# Patient Record
Sex: MALE | Race: WHITE | Employment: OTHER | URBAN - METROPOLITAN AREA
[De-identification: names, ages, dates, MRNs, and addresses within clinical notes are randomized per-mention and may not be internally consistent; named-entity substitution may affect disease eponyms.]

---

## 2024-02-27 ENCOUNTER — TELEPHONE (OUTPATIENT)
Dept: HEMATOLOGY | Age: 74
End: 2024-02-27

## 2024-02-27 ENCOUNTER — OFFICE VISIT (OUTPATIENT)
Dept: SURGERY | Age: 74
End: 2024-02-27
Payer: COMMERCIAL

## 2024-02-27 ENCOUNTER — HOSPITAL ENCOUNTER (INPATIENT)
Age: 74
LOS: 5 days | Discharge: HOME | DRG: 445 | End: 2024-03-03
Attending: TRANSPLANT SURGERY | Admitting: TRANSPLANT SURGERY
Payer: MEDICARE

## 2024-02-27 VITALS
HEIGHT: 73 IN | WEIGHT: 207 LBS | HEART RATE: 69 BPM | DIASTOLIC BLOOD PRESSURE: 50 MMHG | OXYGEN SATURATION: 98 % | TEMPERATURE: 98.2 F | BODY MASS INDEX: 27.43 KG/M2 | SYSTOLIC BLOOD PRESSURE: 121 MMHG | RESPIRATION RATE: 18 BRPM

## 2024-02-27 DIAGNOSIS — C22.1 CHOLANGIOCARCINOMA (HCC): Primary | ICD-10-CM

## 2024-02-27 DIAGNOSIS — C80.1 OBSTRUCTIVE JAUNDICE DUE TO MALIGNANT NEOPLASM (HCC): ICD-10-CM

## 2024-02-27 DIAGNOSIS — K83.1 OBSTRUCTIVE JAUNDICE DUE TO MALIGNANT NEOPLASM (HCC): ICD-10-CM

## 2024-02-27 LAB — GLUCOSE BLD-MCNC: 130 MG/DL (ref 74–99)

## 2024-02-27 PROCEDURE — 80053 COMPREHEN METABOLIC PANEL: CPT

## 2024-02-27 PROCEDURE — 99205 OFFICE O/P NEW HI 60 MIN: CPT | Performed by: TRANSPLANT SURGERY

## 2024-02-27 PROCEDURE — 6360000002 HC RX W HCPCS: Performed by: TRANSPLANT SURGERY

## 2024-02-27 PROCEDURE — 82962 GLUCOSE BLOOD TEST: CPT

## 2024-02-27 PROCEDURE — 86301 IMMUNOASSAY TUMOR CA 19-9: CPT

## 2024-02-27 PROCEDURE — 2580000003 HC RX 258

## 2024-02-27 PROCEDURE — 82378 CARCINOEMBRYONIC ANTIGEN: CPT

## 2024-02-27 PROCEDURE — 2580000003 HC RX 258: Performed by: TRANSPLANT SURGERY

## 2024-02-27 PROCEDURE — 36415 COLL VENOUS BLD VENIPUNCTURE: CPT

## 2024-02-27 PROCEDURE — 6370000000 HC RX 637 (ALT 250 FOR IP)

## 2024-02-27 PROCEDURE — 1200000000 HC SEMI PRIVATE

## 2024-02-27 PROCEDURE — 1123F ACP DISCUSS/DSCN MKR DOCD: CPT | Performed by: TRANSPLANT SURGERY

## 2024-02-27 RX ORDER — ONDANSETRON 2 MG/ML
4 INJECTION INTRAMUSCULAR; INTRAVENOUS EVERY 6 HOURS PRN
Status: DISCONTINUED | OUTPATIENT
Start: 2024-02-27 | End: 2024-03-03 | Stop reason: HOSPADM

## 2024-02-27 RX ORDER — ONDANSETRON 4 MG/1
4 TABLET, ORALLY DISINTEGRATING ORAL EVERY 8 HOURS PRN
Status: DISCONTINUED | OUTPATIENT
Start: 2024-02-27 | End: 2024-03-03 | Stop reason: HOSPADM

## 2024-02-27 RX ORDER — INSULIN LISPRO 100 [IU]/ML
0-8 INJECTION, SOLUTION INTRAVENOUS; SUBCUTANEOUS
Status: DISCONTINUED | OUTPATIENT
Start: 2024-02-27 | End: 2024-03-03 | Stop reason: HOSPADM

## 2024-02-27 RX ORDER — SODIUM CHLORIDE 0.9 % (FLUSH) 0.9 %
10 SYRINGE (ML) INJECTION EVERY 12 HOURS SCHEDULED
Status: DISCONTINUED | OUTPATIENT
Start: 2024-02-27 | End: 2024-03-03 | Stop reason: HOSPADM

## 2024-02-27 RX ORDER — CANDESARTAN CILEXETIL AND HYDROCHLOROTHIAZIDE 16; 12.5 MG/1; MG/1
1 TABLET ORAL DAILY
COMMUNITY

## 2024-02-27 RX ORDER — LABETALOL HYDROCHLORIDE 5 MG/ML
10 INJECTION, SOLUTION INTRAVENOUS
Status: DISCONTINUED | OUTPATIENT
Start: 2024-02-27 | End: 2024-03-03 | Stop reason: HOSPADM

## 2024-02-27 RX ORDER — HYDROXYZINE HYDROCHLORIDE 10 MG/1
10 TABLET, FILM COATED ORAL 3 TIMES DAILY PRN
Status: DISCONTINUED | OUTPATIENT
Start: 2024-02-27 | End: 2024-03-03 | Stop reason: HOSPADM

## 2024-02-27 RX ORDER — SODIUM CHLORIDE 0.9 % (FLUSH) 0.9 %
10 SYRINGE (ML) INJECTION PRN
Status: DISCONTINUED | OUTPATIENT
Start: 2024-02-27 | End: 2024-03-03 | Stop reason: HOSPADM

## 2024-02-27 RX ORDER — SODIUM CHLORIDE 9 MG/ML
INJECTION, SOLUTION INTRAVENOUS PRN
Status: DISCONTINUED | OUTPATIENT
Start: 2024-02-27 | End: 2024-03-03 | Stop reason: HOSPADM

## 2024-02-27 RX ORDER — PANTOPRAZOLE SODIUM 40 MG/1
40 TABLET, DELAYED RELEASE ORAL 2 TIMES DAILY
Qty: 60 TABLET | Refills: 5 | Status: SHIPPED | OUTPATIENT
Start: 2024-02-27

## 2024-02-27 RX ORDER — DEXTROSE MONOHYDRATE 100 MG/ML
INJECTION, SOLUTION INTRAVENOUS CONTINUOUS PRN
Status: DISCONTINUED | OUTPATIENT
Start: 2024-02-27 | End: 2024-03-03 | Stop reason: HOSPADM

## 2024-02-27 RX ORDER — INSULIN LISPRO 100 [IU]/ML
0-16 INJECTION, SOLUTION INTRAVENOUS; SUBCUTANEOUS
Status: DISCONTINUED | OUTPATIENT
Start: 2024-02-27 | End: 2024-02-27

## 2024-02-27 RX ORDER — GLUCAGON 1 MG/ML
1 KIT INJECTION PRN
Status: DISCONTINUED | OUTPATIENT
Start: 2024-02-27 | End: 2024-03-03 | Stop reason: HOSPADM

## 2024-02-27 RX ORDER — INSULIN LISPRO 100 [IU]/ML
0-4 INJECTION, SOLUTION INTRAVENOUS; SUBCUTANEOUS NIGHTLY
Status: DISCONTINUED | OUTPATIENT
Start: 2024-02-27 | End: 2024-02-27

## 2024-02-27 RX ORDER — HYDROXYZINE HYDROCHLORIDE 25 MG/1
50 TABLET, FILM COATED ORAL EVERY 8 HOURS PRN
Qty: 30 TABLET | Refills: 0 | Status: SHIPPED | OUTPATIENT
Start: 2024-02-27 | End: 2024-03-08

## 2024-02-27 RX ORDER — HYDRALAZINE HYDROCHLORIDE 20 MG/ML
10 INJECTION INTRAMUSCULAR; INTRAVENOUS
Status: DISCONTINUED | OUTPATIENT
Start: 2024-02-27 | End: 2024-03-03 | Stop reason: HOSPADM

## 2024-02-27 RX ORDER — PANTOPRAZOLE SODIUM 40 MG/1
40 TABLET, DELAYED RELEASE ORAL
Status: DISCONTINUED | OUTPATIENT
Start: 2024-02-28 | End: 2024-03-03 | Stop reason: HOSPADM

## 2024-02-27 RX ORDER — CANDESARTAN 16 MG/1
16 TABLET ORAL DAILY
COMMUNITY

## 2024-02-27 RX ORDER — PIOGLITAZONEHYDROCHLORIDE 30 MG/1
30 TABLET ORAL DAILY
COMMUNITY

## 2024-02-27 RX ORDER — ENOXAPARIN SODIUM 100 MG/ML
40 INJECTION SUBCUTANEOUS DAILY
Status: DISCONTINUED | OUTPATIENT
Start: 2024-02-28 | End: 2024-03-03 | Stop reason: HOSPADM

## 2024-02-27 RX ADMIN — SODIUM CHLORIDE, PRESERVATIVE FREE 10 ML: 5 INJECTION INTRAVENOUS at 22:59

## 2024-02-27 RX ADMIN — PIPERACILLIN AND TAZOBACTAM 4500 MG: 4; .5 INJECTION, POWDER, FOR SOLUTION INTRAVENOUS at 23:04

## 2024-02-27 RX ADMIN — ONDANSETRON 4 MG: 4 TABLET, ORALLY DISINTEGRATING ORAL at 23:00

## 2024-02-27 ASSESSMENT — ENCOUNTER SYMPTOMS
SHORTNESS OF BREATH: 0
CONSTIPATION: 0
ABDOMINAL PAIN: 0
NAUSEA: 0
BLOOD IN STOOL: 0
PHOTOPHOBIA: 0
BACK PAIN: 0
EYE DISCHARGE: 0
DIARRHEA: 0
EYE PAIN: 0
VOMITING: 0

## 2024-02-27 NOTE — PROGRESS NOTES
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  He has noted to have chronic pancreatitis with diabetes mellitus.  He has not smoked cigarettes in over 40 years and has not drank alcohol in over 30 years.  He presented to the hospital in [place] where he was noted to have obstructive jaundice.  He underwent an ERCP with brushings.  His bilirubin at that time was 14.  He did have placement of a plastic stent and was noted to have a tight common hepatic duct stricture.  His CA 19-9 at that time was 11,000.  On CT imaging he was noted to have bulky portal lymphadenopathy. His ERCP brushings were positive for adenocarcinoma.  He states that he has lost 30 pounds over the last 2 months and he also feels dizzy.  He is eating but is still very jaundiced.  He noticed that his stools are still feliz colored and his urine is still tea colored.  He is also having intermittent nausea and emesis and is mostly eating fruit.  He also states that he is sleeping more.  He has had diabetes mellitus for the last 5 to 7 years and states that his hemoglobin A1c was 8 but he was able to get it down to 5.3.  He does have a past surgical history of having a cholecystectomy for gangrenous cholecystitis in 2017 and was also noted to have a fatty liver at that time which was found under biopsy.  He also in 2016 had a [place] 6 prostate cancer and underwent brachytherapy.    Past Medical History:   Diagnosis Date    Cancer (HCC)        PMH: gallbladder and ankle surgeries    Current Outpatient Medications   Medication Sig Dispense Refill    candesartan (ATACAND) 16 MG tablet Take 1 tablet by mouth daily      metFORMIN (GLUCOPHAGE) 500 MG tablet Take 1 tablet by mouth

## 2024-02-27 NOTE — TELEPHONE ENCOUNTER
I spoke with BUNNY Velázquez from direct admitting. Dr Reynoso wanted patient to be direct admitted for today for SE[facility]. DX cholangiocarcinoma/obstructive jaundice. All info was given to BUNNY Velázquez while on the phone. I also spoke with the patients son tOf to let them know they will receive a call when a bed is ready from either our office or direct admitting, that they should not go to the hospital now. Otf confirmed all of the above and at this time all questions were answered  Electronically signed by Erika Escalante MA on 2/27/2024 at 10:18 AM

## 2024-02-28 ENCOUNTER — APPOINTMENT (OUTPATIENT)
Dept: CT IMAGING | Age: 74
DRG: 445 | End: 2024-02-28
Attending: TRANSPLANT SURGERY
Payer: MEDICARE

## 2024-02-28 ENCOUNTER — ANESTHESIA EVENT (OUTPATIENT)
Dept: ENDOSCOPY | Age: 74
End: 2024-02-28
Payer: MEDICARE

## 2024-02-28 PROBLEM — K83.1 OBSTRUCTIVE JAUNDICE DUE TO MALIGNANT NEOPLASM (HCC): Status: ACTIVE | Noted: 2024-02-28

## 2024-02-28 PROBLEM — C80.1 OBSTRUCTIVE JAUNDICE DUE TO MALIGNANT NEOPLASM (HCC): Status: ACTIVE | Noted: 2024-02-28

## 2024-02-28 PROBLEM — N17.9 AKI (ACUTE KIDNEY INJURY) (HCC): Status: ACTIVE | Noted: 2024-02-28

## 2024-02-28 LAB
ALBUMIN SERPL-MCNC: 2.8 G/DL (ref 3.5–5.2)
ALBUMIN SERPL-MCNC: 3.1 G/DL (ref 3.5–5.2)
ALP SERPL-CCNC: 298 U/L (ref 40–129)
ALP SERPL-CCNC: 338 U/L (ref 40–129)
ALT SERPL-CCNC: 197 U/L (ref 0–40)
ALT SERPL-CCNC: 219 U/L (ref 0–40)
ANION GAP SERPL CALCULATED.3IONS-SCNC: 10 MMOL/L (ref 7–16)
ANION GAP SERPL CALCULATED.3IONS-SCNC: 18 MMOL/L (ref 7–16)
ANION GAP SERPL CALCULATED.3IONS-SCNC: 20 MMOL/L (ref 7–16)
AST SERPL-CCNC: 195 U/L (ref 0–39)
AST SERPL-CCNC: 219 U/L (ref 0–39)
B-OH-BUTYR SERPL-MCNC: 0.14 MMOL/L (ref 0.02–0.27)
BASOPHILS # BLD: 0.05 K/UL (ref 0–0.2)
BASOPHILS NFR BLD: 1 % (ref 0–2)
BILIRUB SERPL-MCNC: 18.6 MG/DL (ref 0–1.2)
BILIRUB SERPL-MCNC: 21.5 MG/DL (ref 0–1.2)
BUN SERPL-MCNC: 62 MG/DL (ref 6–23)
BUN SERPL-MCNC: 74 MG/DL (ref 6–23)
BUN SERPL-MCNC: 76 MG/DL (ref 6–23)
CALCIUM SERPL-MCNC: 9 MG/DL (ref 8.6–10.2)
CALCIUM SERPL-MCNC: 9.1 MG/DL (ref 8.6–10.2)
CALCIUM SERPL-MCNC: 9.7 MG/DL (ref 8.6–10.2)
CEA SERPL-MCNC: 92.3 NG/ML (ref 0–5.2)
CHLORIDE SERPL-SCNC: 102 MMOL/L (ref 98–107)
CHLORIDE SERPL-SCNC: 104 MMOL/L (ref 98–107)
CHLORIDE SERPL-SCNC: 99 MMOL/L (ref 98–107)
CO2 SERPL-SCNC: 17 MMOL/L (ref 22–29)
CO2 SERPL-SCNC: 19 MMOL/L (ref 22–29)
CO2 SERPL-SCNC: 24 MMOL/L (ref 22–29)
CREAT SERPL-MCNC: 1.7 MG/DL (ref 0.7–1.2)
CREAT SERPL-MCNC: 1.8 MG/DL (ref 0.7–1.2)
CREAT SERPL-MCNC: 2 MG/DL (ref 0.7–1.2)
EOSINOPHIL # BLD: 0.03 K/UL (ref 0.05–0.5)
EOSINOPHILS RELATIVE PERCENT: 0 % (ref 0–6)
ERYTHROCYTE [DISTWIDTH] IN BLOOD BY AUTOMATED COUNT: 16 % (ref 11.5–15)
GFR SERPL CREATININE-BSD FRML MDRD: 35 ML/MIN/1.73M2
GFR SERPL CREATININE-BSD FRML MDRD: 39 ML/MIN/1.73M2
GFR SERPL CREATININE-BSD FRML MDRD: 42 ML/MIN/1.73M2
GLUCOSE BLD-MCNC: 107 MG/DL (ref 74–99)
GLUCOSE BLD-MCNC: 110 MG/DL (ref 74–99)
GLUCOSE BLD-MCNC: 122 MG/DL (ref 74–99)
GLUCOSE BLD-MCNC: 153 MG/DL (ref 74–99)
GLUCOSE SERPL-MCNC: 117 MG/DL (ref 74–99)
GLUCOSE SERPL-MCNC: 122 MG/DL (ref 74–99)
GLUCOSE SERPL-MCNC: 134 MG/DL (ref 74–99)
HCT VFR BLD AUTO: 29.4 % (ref 37–54)
HGB BLD-MCNC: 9.7 G/DL (ref 12.5–16.5)
IMM GRANULOCYTES # BLD AUTO: 0.22 K/UL (ref 0–0.58)
IMM GRANULOCYTES NFR BLD: 2 % (ref 0–5)
INR PPP: 1.3
LACTATE BLDV-SCNC: 1.9 MMOL/L (ref 0.5–2.2)
LYMPHOCYTES NFR BLD: 1.83 K/UL (ref 1.5–4)
LYMPHOCYTES RELATIVE PERCENT: 18 % (ref 20–42)
MCH RBC QN AUTO: 32.4 PG (ref 26–35)
MCHC RBC AUTO-ENTMCNC: 33 G/DL (ref 32–34.5)
MCV RBC AUTO: 98.3 FL (ref 80–99.9)
MONOCYTES NFR BLD: 0.84 K/UL (ref 0.1–0.95)
MONOCYTES NFR BLD: 8 % (ref 2–12)
NEUTROPHILS NFR BLD: 71 % (ref 43–80)
NEUTS SEG NFR BLD: 7.32 K/UL (ref 1.8–7.3)
PH VENOUS: 7.36 (ref 7.35–7.45)
PLATELET # BLD AUTO: 187 K/UL (ref 130–450)
PMV BLD AUTO: 11.2 FL (ref 7–12)
POTASSIUM SERPL-SCNC: 4.2 MMOL/L (ref 3.5–5)
POTASSIUM SERPL-SCNC: 4.2 MMOL/L (ref 3.5–5)
POTASSIUM SERPL-SCNC: 4.5 MMOL/L (ref 3.5–5)
PROT SERPL-MCNC: 6 G/DL (ref 6.4–8.3)
PROT SERPL-MCNC: 6.8 G/DL (ref 6.4–8.3)
PROTHROMBIN TIME: 14.1 SEC (ref 9.3–12.4)
RBC # BLD AUTO: 2.99 M/UL (ref 3.8–5.8)
SODIUM SERPL-SCNC: 136 MMOL/L (ref 132–146)
SODIUM SERPL-SCNC: 138 MMOL/L (ref 132–146)
SODIUM SERPL-SCNC: 139 MMOL/L (ref 132–146)
WBC OTHER # BLD: 10.3 K/UL (ref 4.5–11.5)

## 2024-02-28 PROCEDURE — 74175 CTA ABDOMEN W/CONTRAST: CPT

## 2024-02-28 PROCEDURE — 2580000003 HC RX 258: Performed by: STUDENT IN AN ORGANIZED HEALTH CARE EDUCATION/TRAINING PROGRAM

## 2024-02-28 PROCEDURE — 6360000004 HC RX CONTRAST MEDICATION: Performed by: RADIOLOGY

## 2024-02-28 PROCEDURE — 80053 COMPREHEN METABOLIC PANEL: CPT

## 2024-02-28 PROCEDURE — 99222 1ST HOSP IP/OBS MODERATE 55: CPT | Performed by: TRANSPLANT SURGERY

## 2024-02-28 PROCEDURE — 82800 BLOOD PH: CPT

## 2024-02-28 PROCEDURE — 82010 KETONE BODYS QUAN: CPT

## 2024-02-28 PROCEDURE — 6370000000 HC RX 637 (ALT 250 FOR IP)

## 2024-02-28 PROCEDURE — 71260 CT THORAX DX C+: CPT

## 2024-02-28 PROCEDURE — 6360000002 HC RX W HCPCS

## 2024-02-28 PROCEDURE — 99223 1ST HOSP IP/OBS HIGH 75: CPT | Performed by: NURSE PRACTITIONER

## 2024-02-28 PROCEDURE — 83605 ASSAY OF LACTIC ACID: CPT

## 2024-02-28 PROCEDURE — 1200000000 HC SEMI PRIVATE

## 2024-02-28 PROCEDURE — 2580000003 HC RX 258

## 2024-02-28 PROCEDURE — 80048 BASIC METABOLIC PNL TOTAL CA: CPT

## 2024-02-28 PROCEDURE — 2580000003 HC RX 258: Performed by: INTERNAL MEDICINE

## 2024-02-28 PROCEDURE — 85025 COMPLETE CBC W/AUTO DIFF WBC: CPT

## 2024-02-28 PROCEDURE — 36415 COLL VENOUS BLD VENIPUNCTURE: CPT

## 2024-02-28 PROCEDURE — 85610 PROTHROMBIN TIME: CPT

## 2024-02-28 PROCEDURE — 82962 GLUCOSE BLOOD TEST: CPT

## 2024-02-28 RX ORDER — SODIUM CHLORIDE, SODIUM LACTATE, POTASSIUM CHLORIDE, CALCIUM CHLORIDE 600; 310; 30; 20 MG/100ML; MG/100ML; MG/100ML; MG/100ML
INJECTION, SOLUTION INTRAVENOUS CONTINUOUS
Status: DISCONTINUED | OUTPATIENT
Start: 2024-02-28 | End: 2024-02-28

## 2024-02-28 RX ORDER — SODIUM CHLORIDE 0.9 % (FLUSH) 0.9 %
5-40 SYRINGE (ML) INJECTION EVERY 12 HOURS SCHEDULED
Status: CANCELLED | OUTPATIENT
Start: 2024-02-28

## 2024-02-28 RX ORDER — SODIUM CHLORIDE 9 MG/ML
25 INJECTION, SOLUTION INTRAVENOUS PRN
Status: CANCELLED | OUTPATIENT
Start: 2024-02-28

## 2024-02-28 RX ORDER — SODIUM CHLORIDE, SODIUM LACTATE, POTASSIUM CHLORIDE, AND CALCIUM CHLORIDE .6; .31; .03; .02 G/100ML; G/100ML; G/100ML; G/100ML
1000 INJECTION, SOLUTION INTRAVENOUS ONCE
Status: COMPLETED | OUTPATIENT
Start: 2024-02-28 | End: 2024-02-28

## 2024-02-28 RX ORDER — DEXTROSE, SODIUM CHLORIDE, SODIUM LACTATE, POTASSIUM CHLORIDE, AND CALCIUM CHLORIDE 5; .6; .31; .03; .02 G/100ML; G/100ML; G/100ML; G/100ML; G/100ML
INJECTION, SOLUTION INTRAVENOUS CONTINUOUS
Status: DISCONTINUED | OUTPATIENT
Start: 2024-02-28 | End: 2024-03-03

## 2024-02-28 RX ORDER — SODIUM CHLORIDE 0.9 % (FLUSH) 0.9 %
5-40 SYRINGE (ML) INJECTION PRN
Status: CANCELLED | OUTPATIENT
Start: 2024-02-28

## 2024-02-28 RX ADMIN — PIPERACILLIN AND TAZOBACTAM 4500 MG: 4; .5 INJECTION, POWDER, FOR SOLUTION INTRAVENOUS at 09:05

## 2024-02-28 RX ADMIN — ONDANSETRON 4 MG: 4 TABLET, ORALLY DISINTEGRATING ORAL at 09:51

## 2024-02-28 RX ADMIN — SODIUM CHLORIDE, POTASSIUM CHLORIDE, SODIUM LACTATE AND CALCIUM CHLORIDE: 600; 310; 30; 20 INJECTION, SOLUTION INTRAVENOUS at 02:00

## 2024-02-28 RX ADMIN — SODIUM CHLORIDE, PRESERVATIVE FREE 10 ML: 5 INJECTION INTRAVENOUS at 09:01

## 2024-02-28 RX ADMIN — Medication 10 ML: at 12:44

## 2024-02-28 RX ADMIN — PIPERACILLIN AND TAZOBACTAM 4500 MG: 4; .5 INJECTION, POWDER, FOR SOLUTION INTRAVENOUS at 16:44

## 2024-02-28 RX ADMIN — SODIUM CHLORIDE, POTASSIUM CHLORIDE, SODIUM LACTATE AND CALCIUM CHLORIDE 1000 ML: 600; 310; 30; 20 INJECTION, SOLUTION INTRAVENOUS at 06:51

## 2024-02-28 RX ADMIN — SODIUM CHLORIDE, PRESERVATIVE FREE 10 ML: 5 INJECTION INTRAVENOUS at 20:46

## 2024-02-28 RX ADMIN — ENOXAPARIN SODIUM 40 MG: 100 INJECTION SUBCUTANEOUS at 09:01

## 2024-02-28 RX ADMIN — IOPAMIDOL 75 ML: 755 INJECTION, SOLUTION INTRAVENOUS at 12:44

## 2024-02-28 RX ADMIN — SODIUM CHLORIDE, SODIUM LACTATE, POTASSIUM CHLORIDE, CALCIUM CHLORIDE AND DEXTROSE MONOHYDRATE: 5; 600; 310; 30; 20 INJECTION, SOLUTION INTRAVENOUS at 09:03

## 2024-02-28 RX ADMIN — PANTOPRAZOLE SODIUM 40 MG: 40 TABLET, DELAYED RELEASE ORAL at 06:08

## 2024-02-28 RX ADMIN — PANTOPRAZOLE SODIUM 40 MG: 40 TABLET, DELAYED RELEASE ORAL at 16:41

## 2024-02-28 NOTE — CONSULTS
Palliative Care Department  [phone]  Palliative Care Initial Consult  Provider Regina Crandall, ELDA - SHAWN    Raudel Herndon  [id number]  Hospital Day: 2  Date of Initial Consult: 2/27/2024  Referring Provider: Janny Bojorquez MD  Palliative Medicine was consulted for assistance with: Goals of care and overwhelming symptoms     HPI:   Raudel Herndon is a 73 y.o. with a medical history of recently diagnosed extrahepatic cholangiocarcinoma, diabetes and chronic pancreatitis who was admitted on 2/27/2024 from home with a CHIEF COMPLAINT of obstructive jaundice.  Patient was seen at hepatobiliary clinic 2/27 and was recommended admission for obstructive jaundice.  1/2024 patient was evaluated for obstructive jaundice and underwent ERCP with stent placement.  Brushings were obtained and pathology positive for adenocarcinoma.  Patient was admitted for further medical management and palliative medicine was consulted for goals of care and overwhelming symptoms.  ASSESSMENT/PLAN:     Pertinent Hospital Diagnoses     Extrahepatic cholangiocarcinoma    Palliative Care Encounter / Counseling Regarding Goals of Care  Please see detailed goals of care discussion as below  At this time, Raudel Herndon, Does have capacity for medical decision-making.  Capacity is time limited and situation/question specific  During encounter there was no surrogate medical decision-maker needed  Outcome of goals of care meeting:   Symptom control  Continue full code and all aggressive medical management   Code status Full Code  Advanced Directives: no POA or living will in epic  Surrogate/Legal NOK:  Otf Herndon (child) [phone]      Nausea:   -Continue Zofran 4 mg p.o. every 8 hours as needed or 4 mg IV every 6 hours as needed    Spiritual assessment: no spiritual distress identified  Bereavement and grief: to be determined  Referrals to: none today  SUBJECTIVE:     Current medical issues leading to Palliative Medicine involvement include   Active 
Department of Internal Medicine  Nephrology Nurse Practitioner Consult Note      Reason for Consult: NANCY  Requesting Physician:  Sourav Rodrigez MD     CHIEF COMPLAINT: Jaundice    History Obtained From:  patient, electronic medical record    HISTORY OF PRESENT ILLNESS:  Briefly Mr. Herndon is a 73-year-old  male with history of type II DM, chronic pancreatitis, recent diagnosed with obstructive jaundice due to extrahepatic cholangiocarcinoma status post stent placement in January 2024, elevated CEA 19-9, who was a direct admit on February 27 2024 after he was seen by Dr. Reynoso on his office.  On admission his creatinine level was 1.8 mg/dL, reason for this consultation.  Patient reports having persistent nausea with intermittent emesis.  He has lost significant weight since January.  His medications prior to admission included candesartan.  There is no baseline creatinine on records.    Past Medical History:        Diagnosis Date    Cancer (HCC)      Past Surgical History:    No past surgical history on file.  Current Medications:    Current Facility-Administered Medications: dextrose 5 % in lactated ringers infusion, , IntraVENous, Continuous  sodium chloride flush 0.9 % injection 10 mL, 10 mL, IntraVENous, 2 times per day  sodium chloride flush 0.9 % injection 10 mL, 10 mL, IntraVENous, PRN  0.9 % sodium chloride infusion, , IntraVENous, PRN  ondansetron (ZOFRAN-ODT) disintegrating tablet 4 mg, 4 mg, Oral, Q8H PRN **OR** ondansetron (ZOFRAN) injection 4 mg, 4 mg, IntraVENous, Q6H PRN  enoxaparin (LOVENOX) injection 40 mg, 40 mg, SubCUTAneous, Daily  pantoprazole (PROTONIX) tablet 40 mg, 40 mg, Oral, BID AC  hydrOXYzine HCl (ATARAX) tablet 10 mg, 10 mg, Oral, TID PRN  piperacillin-tazobactam (ZOSYN) 4,500 mg in sodium chloride 0.9 % 100 mL IVPB (Rzvr0Ohb), 4,500 mg, IntraVENous, Q8H  labetalol (NORMODYNE;TRANDATE) injection 10 mg, 10 mg, IntraVENous, Q2H PRN  hydrALAZINE (APRESOLINE) injection 10 
[facility]  Hematology/Oncology  Consult      Patient Name: Raudel Herndon  YOB: 1950  PCP: [facility]   Referring Provider: [address] [place] [address] / [facility] [id number]     Reason for Consultation: No chief complaint on file.       History of Present Illness: This is a 73-year-old male patient with a PMH of prostate cancer [place] score 6 s/p brachytherapy in 2016, DM, s/p cholecystectomy for gangrenous cholecystitis in 2017, fatty liver diagnosed on biopsy, and chronic pancreatitis who presented to the ED for evaluation obstructive jaundice after seen by Dr Oh in his office yesterday as he is following for newly diagnosed with extrahepatic cholangiocarcinoma with local camron metastasis. (Minimum T2 with N1/N2).  Patient initially presented to [facility] January 2024 for evaluation of obstructive jaundice and underwent an ERCP with plastic stent placement at that time with noted tight common hepatic duct stricture. ERCP brushings were obtained with pathology positive for adenocarcinoma. CA 19-9 at that time was 11,000 and bilirubin was 14. He also reported 30 lb weight loss in the past 2 months with an additional 10 to 15 in the past couple of weeks since diagnosis. HPBS consulted since admission with CTA pancreas protocol and CT chest ordered. CEA 92.3; Ca19-9 pending.  It was discussed that he does need neoadjuvant chemotherapy as long as everything remains confined to his bile duct and lymph nodes.  Due to his lymph node metastasis and with a positive tissue diagnosis of adenocarcinoma he would strongly benefit from chemotherapy prior to surgical intervention. GI consulted for ERCP. Pending his biliary decompression he may need a PTHC if that is unsuccessful. ERCP scheduled tomorrow. Palliative following. CMP with BUN 74, Cr 2.0, GFR 35. LFT's ALK Phos 298, , , bilit total 18.6 (21.5 yesterday). CBC with hgb 9.7; ANC 7.32 otherwise unremarkable. 
MD   pioglitazone (ACTOS) 30 MG tablet Take 1 tablet by mouth daily    Provider, MD Kwadwo   pantoprazole (PROTONIX) 40 MG tablet Take 1 tablet by mouth in the morning and at bedtime 2/27/24   Raza Reynoso III, MD   hydrOXYzine HCl (ATARAX) 25 MG tablet Take 2 tablets by mouth every 8 hours as needed for Itching 2/27/24 3/8/24  Raza Reynoso III, MD       ALLERGIES:  Adhesive tape    SOCIAL Hx:  Social History     Socioeconomic History    Marital status:      Spouse name: Not on file    Number of children: Not on file    Years of education: Not on file    Highest education level: Not on file   Occupational History    Not on file   Tobacco Use    Smoking status: Former     Types: Cigarettes    Smokeless tobacco: Not on file   Vaping Use    Vaping Use: Never used   Substance and Sexual Activity    Alcohol use: Not Currently    Drug use: Never    Sexual activity: Not on file   Other Topics Concern    Not on file   Social History Narrative    Not on file     Social Determinants of Health     Financial Resource Strain: Not on file   Food Insecurity: No Food Insecurity (2/27/2024)    Hunger Vital Sign     Worried About Running Out of Food in the Last Year: Never true     Ran Out of Food in the Last Year: Never true   Transportation Needs: No Transportation Needs (2/27/2024)    PRAPARE - Transportation     Lack of Transportation (Medical): No     Lack of Transportation (Non-Medical): No   Physical Activity: Not on file   Stress: Not on file   Social Connections: Not on file   Intimate Partner Violence: Not on file   Housing Stability: Low Risk  (2/27/2024)    Housing Stability Vital Sign     Unable to Pay for Housing in the Last Year: No     Number of Places Lived in the Last Year: 2     Unstable Housing in the Last Year: No       PE:  BP (!) 108/57   Pulse 58   Temp 97.4 °F (36.3 °C) (Temporal)   Resp 18   Ht 1.867 m (6' 1.5\")   Wt 93.1 kg (205 lb 3.2 oz)   SpO2 100%   BMI

## 2024-02-28 NOTE — PLAN OF CARE
Problem: Discharge Planning  Goal: Discharge to home or other facility with appropriate resources  Outcome: Progressing     Problem: Safety - Adult  Goal: Free from fall injury  Outcome: Progressing  Flowsheets (Taken 2/27/2024 2203)  Free From Fall Injury: Instruct family/caregiver on patient safety     Problem: ABCDS Injury Assessment  Goal: Absence of physical injury  Outcome: Progressing  Flowsheets (Taken 2/27/2024 2203)  Absence of Physical Injury: Implement safety measures based on patient assessment

## 2024-02-28 NOTE — PLAN OF CARE
Problem: Discharge Planning  Goal: Discharge to home or other facility with appropriate resources  2/28/2024 1024 by Leny Leblanc RN  Outcome: Progressing  2/28/2024 0247 by Lashaun Buckner RN  Outcome: Progressing     Problem: Safety - Adult  Goal: Free from fall injury  2/28/2024 1024 by Leny Leblanc RN  Outcome: Progressing  2/28/2024 0247 by Lashaun Buckner RN  Outcome: Progressing  Flowsheets (Taken 2/27/2024 2203)  Free From Fall Injury: Instruct family/caregiver on patient safety     Problem: ABCDS Injury Assessment  Goal: Absence of physical injury  2/28/2024 1024 by Leny Leblanc RN  Outcome: Progressing  2/28/2024 0247 by Lashaun Buckner RN  Outcome: Progressing  Flowsheets (Taken 2/27/2024 2203)  Absence of Physical Injury: Implement safety measures based on patient assessment

## 2024-02-28 NOTE — CARE COORDINATION
2/28/2024Social work transition of care planning  Pt is from home with his son. Pt reported that he use a cane prn.Pt pcp is [facility] and pharmacy is [facility] in [place], [place]. Explained sw role in transition of care planning. Pt plan is home,pt will call for a ride at [place].  Electronically signed by RAMYA Interiano on 2/28/2024 at 10:50 AM

## 2024-02-29 ENCOUNTER — APPOINTMENT (OUTPATIENT)
Dept: GENERAL RADIOLOGY | Age: 74
DRG: 445 | End: 2024-02-29
Attending: TRANSPLANT SURGERY
Payer: MEDICARE

## 2024-02-29 ENCOUNTER — ANESTHESIA (OUTPATIENT)
Dept: ENDOSCOPY | Age: 74
End: 2024-02-29
Payer: MEDICARE

## 2024-02-29 PROBLEM — E44.0 MODERATE PROTEIN-CALORIE MALNUTRITION (HCC): Status: ACTIVE | Noted: 2024-02-29

## 2024-02-29 LAB
ALBUMIN SERPL-MCNC: 2.5 G/DL (ref 3.5–5.2)
ALP SERPL-CCNC: 285 U/L (ref 40–129)
ALT SERPL-CCNC: 187 U/L (ref 0–40)
ANION GAP SERPL CALCULATED.3IONS-SCNC: 11 MMOL/L (ref 7–16)
AST SERPL-CCNC: 196 U/L (ref 0–39)
BASOPHILS # BLD: 0.05 K/UL (ref 0–0.2)
BASOPHILS NFR BLD: 1 % (ref 0–2)
BILIRUB SERPL-MCNC: 17.6 MG/DL (ref 0–1.2)
BUN SERPL-MCNC: 52 MG/DL (ref 6–23)
CALCIUM SERPL-MCNC: 8.7 MG/DL (ref 8.6–10.2)
CANCER AG19-9 SERPL IA-ACNC: ABNORMAL U/ML (ref 0–35)
CHLORIDE SERPL-SCNC: 108 MMOL/L (ref 98–107)
CO2 SERPL-SCNC: 22 MMOL/L (ref 22–29)
CREAT SERPL-MCNC: 1.7 MG/DL (ref 0.7–1.2)
EOSINOPHIL # BLD: 0.21 K/UL (ref 0.05–0.5)
EOSINOPHILS RELATIVE PERCENT: 3 % (ref 0–6)
ERYTHROCYTE [DISTWIDTH] IN BLOOD BY AUTOMATED COUNT: 15.9 % (ref 11.5–15)
GFR SERPL CREATININE-BSD FRML MDRD: 43 ML/MIN/1.73M2
GLUCOSE BLD-MCNC: 117 MG/DL (ref 74–99)
GLUCOSE BLD-MCNC: 169 MG/DL (ref 74–99)
GLUCOSE BLD-MCNC: 95 MG/DL (ref 74–99)
GLUCOSE SERPL-MCNC: 114 MG/DL (ref 74–99)
HCT VFR BLD AUTO: 29.3 % (ref 37–54)
HGB BLD-MCNC: 9.7 G/DL (ref 12.5–16.5)
IMM GRANULOCYTES # BLD AUTO: 0.15 K/UL (ref 0–0.58)
IMM GRANULOCYTES NFR BLD: 2 % (ref 0–5)
LYMPHOCYTES NFR BLD: 1.33 K/UL (ref 1.5–4)
LYMPHOCYTES RELATIVE PERCENT: 17 % (ref 20–42)
MAGNESIUM SERPL-MCNC: 2.2 MG/DL (ref 1.6–2.6)
MCH RBC QN AUTO: 32.6 PG (ref 26–35)
MCHC RBC AUTO-ENTMCNC: 33.1 G/DL (ref 32–34.5)
MCV RBC AUTO: 98.3 FL (ref 80–99.9)
MONOCYTES NFR BLD: 0.76 K/UL (ref 0.1–0.95)
MONOCYTES NFR BLD: 10 % (ref 2–12)
NEUTROPHILS NFR BLD: 68 % (ref 43–80)
NEUTS SEG NFR BLD: 5.37 K/UL (ref 1.8–7.3)
PHOSPHATE SERPL-MCNC: 2.5 MG/DL (ref 2.5–4.5)
PLATELET # BLD AUTO: 176 K/UL (ref 130–450)
PMV BLD AUTO: 11.1 FL (ref 7–12)
POTASSIUM SERPL-SCNC: 4 MMOL/L (ref 3.5–5)
PROT SERPL-MCNC: 5.5 G/DL (ref 6.4–8.3)
RBC # BLD AUTO: 2.98 M/UL (ref 3.8–5.8)
SODIUM SERPL-SCNC: 141 MMOL/L (ref 132–146)
WBC OTHER # BLD: 7.9 K/UL (ref 4.5–11.5)

## 2024-02-29 PROCEDURE — C2625 STENT, NON-COR, TEM W/DEL SY: HCPCS | Performed by: INTERNAL MEDICINE

## 2024-02-29 PROCEDURE — 83735 ASSAY OF MAGNESIUM: CPT

## 2024-02-29 PROCEDURE — 99232 SBSQ HOSP IP/OBS MODERATE 35: CPT | Performed by: TRANSPLANT SURGERY

## 2024-02-29 PROCEDURE — 3700000000 HC ANESTHESIA ATTENDED CARE: Performed by: INTERNAL MEDICINE

## 2024-02-29 PROCEDURE — C1876 STENT, NON-COA/NON-COV W/DEL: HCPCS | Performed by: INTERNAL MEDICINE

## 2024-02-29 PROCEDURE — 3609018800 HC ERCP DX COLLECTION SPECIMEN BRUSHING/WASHING: Performed by: INTERNAL MEDICINE

## 2024-02-29 PROCEDURE — 3609015100 HC ERCP STENT PLACEMENT BILIARY/PANCREATIC DUCT: Performed by: INTERNAL MEDICINE

## 2024-02-29 PROCEDURE — 2580000003 HC RX 258: Performed by: NURSE ANESTHETIST, CERTIFIED REGISTERED

## 2024-02-29 PROCEDURE — 1200000000 HC SEMI PRIVATE

## 2024-02-29 PROCEDURE — 82962 GLUCOSE BLOOD TEST: CPT

## 2024-02-29 PROCEDURE — 2580000003 HC RX 258

## 2024-02-29 PROCEDURE — 3700000001 HC ADD 15 MINUTES (ANESTHESIA): Performed by: INTERNAL MEDICINE

## 2024-02-29 PROCEDURE — 2709999900 HC NON-CHARGEABLE SUPPLY: Performed by: INTERNAL MEDICINE

## 2024-02-29 PROCEDURE — 6360000002 HC RX W HCPCS

## 2024-02-29 PROCEDURE — 2580000003 HC RX 258: Performed by: INTERNAL MEDICINE

## 2024-02-29 PROCEDURE — 3609014300 HC ERCP BALLOON DILATE BILIARY/PANC DUCT/AMPULLA EA: Performed by: INTERNAL MEDICINE

## 2024-02-29 PROCEDURE — 3609015000 HC ERCP REMOVE FOREIGN BODY/STENT BILIARY/PANC DUCT: Performed by: INTERNAL MEDICINE

## 2024-02-29 PROCEDURE — 6370000000 HC RX 637 (ALT 250 FOR IP): Performed by: STUDENT IN AN ORGANIZED HEALTH CARE EDUCATION/TRAINING PROGRAM

## 2024-02-29 PROCEDURE — 0FPB8DZ REMOVAL OF INTRALUMINAL DEVICE FROM HEPATOBILIARY DUCT, VIA NATURAL OR ARTIFICIAL OPENING ENDOSCOPIC: ICD-10-PCS | Performed by: INTERNAL MEDICINE

## 2024-02-29 PROCEDURE — 85025 COMPLETE CBC W/AUTO DIFF WBC: CPT

## 2024-02-29 PROCEDURE — 84100 ASSAY OF PHOSPHORUS: CPT

## 2024-02-29 PROCEDURE — 99232 SBSQ HOSP IP/OBS MODERATE 35: CPT | Performed by: NURSE PRACTITIONER

## 2024-02-29 PROCEDURE — 0F798DZ DILATION OF COMMON BILE DUCT WITH INTRALUMINAL DEVICE, VIA NATURAL OR ARTIFICIAL OPENING ENDOSCOPIC: ICD-10-PCS | Performed by: INTERNAL MEDICINE

## 2024-02-29 PROCEDURE — 7100000000 HC PACU RECOVERY - FIRST 15 MIN: Performed by: INTERNAL MEDICINE

## 2024-02-29 PROCEDURE — 6370000000 HC RX 637 (ALT 250 FOR IP)

## 2024-02-29 PROCEDURE — 74330 X-RAY BILE/PANC ENDOSCOPY: CPT

## 2024-02-29 PROCEDURE — 2580000003 HC RX 258: Performed by: NURSE PRACTITIONER

## 2024-02-29 PROCEDURE — 7100000001 HC PACU RECOVERY - ADDTL 15 MIN: Performed by: INTERNAL MEDICINE

## 2024-02-29 PROCEDURE — 6360000002 HC RX W HCPCS: Performed by: NURSE ANESTHETIST, CERTIFIED REGISTERED

## 2024-02-29 PROCEDURE — 36415 COLL VENOUS BLD VENIPUNCTURE: CPT

## 2024-02-29 PROCEDURE — 2720000010 HC SURG SUPPLY STERILE: Performed by: INTERNAL MEDICINE

## 2024-02-29 PROCEDURE — 80053 COMPREHEN METABOLIC PANEL: CPT

## 2024-02-29 DEVICE — BILIARY STENT WITH RX DELIVERY SYSTEM
Type: IMPLANTABLE DEVICE | Site: BILE DUCT | Status: FUNCTIONAL
Brand: RX BILIARY

## 2024-02-29 DEVICE — STENT SYSTEM
Type: IMPLANTABLE DEVICE | Site: BILE DUCT | Status: FUNCTIONAL
Brand: WALLFLEX™ BILIARY

## 2024-02-29 RX ORDER — MIDAZOLAM HYDROCHLORIDE 1 MG/ML
INJECTION INTRAMUSCULAR; INTRAVENOUS PRN
Status: DISCONTINUED | OUTPATIENT
Start: 2024-02-29 | End: 2024-02-29 | Stop reason: SDUPTHER

## 2024-02-29 RX ORDER — INDOMETHACIN 100 MG
100 SUPPOSITORY, RECTAL RECTAL ONCE
Status: DISCONTINUED | OUTPATIENT
Start: 2024-02-29 | End: 2024-03-03 | Stop reason: HOSPADM

## 2024-02-29 RX ORDER — POLYETHYLENE GLYCOL 3350 17 G/17G
17 POWDER, FOR SOLUTION ORAL DAILY
Status: DISCONTINUED | OUTPATIENT
Start: 2024-02-29 | End: 2024-03-03 | Stop reason: HOSPADM

## 2024-02-29 RX ORDER — SODIUM CHLORIDE, SODIUM LACTATE, POTASSIUM CHLORIDE, AND CALCIUM CHLORIDE .6; .31; .03; .02 G/100ML; G/100ML; G/100ML; G/100ML
800 INJECTION, SOLUTION INTRAVENOUS ONCE
Status: COMPLETED | OUTPATIENT
Start: 2024-02-29 | End: 2024-02-29

## 2024-02-29 RX ORDER — ONDANSETRON 2 MG/ML
INJECTION INTRAMUSCULAR; INTRAVENOUS PRN
Status: DISCONTINUED | OUTPATIENT
Start: 2024-02-29 | End: 2024-02-29 | Stop reason: SDUPTHER

## 2024-02-29 RX ORDER — PROPOFOL 10 MG/ML
INJECTION, EMULSION INTRAVENOUS CONTINUOUS PRN
Status: DISCONTINUED | OUTPATIENT
Start: 2024-02-29 | End: 2024-02-29 | Stop reason: SDUPTHER

## 2024-02-29 RX ORDER — GLYCOPYRROLATE 0.2 MG/ML
INJECTION INTRAMUSCULAR; INTRAVENOUS PRN
Status: DISCONTINUED | OUTPATIENT
Start: 2024-02-29 | End: 2024-02-29 | Stop reason: SDUPTHER

## 2024-02-29 RX ORDER — SENNA AND DOCUSATE SODIUM 50; 8.6 MG/1; MG/1
2 TABLET, FILM COATED ORAL 2 TIMES DAILY
Status: DISCONTINUED | OUTPATIENT
Start: 2024-02-29 | End: 2024-03-03 | Stop reason: HOSPADM

## 2024-02-29 RX ORDER — SODIUM CHLORIDE, SODIUM LACTATE, POTASSIUM CHLORIDE, CALCIUM CHLORIDE 600; 310; 30; 20 MG/100ML; MG/100ML; MG/100ML; MG/100ML
INJECTION, SOLUTION INTRAVENOUS CONTINUOUS PRN
Status: DISCONTINUED | OUTPATIENT
Start: 2024-02-29 | End: 2024-02-29 | Stop reason: SDUPTHER

## 2024-02-29 RX ORDER — FENTANYL CITRATE 50 UG/ML
INJECTION, SOLUTION INTRAMUSCULAR; INTRAVENOUS PRN
Status: DISCONTINUED | OUTPATIENT
Start: 2024-02-29 | End: 2024-02-29 | Stop reason: SDUPTHER

## 2024-02-29 RX ADMIN — SODIUM CHLORIDE, POTASSIUM CHLORIDE, SODIUM LACTATE AND CALCIUM CHLORIDE: 600; 310; 30; 20 INJECTION, SOLUTION INTRAVENOUS at 17:00

## 2024-02-29 RX ADMIN — PANTOPRAZOLE SODIUM 40 MG: 40 TABLET, DELAYED RELEASE ORAL at 06:27

## 2024-02-29 RX ADMIN — FENTANYL CITRATE 50 MCG: 50 INJECTION, SOLUTION INTRAMUSCULAR; INTRAVENOUS at 17:06

## 2024-02-29 RX ADMIN — ONDANSETRON 4 MG: 2 INJECTION INTRAMUSCULAR; INTRAVENOUS at 17:16

## 2024-02-29 RX ADMIN — PIPERACILLIN AND TAZOBACTAM 4500 MG: 4; .5 INJECTION, POWDER, FOR SOLUTION INTRAVENOUS at 01:38

## 2024-02-29 RX ADMIN — GLYCOPYRROLATE 0.2 MG: 1 INJECTION INTRAMUSCULAR; INTRAVENOUS at 17:15

## 2024-02-29 RX ADMIN — SODIUM CHLORIDE, SODIUM LACTATE, POTASSIUM CHLORIDE, CALCIUM CHLORIDE AND DEXTROSE MONOHYDRATE: 5; 600; 310; 30; 20 INJECTION, SOLUTION INTRAVENOUS at 01:36

## 2024-02-29 RX ADMIN — SODIUM CHLORIDE, PRESERVATIVE FREE 10 ML: 5 INJECTION INTRAVENOUS at 20:24

## 2024-02-29 RX ADMIN — ONDANSETRON 4 MG: 2 INJECTION INTRAMUSCULAR; INTRAVENOUS at 23:37

## 2024-02-29 RX ADMIN — MIDAZOLAM 1 MG: 1 INJECTION INTRAMUSCULAR; INTRAVENOUS at 17:06

## 2024-02-29 RX ADMIN — PIPERACILLIN AND TAZOBACTAM 4500 MG: 4; .5 INJECTION, POWDER, FOR SOLUTION INTRAVENOUS at 20:24

## 2024-02-29 RX ADMIN — SENNOSIDES AND DOCUSATE SODIUM 2 TABLET: 8.6; 5 TABLET ORAL at 09:20

## 2024-02-29 RX ADMIN — PIPERACILLIN AND TAZOBACTAM 4500 MG: 4; .5 INJECTION, POWDER, FOR SOLUTION INTRAVENOUS at 09:42

## 2024-02-29 RX ADMIN — ENOXAPARIN SODIUM 40 MG: 100 INJECTION SUBCUTANEOUS at 09:16

## 2024-02-29 RX ADMIN — PROPOFOL 100 MCG/KG/MIN: 10 INJECTION, EMULSION INTRAVENOUS at 17:06

## 2024-02-29 RX ADMIN — SODIUM CHLORIDE, PRESERVATIVE FREE 10 ML: 5 INJECTION INTRAVENOUS at 09:23

## 2024-02-29 RX ADMIN — SENNOSIDES AND DOCUSATE SODIUM 2 TABLET: 8.6; 5 TABLET ORAL at 20:15

## 2024-02-29 RX ADMIN — SODIUM CHLORIDE, POTASSIUM CHLORIDE, SODIUM LACTATE AND CALCIUM CHLORIDE 800 ML: 600; 310; 30; 20 INJECTION, SOLUTION INTRAVENOUS at 14:05

## 2024-02-29 ASSESSMENT — PAIN - FUNCTIONAL ASSESSMENT: PAIN_FUNCTIONAL_ASSESSMENT: NONE - DENIES PAIN

## 2024-02-29 NOTE — PLAN OF CARE
Problem: Discharge Planning  Goal: Discharge to home or other facility with appropriate resources  Outcome: Progressing     Problem: Safety - Adult  Goal: Free from fall injury  Outcome: Progressing  Flowsheets (Taken 2/28/2024 2000)  Free From Fall Injury: Instruct family/caregiver on patient safety     Problem: ABCDS Injury Assessment  Goal: Absence of physical injury  Outcome: Progressing  Flowsheets (Taken 2/28/2024 2000)  Absence of Physical Injury: Implement safety measures based on patient assessment      PAST MEDICAL HISTORY:  Diabetes     DVT (deep venous thrombosis)     Factor V Leiden     HTN (hypertension)

## 2024-02-29 NOTE — CARE COORDINATION
2/29/2024Social work transition of care planning  Pt plan is home,once medically stable. On Iv atb,Id not consulted. Sw will follow. Pt will call for a ride at [place].  Electronically signed by RAMYA Interiano on 2/29/2024 at 8:24 AM

## 2024-02-29 NOTE — PLAN OF CARE
Problem: Discharge Planning  Goal: Discharge to home or other facility with appropriate resources  2/29/2024 0959 by Leny Leblanc RN  Outcome: Progressing  2/29/2024 0054 by Lashaun Buckner RN  Outcome: Progressing     Problem: Safety - Adult  Goal: Free from fall injury  2/29/2024 0959 by Leny Leblanc RN  Outcome: Progressing  2/29/2024 0054 by Lashaun Buckner RN  Outcome: Progressing  Flowsheets (Taken 2/28/2024 2000)  Free From Fall Injury: Instruct family/caregiver on patient safety     Problem: ABCDS Injury Assessment  Goal: Absence of physical injury  2/29/2024 0959 by Leny Leblanc RN  Outcome: Progressing  2/29/2024 0054 by Lashaun Buckner RN  Outcome: Progressing  Flowsheets (Taken 2/28/2024 2000)  Absence of Physical Injury: Implement safety measures based on patient assessment

## 2024-02-29 NOTE — ANESTHESIA POSTPROCEDURE EVALUATION
Department of Anesthesiology  Postprocedure Note    Patient: Raudel Herndon  MRN: [id number]  YOB: 1950  Date of evaluation: 2/29/2024    Procedure Summary       Date: 02/29/24 Room / Location: [place] [address] / [facility]    Anesthesia Start: 1700 Anesthesia Stop:     Procedures:       ENDOSCOPIC RETROGRADE CHOLANGIOPANCREATOGRAPHY      ENDOSCOPIC RETROGRADE CHOLANGIOPANCREATOGRAPHY DILATION BALLOON      ENDOSCOPIC RETROGRADE CHOLANGIOPANCREATOGRAPHY STENT REMOVAL      ENDOSCOPIC RETROGRADE CHOLANGIOPANCREATOGRAPHY STENT INSERTION Diagnosis:       Choledocholithiasis      (Choledocholithiasis [K80.50])    Surgeons: Raza Lane DO Responsible Provider: Shobha Nguyen MD    Anesthesia Type: MAC ASA Status: 3            Anesthesia Type: No value filed.    Andrews Phase I: Andrews Score: 9    Andrews Phase II:      Anesthesia Post Evaluation    Patient location during evaluation: PACU  Patient participation: complete - patient participated  Level of consciousness: awake  Airway patency: patent  Nausea & Vomiting: no nausea and no vomiting  Cardiovascular status: hemodynamically stable  Respiratory status: acceptable  Hydration status: euvolemic  Pain management: adequate    No notable events documented.

## 2024-02-29 NOTE — BRIEF OP NOTE
Brief Postoperative Note      Patient: Raudel Herndon  YOB: 1950  MRN: [id number]    Date of Procedure: 2/29/2024    Pre-Op Diagnosis: Obstructive jaundice, cholangiocarcinoma    Post-Op Diagnosis: Biliary stricture, biliary stent removal and replacement       Procedure(s):  ENDOSCOPIC RETROGRADE CHOLANGIOPANCREATOGRAPHY  ENDOSCOPIC RETROGRADE CHOLANGIOPANCREATOGRAPHY DILATION BALLOON  ENDOSCOPIC RETROGRADE CHOLANGIOPANCREATOGRAPHY STENT REMOVAL  ENDOSCOPIC RETROGRADE CHOLANGIOPANCREATOGRAPHY STENT INSERTION    Surgeon(s):  Raza Lane DO    Assistant:  * No surgical staff found *    Anesthesia: Monitor Anesthesia Care    Estimated Blood Loss (mL): Minimal    Complications: None    Specimens:   * No specimens in log *    Implants:  Implant Name Type Inv. Item Serial No.  Lot No. LRB No. Used Action   STENT BILI L80MM [id number] CATH L194CM 8FR GWIRE 0.035IN MTL - [id number]  STENT BILI L80MM [id number] CATH L194CM 8FR GWIRE 0.035IN [facility] [id number] [employer]- [id number] N/A 1 Implanted   STENT BILI AD 7FR L10CM 0.035IN PLAS DUODENAL BEND - [id number]  STENT BILI AD 7FR L10CM 0.035IN PLAS DUODENAL BEND  [employer] [employer]- [id number] N/A 1 Implanted      Findings:   -Biliary stent removal   -High grade common hepatic duct stricture, with significant upstream dilation  -diffusely narrowed intrahepatic ducts  -Biliary stent placement uncovered 10mm x 80mm MAXINE with traversing 7F x 10cm plastic barbed duodenal bend.       - Full procedure note to follow    Electronically signed by Raza Lane DO on 2/29/2024 at 5:53 PM

## 2024-02-29 NOTE — ANESTHESIA PRE PROCEDURE
History:        Diagnosis Date    Cancer (HCC)        Past Surgical History:  No past surgical history on file.    Social History:    Social History     Tobacco Use    Smoking status: Former     Types: Cigarettes    Smokeless tobacco: Not on file   Substance Use Topics    Alcohol use: Not Currently                                Counseling given: Not Answered      Vital Signs (Current):   Vitals:    02/28/24 2000 02/29/24 0730 02/29/24 0957 02/29/24 1200   BP: (!) 109/57 (!) 100/59  (!) 96/54   Pulse: 67 62  68   Resp: 17 18  18   Temp: 36.3 °C (97.4 °F) 36.4 °C (97.5 °F)  36.2 °C (97.2 °F)   TempSrc: Temporal Temporal  Temporal   SpO2: 97% 99%  100%   Weight:   95 kg (209 lb 7 oz)    Height:   1.867 m (6' 1.5\")                                               BP Readings from Last 3 Encounters:   02/29/24 (!) 96/54   02/27/24 (!) 121/50       NPO Status: Time of last liquid consumption: 2300                        Time of last solid consumption: 2300                        Date of last liquid consumption: 02/28/24                        Date of last solid food consumption: 02/28/24    BMI:   Wt Readings from Last 3 Encounters:   02/29/24 95 kg (209 lb 7 oz)   02/27/24 93.9 kg (207 lb)     Body mass index is 27.25 kg/m².    CBC:   Lab Results   Component Value Date/Time    WBC 7.9 02/29/2024 04:56 AM    RBC 2.98 02/29/2024 04:56 AM    HGB 9.7 02/29/2024 04:56 AM    HCT 29.3 02/29/2024 04:56 AM    MCV 98.3 02/29/2024 04:56 AM    RDW 15.9 02/29/2024 04:56 AM     02/29/2024 04:56 AM       CMP:   Lab Results   Component Value Date/Time     02/29/2024 04:56 AM    K 4.0 02/29/2024 04:56 AM     02/29/2024 04:56 AM    CO2 22 02/29/2024 04:56 AM    BUN 52 02/29/2024 04:56 AM    CREATININE 1.7 02/29/2024 04:56 AM    LABGLOM 43 02/29/2024 04:56 AM    GLUCOSE 114 02/29/2024 04:56 AM    PROT 5.5 02/29/2024 04:56 AM    CALCIUM 8.7 02/29/2024 04:56 AM    BILITOT 17.6 02/29/2024 04:56 AM    ALKPHOS 285 02/29/2024  10

## 2024-03-01 LAB
ALBUMIN SERPL-MCNC: 2.6 G/DL (ref 3.5–5.2)
ALP SERPL-CCNC: 274 U/L (ref 40–129)
ALT SERPL-CCNC: 180 U/L (ref 0–40)
ANION GAP SERPL CALCULATED.3IONS-SCNC: 13 MMOL/L (ref 7–16)
AST SERPL-CCNC: 196 U/L (ref 0–39)
BASOPHILS # BLD: 0.04 K/UL (ref 0–0.2)
BASOPHILS NFR BLD: 1 % (ref 0–2)
BILIRUB SERPL-MCNC: 16.1 MG/DL (ref 0–1.2)
BUN SERPL-MCNC: 42 MG/DL (ref 6–23)
CALCIUM SERPL-MCNC: 8.5 MG/DL (ref 8.6–10.2)
CHLORIDE SERPL-SCNC: 106 MMOL/L (ref 98–107)
CO2 SERPL-SCNC: 22 MMOL/L (ref 22–29)
CREAT SERPL-MCNC: 1.6 MG/DL (ref 0.7–1.2)
EOSINOPHIL # BLD: 0.13 K/UL (ref 0.05–0.5)
EOSINOPHILS RELATIVE PERCENT: 2 % (ref 0–6)
ERYTHROCYTE [DISTWIDTH] IN BLOOD BY AUTOMATED COUNT: 16 % (ref 11.5–15)
GFR SERPL CREATININE-BSD FRML MDRD: 47 ML/MIN/1.73M2
GLUCOSE BLD-MCNC: 144 MG/DL (ref 74–99)
GLUCOSE BLD-MCNC: 147 MG/DL (ref 74–99)
GLUCOSE BLD-MCNC: 147 MG/DL (ref 74–99)
GLUCOSE BLD-MCNC: 162 MG/DL (ref 74–99)
GLUCOSE BLD-MCNC: 180 MG/DL (ref 74–99)
GLUCOSE SERPL-MCNC: 120 MG/DL (ref 74–99)
HCT VFR BLD AUTO: 27.2 % (ref 37–54)
HGB BLD-MCNC: 9 G/DL (ref 12.5–16.5)
IMM GRANULOCYTES # BLD AUTO: 0.15 K/UL (ref 0–0.58)
IMM GRANULOCYTES NFR BLD: 2 % (ref 0–5)
LYMPHOCYTES NFR BLD: 1.35 K/UL (ref 1.5–4)
LYMPHOCYTES RELATIVE PERCENT: 17 % (ref 20–42)
MAGNESIUM SERPL-MCNC: 2 MG/DL (ref 1.6–2.6)
MCH RBC QN AUTO: 32.6 PG (ref 26–35)
MCHC RBC AUTO-ENTMCNC: 33.1 G/DL (ref 32–34.5)
MCV RBC AUTO: 98.6 FL (ref 80–99.9)
MONOCYTES NFR BLD: 0.7 K/UL (ref 0.1–0.95)
MONOCYTES NFR BLD: 9 % (ref 2–12)
NEUTROPHILS NFR BLD: 70 % (ref 43–80)
NEUTS SEG NFR BLD: 5.62 K/UL (ref 1.8–7.3)
PHOSPHATE SERPL-MCNC: 1.9 MG/DL (ref 2.5–4.5)
PLATELET # BLD AUTO: 166 K/UL (ref 130–450)
PMV BLD AUTO: 11.1 FL (ref 7–12)
POTASSIUM SERPL-SCNC: 3.9 MMOL/L (ref 3.5–5)
PROT SERPL-MCNC: 5.3 G/DL (ref 6.4–8.3)
RBC # BLD AUTO: 2.76 M/UL (ref 3.8–5.8)
SODIUM SERPL-SCNC: 141 MMOL/L (ref 132–146)
WBC OTHER # BLD: 8 K/UL (ref 4.5–11.5)

## 2024-03-01 PROCEDURE — 2580000003 HC RX 258: Performed by: INTERNAL MEDICINE

## 2024-03-01 PROCEDURE — 99232 SBSQ HOSP IP/OBS MODERATE 35: CPT | Performed by: NURSE PRACTITIONER

## 2024-03-01 PROCEDURE — P9047 ALBUMIN (HUMAN), 25%, 50ML: HCPCS | Performed by: INTERNAL MEDICINE

## 2024-03-01 PROCEDURE — 82962 GLUCOSE BLOOD TEST: CPT

## 2024-03-01 PROCEDURE — 85025 COMPLETE CBC W/AUTO DIFF WBC: CPT

## 2024-03-01 PROCEDURE — 6360000002 HC RX W HCPCS: Performed by: INTERNAL MEDICINE

## 2024-03-01 PROCEDURE — 6370000000 HC RX 637 (ALT 250 FOR IP)

## 2024-03-01 PROCEDURE — 6360000002 HC RX W HCPCS

## 2024-03-01 PROCEDURE — 2500000003 HC RX 250 WO HCPCS: Performed by: STUDENT IN AN ORGANIZED HEALTH CARE EDUCATION/TRAINING PROGRAM

## 2024-03-01 PROCEDURE — 99232 SBSQ HOSP IP/OBS MODERATE 35: CPT | Performed by: TRANSPLANT SURGERY

## 2024-03-01 PROCEDURE — 84100 ASSAY OF PHOSPHORUS: CPT

## 2024-03-01 PROCEDURE — 2580000003 HC RX 258: Performed by: STUDENT IN AN ORGANIZED HEALTH CARE EDUCATION/TRAINING PROGRAM

## 2024-03-01 PROCEDURE — 80053 COMPREHEN METABOLIC PANEL: CPT

## 2024-03-01 PROCEDURE — 1200000000 HC SEMI PRIVATE

## 2024-03-01 PROCEDURE — 2580000003 HC RX 258

## 2024-03-01 PROCEDURE — 36415 COLL VENOUS BLD VENIPUNCTURE: CPT

## 2024-03-01 PROCEDURE — 83735 ASSAY OF MAGNESIUM: CPT

## 2024-03-01 PROCEDURE — 6370000000 HC RX 637 (ALT 250 FOR IP): Performed by: STUDENT IN AN ORGANIZED HEALTH CARE EDUCATION/TRAINING PROGRAM

## 2024-03-01 RX ORDER — BISACODYL 10 MG
10 SUPPOSITORY, RECTAL RECTAL DAILY
Status: DISCONTINUED | OUTPATIENT
Start: 2024-03-01 | End: 2024-03-03 | Stop reason: HOSPADM

## 2024-03-01 RX ORDER — ALBUMIN (HUMAN) 12.5 G/50ML
25 SOLUTION INTRAVENOUS EVERY 8 HOURS
Status: COMPLETED | OUTPATIENT
Start: 2024-03-01 | End: 2024-03-03

## 2024-03-01 RX ADMIN — SODIUM CHLORIDE, PRESERVATIVE FREE 10 ML: 5 INJECTION INTRAVENOUS at 09:12

## 2024-03-01 RX ADMIN — Medication 500 MG: at 12:08

## 2024-03-01 RX ADMIN — PANTOPRAZOLE SODIUM 40 MG: 40 TABLET, DELAYED RELEASE ORAL at 05:15

## 2024-03-01 RX ADMIN — SENNOSIDES AND DOCUSATE SODIUM 2 TABLET: 8.6; 5 TABLET ORAL at 09:10

## 2024-03-01 RX ADMIN — ALBUMIN (HUMAN) 25 G: 0.25 INJECTION, SOLUTION INTRAVENOUS at 23:25

## 2024-03-01 RX ADMIN — Medication 500 MG: at 21:09

## 2024-03-01 RX ADMIN — SODIUM CHLORIDE, SODIUM LACTATE, POTASSIUM CHLORIDE, CALCIUM CHLORIDE AND DEXTROSE MONOHYDRATE: 5; 600; 310; 30; 20 INJECTION, SOLUTION INTRAVENOUS at 23:30

## 2024-03-01 RX ADMIN — SODIUM CHLORIDE, PRESERVATIVE FREE 10 ML: 5 INJECTION INTRAVENOUS at 21:09

## 2024-03-01 RX ADMIN — ENOXAPARIN SODIUM 40 MG: 100 INJECTION SUBCUTANEOUS at 09:10

## 2024-03-01 RX ADMIN — POLYETHYLENE GLYCOL 3350 17 G: 17 POWDER, FOR SOLUTION ORAL at 09:10

## 2024-03-01 RX ADMIN — SODIUM CHLORIDE, SODIUM LACTATE, POTASSIUM CHLORIDE, CALCIUM CHLORIDE AND DEXTROSE MONOHYDRATE: 5; 600; 310; 30; 20 INJECTION, SOLUTION INTRAVENOUS at 05:58

## 2024-03-01 RX ADMIN — PIPERACILLIN AND TAZOBACTAM 4500 MG: 4; .5 INJECTION, POWDER, FOR SOLUTION INTRAVENOUS at 02:30

## 2024-03-01 RX ADMIN — BISACODYL 10 MG: 10 SUPPOSITORY RECTAL at 12:09

## 2024-03-01 RX ADMIN — ONDANSETRON 4 MG: 2 INJECTION INTRAMUSCULAR; INTRAVENOUS at 21:08

## 2024-03-01 RX ADMIN — PIPERACILLIN AND TAZOBACTAM 4500 MG: 4; .5 INJECTION, POWDER, FOR SOLUTION INTRAVENOUS at 09:10

## 2024-03-01 RX ADMIN — SODIUM PHOSPHATE, MONOBASIC, MONOHYDRATE AND SODIUM PHOSPHATE, DIBASIC, ANHYDROUS 30 MMOL: 142; 276 INJECTION, SOLUTION INTRAVENOUS at 13:21

## 2024-03-01 RX ADMIN — Medication 500 MG: at 15:34

## 2024-03-01 RX ADMIN — SENNOSIDES AND DOCUSATE SODIUM 2 TABLET: 8.6; 5 TABLET ORAL at 21:09

## 2024-03-01 RX ADMIN — ALBUMIN (HUMAN) 25 G: 0.25 INJECTION, SOLUTION INTRAVENOUS at 16:21

## 2024-03-01 RX ADMIN — Medication: at 15:38

## 2024-03-01 RX ADMIN — PIPERACILLIN AND TAZOBACTAM 4500 MG: 4; .5 INJECTION, POWDER, FOR SOLUTION INTRAVENOUS at 17:21

## 2024-03-01 RX ADMIN — PANTOPRAZOLE SODIUM 40 MG: 40 TABLET, DELAYED RELEASE ORAL at 15:33

## 2024-03-01 NOTE — CARE COORDINATION
3/1/2024Social work transition of care planning  Pt plan remains for home,once medically stable.  Electronically signed by RAMYA Interiano on 3/1/2024 at 8:30 AM

## 2024-03-01 NOTE — PLAN OF CARE
Problem: Discharge Planning  Goal: Discharge to home or other facility with appropriate resources  Outcome: Progressing  Flowsheets (Taken 3/1/2024 0800)  Discharge to home or other facility with appropriate resources: Identify barriers to discharge with patient and caregiver     Problem: Safety - Adult  Goal: Free from fall injury  Outcome: Progressing  Flowsheets (Taken 3/1/2024 1008)  Free From Fall Injury: Instruct family/caregiver on patient safety     Problem: ABCDS Injury Assessment  Goal: Absence of physical injury  Outcome: Progressing     Problem: Nutrition Deficit:  Goal: Optimize nutritional status  Outcome: Progressing

## 2024-03-01 NOTE — PROCEDURES
PROCEDURE PERFORMED:  ERCP with any interventions as indicated     PREOPERATIVE DIAGNOSES:  Elevated liver chemistries, dilated Common bile duct     POSTOPERATIVE DIAGNOSES:  Obstructive jaundice     ANESTHESIA:  LMAC.     DESCRIPTION OF PROCEDURE:  With the patient in supine position, the Olympus side-viewing video duodenoscope was introduced into the esophagus, advanced through the GE junction into the gastric body, advanced through the pylorus into duodenal bulb, second portion of duodenum, the ampulla was visualized with plastic biliary stent. The stent was removed with cold snare. The duct was cannulated with a 39 sphinctertome over the 025 guide wire was utilized to cannulate the common bile duct. A cholangiogram was performed to confirm placement and demonstrated what appeared to be a common hepatic duct high grade stricture ~1cm in length with significant ~20mm ballooning of the proximal hepatic duct, intrahepatic ducts did not demonstrate any dilation and were somewhat tattered and questionably demonstrating diffuse stricturing no significant upstream intrahepatic beading/dilation noted however. The CBD was non dilated ~7mm. Balloon sweeps were performed with a 9-12mm extraction balloon utilized to remove sludge and stent debris. Measurements of the stricture were made decision was made to place a 10mm x 8cm uncovered SEMS, an addition 7F x 10cm plastic stent was then placed traversing the UC-SEMS. Good flow of bile/contrast was observed. Pictures were taken. The scope was retrieved, area decompressed, and the scope was withdrawn. The patient tolerated the procedure well.    Blood loss was minimal and self limited      Assessment:  1) Common hepatic duct stricturing - s/p biliary stent removal, balloon sweeps and 10mm x 8cm UC-SEMS and 7F x 10cm plastic stent traversing UC-SEMS  2) Stent debris/biliary sludge - s/p balloon extraction      Plan:  1) NPO for 2 hours if no signs of PEP ok to advance diet to CL 
information could not be obtained

## 2024-03-02 LAB
25(OH)D3 SERPL-MCNC: 10.3 NG/ML (ref 30–100)
ALBUMIN SERPL-MCNC: 2.8 G/DL (ref 3.5–5.2)
ALP SERPL-CCNC: 237 U/L (ref 40–129)
ALT SERPL-CCNC: 139 U/L (ref 0–40)
ANION GAP SERPL CALCULATED.3IONS-SCNC: 12 MMOL/L (ref 7–16)
AST SERPL-CCNC: 152 U/L (ref 0–39)
BASOPHILS # BLD: 0.04 K/UL (ref 0–0.2)
BASOPHILS NFR BLD: 1 % (ref 0–2)
BILIRUB SERPL-MCNC: 14.4 MG/DL (ref 0–1.2)
BUN SERPL-MCNC: 31 MG/DL (ref 6–23)
CALCIUM SERPL-MCNC: 8 MG/DL (ref 8.6–10.2)
CHLORIDE SERPL-SCNC: 104 MMOL/L (ref 98–107)
CO2 SERPL-SCNC: 21 MMOL/L (ref 22–29)
CREAT SERPL-MCNC: 1.4 MG/DL (ref 0.7–1.2)
EOSINOPHIL # BLD: 0.2 K/UL (ref 0.05–0.5)
EOSINOPHILS RELATIVE PERCENT: 3 % (ref 0–6)
ERYTHROCYTE [DISTWIDTH] IN BLOOD BY AUTOMATED COUNT: 15.6 % (ref 11.5–15)
FERRITIN SERPL-MCNC: 2751 NG/ML
FOLATE SERPL-MCNC: 12.9 NG/ML (ref 4.8–24.2)
GFR SERPL CREATININE-BSD FRML MDRD: 55 ML/MIN/1.73M2
GLUCOSE BLD-MCNC: 111 MG/DL (ref 74–99)
GLUCOSE BLD-MCNC: 120 MG/DL (ref 74–99)
GLUCOSE BLD-MCNC: 126 MG/DL (ref 74–99)
GLUCOSE BLD-MCNC: 139 MG/DL (ref 74–99)
GLUCOSE SERPL-MCNC: 105 MG/DL (ref 74–99)
HCT VFR BLD AUTO: 23.8 % (ref 37–54)
HGB BLD-MCNC: 8 G/DL (ref 12.5–16.5)
IMM GRANULOCYTES # BLD AUTO: 0.17 K/UL (ref 0–0.58)
IMM GRANULOCYTES NFR BLD: 2 % (ref 0–5)
IRON SATN MFR SERPL: 82 % (ref 20–55)
IRON SERPL-MCNC: 120 UG/DL (ref 59–158)
LYMPHOCYTES NFR BLD: 1.37 K/UL (ref 1.5–4)
LYMPHOCYTES RELATIVE PERCENT: 18 % (ref 20–42)
MAGNESIUM SERPL-MCNC: 1.9 MG/DL (ref 1.6–2.6)
MCH RBC QN AUTO: 32.8 PG (ref 26–35)
MCHC RBC AUTO-ENTMCNC: 33.6 G/DL (ref 32–34.5)
MCV RBC AUTO: 97.5 FL (ref 80–99.9)
MONOCYTES NFR BLD: 0.64 K/UL (ref 0.1–0.95)
MONOCYTES NFR BLD: 8 % (ref 2–12)
NEUTROPHILS NFR BLD: 68 % (ref 43–80)
NEUTS SEG NFR BLD: 5.18 K/UL (ref 1.8–7.3)
PHOSPHATE SERPL-MCNC: 2.2 MG/DL (ref 2.5–4.5)
PHOSPHATE SERPL-MCNC: 3.7 MG/DL (ref 2.5–4.5)
PLATELET, FLUORESCENCE: 152 K/UL (ref 130–450)
PMV BLD AUTO: 10.7 FL (ref 7–12)
POTASSIUM SERPL-SCNC: 3.3 MMOL/L (ref 3.5–5)
PROT SERPL-MCNC: 5.1 G/DL (ref 6.4–8.3)
RBC # BLD AUTO: 2.44 M/UL (ref 3.8–5.8)
SODIUM SERPL-SCNC: 137 MMOL/L (ref 132–146)
TIBC SERPL-MCNC: 146 UG/DL (ref 250–450)
VIT B12 SERPL-MCNC: 1886 PG/ML (ref 211–946)
WBC OTHER # BLD: 7.6 K/UL (ref 4.5–11.5)

## 2024-03-02 PROCEDURE — 2580000003 HC RX 258

## 2024-03-02 PROCEDURE — P9047 ALBUMIN (HUMAN), 25%, 50ML: HCPCS | Performed by: INTERNAL MEDICINE

## 2024-03-02 PROCEDURE — 84100 ASSAY OF PHOSPHORUS: CPT

## 2024-03-02 PROCEDURE — 82746 ASSAY OF FOLIC ACID SERUM: CPT

## 2024-03-02 PROCEDURE — 36415 COLL VENOUS BLD VENIPUNCTURE: CPT

## 2024-03-02 PROCEDURE — 6360000002 HC RX W HCPCS: Performed by: INTERNAL MEDICINE

## 2024-03-02 PROCEDURE — 83735 ASSAY OF MAGNESIUM: CPT

## 2024-03-02 PROCEDURE — 6370000000 HC RX 637 (ALT 250 FOR IP): Performed by: STUDENT IN AN ORGANIZED HEALTH CARE EDUCATION/TRAINING PROGRAM

## 2024-03-02 PROCEDURE — 82607 VITAMIN B-12: CPT

## 2024-03-02 PROCEDURE — 82728 ASSAY OF FERRITIN: CPT

## 2024-03-02 PROCEDURE — 83540 ASSAY OF IRON: CPT

## 2024-03-02 PROCEDURE — 1200000000 HC SEMI PRIVATE

## 2024-03-02 PROCEDURE — 80053 COMPREHEN METABOLIC PANEL: CPT

## 2024-03-02 PROCEDURE — 85025 COMPLETE CBC W/AUTO DIFF WBC: CPT

## 2024-03-02 PROCEDURE — 83550 IRON BINDING TEST: CPT

## 2024-03-02 PROCEDURE — 6370000000 HC RX 637 (ALT 250 FOR IP)

## 2024-03-02 PROCEDURE — 6360000002 HC RX W HCPCS

## 2024-03-02 PROCEDURE — 99231 SBSQ HOSP IP/OBS SF/LOW 25: CPT | Performed by: TRANSPLANT SURGERY

## 2024-03-02 PROCEDURE — 82962 GLUCOSE BLOOD TEST: CPT

## 2024-03-02 PROCEDURE — 82306 VITAMIN D 25 HYDROXY: CPT

## 2024-03-02 PROCEDURE — 2580000003 HC RX 258: Performed by: STUDENT IN AN ORGANIZED HEALTH CARE EDUCATION/TRAINING PROGRAM

## 2024-03-02 PROCEDURE — 2500000003 HC RX 250 WO HCPCS: Performed by: STUDENT IN AN ORGANIZED HEALTH CARE EDUCATION/TRAINING PROGRAM

## 2024-03-02 RX ADMIN — Medication 500 MG: at 10:17

## 2024-03-02 RX ADMIN — Medication 500 MG: at 18:50

## 2024-03-02 RX ADMIN — DIBASIC SODIUM PHOSPHATE, MONOBASIC POTASSIUM PHOSPHATE AND MONOBASIC SODIUM PHOSPHATE 2 TABLET: 852; 155; 130 TABLET ORAL at 10:16

## 2024-03-02 RX ADMIN — Medication 500 MG: at 21:27

## 2024-03-02 RX ADMIN — PANTOPRAZOLE SODIUM 40 MG: 40 TABLET, DELAYED RELEASE ORAL at 14:52

## 2024-03-02 RX ADMIN — ALBUMIN (HUMAN) 25 G: 0.25 INJECTION, SOLUTION INTRAVENOUS at 06:19

## 2024-03-02 RX ADMIN — Medication 500 MG: at 13:35

## 2024-03-02 RX ADMIN — POLYETHYLENE GLYCOL 3350 17 G: 17 POWDER, FOR SOLUTION ORAL at 10:18

## 2024-03-02 RX ADMIN — DIBASIC SODIUM PHOSPHATE, MONOBASIC POTASSIUM PHOSPHATE AND MONOBASIC SODIUM PHOSPHATE 2 TABLET: 852; 155; 130 TABLET ORAL at 21:27

## 2024-03-02 RX ADMIN — PIPERACILLIN AND TAZOBACTAM 4500 MG: 4; .5 INJECTION, POWDER, FOR SOLUTION INTRAVENOUS at 10:13

## 2024-03-02 RX ADMIN — PIPERACILLIN AND TAZOBACTAM 4500 MG: 4; .5 INJECTION, POWDER, FOR SOLUTION INTRAVENOUS at 01:39

## 2024-03-02 RX ADMIN — PIPERACILLIN AND TAZOBACTAM 4500 MG: 4; .5 INJECTION, POWDER, FOR SOLUTION INTRAVENOUS at 17:54

## 2024-03-02 RX ADMIN — DIBASIC SODIUM PHOSPHATE, MONOBASIC POTASSIUM PHOSPHATE AND MONOBASIC SODIUM PHOSPHATE 2 TABLET: 852; 155; 130 TABLET ORAL at 13:35

## 2024-03-02 RX ADMIN — SODIUM CHLORIDE, PRESERVATIVE FREE 10 ML: 5 INJECTION INTRAVENOUS at 10:18

## 2024-03-02 RX ADMIN — SODIUM PHOSPHATE, MONOBASIC, MONOHYDRATE AND SODIUM PHOSPHATE, DIBASIC, ANHYDROUS 30 MMOL: 142; 276 INJECTION, SOLUTION INTRAVENOUS at 10:30

## 2024-03-02 RX ADMIN — ALBUMIN (HUMAN) 25 G: 0.25 INJECTION, SOLUTION INTRAVENOUS at 23:12

## 2024-03-02 RX ADMIN — SENNOSIDES AND DOCUSATE SODIUM 2 TABLET: 8.6; 5 TABLET ORAL at 10:17

## 2024-03-02 RX ADMIN — ALBUMIN (HUMAN) 25 G: 0.25 INJECTION, SOLUTION INTRAVENOUS at 14:53

## 2024-03-02 RX ADMIN — PANTOPRAZOLE SODIUM 40 MG: 40 TABLET, DELAYED RELEASE ORAL at 06:19

## 2024-03-02 RX ADMIN — ENOXAPARIN SODIUM 40 MG: 100 INJECTION SUBCUTANEOUS at 10:17

## 2024-03-02 RX ADMIN — SODIUM CHLORIDE, PRESERVATIVE FREE 10 ML: 5 INJECTION INTRAVENOUS at 21:28

## 2024-03-02 NOTE — PLAN OF CARE
Problem: Discharge Planning  Goal: Discharge to home or other facility with appropriate resources  Outcome: Progressing  Flowsheets (Taken 3/1/2024 1945)  Discharge to home or other facility with appropriate resources: Identify barriers to discharge with patient and caregiver     Problem: Safety - Adult  Goal: Free from fall injury  Outcome: Progressing  Flowsheets (Taken 3/1/2024 1945)  Free From Fall Injury: Based on caregiver fall risk screen, instruct family/caregiver to ask for assistance with transferring infant if caregiver noted to have fall risk factors     Problem: ABCDS Injury Assessment  Goal: Absence of physical injury  Outcome: Progressing  Flowsheets (Taken 3/1/2024 1945)  Absence of Physical Injury: Implement safety measures based on patient assessment     Problem: Nutrition Deficit:  Goal: Optimize nutritional status  Outcome: Progressing

## 2024-03-03 VITALS
RESPIRATION RATE: 18 BRPM | HEART RATE: 74 BPM | SYSTOLIC BLOOD PRESSURE: 104 MMHG | WEIGHT: 209.44 LBS | DIASTOLIC BLOOD PRESSURE: 66 MMHG | TEMPERATURE: 97.4 F | HEIGHT: 74 IN | BODY MASS INDEX: 26.88 KG/M2 | OXYGEN SATURATION: 100 %

## 2024-03-03 LAB
ALBUMIN SERPL-MCNC: 2.9 G/DL (ref 3.5–5.2)
ALP SERPL-CCNC: 198 U/L (ref 40–129)
ALT SERPL-CCNC: 116 U/L (ref 0–40)
ANION GAP SERPL CALCULATED.3IONS-SCNC: 13 MMOL/L (ref 7–16)
AST SERPL-CCNC: 126 U/L (ref 0–39)
BASOPHILS # BLD: 0.03 K/UL (ref 0–0.2)
BASOPHILS NFR BLD: 0 % (ref 0–2)
BILIRUB SERPL-MCNC: 13.5 MG/DL (ref 0–1.2)
BUN SERPL-MCNC: 22 MG/DL (ref 6–23)
CALCIUM SERPL-MCNC: 7.5 MG/DL (ref 8.6–10.2)
CHLORIDE SERPL-SCNC: 107 MMOL/L (ref 98–107)
CO2 SERPL-SCNC: 22 MMOL/L (ref 22–29)
CREAT SERPL-MCNC: 1.3 MG/DL (ref 0.7–1.2)
EOSINOPHIL # BLD: 0.2 K/UL (ref 0.05–0.5)
EOSINOPHILS RELATIVE PERCENT: 3 % (ref 0–6)
ERYTHROCYTE [DISTWIDTH] IN BLOOD BY AUTOMATED COUNT: 14.6 % (ref 11.5–15)
GFR SERPL CREATININE-BSD FRML MDRD: 59 ML/MIN/1.73M2
GLUCOSE BLD-MCNC: 110 MG/DL (ref 74–99)
GLUCOSE BLD-MCNC: 113 MG/DL (ref 74–99)
GLUCOSE SERPL-MCNC: 235 MG/DL (ref 74–99)
HCT VFR BLD AUTO: 21.5 % (ref 37–54)
HCT VFR BLD AUTO: 27.4 % (ref 37–54)
HGB BLD-MCNC: 6.7 G/DL (ref 12.5–16.5)
HGB BLD-MCNC: 8.9 G/DL (ref 12.5–16.5)
IMM GRANULOCYTES # BLD AUTO: 0.27 K/UL (ref 0–0.58)
IMM GRANULOCYTES NFR BLD: 4 % (ref 0–5)
LYMPHOCYTES NFR BLD: 1.13 K/UL (ref 1.5–4)
LYMPHOCYTES RELATIVE PERCENT: 17 % (ref 20–42)
MAGNESIUM SERPL-MCNC: 1.6 MG/DL (ref 1.6–2.6)
MCH RBC QN AUTO: 32.1 PG (ref 26–35)
MCHC RBC AUTO-ENTMCNC: 31.2 G/DL (ref 32–34.5)
MCV RBC AUTO: 102.9 FL (ref 80–99.9)
MONOCYTES NFR BLD: 0.58 K/UL (ref 0.1–0.95)
MONOCYTES NFR BLD: 9 % (ref 2–12)
NEUTROPHILS NFR BLD: 67 % (ref 43–80)
NEUTS SEG NFR BLD: 4.48 K/UL (ref 1.8–7.3)
PHOSPHATE SERPL-MCNC: 2.9 MG/DL (ref 2.5–4.5)
PLATELET # BLD AUTO: 113 K/UL (ref 130–450)
PMV BLD AUTO: 11 FL (ref 7–12)
POTASSIUM SERPL-SCNC: 3.6 MMOL/L (ref 3.5–5)
PROT SERPL-MCNC: 5 G/DL (ref 6.4–8.3)
RBC # BLD AUTO: 2.09 M/UL (ref 3.8–5.8)
SODIUM SERPL-SCNC: 142 MMOL/L (ref 132–146)
WBC OTHER # BLD: 6.7 K/UL (ref 4.5–11.5)

## 2024-03-03 PROCEDURE — 85025 COMPLETE CBC W/AUTO DIFF WBC: CPT

## 2024-03-03 PROCEDURE — 6370000000 HC RX 637 (ALT 250 FOR IP)

## 2024-03-03 PROCEDURE — 99239 HOSP IP/OBS DSCHRG MGMT >30: CPT | Performed by: TRANSPLANT SURGERY

## 2024-03-03 PROCEDURE — 85018 HEMOGLOBIN: CPT

## 2024-03-03 PROCEDURE — 83735 ASSAY OF MAGNESIUM: CPT

## 2024-03-03 PROCEDURE — 36415 COLL VENOUS BLD VENIPUNCTURE: CPT

## 2024-03-03 PROCEDURE — 80053 COMPREHEN METABOLIC PANEL: CPT

## 2024-03-03 PROCEDURE — 2500000003 HC RX 250 WO HCPCS

## 2024-03-03 PROCEDURE — 2580000003 HC RX 258

## 2024-03-03 PROCEDURE — 6370000000 HC RX 637 (ALT 250 FOR IP): Performed by: STUDENT IN AN ORGANIZED HEALTH CARE EDUCATION/TRAINING PROGRAM

## 2024-03-03 PROCEDURE — 85014 HEMATOCRIT: CPT

## 2024-03-03 PROCEDURE — 6360000002 HC RX W HCPCS

## 2024-03-03 PROCEDURE — 2580000003 HC RX 258: Performed by: INTERNAL MEDICINE

## 2024-03-03 PROCEDURE — P9047 ALBUMIN (HUMAN), 25%, 50ML: HCPCS | Performed by: INTERNAL MEDICINE

## 2024-03-03 PROCEDURE — 82962 GLUCOSE BLOOD TEST: CPT

## 2024-03-03 PROCEDURE — 84100 ASSAY OF PHOSPHORUS: CPT

## 2024-03-03 PROCEDURE — 6360000002 HC RX W HCPCS: Performed by: INTERNAL MEDICINE

## 2024-03-03 PROCEDURE — 6370000000 HC RX 637 (ALT 250 FOR IP): Performed by: INTERNAL MEDICINE

## 2024-03-03 RX ORDER — ERGOCALCIFEROL 1.25 MG/1
50000 CAPSULE ORAL WEEKLY
Status: DISCONTINUED | OUTPATIENT
Start: 2024-03-03 | End: 2024-03-03 | Stop reason: HOSPADM

## 2024-03-03 RX ADMIN — SODIUM CHLORIDE, PRESERVATIVE FREE 10 ML: 5 INJECTION INTRAVENOUS at 08:43

## 2024-03-03 RX ADMIN — Medication 500 MG: at 08:44

## 2024-03-03 RX ADMIN — SODIUM CHLORIDE, SODIUM LACTATE, POTASSIUM CHLORIDE, CALCIUM CHLORIDE AND DEXTROSE MONOHYDRATE: 5; 600; 310; 30; 20 INJECTION, SOLUTION INTRAVENOUS at 04:43

## 2024-03-03 RX ADMIN — DIBASIC SODIUM PHOSPHATE, MONOBASIC POTASSIUM PHOSPHATE AND MONOBASIC SODIUM PHOSPHATE 2 TABLET: 852; 155; 130 TABLET ORAL at 08:43

## 2024-03-03 RX ADMIN — PIPERACILLIN AND TAZOBACTAM 4500 MG: 4; .5 INJECTION, POWDER, FOR SOLUTION INTRAVENOUS at 08:50

## 2024-03-03 RX ADMIN — ALBUMIN (HUMAN) 25 G: 0.25 INJECTION, SOLUTION INTRAVENOUS at 06:49

## 2024-03-03 RX ADMIN — SODIUM PHOSPHATE, MONOBASIC, MONOHYDRATE AND SODIUM PHOSPHATE, DIBASIC, ANHYDROUS 30 MMOL: 142; 276 INJECTION, SOLUTION INTRAVENOUS at 10:04

## 2024-03-03 RX ADMIN — PIPERACILLIN AND TAZOBACTAM 4500 MG: 4; .5 INJECTION, POWDER, FOR SOLUTION INTRAVENOUS at 01:30

## 2024-03-03 RX ADMIN — PANTOPRAZOLE SODIUM 40 MG: 40 TABLET, DELAYED RELEASE ORAL at 06:48

## 2024-03-03 RX ADMIN — ENOXAPARIN SODIUM 40 MG: 100 INJECTION SUBCUTANEOUS at 08:43

## 2024-03-03 RX ADMIN — ERGOCALCIFEROL 50000 UNITS: 1.25 CAPSULE ORAL at 10:03

## 2024-03-03 NOTE — PLAN OF CARE
Problem: Discharge Planning  Goal: Discharge to home or other facility with appropriate resources  Outcome: Progressing  Flowsheets (Taken 3/2/2024 2000)  Discharge to home or other facility with appropriate resources: Identify discharge learning needs (meds, wound care, etc)     Problem: Safety - Adult  Goal: Free from fall injury  Outcome: Progressing  Flowsheets (Taken 3/2/2024 2000)  Free From Fall Injury: Based on caregiver fall risk screen, instruct family/caregiver to ask for assistance with transferring infant if caregiver noted to have fall risk factors     Problem: ABCDS Injury Assessment  Goal: Absence of physical injury  Outcome: Progressing  Flowsheets (Taken 3/2/2024 2000)  Absence of Physical Injury: Implement safety measures based on patient assessment     Problem: Nutrition Deficit:  Goal: Optimize nutritional status  Outcome: Progressing

## 2024-03-03 NOTE — CARE COORDINATION
Discharge order noted. Patient to discharge home today, no needs. Called the floor, nurse will call back if patient needs anything.    Electronically signed by MJ Collins on 3/3/2024 at 9:21 AM

## 2024-03-03 NOTE — DISCHARGE INSTRUCTIONS
SURGERY DISCHARGE INSTRUCTIONS    FOLLOW UP: Call office to schedule follow-up appointment in 1 weeks.  Will plan to place [facility] next week.     SPECIAL INSTRUCTIONS:   Call physician if they or any other problems occur:  Call the office if you have a fever over >101F, or if your incision becomes red, tender, or drains more than a small amount of clear fluid  Fever over 101°    Redness, swelling, hardness or warmth at the operative site  Unrelieved nausea    Foul smelling or cloudy drainage at the operative site   Unrelieved pain    Blood soaked dressing (Some oozing may be normal)

## 2024-03-03 NOTE — DISCHARGE SUMMARY
laboratory findings), and discussed the case with the surgical team.  I agree with the assessment and plan with the following additions, corrections, and changes. 14pt review of symptoms completed and negative except as mentioned.    Patient continues to do very well.  His bilirubin is now down to 13 from 22.  He is tolerating a diet and his creatinine has continued to improve  It is okay for him to be discharged home today  I will have him follow-up with Dr. Hernandes so chemotherapy can be initiated  Myself or my partner Dr. Barrera will place his Mediport this week    Raza Reynoso MD  03/03/24  9:51 AM

## 2024-03-03 NOTE — PROGRESS NOTES
Palliative Care Department  [phone]  Palliative Care Progress Note  Provider Regina Crandall, APRN - CNP    Raudel Herndon  [id number]  Hospital Day: 3  Date of Initial Consult: 2/27/2024  Referring Provider: Janny Bojorquez MD  Palliative Medicine was consulted for assistance with: Goals of care and overwhelming symptoms     HPI:   Raudel Herndon is a 73 y.o. with a medical history of recently diagnosed extrahepatic cholangiocarcinoma, diabetes and chronic pancreatitis who was admitted on 2/27/2024 from home with a CHIEF COMPLAINT of obstructive jaundice.  Patient was seen at hepatobiliary clinic 2/27 and was recommended admission for obstructive jaundice.  1/2024 patient was evaluated for obstructive jaundice and underwent ERCP with stent placement.  Brushings were obtained and pathology positive for adenocarcinoma.  Patient was admitted for further medical management and palliative medicine was consulted for goals of care and overwhelming symptoms.  ASSESSMENT/PLAN:     Pertinent Hospital Diagnoses     Extrahepatic cholangiocarcinoma    Palliative Care Encounter / Counseling Regarding Goals of Care  Please see detailed goals of care discussion as below  At this time, Raudel Herndon, Does have capacity for medical decision-making.  Capacity is time limited and situation/question specific  During encounter there was no surrogate medical decision-maker needed  Outcome of goals of care meeting:   Symptom control  Continue full code and all aggressive medical management   Code status Full Code  Advanced Directives: no POA or living will in [facility]  Surrogate/Legal NOK:  Otf Herndon (child) [phone]    Nausea:   -Continue Zofran 4 mg p.o. every 8 hours as needed or 4 mg IV every 6 hours as needed    Spiritual assessment: no spiritual distress identified  Bereavement and grief: to be determined  Referrals to: none today  SUBJECTIVE:     Current medical issues leading to Palliative Medicine involvement include   Active 
  Palliative Care Department  [phone]  Palliative Care Progress Note  Provider Regina Crandall, APRN - CNP    Raudel Herndon  [id number]  Hospital Day: 4  Date of Initial Consult: 2/27/2024  Referring Provider: Janny Bojorquez MD  Palliative Medicine was consulted for assistance with: Goals of care and overwhelming symptoms     HPI:   Raudel Herndon is a 73 y.o. with a medical history of recently diagnosed extrahepatic cholangiocarcinoma, diabetes and chronic pancreatitis who was admitted on 2/27/2024 from home with a CHIEF COMPLAINT of obstructive jaundice.  Patient was seen at hepatobiliary clinic 2/27 and was recommended admission for obstructive jaundice.  1/2024 patient was evaluated for obstructive jaundice and underwent ERCP with stent placement.  Brushings were obtained and pathology positive for adenocarcinoma.  Patient was admitted for further medical management and palliative medicine was consulted for goals of care and overwhelming symptoms.  ASSESSMENT/PLAN:     Pertinent Hospital Diagnoses     Extrahepatic cholangiocarcinoma    Palliative Care Encounter / Counseling Regarding Goals of Care  Please see detailed goals of care discussion as below  At this time, Raudel Herndon, Does have capacity for medical decision-making.  Capacity is time limited and situation/question specific  During encounter there was no surrogate medical decision-maker needed  Outcome of goals of care meeting:   Symptom control  Continue full code and all aggressive medical management   Code status Full Code  Advanced Directives: no POA or living will in [facility]  Surrogate/Legal NOK:  Otf Herndon (child) [phone]    Nausea:   -Continue Zofran 4 mg p.o. every 8 hours as needed or 4 mg IV every 6 hours as needed    Spiritual assessment: no spiritual distress identified  Bereavement and grief: to be determined  Referrals to: none today  SUBJECTIVE:     Current medical issues leading to Palliative Medicine involvement include   Active 
4 Eyes Skin Assessment     NAME:  Raudel Herndon  YOB: 1950  MEDICAL RECORD NUMBER:  [id number]    The patient is being assessed for  Admission    I agree that at least one RN has performed a thorough Head to Toe Skin Assessment on the patient. ALL assessment sites listed below have been assessed.      Areas assessed by both nurses:    Head, Face, Ears, Shoulders, Back, Chest, Arms, Elbows, Hands, Sacrum. Buttock, Coccyx, Ischium, Legs. Feet and Heels, and Under Medical Devices         Does the Patient have a Wound? No noted wound(s)    Scars - R Hand, R Lat Ankle, L Knee, R Hip. R Arias  Ecchymosis BUE  Vasc. Discoloration BLE  Jaundiced  Heels Dry/Red - Blanching  BL Buttocks Red/Blanching       Chris Prevention initiated by RN: No  Wound Care Orders initiated by RN: No    Pressure Injury (Stage 3,4, Unstageable, DTI, NWPT, and Complex wounds) if present, place Wound referral order by RN under : No    New Ostomies, if present place, Ostomy referral order under : No     Nurse 1 eSignature: Electronically signed by GARRISON SEGURA RN on 2/28/24 at 2:32 AM EST    **SHARE this note so that the co-signing nurse can place an eSignature**    Nurse 2 eSignature: {Esignature:[id number]}  
Advanced Endoscopy/Gastroenterology Progress Note    By FLORESITA Lopes  73 y.o.  male    SUBJECTIVE:  Patient continues to improve daily. He has a phenomenal outlook. No family present to discuss  Pt awaiting d/c home.     OBJECTIVE:  /60   Pulse 68   Temp 97.9 °F (36.6 °C) (Temporal)   Resp 18   Ht 1.867 m (6' 1.5\")   Wt 95 kg (209 lb 7 oz)   SpO2 98%   BMI 27.25 kg/m²   GEN: A&Ox3, friendly, NAD  HEENT:PEERL, +  icterus  Heart: RRR  Lungs: CTAB  Abd.: soft, mild T, ND, BS +, no G/R, no HSM  Extr.: no C/C/E, no brusing      Stool (measured) : 0 mL  Lab Results   Component Value Date/Time    WBC 6.7 03/03/2024 06:07 AM    WBC 7.6 03/02/2024 05:10 AM    WBC 8.0 03/01/2024 05:14 AM    HGB 8.9 03/03/2024 07:19 AM    HGB 6.7 03/03/2024 06:07 AM    HGB 8.0 03/02/2024 05:10 AM    HCT 27.4 03/03/2024 07:19 AM    .9 03/03/2024 06:07 AM    RDW 14.6 03/03/2024 06:07 AM     03/03/2024 06:07 AM     03/01/2024 05:14 AM     02/29/2024 04:56 AM     Lab Results   Component Value Date/Time     03/03/2024 06:07 AM    K 3.6 03/03/2024 06:07 AM     03/03/2024 06:07 AM    CO2 22 03/03/2024 06:07 AM    BUN 22 03/03/2024 06:07 AM    CREATININE 1.3 03/03/2024 06:07 AM    CALCIUM 7.5 03/03/2024 06:07 AM    PROT 5.0 03/03/2024 06:07 AM    LABALBU 2.9 03/03/2024 06:07 AM    BILITOT 13.5 03/03/2024 06:07 AM    BILITOT 14.4 03/02/2024 05:10 AM    BILITOT 16.1 03/01/2024 05:14 AM    ALKPHOS 198 03/03/2024 06:07 AM    ALKPHOS 237 03/02/2024 05:10 AM    ALKPHOS 274 03/01/2024 05:14 AM     03/03/2024 06:07 AM     03/02/2024 05:10 AM     03/01/2024 05:14 AM     03/03/2024 06:07 AM     03/02/2024 05:10 AM     03/01/2024 05:14 AM     No results found for: \"LIPASE\"  No results found for: \"AMYLASE\"      ASSESSMENT/PLAN:  1. Obstructive jaundice secondary to cholangiocarcinoma   -s/p ERCP 2/29 - common hepatic duct stricturing with 
Advanced Endoscopy/Gastroenterology Progress Note    By FLORESITA Lopes  73 y.o.  male    SUBJECTIVE:  Patient doing well this evening. Stated he felt \"uncomfortable\" and maybe a little nauseous last night, but is feeling quite at this time. Pt has quite a good outlook. No family present    OBJECTIVE:  /61   Pulse 61   Temp 98.1 °F (36.7 °C) (Temporal)   Resp 18   Ht 1.867 m (6' 1.5\")   Wt 95 kg (209 lb 7 oz)   SpO2 98%   BMI 27.25 kg/m²   GEN: A&Ox3, friendly, NAD  HEENT:PEERL, +  icterus  Heart: RRR  Lungs: CTAB  Abd.: soft, mild T, ND, BS +, no G/R, no HSM  Extr.: no C/C/E, no brusing      Stool (measured) : 0 mL  Lab Results   Component Value Date/Time    WBC 7.6 03/02/2024 05:10 AM    WBC 8.0 03/01/2024 05:14 AM    WBC 7.9 02/29/2024 04:56 AM    HGB 8.0 03/02/2024 05:10 AM    HGB 9.0 03/01/2024 05:14 AM    HGB 9.7 02/29/2024 04:56 AM    HCT 23.8 03/02/2024 05:10 AM    MCV 97.5 03/02/2024 05:10 AM    RDW 15.6 03/02/2024 05:10 AM     03/01/2024 05:14 AM     02/29/2024 04:56 AM     02/28/2024 04:48 AM     Lab Results   Component Value Date/Time     03/01/2024 05:14 AM    K 3.9 03/01/2024 05:14 AM     03/01/2024 05:14 AM    CO2 22 03/01/2024 05:14 AM    BUN 42 03/01/2024 05:14 AM    CREATININE 1.6 03/01/2024 05:14 AM    CALCIUM 8.5 03/01/2024 05:14 AM    PROT 5.3 03/01/2024 05:14 AM    LABALBU 2.6 03/01/2024 05:14 AM    BILITOT 16.1 03/01/2024 05:14 AM    BILITOT 17.6 02/29/2024 04:56 AM    BILITOT 18.6 02/28/2024 04:48 AM    ALKPHOS 274 03/01/2024 05:14 AM    ALKPHOS 285 02/29/2024 04:56 AM    ALKPHOS 298 02/28/2024 04:48 AM     03/01/2024 05:14 AM     02/29/2024 04:56 AM     02/28/2024 04:48 AM     03/01/2024 05:14 AM     02/29/2024 04:56 AM     02/28/2024 04:48 AM     No results found for: \"LIPASE\"  No results found for: \"AMYLASE\"      ASSESSMENT/PLAN:  1. Obstructive jaundice secondary to 
Chart review, for consultation to follow.    Briefly Mr. Herndon is a 73-year-old  male with history of type II DM, chronic pancreatitis, recent diagnosed with obstructive jaundice due to extrahepatic cholangiocarcinoma status post stent placement in January 2024, elevated CEA 19-9, who was a direct admit on February 27 2024 after he was seen by Dr. Reynoso on his office.  On admission his creatinine level was 1.8 mg/dL, reason for this consultation.  Patient reports having persistent nausea with intermittent emesis.  He has lost significant weight since January.  His medications prior to admission included candesartan.  There is no baseline creatinine on records    NANCY versus NANCY on CKD, most likely volume responsive NANCY 2/2 poor intake, nausea and emesis in the setting of ARB administration  High anion gap metabolic acidosis, probably normoglycemic DKA, rule out lactic acidosis  Normocytic anemia, multifactorial  ----------------------------------------------------  Cholangiocarcinoma with local camron metastasis  Obstructive jaundice 2/2 #2, on piperacillin-tazobactam  Type II DM    Plan:    Change IV fluids to D5 LR at 125 cc/hour  Hold ARB administration  Discontinue metformin  Obtain lactic acid level  Obtain beta hydroxybutyrate level  Obtain venous pH  Monitor renal function, BMP every 4  Monitor bicarbonate and anion gap    Jovanny Devlin MD    
Department of Internal Medicine  Nephrology Consult Note    Events reviewed.    SUBJECTIVE:  We are following Mr. Herndon for NANCY. Reports no complaints.      PHYSICAL EXAM:      Vitals:    VITALS:  BP (!) 105/54   Pulse 60   Temp 97.7 °F (36.5 °C) (Temporal)   Resp 18   Ht 1.867 m (6' 1.5\")   Wt 95 kg (209 lb 7 oz)   SpO2 99%   BMI 27.25 kg/m²   24HR INTAKE/OUTPUT:    Intake/Output Summary (Last 24 hours) at 3/1/2024 1115  Last data filed at 3/1/2024 0917  Gross per 24 hour   Intake 1000 ml   Output 950 ml   Net 50 ml         Constitutional:  Awake, alert, oriented, in NAD  HEENT:  PERRLA, icteric sclera, normocephalic, atraumatic  Respiratory:  CTA  Cardiovascular/Edema:  RRR, normal S1, normal S2  Gastrointestinal:  Soft, flat, non-distended, non-tender  Neurologic:  Nonfocal  Skin:  warm, dry, no rashes, no lesions, + jaundice  Other: No edema    Scheduled Meds:   polyethylene glycol  17 g Oral Daily    sennosides-docusate sodium  2 tablet Oral BID    indomethacin  100 mg Rectal Once    sodium chloride flush  10 mL IntraVENous 2 times per day    enoxaparin  40 mg SubCUTAneous Daily    pantoprazole  40 mg Oral BID AC    piperacillin-tazobactam  4,500 mg IntraVENous Q8H    insulin lispro  0-8 Units SubCUTAneous 4x Daily AC & HS     Continuous Infusions:   dextrose 5% in lactated ringers 125 mL/hr at 03/01/24 0558    sodium chloride      dextrose       PRN Meds:.sodium chloride flush, sodium chloride, ondansetron **OR** ondansetron, hydrOXYzine HCl, labetalol, hydrALAZINE, glucose, dextrose bolus **OR** dextrose bolus, glucagon (rDNA), dextrose      DATA:    CBC:   Lab Results   Component Value Date/Time    WBC 8.0 03/01/2024 05:14 AM    RBC 2.76 03/01/2024 05:14 AM    HGB 9.0 03/01/2024 05:14 AM    HCT 27.2 03/01/2024 05:14 AM    MCV 98.6 03/01/2024 05:14 AM    MCH 32.6 03/01/2024 05:14 AM    MCHC 33.1 03/01/2024 05:14 AM    RDW 16.0 03/01/2024 05:14 AM     03/01/2024 05:14 AM    MPV 11.1 03/01/2024 
Dr. Bojorquez notified that patient has arrived to floor.   
HEPATOBILIARY SURGERY  DAILY PROGRESS NOTE  3/1/2024      Subjective:  Patient reports mild nausea and abdominal discomfort. Did not sleep well last night. S/p ERCP yesterday.    I/O last 3 completed shifts:  In: 1270 [P.O.:270; I.V.:1000]  Out: 2550 [Urine:2550]  No intake/output data recorded.      Objective:  BP (!) 98/53   Pulse 59   Temp 97.7 °F (36.5 °C) (Temporal)   Resp 17   Ht 1.867 m (6' 1.5\")   Wt 95 kg (209 lb 7 oz)   SpO2 99%   BMI 27.25 kg/m²     General Appearance:  awake, alert, pleasant  Skin:  warm, jaundiced   Head/face:  NCAT  Eyes:  scleral icterus   Lungs:  Normal expansion. No respiratory distress  Heart:  Heart regular rate, warm throughout  Abdomen:  soft, minimally tender, mild distention   Extremities: Extremities warm to touch, with no edema.    Lab Results   Component Value Date    WBC 8.0 03/01/2024    HGB 9.0 (L) 03/01/2024    HCT 27.2 (L) 03/01/2024    MCV 98.6 03/01/2024     03/01/2024     Lab Results   Component Value Date     03/01/2024    K 3.9 03/01/2024     03/01/2024    CO2 22 03/01/2024    BUN 42 (H) 03/01/2024    CREATININE 1.6 (H) 03/01/2024    GLUCOSE 120 (H) 03/01/2024    CALCIUM 8.5 (L) 03/01/2024    PROT 5.3 (L) 03/01/2024    LABALBU 2.6 (L) 03/01/2024    BILITOT 16.1 (H) 03/01/2024    ALKPHOS 274 (H) 03/01/2024     (H) 03/01/2024     (H) 03/01/2024    LABGLOM 47 (L) 03/01/2024         Assessment/Plan:  73 y.o. male with obstructive jaundice secondary to extrahepatic cholangiocarcinoma with local camron metastasis. S/p ERCP with stenting 2/29    - CT chest with RLL pulm nodules X2  - CEA 92.3, CA 19-9 83078  - Atarax for itching  - protonix BID  - ambulate, OOB  - continue to trend bilirubin to ensure consistent decrease  - Zosyn for cholangitis prophylaxis      Electronically signed by Moody Yoo DO on 3/1/2024 at 7:11 AM    Attending Physician Statement:    Chief Complaint: Obstructive jaundice    I have examined the patient 
HEPATOBILIARY SURGERY  DAILY PROGRESS NOTE  3/2/2024      Subjective:  No complaints this morning from patient.  No nausea no vomiting.  No abdominal tenderness.  Resting comfortably no acute distress    I/O last 3 completed shifts:  In: 100 [P.O.:100]  Out: 2300 [Urine:2300]  No intake/output data recorded.      Objective:  /61   Pulse 61   Temp 98.1 °F (36.7 °C) (Temporal)   Resp 18   Ht 1.867 m (6' 1.5\")   Wt 95 kg (209 lb 7 oz)   SpO2 98%   BMI 27.25 kg/m²     General Appearance:  awake, alert, pleasant  Skin:  warm, jaundiced   Head/face:  NCAT  Eyes:  scleral icterus   Lungs:  Normal expansion. No respiratory distress  Heart:  Heart regular rate, warm throughout  Abdomen:  soft, minimally tender, mild distention   Extremities: Extremities warm to touch, with no edema.    Lab Results   Component Value Date    WBC 7.6 03/02/2024    HGB 8.0 (L) 03/02/2024    HCT 23.8 (L) 03/02/2024    MCV 97.5 03/02/2024     03/01/2024     Lab Results   Component Value Date     03/02/2024    K 3.3 (L) 03/02/2024     03/02/2024    CO2 21 (L) 03/02/2024    BUN 31 (H) 03/02/2024    CREATININE 1.4 (H) 03/02/2024    GLUCOSE 105 (H) 03/02/2024    CALCIUM 8.0 (L) 03/02/2024    PROT 5.1 (L) 03/02/2024    LABALBU 2.8 (L) 03/02/2024    BILITOT 14.4 (H) 03/02/2024    ALKPHOS 237 (H) 03/02/2024     (H) 03/02/2024     (H) 03/02/2024    LABGLOM 55 (L) 03/02/2024         Assessment/Plan:  73 y.o. male with obstructive jaundice secondary to extrahepatic cholangiocarcinoma with local camron metastasis. S/p ERCP with stenting 2/29  - CT chest with RLL pulm nodules X2, CEA 92.3, CA 19-9 [id number]      - Atarax for itching  - protonix BID  - ambulate, OOB  -Continue regular diet  -Bilirubin continues to decrease, can hold off of PTHC for now  - Zosyn for cholangitis prophylaxis  -Patient has hypophosphatemia secondary to refeeding, increase phosphate supplementation, will recheck phosphate at 1700 
HEPATOBILIARY SURGERY  DAILY PROGRESS NOTE  3/3/2024      Subjective:  No complaints this morning from patient.  No nausea no vomiting.  No abdominal tenderness.  Resting comfortably no acute distress    I/O last 3 completed shifts:  In: 880 [P.O.:880]  Out: 1950 [Urine:1950]  No intake/output data recorded.      Objective:  /60   Pulse 68   Temp 97.9 °F (36.6 °C) (Temporal)   Resp 18   Ht 1.867 m (6' 1.5\")   Wt 95 kg (209 lb 7 oz)   SpO2 98%   BMI 27.25 kg/m²     General Appearance:  awake, alert, pleasant  Skin:  warm, jaundiced   Head/face:  NCAT  Eyes:  scleral icterus   Lungs:  Normal expansion. No respiratory distress  Heart:  Heart regular rate, warm throughout  Abdomen:  soft, minimally tender, mild distention   Extremities: Extremities warm to touch, with no edema.    Lab Results   Component Value Date    WBC 6.7 03/03/2024    HGB 8.9 (L) 03/03/2024    HCT 27.4 (L) 03/03/2024    .9 (H) 03/03/2024     (L) 03/03/2024     Lab Results   Component Value Date     03/03/2024    K 3.6 03/03/2024     03/03/2024    CO2 22 03/03/2024    BUN 22 03/03/2024    CREATININE 1.3 (H) 03/03/2024    GLUCOSE 235 (H) 03/03/2024    CALCIUM 7.5 (L) 03/03/2024    PROT 5.0 (L) 03/03/2024    LABALBU 2.9 (L) 03/03/2024    BILITOT 13.5 (H) 03/03/2024    ALKPHOS 198 (H) 03/03/2024     (H) 03/03/2024     (H) 03/03/2024    LABGLOM 59 (L) 03/03/2024         Assessment/Plan:  73 y.o. male with obstructive jaundice secondary to extrahepatic cholangiocarcinoma with local camron metastasis. S/p ERCP with stenting 2/29      - CT chest with RLL pulm nodules X2, CEA 92.3, CA 19-9 39586  - Atarax for itching  - protonix BID  - ambulate, OOB  -Continue regular diet  - Will check a repeat CBC if hemoglobin is greater than 7 patient is okay for discharge from our point of view  - Zosyn for cholangitis prophylaxis  -Phos 2.9, replaced this morning    Electronically signed by Rudy Watts DO on 
Ivs removed for discharge. Discharge instructions reviewed with patient and son. Both acknowledge understanding.   
Name: Raudel Herndon  : 1950  MRN: [id number]    Date:  24    Time: 9:33 AM EST    Benefits of immediately proceeding with Radiology exam(s) without pre-testing outweigh the risks or are not indicated as specified below and therefore the following is/are being waived:    [] Pregnancy test   [] Patients LMP on-time and regular.   [] Patient had Tubal Ligation or has other Contraception Device.   [] Patient  is Menopausal or Premenarcheal.    [] Patient had Full or Partial Hysterectomy.    [] Protocol for Iodine allergy    [] MRI Questionnaire     [x] BUN/Creatinine   [] Patient age w/no hx of renal dysfunction.   [] Patient on Dialysis.   [] Recent Normal Labs.  Electronically signed by Sourav Rodrigez MD on 24 at 9:33 AM EST            
Nephrology consult sent to Dr. Brower.  
New orders per Dr Almeida to recheck stat Hemoglobin and Hematocrit and discontinue D5LR continuous fluid.  
Notified blood and cancer center regarding consult.   
Palliative Medicine Social Work     Patient Name: Raudel Herndon    Age: 73 y.o.    Marital Status:     [place] Status: no    Next of Kin:son Otf Herndon [phone]    Additional Support: family and friends    Minor Children: no    Advanced Directives: no, pt states he only has 1 child who is already aware of his wishes. Forms provided for future use if pt decides to complete them.    Confirm Code Status: full    Current Goals of Care:proceed with treatment for newly diagnosed cancer    Mental Health History: no    Substance Abuse:no    Indications of Abuse/Neglect: no    Financial Concerns: no    Living Situation: resides with his son. Pt was independent prior to admission. He was driving until 3 weeks ago. His son can provide transportation    Physical Care Needs Met: yes    Emotional Needs Met: time spent exploring pts thoughts and feelings, providing support through active listening and validation of feelings.    Assessment: LSW met with pt to review and provide advance directives. Pt does not think he will complete them as he only has one child who already knows his wishes.  Pt feels he has support to assist him through his recently diagnosed cancer and upcoming treatments.       
Palliative consult sent to LUCA Pinto.  Gastroenterology consult sent to Dr. Lane.  
Patient given a rectal suppository this morning with scant results (two tiny round formed balls of stool).  Ordered enema given this afternoon, with the same results.      Patient states his abdomen is not uncomfortable and he is passing gas.    
Patient given medication whole thin liquids with small sips. Patient NPO secondary IV bag hung. IV site flushed patent no signs of infection   
Pt's GFR is too low to receive contrast for CT scans.  Pt would need a waiver or dialysis within 24 hrs to proceed with scans.  Also, pt needs an iv in ac or below.   
Renal Dose Adjustment Policy (Generic)     This patient is on medication that requires renal, weight, and/or indication dose adjustment.      Date Body Weight IBW  Adjusted BW SCr  CrCl Dialysis status   2/28/2024 93.1 kg (205 lb 3.2 oz) Ideal body weight: 81 kg (178 lb 10.9 oz)  Adjusted ideal body weight: 85.9 kg (189 lb 4.6 oz) Serum creatinine: 1.8 mg/dL (H) 02/27/24 2243  Estimated creatinine clearance: 42 mL/min (A) N/a       Pharmacy has dose-adjusted the following medication(s):    Date Previous Order Adjusted Order   2/28/2024 Piperacillin-tazobactam 3,3 75 mg IV q8h Piperacillin-tazobactam 4,500 mg IV q8h       These changes were made per protocol according to the [facility]   Automatic Renal Dose Adjustment Policy.     *Please note this dose may need readjusted if patient's condition changes.    Please contact pharmacy with any questions regarding these changes.    Cary Bundy, PharmD, RP, BCPS 2/28/2024 1:08 AM      
[facility]  Hematology/Oncology  Consult      Patient Name: Raudel Herndon  YOB: 1950  PCP: Villa Lemus PA   Referring Provider: [address] [place] [address] / [facility] [id number]     Reason for Consultation: No chief complaint on file.       History of Present Illness: This is a 73-year-old male patient with a PMH of prostate cancer [place] score 6 s/p brachytherapy in 2016, DM, s/p cholecystectomy for gangrenous cholecystitis in 2017, fatty liver diagnosed on biopsy, and chronic pancreatitis who presented to the ED for evaluation obstructive jaundice after seen by Dr Oh in his office yesterday as he is following for newly diagnosed with extrahepatic cholangiocarcinoma with local camron metastasis. (Minimum T2 with N1/N2).  Patient initially presented to [facility] in January 2024 for evaluation of obstructive jaundice and underwent an ERCP with plastic stent placement at that time with noted tight common hepatic duct stricture. ERCP brushings were obtained with pathology positive for adenocarcinoma. CA 19-9 at that time was 11,000 and bilirubin was 14. He also reported 30 lb weight loss in the past 2 months with an additional 10 to 15 in the past couple of weeks since diagnosis. HPBS consulted since admission with CTA pancreas protocol and CT chest ordered. CEA 92.3; Ca19-9 pending.  It was discussed that he does need neoadjuvant chemotherapy as long as everything remains confined to his bile duct and lymph nodes.  Due to his lymph node metastasis and with a positive tissue diagnosis of adenocarcinoma he would strongly benefit from chemotherapy prior to surgical intervention. GI consulted for ERCP. Pending his biliary decompression he may need a PTHC if that is unsuccessful. ERCP scheduled tomorrow. Palliative following. CMP with BUN 74, Cr 2.0, GFR 35. LFT's ALK Phos 298, , , bilit total 18.6 (21.5 yesterday). CBC with hgb 9.7; ANC 7.32 otherwise unremarkable. 
Visited the patient, he stated that he is doing better than he was. That he is living with his son who takes him wherever he is going. He has some family members living in [place]. He indicated that he is being taking good care of here. The doctors and nurses doing their best to get him going. He is a Religious by franco and he received anointing and prayed for. He was appreciative of the visit.  Assured him of our support if need be.     IOANA Gifford.PH,B.TH, [facility] [phone]  
03/02/2024 05:10 AM    HCT 23.8 03/02/2024 05:10 AM    MCV 97.5 03/02/2024 05:10 AM    MCH 32.8 03/02/2024 05:10 AM    MCHC 33.6 03/02/2024 05:10 AM    RDW 15.6 03/02/2024 05:10 AM     03/01/2024 05:14 AM    MPV 10.7 03/02/2024 05:10 AM     CMP:    Lab Results   Component Value Date/Time     03/02/2024 05:10 AM    K 3.3 03/02/2024 05:10 AM     03/02/2024 05:10 AM    CO2 21 03/02/2024 05:10 AM    BUN 31 03/02/2024 05:10 AM    CREATININE 1.4 03/02/2024 05:10 AM    LABGLOM 55 03/02/2024 05:10 AM    GLUCOSE 105 03/02/2024 05:10 AM    PROT 5.1 03/02/2024 05:10 AM    LABALBU 2.8 03/02/2024 05:10 AM    CALCIUM 8.0 03/02/2024 05:10 AM    BILITOT 14.4 03/02/2024 05:10 AM    ALKPHOS 237 03/02/2024 05:10 AM     03/02/2024 05:10 AM     03/02/2024 05:10 AM     Magnesium:    Lab Results   Component Value Date/Time    MG 1.9 03/02/2024 05:10 AM     Phosphorus:    Lab Results   Component Value Date/Time    PHOS 2.2 03/02/2024 05:10 AM     Radiology Review:    CT CHEST W CONTRAST 2/28/24  IMPRESSION  5 and 4 mm right lower lobe pulmonary nodules may be postinflammatory but  early metastatic lesions cannot be excluded.      BRIEF SUMMARY OF INITIAL CONSULT:    Briefly Mr. Herndon is a 73-year-old  male with history of type II DM, chronic pancreatitis, recent diagnosed with obstructive jaundice due to extrahepatic cholangiocarcinoma status post stent placement in January 2024, elevated CEA 19-9, who was a direct admit on February 27 2024 after he was seen by Dr. Reynoso on his office.  On admission his creatinine level was 1.8 mg/dL, reason for this consultation.  Patient reports having persistent nausea with intermittent emesis.  He has lost significant weight since January.  His medications prior to admission included candesartan.  There is no baseline creatinine on records    Problems resolved:    High anion gap metabolic acidosis, probably starvation ketoacidosis, 
06:07 AM    MCHC 31.2 03/03/2024 06:07 AM    RDW 14.6 03/03/2024 06:07 AM     03/03/2024 06:07 AM    MPV 11.0 03/03/2024 06:07 AM     CMP:    Lab Results   Component Value Date/Time     03/03/2024 06:07 AM    K 3.6 03/03/2024 06:07 AM     03/03/2024 06:07 AM    CO2 22 03/03/2024 06:07 AM    BUN 22 03/03/2024 06:07 AM    CREATININE 1.3 03/03/2024 06:07 AM    LABGLOM 59 03/03/2024 06:07 AM    GLUCOSE 235 03/03/2024 06:07 AM    PROT 5.0 03/03/2024 06:07 AM    LABALBU 2.9 03/03/2024 06:07 AM    CALCIUM 7.5 03/03/2024 06:07 AM    BILITOT 13.5 03/03/2024 06:07 AM    ALKPHOS 198 03/03/2024 06:07 AM     03/03/2024 06:07 AM     03/03/2024 06:07 AM     Magnesium:    Lab Results   Component Value Date/Time    MG 1.6 03/03/2024 06:07 AM     Phosphorus:    Lab Results   Component Value Date/Time    PHOS 2.9 03/03/2024 06:07 AM     Radiology Review:    CT CHEST W CONTRAST 2/28/24  IMPRESSION  5 and 4 mm right lower lobe pulmonary nodules may be postinflammatory but  early metastatic lesions cannot be excluded.      BRIEF SUMMARY OF INITIAL CONSULT:    Briefly Mr. Herndon is a 73-year-old  male with history of type II DM, chronic pancreatitis, recent diagnosed with obstructive jaundice due to extrahepatic cholangiocarcinoma status post stent placement in January 2024, elevated CEA 19-9, who was a direct admit on February 27 2024 after he was seen by Dr. Reynoso on his office.  On admission his creatinine level was 1.8 mg/dL, reason for this consultation.  Patient reports having persistent nausea with intermittent emesis.  He has lost significant weight since January.  His medications prior to admission included candesartan.  There is no baseline creatinine on records    Problems resolved:    High anion gap metabolic acidosis, probably starvation ketoacidosis, resolved,    IMPRESSION/RECOMMENDATIONS:        NANCY versus NANCY on CKD, most likely volume responsive NANCY 2/2 poor intake, nausea 
Physician Statement:    Chief Complaint: Obstructive jaundice due to malignant neoplasm    I have examined the patient and performed the key aspects of physical exam, reviewed the record (including all pertinent and new radiology images and laboratory findings), and discussed the case with the surgical team.  I agree with the assessment and plan with the following additions, corrections, and changes. 14pt review of symptoms completed and negative except as mentioned.    Patient's current CA 19-9 is 11,620 and his CEA is 92  He is scheduled for an ERCP with metallic stenting today with Dr. Lane  I appreciate all consultants input  His creatinine is improving with IV hydration  Antibiotics are continued due to high risk for cholangitis post metallic stenting  Will plan to place a Mediport next week once his bilirubin is improved.  I will plan to keep him for a few more days after his metallic stenting to ensure that his bilirubin does come down.  Otherwise he will need a [facility]    Raza Reynoso MD  02/29/24  11:01 AM    
Results   Component Value Date/Time     02/29/2024 04:56 AM    K 4.0 02/29/2024 04:56 AM     02/29/2024 04:56 AM    CO2 22 02/29/2024 04:56 AM    BUN 52 02/29/2024 04:56 AM    CREATININE 1.7 02/29/2024 04:56 AM    LABGLOM 43 02/29/2024 04:56 AM    GLUCOSE 114 02/29/2024 04:56 AM    PROT 5.5 02/29/2024 04:56 AM    LABALBU 2.5 02/29/2024 04:56 AM    CALCIUM 8.7 02/29/2024 04:56 AM    BILITOT 17.6 02/29/2024 04:56 AM    ALKPHOS 285 02/29/2024 04:56 AM     02/29/2024 04:56 AM     02/29/2024 04:56 AM     Magnesium:    Lab Results   Component Value Date/Time    MG 2.2 02/29/2024 04:56 AM     Phosphorus:    Lab Results   Component Value Date/Time    PHOS 2.5 02/29/2024 04:56 AM     Radiology Review:    CT CHEST W CONTRAST 2/28/24  IMPRESSION  5 and 4 mm right lower lobe pulmonary nodules may be postinflammatory but  early metastatic lesions cannot be excluded.      BRIEF SUMMARY OF INITIAL CONSULT:    Briefly Mr. Herndon is a 73-year-old  male with history of type II DM, chronic pancreatitis, recent diagnosed with obstructive jaundice due to extrahepatic cholangiocarcinoma status post stent placement in January 2024, elevated CEA 19-9, who was a direct admit on February 27 2024 after he was seen by Dr. Reynoso on his office.  On admission his creatinine level was 1.8 mg/dL, reason for this consultation.  Patient reports having persistent nausea with intermittent emesis.  He has lost significant weight since January.  His medications prior to admission included candesartan.  There is no baseline creatinine on records    IMPRESSION/RECOMMENDATIONS:        NANCY versus NANCY on CKD, most likely volume responsive NANCY 2/2 poor intake, nausea and emesis in the setting of ARB administration, less likely bile nephropathy?.  Renal function improved with IV fluids, creatinine down to 1.7 mg/dL with excellent urine output.    High anion gap metabolic acidosis, probably starvation ketoacidosis, 
kg)    Admission Body Weight: 95 kg (209 lb 7 oz) (2/29-BS)  Current Body Weight: 95 kg (209 lb 7 oz) (2/29-BS), 112 % IBW. Weight Source: Bed Scale  Current BMI (kg/m2): 27.3  Usual Body Weight:  (UTO d/t lack of wt hx per EMR ; subjective wt loss noted)     Weight Adjustment For: No Adjustment                 BMI Categories: Overweight (BMI 25.0-29.9)    Estimated Daily Nutrient Needs:  Energy Requirements Based On: Formula  Weight Used for Energy Requirements: Current  Energy (kcal/day): 5359-8554  Weight Used for Protein Requirements: Ideal  Protein (g/day): 1.3-1.5g/kadSAL=755-553b (as tolerated ; monitor LFTs/bili)  Method Used for Fluid Requirements: 1 ml/kcal  Fluid (ml/day): 8609-6850    Nutrition Diagnosis:   Moderate malnutrition, In context of chronic illness related to catabolic illness (hx of CA) as evidenced by Criteria as identified in malnutrition assessment    Nutrition Interventions:   Food and/or Nutrient Delivery: Continue NPO (start Glucerna TID (prefers chocolate or strawberry flavors) w/ diet advancement)  Nutrition Education/Counseling: Education initiated (about ONS Options once pt is no longer NPO)  Coordination of Nutrition Care: Continue to monitor while inpatient       Goals:     Goals: Initiate PO diet, within 2 days       Nutrition Monitoring and Evaluation:   Behavioral-Environmental Outcomes: None Identified  Food/Nutrient Intake Outcomes: Diet Advancement/Tolerance  Physical Signs/Symptoms Outcomes: Nutrition Focused Physical Findings, Biochemical Data, Chewing or Swallowing, Skin, Weight, GI Status, Fluid Status or Edema    Discharge Planning:    Too soon to determine     Cary Ventura RD, LD  Contact: [phone]

## 2024-03-04 ENCOUNTER — TELEPHONE (OUTPATIENT)
Dept: HEMATOLOGY | Age: 74
End: 2024-03-04

## 2024-03-04 ENCOUNTER — PREP FOR PROCEDURE (OUTPATIENT)
Dept: HEMATOLOGY | Age: 74
End: 2024-03-04

## 2024-03-04 RX ORDER — SODIUM CHLORIDE 9 MG/ML
INJECTION, SOLUTION INTRAVENOUS PRN
Status: CANCELLED | OUTPATIENT
Start: 2024-03-04

## 2024-03-04 RX ORDER — SODIUM CHLORIDE 0.9 % (FLUSH) 0.9 %
5-40 SYRINGE (ML) INJECTION EVERY 12 HOURS SCHEDULED
Status: CANCELLED | OUTPATIENT
Start: 2024-03-04

## 2024-03-04 RX ORDER — SODIUM CHLORIDE 0.9 % (FLUSH) 0.9 %
5-40 SYRINGE (ML) INJECTION PRN
Status: CANCELLED | OUTPATIENT
Start: 2024-03-04

## 2024-03-04 NOTE — PROGRESS NOTES
[facility]   PRE-ADMISSION TESTING GENERAL INSTRUCTIONS  PAT Phone Number: [phone]      GENERAL INSTRUCTIONS:    [x] Antibacterial Soap Shower Night before and/or AM of surgery.  [] CHG Wipes instruction sheet and wipes given.  [x] Do not wear makeup, lotions, powders, deodorant the morning of surgery.  [x] Nothing to eat or drink after midnight. This includes no gum, candy, mints or water.  [x] You may brush your teeth, gargle, but do not swallow water.   [] No tobacco products, illegal drugs, or alcohol within 24 hours of your surgery.  [x] Jewelry or valuables should not be brought to the hospital. All body and/or tongue piercing's must be removed prior to arriving to hospital. No contact lens or hair pins.   [x] Arrange transportation with a responsible adult  to and from the hospital. Arrange for someone to be with you for the remainder of the day and for 24 hours after your procedure due to having had anesthesia.          -Who will be your  for transportation? friend        -Who will be staying with you for 24 hrs after your procedure? son  [x] Bring insurance card and photo ID.  [] Bring copy of living will or healthcare power of  papers to be placed in your electronic record.  [] Urine Pregnancy test will be preformed the day of surgery. A specimen sample may be brought from home.  [] Transfusion (Green) Bracelet: Please bring with you to hospital, day of surgery.     PARKING INSTRUCTIONS:     [x] ARRIVAL DATE & TIME: 3/8 @ 0530  [x] Times are subject to change. We will contact you the business day before surgery if that were to occur.  [x] Enter into the [facility] Entrance. Two people may accompany you. Masks are not required.  [x] Parking Lot \"I\" is where you will park. It is located on the corner of [address] and [address]. The entrance is on [address].   Only one vehicle - per patient, is permitted in parking lot.   Upon

## 2024-03-04 NOTE — TELEPHONE ENCOUNTER
Raudel is scheduled for Surgery on 3/8/24 at [facility] with Dr. Barrera.   Prior authorization for CPT 03596 is pending.   Spoke to patient who confirmed surgery date and location. He is aware he needs to talk to PAT for further instructions. Raudel denies taking any blood thinners, ASA, or any diabetic medications listed on the sx hold list.

## 2024-03-07 NOTE — PROGRESS NOTES
Patient notified of new arrival time for procedure tomorrow 3/8. New arrival time is now 0500. Patient verbalized understanding.

## 2024-03-08 ENCOUNTER — HOSPITAL ENCOUNTER (OUTPATIENT)
Age: 74
Setting detail: OUTPATIENT SURGERY
Discharge: HOME | End: 2024-03-08
Attending: STUDENT IN AN ORGANIZED HEALTH CARE EDUCATION/TRAINING PROGRAM | Admitting: STUDENT IN AN ORGANIZED HEALTH CARE EDUCATION/TRAINING PROGRAM
Payer: MEDICARE

## 2024-03-08 ENCOUNTER — APPOINTMENT (OUTPATIENT)
Dept: GENERAL RADIOLOGY | Age: 74
End: 2024-03-08
Attending: STUDENT IN AN ORGANIZED HEALTH CARE EDUCATION/TRAINING PROGRAM
Payer: MEDICARE

## 2024-03-08 ENCOUNTER — ANESTHESIA EVENT (OUTPATIENT)
Dept: OPERATING ROOM | Age: 74
End: 2024-03-08
Payer: MEDICARE

## 2024-03-08 ENCOUNTER — ANESTHESIA (OUTPATIENT)
Dept: OPERATING ROOM | Age: 74
End: 2024-03-08
Payer: MEDICARE

## 2024-03-08 VITALS
DIASTOLIC BLOOD PRESSURE: 55 MMHG | OXYGEN SATURATION: 99 % | BODY MASS INDEX: 27.43 KG/M2 | SYSTOLIC BLOOD PRESSURE: 105 MMHG | HEIGHT: 73 IN | HEART RATE: 58 BPM | TEMPERATURE: 97.2 F | WEIGHT: 207 LBS | RESPIRATION RATE: 15 BRPM

## 2024-03-08 DIAGNOSIS — C22.1 CHOLANGIOCARCINOMA (HCC): ICD-10-CM

## 2024-03-08 DIAGNOSIS — E44.0 MODERATE PROTEIN-CALORIE MALNUTRITION (HCC): Primary | ICD-10-CM

## 2024-03-08 DIAGNOSIS — Z45.2 ENCOUNTER FOR INSERTION OF TUNNELED CENTRAL VENOUS CATHETER (CVC) WITH PORT: ICD-10-CM

## 2024-03-08 PROCEDURE — 2580000003 HC RX 258: Performed by: CLINICAL NURSE SPECIALIST

## 2024-03-08 PROCEDURE — 7100000010 HC PHASE II RECOVERY - FIRST 15 MIN: Performed by: STUDENT IN AN ORGANIZED HEALTH CARE EDUCATION/TRAINING PROGRAM

## 2024-03-08 PROCEDURE — C1788 PORT, INDWELLING, IMP: HCPCS | Performed by: STUDENT IN AN ORGANIZED HEALTH CARE EDUCATION/TRAINING PROGRAM

## 2024-03-08 PROCEDURE — 2580000003 HC RX 258

## 2024-03-08 PROCEDURE — A4217 STERILE WATER/SALINE, 500 ML: HCPCS | Performed by: STUDENT IN AN ORGANIZED HEALTH CARE EDUCATION/TRAINING PROGRAM

## 2024-03-08 PROCEDURE — 3600000016 HC SURGERY LEVEL 6 ADDTL 15MIN: Performed by: STUDENT IN AN ORGANIZED HEALTH CARE EDUCATION/TRAINING PROGRAM

## 2024-03-08 PROCEDURE — 71045 X-RAY EXAM CHEST 1 VIEW: CPT

## 2024-03-08 PROCEDURE — 6360000002 HC RX W HCPCS: Performed by: CLINICAL NURSE SPECIALIST

## 2024-03-08 PROCEDURE — 36561 INSERT TUNNELED CV CATH: CPT | Performed by: STUDENT IN AN ORGANIZED HEALTH CARE EDUCATION/TRAINING PROGRAM

## 2024-03-08 PROCEDURE — 6360000002 HC RX W HCPCS

## 2024-03-08 PROCEDURE — 7100000011 HC PHASE II RECOVERY - ADDTL 15 MIN: Performed by: STUDENT IN AN ORGANIZED HEALTH CARE EDUCATION/TRAINING PROGRAM

## 2024-03-08 PROCEDURE — 77001 FLUOROGUIDE FOR VEIN DEVICE: CPT | Performed by: STUDENT IN AN ORGANIZED HEALTH CARE EDUCATION/TRAINING PROGRAM

## 2024-03-08 PROCEDURE — 3700000000 HC ANESTHESIA ATTENDED CARE: Performed by: STUDENT IN AN ORGANIZED HEALTH CARE EDUCATION/TRAINING PROGRAM

## 2024-03-08 PROCEDURE — 2580000003 HC RX 258: Performed by: STUDENT IN AN ORGANIZED HEALTH CARE EDUCATION/TRAINING PROGRAM

## 2024-03-08 PROCEDURE — 3700000001 HC ADD 15 MINUTES (ANESTHESIA): Performed by: STUDENT IN AN ORGANIZED HEALTH CARE EDUCATION/TRAINING PROGRAM

## 2024-03-08 PROCEDURE — 3600000006 HC SURGERY LEVEL 6 BASE: Performed by: STUDENT IN AN ORGANIZED HEALTH CARE EDUCATION/TRAINING PROGRAM

## 2024-03-08 PROCEDURE — 2709999900 HC NON-CHARGEABLE SUPPLY: Performed by: STUDENT IN AN ORGANIZED HEALTH CARE EDUCATION/TRAINING PROGRAM

## 2024-03-08 PROCEDURE — 6360000002 HC RX W HCPCS: Performed by: STUDENT IN AN ORGANIZED HEALTH CARE EDUCATION/TRAINING PROGRAM

## 2024-03-08 DEVICE — VACCESS CT POWER-INJECTABLE IMPLANTABLE PORT (WITH SUTURE PLUGS) (8F)
Type: IMPLANTABLE DEVICE | Site: CHEST | Status: FUNCTIONAL
Brand: VACCESS

## 2024-03-08 RX ORDER — OXYCODONE HYDROCHLORIDE AND ACETAMINOPHEN 5; 325 MG/1; MG/1
1 TABLET ORAL EVERY 8 HOURS PRN
Qty: 4 TABLET | Refills: 0 | Status: SHIPPED | OUTPATIENT
Start: 2024-03-08 | End: 2024-03-11

## 2024-03-08 RX ORDER — SODIUM CHLORIDE 0.9 % (FLUSH) 0.9 %
5-40 SYRINGE (ML) INJECTION PRN
Status: DISCONTINUED | OUTPATIENT
Start: 2024-03-08 | End: 2024-03-08 | Stop reason: HOSPADM

## 2024-03-08 RX ORDER — BUPIVACAINE HYDROCHLORIDE 5 MG/ML
INJECTION, SOLUTION EPIDURAL; INTRACAUDAL PRN
Status: DISCONTINUED | OUTPATIENT
Start: 2024-03-08 | End: 2024-03-08 | Stop reason: ALTCHOICE

## 2024-03-08 RX ORDER — FENTANYL CITRATE 50 UG/ML
INJECTION, SOLUTION INTRAMUSCULAR; INTRAVENOUS PRN
Status: DISCONTINUED | OUTPATIENT
Start: 2024-03-08 | End: 2024-03-08 | Stop reason: SDUPTHER

## 2024-03-08 RX ORDER — PROPOFOL 10 MG/ML
INJECTION, EMULSION INTRAVENOUS CONTINUOUS PRN
Status: DISCONTINUED | OUTPATIENT
Start: 2024-03-08 | End: 2024-03-08 | Stop reason: SDUPTHER

## 2024-03-08 RX ORDER — SODIUM CHLORIDE 0.9 % (FLUSH) 0.9 %
5-40 SYRINGE (ML) INJECTION EVERY 12 HOURS SCHEDULED
Status: DISCONTINUED | OUTPATIENT
Start: 2024-03-08 | End: 2024-03-08 | Stop reason: HOSPADM

## 2024-03-08 RX ORDER — HEPARIN 100 UNIT/ML
SYRINGE INTRAVENOUS PRN
Status: DISCONTINUED | OUTPATIENT
Start: 2024-03-08 | End: 2024-03-08 | Stop reason: ALTCHOICE

## 2024-03-08 RX ORDER — SODIUM CHLORIDE 9 MG/ML
INJECTION, SOLUTION INTRAVENOUS CONTINUOUS PRN
Status: DISCONTINUED | OUTPATIENT
Start: 2024-03-08 | End: 2024-03-08 | Stop reason: SDUPTHER

## 2024-03-08 RX ORDER — PHENYLEPHRINE HYDROCHLORIDE 10 MG/ML
INJECTION INTRAVENOUS PRN
Status: DISCONTINUED | OUTPATIENT
Start: 2024-03-08 | End: 2024-03-08 | Stop reason: SDUPTHER

## 2024-03-08 RX ORDER — SODIUM CHLORIDE 9 MG/ML
INJECTION, SOLUTION INTRAVENOUS PRN
Status: DISCONTINUED | OUTPATIENT
Start: 2024-03-08 | End: 2024-03-08 | Stop reason: HOSPADM

## 2024-03-08 RX ADMIN — SODIUM CHLORIDE: 9 INJECTION, SOLUTION INTRAVENOUS at 05:26

## 2024-03-08 RX ADMIN — CEFAZOLIN 2000 MG: 2 INJECTION, POWDER, FOR SOLUTION INTRAMUSCULAR; INTRAVENOUS at 07:07

## 2024-03-08 RX ADMIN — PROPOFOL 50 MCG/KG/MIN: 10 INJECTION, EMULSION INTRAVENOUS at 07:08

## 2024-03-08 RX ADMIN — PHENYLEPHRINE HYDROCHLORIDE 100 MCG: 10 INJECTION, SOLUTION INTRAVENOUS at 07:35

## 2024-03-08 RX ADMIN — SODIUM CHLORIDE: 9 INJECTION, SOLUTION INTRAVENOUS at 07:00

## 2024-03-08 RX ADMIN — FENTANYL CITRATE 50 MCG: 50 INJECTION, SOLUTION INTRAMUSCULAR; INTRAVENOUS at 07:06

## 2024-03-08 RX ADMIN — FENTANYL CITRATE 50 MCG: 50 INJECTION, SOLUTION INTRAMUSCULAR; INTRAVENOUS at 07:22

## 2024-03-08 ASSESSMENT — PAIN - FUNCTIONAL ASSESSMENT
PAIN_FUNCTIONAL_ASSESSMENT: NONE - DENIES PAIN
PAIN_FUNCTIONAL_ASSESSMENT: NONE - DENIES PAIN

## 2024-03-08 NOTE — PROGRESS NOTES
3/8/2024 8:36 am     COMPARISON:  None.     HISTORY:  ORDERING SYSTEM PROVIDED HISTORY: port insertion  TECHNOLOGIST PROVIDED HISTORY:  Reason for exam:->port insertion  What reading provider will be dictating this exam?->CRC     FINDINGS:  The cardiac silhouette is within normal limits..     The lungs are clear.  There is no focal infiltrate or effusion     Note is made of a right MediPort catheter.  There is no right pneumothorax.     IMPRESSION:  1. Satisfactory position of the right MediPort catheter.  There is no  pneumothorax.  2. The lungs are clear.              Specimen Collected: 03/08/24 08:39 EST

## 2024-03-08 NOTE — H&P
GENERAL SURGERY  HISTORY AND PHYSICAL  3/8/2024          HPI  Raudel Herndon is a 73 y.o. male with hx of diabetes mellitus and chronic pancreatitis and recently diagnosed extrahepatic cholangiocarcinoma. He was seen in [facility] clinic 2/27 and was recommended admission for obstructive jaundice.  Patient was started on Zosyn for cholangitis prophylaxis and GI was consulted for ERCP. Patient underwent ERCP with improvement of bilirubin and was discharged home. He is here today for mediport insertion.       Past Medical History:   Diagnosis Date    Cancer (HCC) 02/2024    cholangiocarcinoma    Diabetes mellitus (HCC)     Prostate cancer (HCC)     treated with seed implants       Past Surgical History:   Procedure Laterality Date    ANKLE FUSION Right 2022    COLONOSCOPY      ERCP N/A 02/29/2024    ENDOSCOPIC RETROGRADE CHOLANGIOPANCREATOGRAPHY performed by Raza Lane DO at [facility] ENDOSCOPY    ERCP N/A 02/29/2024    ENDOSCOPIC RETROGRADE CHOLANGIOPANCREATOGRAPHY DILATION BALLOON performed by Raza Lane DO at [facility] ENDOSCOPY    ERCP N/A 02/29/2024    ENDOSCOPIC RETROGRADE CHOLANGIOPANCREATOGRAPHY STENT REMOVAL performed by Raza Lane DO at [facility] ENDOSCOPY    ERCP N/A 02/29/2024    ENDOSCOPIC RETROGRADE CHOLANGIOPANCREATOGRAPHY STENT INSERTION performed by Raza Laen DO at [facility] ENDOSCOPY    EYE SURGERY Bilateral 2022    JOINT REPLACEMENT Right 2022    hip    TOTAL KNEE ARTHROPLASTY Left        Prior to Admission medications    Medication Sig Start Date End Date Taking? Authorizing Provider   metFORMIN (GLUCOPHAGE) 500 MG tablet Take 1 tablet by mouth daily    Kwadwo Valenzuela MD   pioglitazone (ACTOS) 30 MG tablet Take 1 tablet by mouth daily    Kwadwo Valenzuela MD   pantoprazole (PROTONIX) 40 MG tablet Take 1 tablet by mouth in the morning and at bedtime 2/27/24   Raza Reynoso III, MD   hydrOXYzine HCl (ATARAX) 25 MG tablet Take 2 tablets by mouth every 8 hours as needed

## 2024-03-08 NOTE — OP NOTE
junction. The catheter was tunneled up to the neck incision and a dilator sheath was placed over the wire into the right IJ. The dilator and guidewire were removed and the catheter was inserted into the sheath. The catheter was confirmed to be in appropriate position with fluoroscopy. The port was flushed with heperinized saline followed by full strength heparin. Subcutaneous tissues were closed with 3-0 vicryl and skin approximated with 4-0 monocryl followed by surgical glue. The neck incision was closed with 4-0 monocryl and surgical glue in similar fashion.     The patient tolerated the procedure well, was awoken from anesthesia and escorted to PACU in stable condition. All sponge and instrument counts were correct x2. A post operative CXR is pending.     Dr. Barrera was scrubbed and present throughout the entire case.     Electronically signed by Joann Arceo DO on 3/8/2024 at 8:09 AM

## 2024-03-08 NOTE — PROGRESS NOTES
CLINICAL PHARMACY NOTE: MEDS TO BEDS    Total # of Prescriptions Filled: 1   The following medications were delivered to the patient:  Oxycodone-apap 5-325    Additional Documentation:  Friend Liv picked up in pharmacy

## 2024-03-08 NOTE — DISCHARGE INSTRUCTIONS
[facility] AMBULATORY PROCEDURE DISCHARGE - Mediport INSTRUCTIONS  You may be drowsy or lightheaded after receiving sedation or anesthesia.    A responsible person should be with you for the next 24 hours.    Please follow the instructions checked below:    DIET INSTRUCTIONS:  [x]Start with light diet and progress to your normal diet as you feel like eating. If you experience nausea or repeated episodes of vomiting which persist beyond 12-24 hours, notify your doctor.  []Other     ACTIVITY INSTRUCTIONS:  [x]Rest today. Increase activity as tolerated    [x]No heavy lifting or strenuous activity for 2 weeks   [x]No driving for 1 day or while on narcotics  []Other     WOUND/DRESSING INSTRUCTIONS:  Always ensure you and your care giver clean hands before and after caring for the wound.  [x]May shower      []May bathe      [x]Derma bond dressing-Do not apply lotion, gel, or liquid to wound while the derma bond is in place.   []Other         MEDICATION INSTRUCTIONS:    [x]Prescriptions sent with you.  Use as directed.  When taking pain medications, you may experience dizziness or drowsiness.  Do not drink alcohol or drive when taking these medications.  [x]You may take a non-prescription “headache remedy”, preferably one that does not contain aspirin.    Other Instructions:      FOLLOW-UP CARE:  [x]Call the office at [phone] for follow-up appointment as needed    Call physician if they or any other problems occur:  Fever over 101°    Redness, swelling, hardness or warmth at the operative site  Unrelieved nausea    Foul smelling or cloudy drainage at the operative site   Unrelieved pain    Blood soaked dressing. (Some oozing may be normal)

## 2024-03-08 NOTE — ANESTHESIA POSTPROCEDURE EVALUATION
Department of Anesthesiology  Postprocedure Note    Patient: Raudel Herndon  MRN: [id number]  YOB: 1950  Date of evaluation: 3/8/2024    Procedure Summary       Date: 03/08/24 Room / Location: [address]    Anesthesia Start: 0700 Anesthesia Stop: 0800    Procedure: PORT INSERTION (Right: Chest) Diagnosis:       Cholangiocarcinoma (HCC)      (Cholangiocarcinoma (HCC) [C22.1])    Surgeons: Susie Barrera MD Responsible Provider: Di Cook MD    Anesthesia Type: MAC ASA Status: 3            Anesthesia Type: No value filed.    Andrews Phase I: Andrews Score: 10    Andrews Phase II: Andrews Score: 10    Anesthesia Post Evaluation    Patient location during evaluation: PACU  Patient participation: complete - patient participated  Level of consciousness: awake and alert  Airway patency: patent  Nausea & Vomiting: no nausea and no vomiting  Cardiovascular status: blood pressure returned to baseline and hemodynamically stable  Respiratory status: acceptable and spontaneous ventilation  Hydration status: euvolemic  Multimodal analgesia pain management approach  Pain management: adequate    No notable events documented.

## 2024-03-08 NOTE — ANESTHESIA PRE PROCEDURE
Department of Anesthesiology  Preprocedure Note       Name:  Raudel Herndon   Age:  73 y.o.  :  1950                                          MRN:  [id number]         Date:  3/8/2024      Surgeon: Surgeon(s):  Susie Barrera MD    Procedure: Procedure(s):  PORT INSERTION    Medications prior to admission:   Prior to Admission medications    Medication Sig Start Date End Date Taking? Authorizing Provider   metFORMIN (GLUCOPHAGE) 500 MG tablet Take 1 tablet by mouth daily    Kwadwo Valenzuela MD   pioglitazone (ACTOS) 30 MG tablet Take 1 tablet by mouth daily    Kwadwo Valenzuela MD   pantoprazole (PROTONIX) 40 MG tablet Take 1 tablet by mouth in the morning and at bedtime 24   Raza Reynoso III, MD   hydrOXYzine HCl (ATARAX) 25 MG tablet Take 2 tablets by mouth every 8 hours as needed for Itching  Patient not taking: Reported on 3/4/2024 2/27/24 3/8/24  Raza Reynoso III, MD   candesartan-hydroCHLOROthiazide (ATACAND HCT) 16-12.5 MG per tablet Take 1 tablet by mouth daily    ProviderKwadwo MD       Current medications:    Current Facility-Administered Medications   Medication Dose Route Frequency Provider Last Rate Last Admin    sodium chloride flush 0.9 % injection 5-40 mL  5-40 mL IntraVENous 2 times per day Miah Gong APRN - CNP        sodium chloride flush 0.9 % injection 5-40 mL  5-40 mL IntraVENous PRN Miah Gong APRN - CNP        0.9 % sodium chloride infusion   IntraVENous PRN Miah Gong APRN - CNP 15 mL/hr at 24 0526 New Bag at 24 0526    ceFAZolin (ANCEF) 2,000 mg in sterile water 20 mL IV syringe  2,000 mg IntraVENous 60 Min Pre-Op Miah Gong APRN - CNP   2,000 mg at 24 0707     Facility-Administered Medications Ordered in Other Encounters   Medication Dose Route Frequency Provider Last Rate Last Admin    0.9 % sodium chloride infusion   IntraVENous Continuous PRN Raul [place] APRN - CRNA   New Bag at 24 0700

## 2024-03-13 ENCOUNTER — TELEPHONE (OUTPATIENT)
Dept: HEMATOLOGY | Age: 74
End: 2024-03-13

## 2024-03-13 ENCOUNTER — HOSPITAL ENCOUNTER (OUTPATIENT)
Age: 74
Discharge: HOME | End: 2024-03-13
Payer: MEDICARE

## 2024-03-13 LAB
ALBUMIN SERPL-MCNC: 3.3 G/DL (ref 3.5–5.2)
ALP SERPL-CCNC: 379 U/L (ref 40–129)
ALT SERPL-CCNC: 101 U/L (ref 0–40)
ANION GAP SERPL CALCULATED.3IONS-SCNC: 12 MMOL/L (ref 7–16)
AST SERPL-CCNC: 145 U/L (ref 0–39)
BILIRUB SERPL-MCNC: 16.2 MG/DL (ref 0–1.2)
BUN SERPL-MCNC: 16 MG/DL (ref 6–23)
CALCIUM SERPL-MCNC: 8.2 MG/DL (ref 8.6–10.2)
CHLORIDE SERPL-SCNC: 103 MMOL/L (ref 98–107)
CO2 SERPL-SCNC: 22 MMOL/L (ref 22–29)
CREAT SERPL-MCNC: 1.1 MG/DL (ref 0.7–1.2)
ERYTHROCYTE [DISTWIDTH] IN BLOOD BY AUTOMATED COUNT: 17.2 % (ref 11.5–15)
GFR SERPL CREATININE-BSD FRML MDRD: >60 ML/MIN/1.73M2
GLUCOSE SERPL-MCNC: 108 MG/DL (ref 74–99)
HCT VFR BLD AUTO: 30.3 % (ref 37–54)
HGB BLD-MCNC: 9.6 G/DL (ref 12.5–16.5)
MCH RBC QN AUTO: 33.9 PG (ref 26–35)
MCHC RBC AUTO-ENTMCNC: 31.7 G/DL (ref 32–34.5)
MCV RBC AUTO: 107.1 FL (ref 80–99.9)
PLATELET # BLD AUTO: 180 K/UL (ref 130–450)
PMV BLD AUTO: 10.1 FL (ref 7–12)
POTASSIUM SERPL-SCNC: 3.9 MMOL/L (ref 3.5–5)
PROT SERPL-MCNC: 6.2 G/DL (ref 6.4–8.3)
RBC # BLD AUTO: 2.83 M/UL (ref 3.8–5.8)
SODIUM SERPL-SCNC: 137 MMOL/L (ref 132–146)
WBC OTHER # BLD: 6.6 K/UL (ref 4.5–11.5)

## 2024-03-13 PROCEDURE — 80053 COMPREHEN METABOLIC PANEL: CPT

## 2024-03-13 PROCEDURE — 36415 COLL VENOUS BLD VENIPUNCTURE: CPT

## 2024-03-13 PROCEDURE — 85027 COMPLETE CBC AUTOMATED: CPT

## 2024-03-13 NOTE — TELEPHONE ENCOUNTER
I called the access line per request of Dr. Barrera to have patient direct admitted for obstructive jaundice.  I spoke to Ariel HOLLIS and he is working on admission to [facility] intermediate floor.  He did let me know there are  holds in ER waiting for beds so he might not get a bed until  tomorrow.  I called patient and he was made aware that he is going to be admitted to [facility] and once a bed is available he will get a call with his bed assignment and he can head to hospital after he gets the call.  He verbalized understanding.    Electronically signed by Ina Henderson RN on 3/13/2024 at 3:31 PM

## 2024-03-14 ENCOUNTER — HOSPITAL ENCOUNTER (INPATIENT)
Age: 74
LOS: 5 days | Discharge: HOME | DRG: 435 | End: 2024-03-19
Attending: TRANSPLANT SURGERY | Admitting: STUDENT IN AN ORGANIZED HEALTH CARE EDUCATION/TRAINING PROGRAM
Payer: MEDICARE

## 2024-03-14 PROBLEM — K83.1 OBSTRUCTIVE JAUNDICE: Status: ACTIVE | Noted: 2024-03-14

## 2024-03-14 PROBLEM — K83.1 OBSTRUCTIVE JAUNDICE DUE TO CANCER (HCC): Status: ACTIVE | Noted: 2024-03-14

## 2024-03-14 PROBLEM — C80.1 OBSTRUCTIVE JAUNDICE DUE TO CANCER (HCC): Status: ACTIVE | Noted: 2024-03-14

## 2024-03-14 LAB
ALBUMIN SERPL-MCNC: 3 G/DL (ref 3.5–5.2)
ALP SERPL-CCNC: 359 U/L (ref 40–129)
ALT SERPL-CCNC: 94 U/L (ref 0–40)
ANION GAP SERPL CALCULATED.3IONS-SCNC: 15 MMOL/L (ref 7–16)
AST SERPL-CCNC: 128 U/L (ref 0–39)
BASOPHILS # BLD: 0.04 K/UL (ref 0–0.2)
BASOPHILS NFR BLD: 1 % (ref 0–2)
BILIRUB SERPL-MCNC: 15.4 MG/DL (ref 0–1.2)
BUN SERPL-MCNC: 16 MG/DL (ref 6–23)
CALCIUM SERPL-MCNC: 7.9 MG/DL (ref 8.6–10.2)
CHLORIDE SERPL-SCNC: 100 MMOL/L (ref 98–107)
CO2 SERPL-SCNC: 21 MMOL/L (ref 22–29)
CREAT SERPL-MCNC: 1.2 MG/DL (ref 0.7–1.2)
EOSINOPHIL # BLD: 0.08 K/UL (ref 0.05–0.5)
EOSINOPHILS RELATIVE PERCENT: 1 % (ref 0–6)
ERYTHROCYTE [DISTWIDTH] IN BLOOD BY AUTOMATED COUNT: 16.8 % (ref 11.5–15)
GFR SERPL CREATININE-BSD FRML MDRD: >60 ML/MIN/1.73M2
GLUCOSE BLD-MCNC: 83 MG/DL (ref 74–99)
GLUCOSE SERPL-MCNC: 93 MG/DL (ref 74–99)
HBA1C MFR BLD: 4.5 % (ref 4–5.6)
HCT VFR BLD AUTO: 27.7 % (ref 37–54)
HGB BLD-MCNC: 8.8 G/DL (ref 12.5–16.5)
IMM GRANULOCYTES # BLD AUTO: 0.15 K/UL (ref 0–0.58)
IMM GRANULOCYTES NFR BLD: 2 % (ref 0–5)
LYMPHOCYTES NFR BLD: 1.62 K/UL (ref 1.5–4)
LYMPHOCYTES RELATIVE PERCENT: 23 % (ref 20–42)
MCH RBC QN AUTO: 33 PG (ref 26–35)
MCHC RBC AUTO-ENTMCNC: 31.8 G/DL (ref 32–34.5)
MCV RBC AUTO: 103.7 FL (ref 80–99.9)
MONOCYTES NFR BLD: 0.59 K/UL (ref 0.1–0.95)
MONOCYTES NFR BLD: 8 % (ref 2–12)
NEUTROPHILS NFR BLD: 65 % (ref 43–80)
NEUTS SEG NFR BLD: 4.62 K/UL (ref 1.8–7.3)
PLATELET # BLD AUTO: 155 K/UL (ref 130–450)
PMV BLD AUTO: 10.1 FL (ref 7–12)
POTASSIUM SERPL-SCNC: 3.2 MMOL/L (ref 3.5–5)
PROT SERPL-MCNC: 5.8 G/DL (ref 6.4–8.3)
RBC # BLD AUTO: 2.67 M/UL (ref 3.8–5.8)
SODIUM SERPL-SCNC: 136 MMOL/L (ref 132–146)
WBC OTHER # BLD: 7.1 K/UL (ref 4.5–11.5)

## 2024-03-14 PROCEDURE — 2140000000 HC CCU INTERMEDIATE R&B

## 2024-03-14 PROCEDURE — 2580000003 HC RX 258

## 2024-03-14 PROCEDURE — 6360000002 HC RX W HCPCS

## 2024-03-14 PROCEDURE — 36415 COLL VENOUS BLD VENIPUNCTURE: CPT

## 2024-03-14 PROCEDURE — 85025 COMPLETE CBC W/AUTO DIFF WBC: CPT

## 2024-03-14 PROCEDURE — 82962 GLUCOSE BLOOD TEST: CPT

## 2024-03-14 PROCEDURE — 6370000000 HC RX 637 (ALT 250 FOR IP)

## 2024-03-14 PROCEDURE — 80053 COMPREHEN METABOLIC PANEL: CPT

## 2024-03-14 PROCEDURE — 83036 HEMOGLOBIN GLYCOSYLATED A1C: CPT

## 2024-03-14 RX ORDER — SODIUM CHLORIDE, SODIUM LACTATE, POTASSIUM CHLORIDE, CALCIUM CHLORIDE 600; 310; 30; 20 MG/100ML; MG/100ML; MG/100ML; MG/100ML
INJECTION, SOLUTION INTRAVENOUS CONTINUOUS
Status: ACTIVE | OUTPATIENT
Start: 2024-03-15 | End: 2024-03-15

## 2024-03-14 RX ORDER — DEXTROSE MONOHYDRATE 100 MG/ML
INJECTION, SOLUTION INTRAVENOUS CONTINUOUS PRN
Status: DISCONTINUED | OUTPATIENT
Start: 2024-03-14 | End: 2024-03-19 | Stop reason: HOSPADM

## 2024-03-14 RX ORDER — ONDANSETRON 2 MG/ML
4 INJECTION INTRAMUSCULAR; INTRAVENOUS EVERY 6 HOURS PRN
Status: DISCONTINUED | OUTPATIENT
Start: 2024-03-14 | End: 2024-03-19 | Stop reason: HOSPADM

## 2024-03-14 RX ORDER — INSULIN LISPRO 100 [IU]/ML
0-4 INJECTION, SOLUTION INTRAVENOUS; SUBCUTANEOUS NIGHTLY
Status: DISCONTINUED | OUTPATIENT
Start: 2024-03-14 | End: 2024-03-19 | Stop reason: HOSPADM

## 2024-03-14 RX ORDER — ENOXAPARIN SODIUM 100 MG/ML
40 INJECTION SUBCUTANEOUS DAILY
Status: DISCONTINUED | OUTPATIENT
Start: 2024-03-14 | End: 2024-03-14

## 2024-03-14 RX ORDER — SODIUM CHLORIDE 0.9 % (FLUSH) 0.9 %
10 SYRINGE (ML) INJECTION PRN
Status: DISCONTINUED | OUTPATIENT
Start: 2024-03-14 | End: 2024-03-19 | Stop reason: HOSPADM

## 2024-03-14 RX ORDER — SODIUM CHLORIDE 9 MG/ML
INJECTION, SOLUTION INTRAVENOUS PRN
Status: DISCONTINUED | OUTPATIENT
Start: 2024-03-14 | End: 2024-03-19 | Stop reason: HOSPADM

## 2024-03-14 RX ORDER — INSULIN LISPRO 100 [IU]/ML
0-8 INJECTION, SOLUTION INTRAVENOUS; SUBCUTANEOUS EVERY 4 HOURS
Status: DISCONTINUED | OUTPATIENT
Start: 2024-03-14 | End: 2024-03-14 | Stop reason: SDUPTHER

## 2024-03-14 RX ORDER — SODIUM CHLORIDE 0.9 % (FLUSH) 0.9 %
10 SYRINGE (ML) INJECTION EVERY 12 HOURS SCHEDULED
Status: DISCONTINUED | OUTPATIENT
Start: 2024-03-14 | End: 2024-03-19 | Stop reason: HOSPADM

## 2024-03-14 RX ORDER — PANTOPRAZOLE SODIUM 40 MG/1
40 TABLET, DELAYED RELEASE ORAL 2 TIMES DAILY
Status: DISCONTINUED | OUTPATIENT
Start: 2024-03-14 | End: 2024-03-19 | Stop reason: HOSPADM

## 2024-03-14 RX ORDER — INSULIN LISPRO 100 [IU]/ML
0-8 INJECTION, SOLUTION INTRAVENOUS; SUBCUTANEOUS
Status: DISCONTINUED | OUTPATIENT
Start: 2024-03-15 | End: 2024-03-19 | Stop reason: HOSPADM

## 2024-03-14 RX ORDER — GLUCAGON 1 MG/ML
1 KIT INJECTION PRN
Status: DISCONTINUED | OUTPATIENT
Start: 2024-03-14 | End: 2024-03-19 | Stop reason: HOSPADM

## 2024-03-14 RX ORDER — ONDANSETRON 4 MG/1
4 TABLET, ORALLY DISINTEGRATING ORAL EVERY 8 HOURS PRN
Status: DISCONTINUED | OUTPATIENT
Start: 2024-03-14 | End: 2024-03-19 | Stop reason: HOSPADM

## 2024-03-14 RX ADMIN — PANTOPRAZOLE SODIUM 40 MG: 40 TABLET, DELAYED RELEASE ORAL at 21:20

## 2024-03-14 RX ADMIN — PIPERACILLIN AND TAZOBACTAM 3375 MG: 3; .375 INJECTION, POWDER, FOR SOLUTION INTRAVENOUS at 21:21

## 2024-03-14 RX ADMIN — SODIUM CHLORIDE, PRESERVATIVE FREE 10 ML: 5 INJECTION INTRAVENOUS at 21:24

## 2024-03-14 ASSESSMENT — PAIN SCALES - GENERAL
PAINLEVEL_OUTOF10: 0
PAINLEVEL_OUTOF10: 0

## 2024-03-14 NOTE — PROGRESS NOTES
[facility] notified of pt arriving to unit and home med rec completed.    Myrtle Contreras RN     Incision & Drainage

## 2024-03-15 ENCOUNTER — APPOINTMENT (OUTPATIENT)
Dept: INTERVENTIONAL RADIOLOGY/VASCULAR | Age: 74
DRG: 435 | End: 2024-03-15
Attending: TRANSPLANT SURGERY
Payer: MEDICARE

## 2024-03-15 ENCOUNTER — APPOINTMENT (OUTPATIENT)
Dept: CT IMAGING | Age: 74
DRG: 435 | End: 2024-03-15
Attending: TRANSPLANT SURGERY
Payer: MEDICARE

## 2024-03-15 LAB
ANION GAP SERPL CALCULATED.3IONS-SCNC: 11 MMOL/L (ref 7–16)
BASOPHILS # BLD: 0.06 K/UL (ref 0–0.2)
BASOPHILS NFR BLD: 1 % (ref 0–2)
BUN SERPL-MCNC: 13 MG/DL (ref 6–23)
CALCIUM SERPL-MCNC: 7.9 MG/DL (ref 8.6–10.2)
CHLORIDE SERPL-SCNC: 103 MMOL/L (ref 98–107)
CO2 SERPL-SCNC: 23 MMOL/L (ref 22–29)
CREAT SERPL-MCNC: 1.1 MG/DL (ref 0.7–1.2)
EOSINOPHIL # BLD: 0.16 K/UL (ref 0.05–0.5)
EOSINOPHILS RELATIVE PERCENT: 3 % (ref 0–6)
ERYTHROCYTE [DISTWIDTH] IN BLOOD BY AUTOMATED COUNT: 16.8 % (ref 11.5–15)
FERRITIN SERPL-MCNC: 2098 NG/ML
GFR SERPL CREATININE-BSD FRML MDRD: >60 ML/MIN/1.73M2
GLUCOSE BLD-MCNC: 109 MG/DL (ref 74–99)
GLUCOSE BLD-MCNC: 75 MG/DL (ref 74–99)
GLUCOSE BLD-MCNC: 82 MG/DL (ref 74–99)
GLUCOSE BLD-MCNC: 94 MG/DL (ref 74–99)
GLUCOSE SERPL-MCNC: 69 MG/DL (ref 74–99)
HCT VFR BLD AUTO: 27.7 % (ref 37–54)
HGB BLD-MCNC: 8.8 G/DL (ref 12.5–16.5)
IMM GRANULOCYTES # BLD AUTO: 0.12 K/UL (ref 0–0.58)
IMM GRANULOCYTES NFR BLD: 2 % (ref 0–5)
INR PPP: 1.5
IRON SATN MFR SERPL: 60 % (ref 20–55)
IRON SERPL-MCNC: 94 UG/DL (ref 59–158)
LYMPHOCYTES NFR BLD: 1.76 K/UL (ref 1.5–4)
LYMPHOCYTES RELATIVE PERCENT: 29 % (ref 20–42)
MCH RBC QN AUTO: 33.1 PG (ref 26–35)
MCHC RBC AUTO-ENTMCNC: 31.8 G/DL (ref 32–34.5)
MCV RBC AUTO: 104.1 FL (ref 80–99.9)
MONOCYTES NFR BLD: 0.52 K/UL (ref 0.1–0.95)
MONOCYTES NFR BLD: 9 % (ref 2–12)
NEUTROPHILS NFR BLD: 57 % (ref 43–80)
NEUTS SEG NFR BLD: 3.51 K/UL (ref 1.8–7.3)
PLATELET # BLD AUTO: 165 K/UL (ref 130–450)
PMV BLD AUTO: 10.3 FL (ref 7–12)
POTASSIUM SERPL-SCNC: 3.6 MMOL/L (ref 3.5–5)
PROTHROMBIN TIME: 16.1 SEC (ref 9.3–12.4)
RBC # BLD AUTO: 2.66 M/UL (ref 3.8–5.8)
SODIUM SERPL-SCNC: 137 MMOL/L (ref 132–146)
TIBC SERPL-MCNC: 156 UG/DL (ref 250–450)
TRANSFERRIN SERPL-MCNC: 130 MG/DL (ref 200–360)
WBC OTHER # BLD: 6.1 K/UL (ref 4.5–11.5)

## 2024-03-15 PROCEDURE — 83516 IMMUNOASSAY NONANTIBODY: CPT

## 2024-03-15 PROCEDURE — 86039 ANTINUCLEAR ANTIBODIES (ANA): CPT

## 2024-03-15 PROCEDURE — 6370000000 HC RX 637 (ALT 250 FOR IP)

## 2024-03-15 PROCEDURE — 2709999900 IR INJ PERC CHOLA NEW ACCESS W IMAGING

## 2024-03-15 PROCEDURE — 6370000000 HC RX 637 (ALT 250 FOR IP): Performed by: STUDENT IN AN ORGANIZED HEALTH CARE EDUCATION/TRAINING PROGRAM

## 2024-03-15 PROCEDURE — 6360000004 HC RX CONTRAST MEDICATION: Performed by: RADIOLOGY

## 2024-03-15 PROCEDURE — 6360000002 HC RX W HCPCS

## 2024-03-15 PROCEDURE — 85610 PROTHROMBIN TIME: CPT

## 2024-03-15 PROCEDURE — 2580000003 HC RX 258

## 2024-03-15 PROCEDURE — 82962 GLUCOSE BLOOD TEST: CPT

## 2024-03-15 PROCEDURE — 6360000002 HC RX W HCPCS: Performed by: RADIOLOGY

## 2024-03-15 PROCEDURE — 83540 ASSAY OF IRON: CPT

## 2024-03-15 PROCEDURE — 85025 COMPLETE CBC W/AUTO DIFF WBC: CPT

## 2024-03-15 PROCEDURE — 36415 COLL VENOUS BLD VENIPUNCTURE: CPT

## 2024-03-15 PROCEDURE — 84466 ASSAY OF TRANSFERRIN: CPT

## 2024-03-15 PROCEDURE — 80074 ACUTE HEPATITIS PANEL: CPT

## 2024-03-15 PROCEDURE — 86038 ANTINUCLEAR ANTIBODIES: CPT

## 2024-03-15 PROCEDURE — 2140000000 HC CCU INTERMEDIATE R&B

## 2024-03-15 PROCEDURE — 82728 ASSAY OF FERRITIN: CPT

## 2024-03-15 PROCEDURE — 47532 INJECTION FOR CHOLANGIOGRAM: CPT

## 2024-03-15 PROCEDURE — 80048 BASIC METABOLIC PNL TOTAL CA: CPT

## 2024-03-15 PROCEDURE — 74160 CT ABDOMEN W/CONTRAST: CPT

## 2024-03-15 PROCEDURE — 6360000002 HC RX W HCPCS: Performed by: SURGERY

## 2024-03-15 RX ORDER — URSODIOL 300 MG/1
300 CAPSULE ORAL 3 TIMES DAILY
Status: DISCONTINUED | OUTPATIENT
Start: 2024-03-15 | End: 2024-03-19 | Stop reason: HOSPADM

## 2024-03-15 RX ORDER — DIPHENHYDRAMINE HYDROCHLORIDE 50 MG/ML
INJECTION INTRAMUSCULAR; INTRAVENOUS PRN
Status: COMPLETED | OUTPATIENT
Start: 2024-03-15 | End: 2024-03-15

## 2024-03-15 RX ORDER — FENTANYL CITRATE 50 UG/ML
INJECTION, SOLUTION INTRAMUSCULAR; INTRAVENOUS PRN
Status: COMPLETED | OUTPATIENT
Start: 2024-03-15 | End: 2024-03-15

## 2024-03-15 RX ORDER — ONDANSETRON 2 MG/ML
INJECTION INTRAMUSCULAR; INTRAVENOUS PRN
Status: COMPLETED | OUTPATIENT
Start: 2024-03-15 | End: 2024-03-15

## 2024-03-15 RX ORDER — ENOXAPARIN SODIUM 100 MG/ML
40 INJECTION SUBCUTANEOUS DAILY
Status: DISCONTINUED | OUTPATIENT
Start: 2024-03-15 | End: 2024-03-19 | Stop reason: HOSPADM

## 2024-03-15 RX ORDER — KETOROLAC TROMETHAMINE 30 MG/ML
INJECTION, SOLUTION INTRAMUSCULAR; INTRAVENOUS PRN
Status: COMPLETED | OUTPATIENT
Start: 2024-03-15 | End: 2024-03-15

## 2024-03-15 RX ORDER — MIDAZOLAM HYDROCHLORIDE 2 MG/2ML
INJECTION, SOLUTION INTRAMUSCULAR; INTRAVENOUS PRN
Status: COMPLETED | OUTPATIENT
Start: 2024-03-15 | End: 2024-03-15

## 2024-03-15 RX ADMIN — PANTOPRAZOLE SODIUM 40 MG: 40 TABLET, DELAYED RELEASE ORAL at 21:12

## 2024-03-15 RX ADMIN — ONDANSETRON HYDROCHLORIDE 4 MG: 2 INJECTION, SOLUTION INTRAMUSCULAR; INTRAVENOUS at 14:30

## 2024-03-15 RX ADMIN — MIDAZOLAM HYDROCHLORIDE 0.5 MG: 1 INJECTION, SOLUTION INTRAMUSCULAR; INTRAVENOUS at 14:10

## 2024-03-15 RX ADMIN — URSODIOL 300 MG: 300 CAPSULE ORAL at 17:25

## 2024-03-15 RX ADMIN — POTASSIUM BICARBONATE 40 MEQ: 782 TABLET, EFFERVESCENT ORAL at 17:25

## 2024-03-15 RX ADMIN — PIPERACILLIN AND TAZOBACTAM 3375 MG: 3; .375 INJECTION, POWDER, FOR SOLUTION INTRAVENOUS at 12:07

## 2024-03-15 RX ADMIN — DIPHENHYDRAMINE HYDROCHLORIDE 25 MG: 50 INJECTION, SOLUTION INTRAMUSCULAR; INTRAVENOUS at 14:09

## 2024-03-15 RX ADMIN — IOPAMIDOL 70 ML: 755 INJECTION, SOLUTION INTRAVENOUS at 15:22

## 2024-03-15 RX ADMIN — URSODIOL 300 MG: 300 CAPSULE ORAL at 21:11

## 2024-03-15 RX ADMIN — SODIUM CHLORIDE, PRESERVATIVE FREE 10 ML: 5 INJECTION INTRAVENOUS at 21:12

## 2024-03-15 RX ADMIN — ENOXAPARIN SODIUM 40 MG: 100 INJECTION SUBCUTANEOUS at 18:57

## 2024-03-15 RX ADMIN — HYDROMORPHONE HYDROCHLORIDE 0.5 MG: 1 INJECTION, SOLUTION INTRAMUSCULAR; INTRAVENOUS; SUBCUTANEOUS at 14:14

## 2024-03-15 RX ADMIN — SODIUM CHLORIDE, POTASSIUM CHLORIDE, SODIUM LACTATE AND CALCIUM CHLORIDE: 600; 310; 30; 20 INJECTION, SOLUTION INTRAVENOUS at 00:13

## 2024-03-15 RX ADMIN — KETOROLAC TROMETHAMINE 15 MG: 30 INJECTION, SOLUTION INTRAMUSCULAR at 14:15

## 2024-03-15 RX ADMIN — IOPAMIDOL 75 ML: 755 INJECTION, SOLUTION INTRAVENOUS at 09:11

## 2024-03-15 RX ADMIN — FENTANYL CITRATE 25 MCG: 50 INJECTION, SOLUTION INTRAMUSCULAR; INTRAVENOUS at 14:11

## 2024-03-15 RX ADMIN — PIPERACILLIN AND TAZOBACTAM 3375 MG: 3; .375 INJECTION, POWDER, FOR SOLUTION INTRAVENOUS at 21:18

## 2024-03-15 RX ADMIN — PIPERACILLIN AND TAZOBACTAM 3375 MG: 3; .375 INJECTION, POWDER, FOR SOLUTION INTRAVENOUS at 05:08

## 2024-03-15 RX ADMIN — POTASSIUM BICARBONATE 40 MEQ: 782 TABLET, EFFERVESCENT ORAL at 00:16

## 2024-03-15 NOTE — PROGRESS NOTES
4 Eyes Skin Assessment     NAME:  Raudel Herndon  YOB: 1950  MEDICAL RECORD NUMBER:  [id number]    The patient is being assessed for  Admission    I agree that at least one RN has performed a thorough Head to Toe Skin Assessment on the patient. ALL assessment sites listed below have been assessed.      Areas assessed by both nurses:    Head, Face, Ears, Shoulders, Back, Chest, Arms, Elbows, Hands, Sacrum. Buttock, Coccyx, Ischium, Legs. Feet and Heels, and Under Medical Devices         Does the Patient have a Wound? No noted wound(s)       Chris Prevention initiated by RN: No  Wound Care Orders initiated by RN: No    Pressure Injury (Stage 3,4, Unstageable, DTI, NWPT, and Complex wounds) if present, place Wound referral order by RN under : No    New Ostomies, if present place, Ostomy referral order under : No     Nurse 1 eSignature: Electronically signed by Amie Robles RN on 3/14/24 at 11:57 PM EDT    **SHARE this note so that the co-signing nurse can place an eSignature**    Nurse 2 eSignature: Electronically signed by Cassy Richmond RN on 3/14/24 at 11:58 PM EDT

## 2024-03-15 NOTE — PROGRESS NOTES
HEPATOBILIARY SURGERY  DAILY PROGRESS NOTE    Patient's Name/Date of Birth: Raudel Herndon / 1950    Date: March 15, 2024     No chief complaint on file.       Subjective:  Pt resting in bed. Denies abdominal pain. States he had some nausea which resolved.      Objective:  Last 24Hrs  Temp  Av.6 °F (36.4 °C)  Min: 97.2 °F (36.2 °C)  Max: 98.3 °F (36.8 °C)  Resp  Av.6  Min: 18  Max: 24  Pulse  Av.2  Min: 66  Max: 92  Systolic (24hrs), Av , Min:102 , Max:115     Diastolic (24hrs), Av, Min:57, Max:72    SpO2  Av.6 %  Min: 98 %  Max: 100 %    I/O last 3 completed shifts:  In: 0   Out: 800 [Urine:800]      General: In no acute distress  Cardiovascular: Warm throughout, no edema  Respiratory: no respiratory distress, equal chest rise  Abdomen: soft,  nontender, nondistended  Skin: jaundiced   Extremities: atraumatic, no focal motor deficits, no open wounds      CBC  Recent Labs     24  1309 24   WBC 6.6 7.1   RBC 2.83* 2.67*   HGB 9.6* 8.8*   HCT 30.3* 27.7*   .1* 103.7*   MCH 33.9 33.0   MCHC 31.7* 31.8*   RDW 17.2* 16.8*    155   MPV 10.1 10.1       CMP  Recent Labs     24  1309 24    136   K 3.9 3.2*    100   CO2 22 21*   BUN 16 16   CREATININE 1.1 1.2   GLUCOSE 108* 93   CALCIUM 8.2* 7.9*   PROT 6.2* 5.8*   LABALBU 3.3* 3.0*   BILITOT 16.2* 15.4*   ALKPHOS 379* 359*   * 128*   * 94*         Assessment/Plan:    Patient Active Problem List   Diagnosis    Cholangiocarcinoma (HCC)    Cholangiocellular carcinoma (HCC)    Obstructive jaundice due to malignant neoplasm (HCC)    NANCY (acute kidney injury) (HCC)    Moderate protein-calorie malnutrition (HCC)    Obstructive jaundice    Obstructive jaundice due to cancer (HCC)       73 y.o. male with history of cholangiocarcinoma complicated with obstructive jaundice status post ERCP with stent placement x 2, now with recurrent obstructive jaundice      - awaiting AM

## 2024-03-15 NOTE — PATIENT CARE CONFERENCE
P Quality Flow/Interdisciplinary Rounds Progress Note        Quality Flow Rounds held on March 15, 2024    Disciplines Attending:  Bedside Nurse, , , and Nursing Unit Leadership    Raudel Herndon was admitted on 3/14/2024  6:41 PM    Anticipated Discharge Date:       Disposition:    Chris Score:  Chris Scale Score: 21    Readmission Risk              Risk of Unplanned Readmission:  11           Discussed patient goal for the day, patient clinical progression, and barriers to discharge.  The following Goal(s) of the Day/Commitment(s) have been identified:  Report labs/diagnostics       Miranda Marie RN  March 15, 2024

## 2024-03-15 NOTE — CARE COORDINATION
3/15/24 Patient admitted for obstructive jaundice due to cancer. Patient is s/p ERCP from 2/29 with 2 stents placed. Attempted to see patient twice today who has been off the unit in Radiology for PTHC drain. Patient is on  IV Zosyn prophylactic.  Patient had a Mediport inserted on his last admission in order to start chemotherapy. Patient is pending a CT of the abdomen.  Per chart review patient lives at home with his son.  Patient uses a cane prn.  PCP is Dr. ML Lemus and pharmacy choice is [facility] in [facility].  SW/CM to follow up to complete CM assessment once patient returns to the unit. Patient will most likely need choiced for home care support.  SW/Cm to follow for discharge planing needs.    Electronically signed by RAMYA Hanna on 3/15/2024 at 3:43 PM

## 2024-03-15 NOTE — H&P
ERCP N/A 02/29/2024    ENDOSCOPIC RETROGRADE CHOLANGIOPANCREATOGRAPHY DILATION BALLOON performed by Raza Lane DO at [facility] ENDOSCOPY    ERCP N/A 02/29/2024    ENDOSCOPIC RETROGRADE CHOLANGIOPANCREATOGRAPHY STENT REMOVAL performed by Raza Lane DO at [facility] ENDOSCOPY    ERCP N/A 02/29/2024    ENDOSCOPIC RETROGRADE CHOLANGIOPANCREATOGRAPHY STENT INSERTION performed by Raza Lane DO at [facility] ENDOSCOPY    EYE SURGERY Bilateral 2022    JOINT REPLACEMENT Right 2022    hip    PORT SURGERY Right 3/8/2024    PORT INSERTION performed by Susie Barrera MD at [facility] OR    TOTAL KNEE ARTHROPLASTY Left        Prior to Admission medications    Medication Sig Start Date End Date Taking? Authorizing Provider   metFORMIN (GLUCOPHAGE) 500 MG tablet Take 1 tablet by mouth daily    Kwadwo Valenzuela MD   pioglitazone (ACTOS) 30 MG tablet Take 1 tablet by mouth daily    Kwadwo Valenzuela MD   pantoprazole (PROTONIX) 40 MG tablet Take 1 tablet by mouth in the morning and at bedtime 2/27/24   Raza Reynoso III, MD   candesartan-hydroCHLOROthiazide (ATACAND HCT) 16-12.5 MG per tablet Take 1 tablet by mouth daily    ProviderKwadwo MD       Allergies   Allergen Reactions    Adhesive Tape Rash       Family History   Problem Relation Age of Onset    Breast Cancer Mother     Cancer Mother          Review of Systems   Review of Systems       PHYSICAL EXAM:    Vitals:    03/14/24 1952   BP: (!) 115/59   Pulse: 90   Resp: 20   Temp: 97.8 °F (36.6 °C)   SpO2: 98%       General Appearance:  awake, alert, oriented, in no acute distress  Skin: Very jaundiced. No rashes or lesions.  Head/face:  NCAT  Eyes:  No gross abnormalities. Sclera nonicteric  Lungs/Chest:  Normal expansion. No respiratory distress. On room air. No chest wall tenderness  Heart: Warm throughout. Regular rate   Abdomen:  Soft, no tenderness, not distended.  Extremities: Extremities warm to touch, pink, with no

## 2024-03-15 NOTE — PROCEDURES
3695 Patient arrived to Radiology department for PTHC drain. Vital signs taken. IV flushed. Allergies, home medications, H&P and fasting instructions reviewed with patient.     6785 Procedural instructions given, questions answered, understanding expressed and consent signed.

## 2024-03-15 NOTE — PROCEDURES
1540 Patient returned from procedure. Dressing checked, clean, dry, and intact. Patient stable. No s/s of complications noted or reported. Vitals will be checked q 15min, see flow sheets.     1602 Site was checked with every set of vitals. Site clean dry and intact.  Patient in stable condition, no s/s of complications noted or reported. Patient returned to inpatient room via Transport.

## 2024-03-16 LAB
ALBUMIN SERPL-MCNC: 2.7 G/DL (ref 3.5–5.2)
ALP SERPL-CCNC: 323 U/L (ref 40–129)
ALT SERPL-CCNC: 90 U/L (ref 0–40)
ANION GAP SERPL CALCULATED.3IONS-SCNC: 13 MMOL/L (ref 7–16)
AST SERPL-CCNC: 162 U/L (ref 0–39)
BASOPHILS # BLD: 0.03 K/UL (ref 0–0.2)
BASOPHILS NFR BLD: 1 % (ref 0–2)
BILIRUB DIRECT SERPL-MCNC: 10.3 MG/DL (ref 0–0.3)
BILIRUB INDIRECT SERPL-MCNC: 4 MG/DL (ref 0–1)
BILIRUB SERPL-MCNC: 14.3 MG/DL (ref 0–1.2)
BUN SERPL-MCNC: 13 MG/DL (ref 6–23)
CALCIUM SERPL-MCNC: 7.6 MG/DL (ref 8.6–10.2)
CHLORIDE SERPL-SCNC: 102 MMOL/L (ref 98–107)
CO2 SERPL-SCNC: 23 MMOL/L (ref 22–29)
CREAT SERPL-MCNC: 1.1 MG/DL (ref 0.7–1.2)
EOSINOPHIL # BLD: 0.12 K/UL (ref 0.05–0.5)
EOSINOPHILS RELATIVE PERCENT: 2 % (ref 0–6)
ERYTHROCYTE [DISTWIDTH] IN BLOOD BY AUTOMATED COUNT: 16.5 % (ref 11.5–15)
GFR SERPL CREATININE-BSD FRML MDRD: >60 ML/MIN/1.73M2
GLUCOSE BLD-MCNC: 111 MG/DL (ref 74–99)
GLUCOSE BLD-MCNC: 120 MG/DL (ref 74–99)
GLUCOSE BLD-MCNC: 142 MG/DL (ref 74–99)
GLUCOSE BLD-MCNC: 88 MG/DL (ref 74–99)
GLUCOSE SERPL-MCNC: 79 MG/DL (ref 74–99)
HCT VFR BLD AUTO: 26.3 % (ref 37–54)
HGB BLD-MCNC: 8.4 G/DL (ref 12.5–16.5)
IMM GRANULOCYTES # BLD AUTO: 0.07 K/UL (ref 0–0.58)
IMM GRANULOCYTES NFR BLD: 1 % (ref 0–5)
LYMPHOCYTES NFR BLD: 1.05 K/UL (ref 1.5–4)
LYMPHOCYTES RELATIVE PERCENT: 21 % (ref 20–42)
MAGNESIUM SERPL-MCNC: 1.6 MG/DL (ref 1.6–2.6)
MCH RBC QN AUTO: 33.5 PG (ref 26–35)
MCHC RBC AUTO-ENTMCNC: 31.9 G/DL (ref 32–34.5)
MCV RBC AUTO: 104.8 FL (ref 80–99.9)
MONOCYTES NFR BLD: 0.45 K/UL (ref 0.1–0.95)
MONOCYTES NFR BLD: 9 % (ref 2–12)
NEUTROPHILS NFR BLD: 66 % (ref 43–80)
NEUTS SEG NFR BLD: 3.33 K/UL (ref 1.8–7.3)
PHOSPHATE SERPL-MCNC: 1.9 MG/DL (ref 2.5–4.5)
PHOSPHATE SERPL-MCNC: 2.1 MG/DL (ref 2.5–4.5)
PLATELET # BLD AUTO: 150 K/UL (ref 130–450)
PMV BLD AUTO: 10.4 FL (ref 7–12)
POTASSIUM SERPL-SCNC: 3.9 MMOL/L (ref 3.5–5)
PROT SERPL-MCNC: 5.3 G/DL (ref 6.4–8.3)
RBC # BLD AUTO: 2.51 M/UL (ref 3.8–5.8)
SODIUM SERPL-SCNC: 138 MMOL/L (ref 132–146)
WBC OTHER # BLD: 5.1 K/UL (ref 4.5–11.5)

## 2024-03-16 PROCEDURE — 2140000000 HC CCU INTERMEDIATE R&B

## 2024-03-16 PROCEDURE — 80053 COMPREHEN METABOLIC PANEL: CPT

## 2024-03-16 PROCEDURE — 2580000003 HC RX 258: Performed by: STUDENT IN AN ORGANIZED HEALTH CARE EDUCATION/TRAINING PROGRAM

## 2024-03-16 PROCEDURE — 6370000000 HC RX 637 (ALT 250 FOR IP)

## 2024-03-16 PROCEDURE — 85025 COMPLETE CBC W/AUTO DIFF WBC: CPT

## 2024-03-16 PROCEDURE — 83735 ASSAY OF MAGNESIUM: CPT

## 2024-03-16 PROCEDURE — 86376 MICROSOMAL ANTIBODY EACH: CPT

## 2024-03-16 PROCEDURE — 36415 COLL VENOUS BLD VENIPUNCTURE: CPT

## 2024-03-16 PROCEDURE — 2500000003 HC RX 250 WO HCPCS: Performed by: STUDENT IN AN ORGANIZED HEALTH CARE EDUCATION/TRAINING PROGRAM

## 2024-03-16 PROCEDURE — 6370000000 HC RX 637 (ALT 250 FOR IP): Performed by: STUDENT IN AN ORGANIZED HEALTH CARE EDUCATION/TRAINING PROGRAM

## 2024-03-16 PROCEDURE — 6360000002 HC RX W HCPCS

## 2024-03-16 PROCEDURE — 86255 FLUORESCENT ANTIBODY SCREEN: CPT

## 2024-03-16 PROCEDURE — 84100 ASSAY OF PHOSPHORUS: CPT

## 2024-03-16 PROCEDURE — 6360000002 HC RX W HCPCS: Performed by: STUDENT IN AN ORGANIZED HEALTH CARE EDUCATION/TRAINING PROGRAM

## 2024-03-16 PROCEDURE — 2580000003 HC RX 258

## 2024-03-16 PROCEDURE — 82248 BILIRUBIN DIRECT: CPT

## 2024-03-16 PROCEDURE — 6360000002 HC RX W HCPCS: Performed by: SURGERY

## 2024-03-16 PROCEDURE — 82962 GLUCOSE BLOOD TEST: CPT

## 2024-03-16 RX ORDER — MAGNESIUM SULFATE IN WATER 40 MG/ML
4000 INJECTION, SOLUTION INTRAVENOUS ONCE
Status: COMPLETED | OUTPATIENT
Start: 2024-03-16 | End: 2024-03-16

## 2024-03-16 RX ORDER — DOCUSATE SODIUM 100 MG/1
100 CAPSULE, LIQUID FILLED ORAL DAILY
Status: DISCONTINUED | OUTPATIENT
Start: 2024-03-16 | End: 2024-03-19 | Stop reason: HOSPADM

## 2024-03-16 RX ORDER — POLYETHYLENE GLYCOL 3350 17 G/17G
17 POWDER, FOR SOLUTION ORAL DAILY
Status: DISCONTINUED | OUTPATIENT
Start: 2024-03-16 | End: 2024-03-19 | Stop reason: HOSPADM

## 2024-03-16 RX ORDER — BISACODYL 10 MG
10 SUPPOSITORY, RECTAL RECTAL DAILY PRN
Status: DISCONTINUED | OUTPATIENT
Start: 2024-03-16 | End: 2024-03-19 | Stop reason: HOSPADM

## 2024-03-16 RX ADMIN — URSODIOL 300 MG: 300 CAPSULE ORAL at 20:10

## 2024-03-16 RX ADMIN — URSODIOL 300 MG: 300 CAPSULE ORAL at 09:07

## 2024-03-16 RX ADMIN — Medication 250 MG: at 17:56

## 2024-03-16 RX ADMIN — PANTOPRAZOLE SODIUM 40 MG: 40 TABLET, DELAYED RELEASE ORAL at 20:10

## 2024-03-16 RX ADMIN — Medication 30 MMOL: at 12:17

## 2024-03-16 RX ADMIN — SODIUM CHLORIDE, PRESERVATIVE FREE 10 ML: 5 INJECTION INTRAVENOUS at 09:04

## 2024-03-16 RX ADMIN — Medication 250 MG: at 15:29

## 2024-03-16 RX ADMIN — Medication 250 MG: at 20:10

## 2024-03-16 RX ADMIN — POLYETHYLENE GLYCOL 3350 17 G: 17 POWDER, FOR SOLUTION ORAL at 20:10

## 2024-03-16 RX ADMIN — DOCUSATE SODIUM 100 MG: 100 CAPSULE, LIQUID FILLED ORAL at 15:24

## 2024-03-16 RX ADMIN — Medication 250 MG: at 11:00

## 2024-03-16 RX ADMIN — URSODIOL 300 MG: 300 CAPSULE ORAL at 15:31

## 2024-03-16 RX ADMIN — SODIUM CHLORIDE, PRESERVATIVE FREE 10 ML: 5 INJECTION INTRAVENOUS at 20:11

## 2024-03-16 RX ADMIN — PANTOPRAZOLE SODIUM 40 MG: 40 TABLET, DELAYED RELEASE ORAL at 09:06

## 2024-03-16 RX ADMIN — MAGNESIUM SULFATE HEPTAHYDRATE 4000 MG: 40 INJECTION, SOLUTION INTRAVENOUS at 12:24

## 2024-03-16 RX ADMIN — PIPERACILLIN AND TAZOBACTAM 3375 MG: 3; .375 INJECTION, POWDER, FOR SOLUTION INTRAVENOUS at 05:05

## 2024-03-16 RX ADMIN — ENOXAPARIN SODIUM 40 MG: 100 INJECTION SUBCUTANEOUS at 09:06

## 2024-03-16 NOTE — PROGRESS NOTES
HEPATOBILIARY SURGERY  DAILY PROGRESS NOTE    Patient's Name/Date of Birth: Raudel Herndon / 1950    Date: 2024     No chief complaint on file.       Subjective:  Pt resting in bed. Says he has some tendereness from procedure yesterday, but otherwse feeling great. No more nausea. Tolerating diet.       Objective:  Last 24Hrs  Temp  Av.7 °F (36.5 °C)  Min: 97.1 °F (36.2 °C)  Max: 98 °F (36.7 °C)  Resp  Av.5  Min: 10  Max: 18  Pulse  Av.6  Min: 54  Max: 79  Systolic (24hrs), Av , Min:99 , Max:149     Diastolic (24hrs), Av, Min:55, Max:86    SpO2  Av.2 %  Min: 96 %  Max: 100 %    I/O last 3 completed shifts:  In: 0   Out: 1150 [Urine:1150]      General: In no acute distress  Cardiovascular: Warm throughout, no edema  Respiratory: no respiratory distress, equal chest rise  Abdomen: soft,  nontender, nondistended  Skin: jaundiced   Extremities: atraumatic, no focal motor deficits, no open wounds      CBC  Recent Labs     03/13/24  1309 03/14/24  1955 03/15/24  0636   WBC 6.6 7.1 6.1   RBC 2.83* 2.67* 2.66*   HGB 9.6* 8.8* 8.8*   HCT 30.3* 27.7* 27.7*   .1* 103.7* 104.1*   MCH 33.9 33.0 33.1   MCHC 31.7* 31.8* 31.8*   RDW 17.2* 16.8* 16.8*    155 165   MPV 10.1 10.1 10.3         CMP  Recent Labs     24  1309 03/14/24  1955 03/15/24  0636    136 137   K 3.9 3.2* 3.6    100 103   CO2 22 21* 23   BUN 16 16 13   CREATININE 1.1 1.2 1.1   GLUCOSE 108* 93 69*   CALCIUM 8.2* 7.9* 7.9*   PROT 6.2* 5.8*  --    LABALBU 3.3* 3.0*  --    BILITOT 16.2* 15.4*  --    ALKPHOS 379* 359*  --    * 128*  --    * 94*  --            Assessment/Plan:    Patient Active Problem List   Diagnosis    Cholangiocarcinoma (HCC)    Cholangiocellular carcinoma (HCC)    Obstructive jaundice due to malignant neoplasm (HCC)    NANCY (acute kidney injury) (HCC)    Moderate protein-calorie malnutrition (HCC)    Obstructive jaundice    Obstructive jaundice due to cancer (HCC)

## 2024-03-16 NOTE — PRE SEDATION
Sedation Pre-Procedure Note    Patient Name: Raudel Herndon   YOB: 1950  Room/Bed: 6509/6509-A  Medical Record Number: [id number]  Date: 3/15/2024   Time: 8:06 PM       Indication:  Elevated Bilirubin levels    Consent: Consent was able to be obtained     Vital Signs:   Vitals:    03/15/24 1612   BP: 120/61   Pulse: 61   Resp: 18   Temp: 98 °F (36.7 °C)   SpO2: 99%       Past Medical History:   has a past medical history of Cancer (HCC), Diabetes mellitus (HCC), and Prostate cancer (HCC).    Past Surgical History:   has a past surgical history that includes ERCP (N/A, 02/29/2024); ERCP (N/A, 02/29/2024); ERCP (N/A, 02/29/2024); ERCP (N/A, 02/29/2024); joint replacement (Right, 2022); Total knee arthroplasty (Left); Ankle Fusion (Right, 2022); Colonoscopy; eye surgery (Bilateral, 2022); and Port Surgery (Right, 3/8/2024).    Medications:   Scheduled Meds:    ursodiol  300 mg Oral TID    enoxaparin  40 mg SubCUTAneous Daily    sodium chloride flush  10 mL IntraVENous 2 times per day    piperacillin-tazobactam  3,375 mg IntraVENous Q8H    pantoprazole  40 mg Oral BID    insulin lispro  0-8 Units SubCUTAneous TID WC    insulin lispro  0-4 Units SubCUTAneous Nightly     Continuous Infusions:    sodium chloride      dextrose       PRN Meds: sodium chloride flush, sodium chloride, ondansetron **OR** ondansetron, glucose, dextrose bolus **OR** dextrose bolus, glucagon (rDNA), dextrose  Home Meds:   Prior to Admission medications    Medication Sig Start Date End Date Taking? Authorizing Provider   metFORMIN (GLUCOPHAGE) 500 MG tablet Take 1 tablet by mouth daily    Kwadwo Valenzuela MD   pioglitazone (ACTOS) 30 MG tablet Take 1 tablet by mouth daily    Kwadwo Valenzuela MD   pantoprazole (PROTONIX) 40 MG tablet Take 1 tablet by mouth in the morning and at bedtime 2/27/24   Raza Reynoso III, MD   candesartan-hydroCHLOROthiazide (ATACAND HCT) 16-12.5 MG per tablet Take 1 tablet by mouth daily

## 2024-03-16 NOTE — PROGRESS NOTES
P Quality Flow/Interdisciplinary Rounds Progress Note        Quality Flow Rounds held on March 16, 2024    Disciplines Attending:  Bedside Nurse, , , and Nursing Unit Leadership    Raudel Herndon was admitted on 3/14/2024  6:41 PM    Anticipated Discharge Date:       Disposition:    Chris Score:  Chris Scale Score: 21    Readmission Risk              Risk of Unplanned Readmission:  13           Discussed patient goal for the day, patient clinical progression, and barriers to discharge.  The following Goal(s) of the Day/Commitment(s) have been identified:  Labs - Report Results      Malathi Valenzuela RN  March 16, 2024

## 2024-03-17 LAB
ALBUMIN SERPL-MCNC: 2.8 G/DL (ref 3.5–5.2)
ALP SERPL-CCNC: 326 U/L (ref 40–129)
ALT SERPL-CCNC: 90 U/L (ref 0–40)
ANION GAP SERPL CALCULATED.3IONS-SCNC: 15 MMOL/L (ref 7–16)
AST SERPL-CCNC: 138 U/L (ref 0–39)
BASOPHILS # BLD: 0.04 K/UL (ref 0–0.2)
BASOPHILS NFR BLD: 1 % (ref 0–2)
BILIRUB SERPL-MCNC: 15.4 MG/DL (ref 0–1.2)
BUN SERPL-MCNC: 13 MG/DL (ref 6–23)
CALCIUM SERPL-MCNC: 7.5 MG/DL (ref 8.6–10.2)
CHLORIDE SERPL-SCNC: 102 MMOL/L (ref 98–107)
CO2 SERPL-SCNC: 22 MMOL/L (ref 22–29)
CREAT SERPL-MCNC: 1.2 MG/DL (ref 0.7–1.2)
EOSINOPHIL # BLD: 0.1 K/UL (ref 0.05–0.5)
EOSINOPHILS RELATIVE PERCENT: 2 % (ref 0–6)
ERYTHROCYTE [DISTWIDTH] IN BLOOD BY AUTOMATED COUNT: 15.9 % (ref 11.5–15)
GFR SERPL CREATININE-BSD FRML MDRD: >60 ML/MIN/1.73M2
GLUCOSE BLD-MCNC: 129 MG/DL (ref 74–99)
GLUCOSE BLD-MCNC: 137 MG/DL (ref 74–99)
GLUCOSE BLD-MCNC: 149 MG/DL (ref 74–99)
GLUCOSE BLD-MCNC: 175 MG/DL (ref 74–99)
GLUCOSE SERPL-MCNC: 124 MG/DL (ref 74–99)
HCT VFR BLD AUTO: 26.4 % (ref 37–54)
HGB BLD-MCNC: 8.5 G/DL (ref 12.5–16.5)
IMM GRANULOCYTES # BLD AUTO: 0.08 K/UL (ref 0–0.58)
IMM GRANULOCYTES NFR BLD: 1 % (ref 0–5)
LYMPHOCYTES NFR BLD: 0.92 K/UL (ref 1.5–4)
LYMPHOCYTES RELATIVE PERCENT: 15 % (ref 20–42)
MAGNESIUM SERPL-MCNC: 1.9 MG/DL (ref 1.6–2.6)
MCH RBC QN AUTO: 33.5 PG (ref 26–35)
MCHC RBC AUTO-ENTMCNC: 32.2 G/DL (ref 32–34.5)
MCV RBC AUTO: 103.9 FL (ref 80–99.9)
MONOCYTES NFR BLD: 0.49 K/UL (ref 0.1–0.95)
MONOCYTES NFR BLD: 8 % (ref 2–12)
NEUTROPHILS NFR BLD: 73 % (ref 43–80)
NEUTS SEG NFR BLD: 4.36 K/UL (ref 1.8–7.3)
PHOSPHATE SERPL-MCNC: 2.7 MG/DL (ref 2.5–4.5)
PLATELET # BLD AUTO: 146 K/UL (ref 130–450)
PMV BLD AUTO: 10.4 FL (ref 7–12)
POTASSIUM SERPL-SCNC: 3.8 MMOL/L (ref 3.5–5)
PROT SERPL-MCNC: 5.3 G/DL (ref 6.4–8.3)
RBC # BLD AUTO: 2.54 M/UL (ref 3.8–5.8)
SODIUM SERPL-SCNC: 139 MMOL/L (ref 132–146)
WBC OTHER # BLD: 6 K/UL (ref 4.5–11.5)

## 2024-03-17 PROCEDURE — 83735 ASSAY OF MAGNESIUM: CPT

## 2024-03-17 PROCEDURE — 85025 COMPLETE CBC W/AUTO DIFF WBC: CPT

## 2024-03-17 PROCEDURE — 6370000000 HC RX 637 (ALT 250 FOR IP)

## 2024-03-17 PROCEDURE — 84100 ASSAY OF PHOSPHORUS: CPT

## 2024-03-17 PROCEDURE — 82962 GLUCOSE BLOOD TEST: CPT

## 2024-03-17 PROCEDURE — 2580000003 HC RX 258

## 2024-03-17 PROCEDURE — 2140000000 HC CCU INTERMEDIATE R&B

## 2024-03-17 PROCEDURE — 6360000002 HC RX W HCPCS: Performed by: SURGERY

## 2024-03-17 PROCEDURE — 36415 COLL VENOUS BLD VENIPUNCTURE: CPT

## 2024-03-17 PROCEDURE — 80053 COMPREHEN METABOLIC PANEL: CPT

## 2024-03-17 PROCEDURE — 6370000000 HC RX 637 (ALT 250 FOR IP): Performed by: STUDENT IN AN ORGANIZED HEALTH CARE EDUCATION/TRAINING PROGRAM

## 2024-03-17 RX ORDER — SODIUM CHLORIDE, SODIUM LACTATE, POTASSIUM CHLORIDE, CALCIUM CHLORIDE 600; 310; 30; 20 MG/100ML; MG/100ML; MG/100ML; MG/100ML
INJECTION, SOLUTION INTRAVENOUS CONTINUOUS
Status: DISCONTINUED | OUTPATIENT
Start: 2024-03-18 | End: 2024-03-18

## 2024-03-17 RX ADMIN — URSODIOL 300 MG: 300 CAPSULE ORAL at 14:03

## 2024-03-17 RX ADMIN — BISACODYL 10 MG: 10 SUPPOSITORY RECTAL at 03:56

## 2024-03-17 RX ADMIN — ENOXAPARIN SODIUM 40 MG: 100 INJECTION SUBCUTANEOUS at 08:45

## 2024-03-17 RX ADMIN — PANTOPRAZOLE SODIUM 40 MG: 40 TABLET, DELAYED RELEASE ORAL at 19:59

## 2024-03-17 RX ADMIN — PANTOPRAZOLE SODIUM 40 MG: 40 TABLET, DELAYED RELEASE ORAL at 08:45

## 2024-03-17 RX ADMIN — POLYETHYLENE GLYCOL 3350 17 G: 17 POWDER, FOR SOLUTION ORAL at 08:45

## 2024-03-17 RX ADMIN — DOCUSATE SODIUM 100 MG: 100 CAPSULE, LIQUID FILLED ORAL at 08:45

## 2024-03-17 RX ADMIN — URSODIOL 300 MG: 300 CAPSULE ORAL at 19:59

## 2024-03-17 RX ADMIN — SODIUM CHLORIDE, PRESERVATIVE FREE 10 ML: 5 INJECTION INTRAVENOUS at 20:00

## 2024-03-17 RX ADMIN — Medication 500 MG: at 11:14

## 2024-03-17 RX ADMIN — URSODIOL 300 MG: 300 CAPSULE ORAL at 08:45

## 2024-03-17 RX ADMIN — SODIUM CHLORIDE, PRESERVATIVE FREE 10 ML: 5 INJECTION INTRAVENOUS at 08:46

## 2024-03-17 NOTE — PROGRESS NOTES
P Quality Flow/Interdisciplinary Rounds Progress Note        Quality Flow Rounds held on March 17, 2024    Disciplines Attending:  Bedside Nurse, , , and Nursing Unit Leadership    Raudel Herndon was admitted on 3/14/2024  6:41 PM    Anticipated Discharge Date:       Disposition:    Chris Score:  Chris Scale Score: 22    Readmission Risk              Risk of Unplanned Readmission:  15           Discussed patient goal for the day, patient clinical progression, and barriers to discharge.  The following Goal(s) of the Day/Commitment(s) have been identified:  Labs - Report Results      Malathi Valenzuela RN  March 17, 2024

## 2024-03-17 NOTE — PROGRESS NOTES
HEPATOBILIARY SURGERY  DAILY PROGRESS NOTE  3/17/2024    CHIEF COMPLAINT:  Obstructive jaundice    SUBJECTIVE:  Comfortable appearing, no acute distress, no acute events overnight.    OBJECTIVE:  /64   Pulse 72   Temp 98.4 °F (36.9 °C) (Temporal)   Resp 16   Ht 1.854 m (6' 1\")   Wt 100.7 kg (222 lb)   SpO2 97%   BMI 29.29 kg/m²     GENERAL: Comfortable appearing, mildly obese, severely jaundiced  LUNGS:  No increased work of breathing, symmetric chest rise  CARDIOVASCULAR: RR  ABDOMEN:  Soft, non-distended, non-tender. No guarding, rigidity, rebound.    ASSESSMENT/PLAN:  73 y.o. male with cholangiocarcinoma, history of ERCP with stent placements with recurrent obstructive jaundice    -PTHC was unable to be performed as ductal system was too small for catheter placement  -Okay to continue regular diet today  -Continue to monitor daily CMP, bilirubin 15.4 from 14.3  -Will plan for IR liver biopsy 3/18  -N.p.o. at midnight, maintenance fluids to start then  -Last INR was 1.5 on 3/15  -Follow-up intrinsic liver workup  -Janay Wilkins DO  Surgery Resident PGY-2  3/17/2024  9:25 AM     Attending Physician Statement:    Chief Complaint: Cholangiocarcinoma, jaundice    I have examined the patient and performed the key aspects of physical exam, reviewed the record (including all pertinent and new radiology images and laboratory findings), and discussed the case with the surgical team.  I agree with the assessment and plan with the following additions, corrections, and changes. 14pt review of symptoms completed and negative except as mentioned.    Bilirubin still 15.  Liver serologies pending.  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  Plan for IR biopsy tomorrow.  N.p.o. at midnight    Susie Barrera MD  03/17/24  10:46 AM

## 2024-03-18 ENCOUNTER — APPOINTMENT (OUTPATIENT)
Dept: INTERVENTIONAL RADIOLOGY/VASCULAR | Age: 74
DRG: 435 | End: 2024-03-18
Attending: TRANSPLANT SURGERY
Payer: MEDICARE

## 2024-03-18 PROBLEM — K83.1: Status: ACTIVE | Noted: 2024-03-18

## 2024-03-18 LAB
ALBUMIN SERPL-MCNC: 2.8 G/DL (ref 3.5–5.2)
ALP SERPL-CCNC: 322 U/L (ref 40–129)
ALT SERPL-CCNC: 82 U/L (ref 0–40)
ANA SER QL IA: NEGATIVE
ANION GAP SERPL CALCULATED.3IONS-SCNC: 16 MMOL/L (ref 7–16)
AST SERPL-CCNC: 116 U/L (ref 0–39)
BASOPHILS # BLD: 0.05 K/UL (ref 0–0.2)
BASOPHILS NFR BLD: 1 % (ref 0–2)
BILIRUB SERPL-MCNC: 15.2 MG/DL (ref 0–1.2)
BUN SERPL-MCNC: 14 MG/DL (ref 6–23)
CALCIUM SERPL-MCNC: 7.8 MG/DL (ref 8.6–10.2)
CHLORIDE SERPL-SCNC: 104 MMOL/L (ref 98–107)
CO2 SERPL-SCNC: 21 MMOL/L (ref 22–29)
CREAT SERPL-MCNC: 1.1 MG/DL (ref 0.7–1.2)
EOSINOPHIL # BLD: 0.13 K/UL (ref 0.05–0.5)
EOSINOPHILS RELATIVE PERCENT: 2 % (ref 0–6)
ERYTHROCYTE [DISTWIDTH] IN BLOOD BY AUTOMATED COUNT: 15.7 % (ref 11.5–15)
GFR SERPL CREATININE-BSD FRML MDRD: >60 ML/MIN/1.73M2
GLUCOSE BLD-MCNC: 111 MG/DL (ref 74–99)
GLUCOSE BLD-MCNC: 114 MG/DL (ref 74–99)
GLUCOSE BLD-MCNC: 119 MG/DL (ref 74–99)
GLUCOSE BLD-MCNC: 95 MG/DL (ref 74–99)
GLUCOSE SERPL-MCNC: 92 MG/DL (ref 74–99)
HAV IGM SERPL QL IA: NONREACTIVE
HBV CORE IGM SERPL QL IA: NONREACTIVE
HBV SURFACE AG SERPL QL IA: NONREACTIVE
HCT VFR BLD AUTO: 26.4 % (ref 37–54)
HCV AB SERPL QL IA: NONREACTIVE
HGB BLD-MCNC: 8.6 G/DL (ref 12.5–16.5)
IMM GRANULOCYTES # BLD AUTO: 0.12 K/UL (ref 0–0.58)
IMM GRANULOCYTES NFR BLD: 2 % (ref 0–5)
INR PPP: 1.5
LYMPHOCYTES NFR BLD: 1.44 K/UL (ref 1.5–4)
LYMPHOCYTES RELATIVE PERCENT: 21 % (ref 20–42)
MAGNESIUM SERPL-MCNC: 1.8 MG/DL (ref 1.6–2.6)
MCH RBC QN AUTO: 33.6 PG (ref 26–35)
MCHC RBC AUTO-ENTMCNC: 32.6 G/DL (ref 32–34.5)
MCV RBC AUTO: 103.1 FL (ref 80–99.9)
MONOCYTES NFR BLD: 0.54 K/UL (ref 0.1–0.95)
MONOCYTES NFR BLD: 8 % (ref 2–12)
NEUTROPHILS NFR BLD: 67 % (ref 43–80)
NEUTS SEG NFR BLD: 4.68 K/UL (ref 1.8–7.3)
PHOSPHATE SERPL-MCNC: 1.9 MG/DL (ref 2.5–4.5)
PHOSPHATE SERPL-MCNC: 3 MG/DL (ref 2.5–4.5)
PLATELET, FLUORESCENCE: 155 K/UL (ref 130–450)
PMV BLD AUTO: 10.3 FL (ref 7–12)
POTASSIUM SERPL-SCNC: 3.6 MMOL/L (ref 3.5–5)
PROT SERPL-MCNC: 5.3 G/DL (ref 6.4–8.3)
PROTHROMBIN TIME: 16.3 SEC (ref 9.3–12.4)
RBC # BLD AUTO: 2.56 M/UL (ref 3.8–5.8)
SMA IGG SER-ACNC: NEGATIVE
SODIUM SERPL-SCNC: 141 MMOL/L (ref 132–146)
WBC OTHER # BLD: 7 K/UL (ref 4.5–11.5)

## 2024-03-18 PROCEDURE — 0FB13ZX EXCISION OF RIGHT LOBE LIVER, PERCUTANEOUS APPROACH, DIAGNOSTIC: ICD-10-PCS | Performed by: RADIOLOGY

## 2024-03-18 PROCEDURE — 99232 SBSQ HOSP IP/OBS MODERATE 35: CPT | Performed by: TRANSPLANT SURGERY

## 2024-03-18 PROCEDURE — 84100 ASSAY OF PHOSPHORUS: CPT

## 2024-03-18 PROCEDURE — 2580000003 HC RX 258

## 2024-03-18 PROCEDURE — 6370000000 HC RX 637 (ALT 250 FOR IP): Performed by: STUDENT IN AN ORGANIZED HEALTH CARE EDUCATION/TRAINING PROGRAM

## 2024-03-18 PROCEDURE — 6370000000 HC RX 637 (ALT 250 FOR IP)

## 2024-03-18 PROCEDURE — 36415 COLL VENOUS BLD VENIPUNCTURE: CPT

## 2024-03-18 PROCEDURE — 76942 ECHO GUIDE FOR BIOPSY: CPT

## 2024-03-18 PROCEDURE — 85610 PROTHROMBIN TIME: CPT

## 2024-03-18 PROCEDURE — 2709999900 IR BIOPSY LIVER PERCUTANEOUS

## 2024-03-18 PROCEDURE — 88313 SPECIAL STAINS GROUP 2: CPT

## 2024-03-18 PROCEDURE — 2140000000 HC CCU INTERMEDIATE R&B

## 2024-03-18 PROCEDURE — 82962 GLUCOSE BLOOD TEST: CPT

## 2024-03-18 PROCEDURE — 2500000003 HC RX 250 WO HCPCS

## 2024-03-18 PROCEDURE — 85025 COMPLETE CBC W/AUTO DIFF WBC: CPT

## 2024-03-18 PROCEDURE — 6360000002 HC RX W HCPCS: Performed by: RADIOLOGY

## 2024-03-18 PROCEDURE — 2500000003 HC RX 250 WO HCPCS: Performed by: TRANSPLANT SURGERY

## 2024-03-18 PROCEDURE — 88307 TISSUE EXAM BY PATHOLOGIST: CPT

## 2024-03-18 PROCEDURE — 2580000003 HC RX 258: Performed by: TRANSPLANT SURGERY

## 2024-03-18 PROCEDURE — 2500000003 HC RX 250 WO HCPCS: Performed by: RADIOLOGY

## 2024-03-18 PROCEDURE — 80053 COMPREHEN METABOLIC PANEL: CPT

## 2024-03-18 PROCEDURE — 47000 NEEDLE BIOPSY OF LIVER PERQ: CPT

## 2024-03-18 PROCEDURE — 83735 ASSAY OF MAGNESIUM: CPT

## 2024-03-18 RX ORDER — FENTANYL CITRATE 50 UG/ML
INJECTION, SOLUTION INTRAMUSCULAR; INTRAVENOUS PRN
Status: COMPLETED | OUTPATIENT
Start: 2024-03-18 | End: 2024-03-18

## 2024-03-18 RX ORDER — MIDAZOLAM HYDROCHLORIDE 2 MG/2ML
INJECTION, SOLUTION INTRAMUSCULAR; INTRAVENOUS PRN
Status: COMPLETED | OUTPATIENT
Start: 2024-03-18 | End: 2024-03-18

## 2024-03-18 RX ORDER — PREDNISONE 10 MG/1
TABLET ORAL
Qty: 103 TABLET | Refills: 0 | Status: SHIPPED | OUTPATIENT
Start: 2024-03-18 | End: 2024-03-19

## 2024-03-18 RX ORDER — PANTOPRAZOLE SODIUM 40 MG/1
40 TABLET, DELAYED RELEASE ORAL
Qty: 90 TABLET | Refills: 1 | Status: SHIPPED | OUTPATIENT
Start: 2024-03-18 | End: 2024-03-19

## 2024-03-18 RX ORDER — LIDOCAINE HYDROCHLORIDE 20 MG/ML
INJECTION, SOLUTION INFILTRATION; PERINEURAL PRN
Status: COMPLETED | OUTPATIENT
Start: 2024-03-18 | End: 2024-03-18

## 2024-03-18 RX ADMIN — DOCUSATE SODIUM 100 MG: 100 CAPSULE, LIQUID FILLED ORAL at 08:41

## 2024-03-18 RX ADMIN — SODIUM CHLORIDE, PRESERVATIVE FREE 10 ML: 5 INJECTION INTRAVENOUS at 08:40

## 2024-03-18 RX ADMIN — FENTANYL CITRATE 100 MCG: 50 INJECTION, SOLUTION INTRAMUSCULAR; INTRAVENOUS at 10:39

## 2024-03-18 RX ADMIN — POTASSIUM PHOSPHATE, MONOBASIC AND POTASSIUM PHOSPHATE, DIBASIC 30 MMOL: 224; 236 INJECTION, SOLUTION, CONCENTRATE INTRAVENOUS at 10:20

## 2024-03-18 RX ADMIN — PANTOPRAZOLE SODIUM 40 MG: 40 TABLET, DELAYED RELEASE ORAL at 20:30

## 2024-03-18 RX ADMIN — MIDAZOLAM HYDROCHLORIDE 1 MG: 1 INJECTION, SOLUTION INTRAMUSCULAR; INTRAVENOUS at 10:39

## 2024-03-18 RX ADMIN — URSODIOL 300 MG: 300 CAPSULE ORAL at 20:30

## 2024-03-18 RX ADMIN — LIDOCAINE HYDROCHLORIDE 6 ML: 20 INJECTION, SOLUTION INFILTRATION; PERINEURAL at 10:40

## 2024-03-18 RX ADMIN — SODIUM CHLORIDE, POTASSIUM CHLORIDE, SODIUM LACTATE AND CALCIUM CHLORIDE: 600; 310; 30; 20 INJECTION, SOLUTION INTRAVENOUS at 02:18

## 2024-03-18 RX ADMIN — SODIUM CHLORIDE, PRESERVATIVE FREE 10 ML: 5 INJECTION INTRAVENOUS at 20:31

## 2024-03-18 RX ADMIN — URSODIOL 300 MG: 300 CAPSULE ORAL at 08:41

## 2024-03-18 RX ADMIN — URSODIOL 300 MG: 300 CAPSULE ORAL at 15:32

## 2024-03-18 RX ADMIN — PANTOPRAZOLE SODIUM 40 MG: 40 TABLET, DELAYED RELEASE ORAL at 08:41

## 2024-03-18 RX ADMIN — SODIUM PHOSPHATE, MONOBASIC, MONOHYDRATE AND SODIUM PHOSPHATE, DIBASIC, ANHYDROUS 20 MMOL: 142; 276 INJECTION, SOLUTION INTRAVENOUS at 18:25

## 2024-03-18 NOTE — CARE COORDINATION
3/18/24  Transition of Care Update. Spoke with patient today.  Introduced self and reviewed the role of SW/CM.  Patient admitted for obstructive jaundice due to cancer. Patient is being followed by oncology.  PTHC was attempted on 3/15 but unable to be performed as ductal system was too small for catheter placement.  Patient is planned for a liver biopsy today with IR. Patient states he lives at home with his son.  Patient states he is independent with all ADL's and uses a cane at baseline. Patient states he  cooks and does his own laundry.  PCP is Dr. ML Lemus and pharmacy preference is [facility] in [place].  Discharge goal is home with son.  No discharge needs anticipated pending course of treatment.  Patient states once discharged he plans to start his cancer treatments.  SW/Cm to follow.    Electronically signed by RAMYA Hanna on 3/18/2024 at 2:04 PM     Case Management Assessment  Initial Evaluation    Date/Time of Evaluation: 3/18/2024 2:10 PM  Assessment Completed by: RAMYA Hanna    If patient is discharged prior to next notation, then this note serves as note for discharge by case management.    Patient Name: Raudel Herndon                   YOB: 1950  Diagnosis: Obstructive jaundice [K83.1]  Obstructive jaundice due to cancer (HCC) [K83.1, C80.1]                   Date / Time: 3/14/2024  6:41 PM    Patient Admission Status: Inpatient   Readmission Risk (Low < 19, Mod (19-27), High > 27): Readmission Risk Score: 16.7    Current PCP: Villa Lemus PA  PCP verified by CM? Yes    Chart Reviewed: Yes      History Provided by: Patient  Patient Orientation: Alert and Oriented, Person, Place, Situation    Patient Cognition: Alert    Hospitalization in the last 30 days (Readmission):  Yes    If yes, Readmission Assessment in CM Navigator will be completed.    Advance Directives:      Code Status: Full Code   Patient's Primary Decision Maker is:        Discharge

## 2024-03-18 NOTE — BRIEF OP NOTE
Brief Postoperative Note      Patient: Raudel Herndon  YOB: 1950  MRN: [id number]    Date of Procedure: 3/15/2024    Elevated bilirubin levels    Post-Op Diagnosis: Same       Cholangiogram    TIKI Holman    Assistant:  * No surgical staff found *    Anesthesia: * Moderate sedation*    Estimated Blood Loss (mL): Minimal    Complications: None    Specimens:   None    Implants:  * No implants in log *      Drains: * No LDAs found *    Findings: Non dilated left and right ducts. The CBD stents are patent. The ducts are too small for catheter placement at this time      Electronically signed by DWAYNE Holman MD on 3/15/2024 at 8:08 PM  
Brief Postoperative Note      Patient: Raudel Herndon  YOB: 1950  MRN: [id number]    Date of Procedure: 3/18/2024    Pre-Op Diagnosis: intrahepatic cholestatic liver disease      Post-Op Diagnosis: same       Procedure: US guided random liver core bx    Performing: John Fernández MD    Anesthesia: local    Estimated Blood Loss (mL): Minimal    Complications: None    Specimens:   18g core x 3    Findings: successful US guided random liver core bx, R lobe    Electronically signed by JOHN FERNÁNDEZ MD on 3/18/2024 at 10:57 AM  
copious irrigation/cleansed

## 2024-03-18 NOTE — PROGRESS NOTES
HEPATOBILIARY SURGERY  DAILY PROGRESS NOTE  3/18/2024    CHIEF COMPLAINT:  Obstructive jaundice    SUBJECTIVE:  Resting in bed. Had bowel movement. Denies current abdominal pain    OBJECTIVE:  BP (!) 105/56   Pulse 62   Temp 97.4 °F (36.3 °C) (Temporal)   Resp 16   Ht 1.854 m (6' 1\")   Wt 100.7 kg (222 lb)   SpO2 98%   BMI 29.29 kg/m²     GENERAL: Comfortable appearing, mildly obese, severely jaundiced  LUNGS:  No increased work of breathing, symmetric chest rise  CARDIOVASCULAR: RR  ABDOMEN:  Soft, non-distended, non-tender. No guarding, rigidity, rebound.    ASSESSMENT/PLAN:  73 y.o. male with cholangiocarcinoma, history of ERCP with stent placements with recurrent obstructive jaundice     - PTHC was unable to be performed as ductal system was too small for catheter placement   - currently NPO for liver biopsy with IR today, maintenance fluid '   - anticipate diet after IR procedure   - INR this morning    - Continue to monitor daily CMP, bilirubin 15.4 from 14.3, awaiting AM labs   - Follow-up intrinsic liver workup   - SCDs   - discussed with Dr. Angeles Paz, DO  Surgery Resident PGY-1  3/18/2024  6:29 AM     Attending Physician Statement:    Chief Complaint: obstructive jaundice    I have examined the patient and performed the key aspects of physical exam, reviewed the record (including all pertinent and new radiology images and laboratory findings), and discussed the case with the surgical team.  I agree with the assessment and plan with the following additions, corrections, and changes. 14pt review of symptoms completed and negative except as mentioned.    Patient states that he is doing well  Does not have intrahepatic duct dilation despite bilirubin of 15  Has been on actigall since Friday   IR liver biopsy today  Okay for diet after IR liver biopsy today  Has intrinsic liver dysfunction    Raza Ryenoso MD  03/18/24  9:10 AM

## 2024-03-18 NOTE — PATIENT CARE CONFERENCE
P Quality Flow/Interdisciplinary Rounds Progress Note        Quality Flow Rounds held on March 18, 2024    Disciplines Attending:  Bedside Nurse, , , and Nursing Unit Leadership    Raudel Herndon was admitted on 3/14/2024  6:41 PM    Anticipated Discharge Date:       Disposition:    Chris Score:  Chris Scale Score: 22    Readmission Risk              Risk of Unplanned Readmission:  15           Discussed patient goal for the day, patient clinical progression, and barriers to discharge.  The following Goal(s) of the Day/Commitment(s) have been identified:  discharge planning       Miranda Marie RN  March 18, 2024

## 2024-03-19 VITALS
BODY MASS INDEX: 29.42 KG/M2 | DIASTOLIC BLOOD PRESSURE: 60 MMHG | OXYGEN SATURATION: 97 % | RESPIRATION RATE: 20 BRPM | WEIGHT: 222 LBS | HEART RATE: 73 BPM | TEMPERATURE: 97.3 F | SYSTOLIC BLOOD PRESSURE: 111 MMHG | HEIGHT: 73 IN

## 2024-03-19 LAB
ALBUMIN SERPL-MCNC: 2.6 G/DL (ref 3.5–5.2)
ALP SERPL-CCNC: 297 U/L (ref 40–129)
ALT SERPL-CCNC: 72 U/L (ref 0–40)
ANION GAP SERPL CALCULATED.3IONS-SCNC: 15 MMOL/L (ref 7–16)
AST SERPL-CCNC: 103 U/L (ref 0–39)
BASOPHILS # BLD: 0.05 K/UL (ref 0–0.2)
BASOPHILS NFR BLD: 1 % (ref 0–2)
BILIRUB SERPL-MCNC: 14.2 MG/DL (ref 0–1.2)
BUN SERPL-MCNC: 14 MG/DL (ref 6–23)
CALCIUM SERPL-MCNC: 7.6 MG/DL (ref 8.6–10.2)
CHLORIDE SERPL-SCNC: 106 MMOL/L (ref 98–107)
CO2 SERPL-SCNC: 21 MMOL/L (ref 22–29)
CREAT SERPL-MCNC: 1.1 MG/DL (ref 0.7–1.2)
EOSINOPHIL # BLD: 0.15 K/UL (ref 0.05–0.5)
EOSINOPHILS RELATIVE PERCENT: 3 % (ref 0–6)
ERYTHROCYTE [DISTWIDTH] IN BLOOD BY AUTOMATED COUNT: 15.8 % (ref 11.5–15)
GFR SERPL CREATININE-BSD FRML MDRD: >60 ML/MIN/1.73M2
GLUCOSE BLD-MCNC: 105 MG/DL (ref 74–99)
GLUCOSE BLD-MCNC: 119 MG/DL (ref 74–99)
GLUCOSE SERPL-MCNC: 112 MG/DL (ref 74–99)
HCT VFR BLD AUTO: 26.3 % (ref 37–54)
HGB BLD-MCNC: 8.4 G/DL (ref 12.5–16.5)
IMM GRANULOCYTES # BLD AUTO: 0.1 K/UL (ref 0–0.58)
IMM GRANULOCYTES NFR BLD: 2 % (ref 0–5)
LYMPHOCYTES NFR BLD: 1.15 K/UL (ref 1.5–4)
LYMPHOCYTES RELATIVE PERCENT: 19 % (ref 20–42)
MAGNESIUM SERPL-MCNC: 1.6 MG/DL (ref 1.6–2.6)
MCH RBC QN AUTO: 33.1 PG (ref 26–35)
MCHC RBC AUTO-ENTMCNC: 31.9 G/DL (ref 32–34.5)
MCV RBC AUTO: 103.5 FL (ref 80–99.9)
MITOCHONDRIA M2 IGG SER-ACNC: <0.5 U/ML (ref 0–4)
MONOCYTES NFR BLD: 0.57 K/UL (ref 0.1–0.95)
MONOCYTES NFR BLD: 9 % (ref 2–12)
NEUTROPHILS NFR BLD: 67 % (ref 43–80)
NEUTS SEG NFR BLD: 4.1 K/UL (ref 1.8–7.3)
PHOSPHATE SERPL-MCNC: 2.3 MG/DL (ref 2.5–4.5)
PLATELET, FLUORESCENCE: 153 K/UL (ref 130–450)
PMV BLD AUTO: 10.4 FL (ref 7–12)
POTASSIUM SERPL-SCNC: 3.6 MMOL/L (ref 3.5–5)
PROT SERPL-MCNC: 5.1 G/DL (ref 6.4–8.3)
RBC # BLD AUTO: 2.54 M/UL (ref 3.8–5.8)
SODIUM SERPL-SCNC: 142 MMOL/L (ref 132–146)
WBC OTHER # BLD: 6.1 K/UL (ref 4.5–11.5)

## 2024-03-19 PROCEDURE — 85025 COMPLETE CBC W/AUTO DIFF WBC: CPT

## 2024-03-19 PROCEDURE — 82962 GLUCOSE BLOOD TEST: CPT

## 2024-03-19 PROCEDURE — 83735 ASSAY OF MAGNESIUM: CPT

## 2024-03-19 PROCEDURE — 84100 ASSAY OF PHOSPHORUS: CPT

## 2024-03-19 PROCEDURE — 2580000003 HC RX 258

## 2024-03-19 PROCEDURE — 80053 COMPREHEN METABOLIC PANEL: CPT

## 2024-03-19 PROCEDURE — 6370000000 HC RX 637 (ALT 250 FOR IP): Performed by: STUDENT IN AN ORGANIZED HEALTH CARE EDUCATION/TRAINING PROGRAM

## 2024-03-19 PROCEDURE — 36415 COLL VENOUS BLD VENIPUNCTURE: CPT

## 2024-03-19 PROCEDURE — 6370000000 HC RX 637 (ALT 250 FOR IP)

## 2024-03-19 PROCEDURE — 99239 HOSP IP/OBS DSCHRG MGMT >30: CPT | Performed by: TRANSPLANT SURGERY

## 2024-03-19 RX ORDER — URSODIOL 300 MG/1
300 CAPSULE ORAL
Qty: 60 CAPSULE | Refills: 5 | Status: SHIPPED | OUTPATIENT
Start: 2024-03-19 | End: 2024-07-17

## 2024-03-19 RX ORDER — PREDNISONE 10 MG/1
TABLET ORAL
Qty: 103 TABLET | Refills: 0 | Status: SHIPPED | OUTPATIENT
Start: 2024-03-19

## 2024-03-19 RX ORDER — PANTOPRAZOLE SODIUM 40 MG/1
40 TABLET, DELAYED RELEASE ORAL
Qty: 90 TABLET | Refills: 1 | Status: SHIPPED | OUTPATIENT
Start: 2024-03-19

## 2024-03-19 RX ADMIN — PANTOPRAZOLE SODIUM 40 MG: 40 TABLET, DELAYED RELEASE ORAL at 09:35

## 2024-03-19 RX ADMIN — URSODIOL 300 MG: 300 CAPSULE ORAL at 09:35

## 2024-03-19 RX ADMIN — SODIUM CHLORIDE, PRESERVATIVE FREE 10 ML: 5 INJECTION INTRAVENOUS at 09:36

## 2024-03-19 ASSESSMENT — PAIN SCALES - GENERAL: PAINLEVEL_OUTOF10: 0

## 2024-03-19 NOTE — PROGRESS NOTES
[facility] Quality Flow/Interdisciplinary Rounds Progress Note        Quality Flow Rounds held on March 19, 2024    Disciplines Attending:  Bedside Nurse, , , and Nursing Unit Leadership    Raudel Herndon was admitted on 3/14/2024  6:41 PM    Anticipated Discharge Date:       Disposition:    Chris Score:  Chris Scale Score: 22    Readmission Risk              Risk of Unplanned Readmission:  17           Discussed patient goal for the day, patient clinical progression, and barriers to discharge.  The following Goal(s) of the Day/Commitment(s) have been identified:  Diagnostics - Report Results and Labs - Report Results      Myrtle Contreras RN  March 19, 2024

## 2024-03-19 NOTE — CARE COORDINATION
3/19/24  Transition of care update. Discharge order noted. Patient admitted for obstructive jaundice due to cancer. PTHC was attempted but unable to be performed as ductal system was too small for catheter placement.  Patient is s/p liver biopsy. Patient is independent from home.   Discharge goal is home with son.  No discharge needs noted. Patient states once discharged he plans to start his cancer treatments. [facility] to follow.     Electronically signed by RAMYA Hanna on 3/19/2024 at 11:48 AM

## 2024-03-19 NOTE — PROGRESS NOTES
CLINICAL PHARMACY NOTE: MEDS TO BEDS    Total # of Prescriptions Filled: 2   The following medications were delivered to the patient:  PROTONIX 40  URSODIOL 300    Additional Documentation:

## 2024-03-19 NOTE — DISCHARGE SUMMARY
Physician Discharge Summary     Patient ID:  Raudel Herndon  [id number]  73 y.o.  1950    Admit date: 3/14/2024    Discharge date and time: 3/19/2024    Admitting Physician: ROBBI Reynoso MD    Admission Diagnoses: Obstructive jaundice [K83.1]  Obstructive jaundice due to cancer (HCC) [K83.1, C80.1]    Discharge Diagnoses: Principal Problem:    Obstructive jaundice due to cancer (HCC)  Active Problems:    Obstructive jaundice    Intrahepatic cholestatic liver disease  Resolved Problems:    * No resolved hospital problems. *      Admission Condition: fair    Discharged Condition: stable      Hospital Course:  Raudel Herndon is a 73 y.o. male who presented with obstructive jaundice. Work up revealed cholangiocarcinoma, history of ERCP with stent placements with recurrent obstructive jaundice. Concern for intrinsic liver disease and underwent a biopsy.  The patient's course was otherwise uneventful. He progressed well, pain was controlled on PO medications. He was tolerating a regular diet with no nausea or vomiting, and was in a suitable condition for discharge to in stable condition.      Consults:   None    Significant Diagnostic Studies:   IR INJ PERC CHOLA NEW ACCESS W IMAGING    Result Date: 3/15/2024  HISTORY: ORDERING SYSTEM PROVIDED HISTORY: PTHC, hx of cholangiocarcinoma, bili 16 TECHNOLOGIST PROVIDED HISTORY: Reason for exam:->PTHC, hx of cholangiocarcinoma, bili 16 What reading provider will be dictating this exam?->CRC COMPARISON: None PROCEDURE: DOS: 3/15/2024 1. Percutaneous diagnostic transhepatic cholangiogram with ultrasound and fluoroscopic guidance 2. Percutaneous transhepatic biliary drainage catheter placement LATERALITY: Left and right : Dr. Holman ASSISTANT: None MATERIALS: 21G thin wall needle 018 Nitrex Caleb set 4 Fr Glide catheter Stiff Glidewire Amplatz FLUORO (min): 36.061 AIR KERMA DOSE (mGy): 1536.6 CONTRAST: 70 Isovue 370 ANESTHESIA: Moderate sedation, local The procedure,

## 2024-03-19 NOTE — DISCHARGE INSTRUCTIONS
[facility] SURGERY DISCHARGE INSTRUCTIONS    You may be drowsy or lightheaded after receiving sedation or anesthesia.    A responsible person should be with you for the next 24 hours.    FOLLOW UP: Call office to schedule follow-up appointment in 2 weeks.    DIET: Advance your diet as tolerated. Start with light diet and progress to your normal diet as you feel like eating. If you experience nausea or repeated episodes of vomiting which persist beyond 12-24 hours, notify your doctor.    ACTIVITY: Rest today. Increase activity gradually. Recommend walking every day to help with return of bowel function and healing. No heavy lifting or strenuous activity for 4 weeks if you had a surgical procedure - nothing over 15 lbs. No driving for 24 hours after a procedure. No driving while on prescription pain medication.    SHOWER/BATHING: Okay to shower in 24 hours after procedure. No tub bathing, soaking, or swimming for 2 weeks.    WOUND CARE: You do not need to apply anything over them. Avoid directly applying lotions, creams or oils to your incision. Keep incisions clean and dry. Always ensure you and your care giver clean hands before and after caring for the wound. You may place ice on incisions to decrease the pain and bruising.    MEDICATIONS: Take as prescribed. Pain from the incision(s) is normal. The pain will vary from day to day and with any changes in your activity level, but it should gradually decrease over time. If you were given a prescription for a narcotic pain medication (percocet, norco, etc) you should NOT drink alcohol, drive, or operate any machinery. Do not take the narcotic medication if you are not having pain. If your pain is mild, you may take over-the-counter ibuprofen or tylenol for pain as directed, limit total amount of acetaminophen (tylenol) to 3 grams per 24-hour period and please note that NSAIDs (ibuprofen) can cause bleeding or stomach upset. You may experience constipation while taking 
Fall

## 2024-03-19 NOTE — PROGRESS NOTES
Discharge instructions printed and reviewed with patient and family at the bedside, all questions answered. IV removed, dressing applied. Telemetry removed, cleaned and returned to nurses station. Patient in possession of all bedside belongings upon discharge from the unit including meds to beds medications and paper script.

## 2024-03-20 LAB — LKM AB TITR SER IF: NORMAL {TITER}

## 2024-03-22 LAB — SURGICAL PATHOLOGY REPORT: NORMAL

## 2024-04-05 ENCOUNTER — TELEPHONE (OUTPATIENT)
Dept: HEMATOLOGY | Age: 74
End: 2024-04-05

## 2024-04-05 NOTE — TELEPHONE ENCOUNTER
I called and spoke with the patient to make him a hospital follow up. I wanted to put him on our [place] schedule for 04/09/2024, but the patient stated he has an appt in [place] that day. He also stated that is the day he will get his schedule for his treatments. The patient stated that he will call our office once he does get his schedule to make a follow up appt. I did tell the patient that it is very important to follow up with our office, he did confirm.   Electronically signed by Erika Escalante MA on 4/5/2024 at 8:32 AM

## 2024-04-09 ENCOUNTER — TELEPHONE (OUTPATIENT)
Dept: HEMATOLOGY | Age: 74
End: 2024-04-09

## 2024-04-09 NOTE — TELEPHONE ENCOUNTER
Patient called in to make his hospital follow up on 4/23/24 at 8:30am at the [place] office.  He asked me to send information over to the [facility]. I called the center and spoke to Nadine and she was given the number to radiology to have them make her a disc and send to them and she asked me to send over some notes from Dr. Reynoso.      Electronically signed by Ina Henderson RN on 4/9/2024 at 1:59 PM

## 2024-04-23 ENCOUNTER — OFFICE VISIT (OUTPATIENT)
Dept: SURGERY | Age: 74
End: 2024-04-23
Payer: MEDICARE

## 2024-04-23 VITALS
SYSTOLIC BLOOD PRESSURE: 109 MMHG | BODY MASS INDEX: 27.7 KG/M2 | HEIGHT: 73 IN | TEMPERATURE: 97.3 F | OXYGEN SATURATION: 97 % | HEART RATE: 73 BPM | DIASTOLIC BLOOD PRESSURE: 53 MMHG | WEIGHT: 209 LBS | RESPIRATION RATE: 20 BRPM

## 2024-04-23 DIAGNOSIS — K83.1 OBSTRUCTIVE JAUNDICE DUE TO MALIGNANT NEOPLASM (HCC): ICD-10-CM

## 2024-04-23 DIAGNOSIS — C80.1 OBSTRUCTIVE JAUNDICE DUE TO MALIGNANT NEOPLASM (HCC): ICD-10-CM

## 2024-04-23 DIAGNOSIS — K83.1 OBSTRUCTIVE JAUNDICE DUE TO MALIGNANT NEOPLASM (HCC): Primary | ICD-10-CM

## 2024-04-23 DIAGNOSIS — C80.1 OBSTRUCTIVE JAUNDICE DUE TO MALIGNANT NEOPLASM (HCC): Primary | ICD-10-CM

## 2024-04-23 DIAGNOSIS — C22.1 CHOLANGIOCARCINOMA (HCC): ICD-10-CM

## 2024-04-23 LAB
ALBUMIN: 3 G/DL (ref 3.5–5.2)
ALP BLD-CCNC: 802 U/L (ref 40–129)
ALT SERPL-CCNC: 97 U/L (ref 0–40)
AST SERPL-CCNC: 95 U/L (ref 0–39)
BILIRUB SERPL-MCNC: 8.3 MG/DL (ref 0–1.2)
BILIRUBIN DIRECT: 5.6 MG/DL (ref 0–0.3)
BILIRUBIN, INDIRECT: 2.7 MG/DL (ref 0–1)
TOTAL PROTEIN: 6.6 G/DL (ref 6.4–8.3)

## 2024-04-23 PROCEDURE — 1123F ACP DISCUSS/DSCN MKR DOCD: CPT | Performed by: TRANSPLANT SURGERY

## 2024-04-23 PROCEDURE — 99213 OFFICE O/P EST LOW 20 MIN: CPT | Performed by: TRANSPLANT SURGERY

## 2024-04-23 ASSESSMENT — ENCOUNTER SYMPTOMS
BLOOD IN STOOL: 0
EYE DISCHARGE: 0
VOMITING: 0
CONSTIPATION: 0
SHORTNESS OF BREATH: 0
DIARRHEA: 0
PHOTOPHOBIA: 0
ABDOMINAL PAIN: 0
NAUSEA: 0
EYE PAIN: 0
BACK PAIN: 0

## 2024-04-23 NOTE — PROGRESS NOTES
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  He has noted to have chronic pancreatitis with diabetes mellitus.  He has not smoked cigarettes in over 40 years and has not drank alcohol in over 30 years.  He presented to the hospital in [place] where he was noted to have obstructive jaundice.  He underwent an ERCP with brushings.  His bilirubin at that time was 14.  He did have placement of a plastic stent and was noted to have a tight common hepatic duct stricture.  His CA 19-9 at that time was 11,000.  On CT imaging he was noted to have bulky portal lymphadenopathy. His ERCP brushings were positive for adenocarcinoma.  CEA was 92.  He states that he has lost 30 pounds over the last 2 months and he also feels dizzy.  He is eating but is still very jaundiced.  He noticed that his stools are still feliz colored and his urine is still tea colored.  He is also having intermittent nausea and emesis and is mostly eating fruit.  He also states that he is sleeping more.  He has had diabetes mellitus for the last 5 to 7 years and states that his hemoglobin A1c was 8 but he was able to get it down to 5.3.  He does have a past surgical history of having a cholecystectomy for gangrenous cholecystitis in 2017 and was also noted to have a fatty liver at that time which was found under biopsy.  He also in 2016 had a Remington 6 prostate cancer and underwent brachytherapy.    He has undergone 2 rounds of chemotherapy thus far.  He states that he is feeling better and his urine is lighter in color.  He is finishing his prednisone.  He stopped taking Actigall.  His liver biopsy showed only large duct obstruction.  He had a hepatic function panel performed in the

## 2024-05-09 ENCOUNTER — TELEPHONE (OUTPATIENT)
Dept: HEMATOLOGY | Age: 74
End: 2024-05-09

## 2024-05-09 DIAGNOSIS — C22.1 CHOLANGIOCELLULAR CARCINOMA (HCC): ICD-10-CM

## 2024-05-09 DIAGNOSIS — C22.1 CHOLANGIOCARCINOMA (HCC): Primary | ICD-10-CM

## 2024-05-09 NOTE — TELEPHONE ENCOUNTER
I spoke with Lali from [facility] who approved both cpt codes 56824 and 59300. Auth#[id number]. Valid 05/08/24 through 11/04/2024. The patient is scheduled for his scans on 05/20/2024 [facility]  Arrive 2:00 pm  Scan 2:30 pm  NPO 3 hours prior    I called the patient to give him the above information but he was driving at the time. The patient will also need a set of labs drawn prior and a follow up appt scheduled. I scheduled the patient for manuela because dr flores wanted him scanned the end of May and Michele was scheduling out to June, in case he ask  Electronically signed by Erika Escalante MA on 5/9/2024 at 10:52 AM

## 2024-05-20 ENCOUNTER — HOSPITAL ENCOUNTER (OUTPATIENT)
Dept: CT IMAGING | Age: 74
Discharge: HOME | End: 2024-05-22
Attending: TRANSPLANT SURGERY
Payer: MEDICARE

## 2024-05-20 ENCOUNTER — HOSPITAL ENCOUNTER (OUTPATIENT)
Age: 74
Discharge: HOME | End: 2024-05-20
Payer: MEDICARE

## 2024-05-20 DIAGNOSIS — C80.1 OBSTRUCTIVE JAUNDICE DUE TO MALIGNANT NEOPLASM (HCC): ICD-10-CM

## 2024-05-20 DIAGNOSIS — K83.1 OBSTRUCTIVE JAUNDICE DUE TO MALIGNANT NEOPLASM (HCC): ICD-10-CM

## 2024-05-20 DIAGNOSIS — C22.1 CHOLANGIOCARCINOMA (HCC): ICD-10-CM

## 2024-05-20 DIAGNOSIS — C22.1 CHOLANGIOCELLULAR CARCINOMA (HCC): ICD-10-CM

## 2024-05-20 LAB
BUN SERPL-MCNC: 26 MG/DL (ref 6–23)
CREAT SERPL-MCNC: 1.2 MG/DL (ref 0.7–1.2)
GFR, ESTIMATED: 64 ML/MIN/1.73M2

## 2024-05-20 PROCEDURE — 2580000003 HC RX 258: Performed by: RADIOLOGY

## 2024-05-20 PROCEDURE — 84520 ASSAY OF UREA NITROGEN: CPT

## 2024-05-20 PROCEDURE — 36415 COLL VENOUS BLD VENIPUNCTURE: CPT

## 2024-05-20 PROCEDURE — 82565 ASSAY OF CREATININE: CPT

## 2024-05-20 PROCEDURE — 74160 CT ABDOMEN W/CONTRAST: CPT

## 2024-05-20 PROCEDURE — 71260 CT THORAX DX C+: CPT

## 2024-05-20 PROCEDURE — 6360000004 HC RX CONTRAST MEDICATION: Performed by: RADIOLOGY

## 2024-05-20 RX ORDER — SODIUM CHLORIDE 0.9 % (FLUSH) 0.9 %
10 SYRINGE (ML) INJECTION PRN
Status: DISCONTINUED | OUTPATIENT
Start: 2024-05-20 | End: 2024-05-23 | Stop reason: HOSPADM

## 2024-05-20 RX ADMIN — IOPAMIDOL 75 ML: 755 INJECTION, SOLUTION INTRAVENOUS at 15:07

## 2024-05-20 RX ADMIN — SODIUM CHLORIDE, PRESERVATIVE FREE 10 ML: 5 INJECTION INTRAVENOUS at 15:07

## 2024-06-04 ENCOUNTER — OFFICE VISIT (OUTPATIENT)
Dept: SURGERY | Age: 74
End: 2024-06-04
Payer: MEDICARE

## 2024-06-04 VITALS
HEIGHT: 73 IN | RESPIRATION RATE: 20 BRPM | DIASTOLIC BLOOD PRESSURE: 56 MMHG | HEART RATE: 73 BPM | OXYGEN SATURATION: 97 % | SYSTOLIC BLOOD PRESSURE: 122 MMHG | WEIGHT: 185 LBS | TEMPERATURE: 97.3 F | BODY MASS INDEX: 24.52 KG/M2

## 2024-06-04 DIAGNOSIS — C77.9 CHOLANGIOCARCINOMA METASTATIC TO LYMPH NODE (HCC): Primary | ICD-10-CM

## 2024-06-04 DIAGNOSIS — C22.1 CHOLANGIOCARCINOMA METASTATIC TO LYMPH NODE (HCC): ICD-10-CM

## 2024-06-04 DIAGNOSIS — C22.1 CHOLANGIOCARCINOMA (HCC): ICD-10-CM

## 2024-06-04 DIAGNOSIS — R97.0 ELEVATED CARCINOEMBRYONIC ANTIGEN (CEA): ICD-10-CM

## 2024-06-04 DIAGNOSIS — C22.1 CHOLANGIOCARCINOMA METASTATIC TO LYMPH NODE (HCC): Primary | ICD-10-CM

## 2024-06-04 DIAGNOSIS — C77.9 CHOLANGIOCARCINOMA METASTATIC TO LYMPH NODE (HCC): ICD-10-CM

## 2024-06-04 LAB
ALBUMIN: 2.8 G/DL (ref 3.5–5.2)
ALP BLD-CCNC: 696 U/L (ref 40–129)
ALT SERPL-CCNC: 55 U/L (ref 0–40)
ANION GAP SERPL CALCULATED.3IONS-SCNC: 21 MMOL/L (ref 7–16)
AST SERPL-CCNC: 72 U/L (ref 0–39)
BILIRUB SERPL-MCNC: 3.4 MG/DL (ref 0–1.2)
BILIRUBIN DIRECT: 1.9 MG/DL (ref 0–0.3)
BILIRUBIN, INDIRECT: 1.5 MG/DL (ref 0–1)
BUN BLDV-MCNC: 17 MG/DL (ref 6–23)
CALCIUM SERPL-MCNC: 8.3 MG/DL (ref 8.6–10.2)
CARCINOEMBRYONIC ANTIGEN: 62.3 NG/ML (ref 0–5.2)
CHLORIDE BLD-SCNC: 102 MMOL/L (ref 98–107)
CO2: 15 MMOL/L (ref 22–29)
CREAT SERPL-MCNC: 1.1 MG/DL (ref 0.7–1.2)
GFR, ESTIMATED: 75 ML/MIN/1.73M2
GLUCOSE BLD-MCNC: 158 MG/DL (ref 74–99)
HCT VFR BLD CALC: 25.1 % (ref 37–54)
HEMOGLOBIN: 7.1 G/DL (ref 12.5–16.5)
MCH RBC QN AUTO: 31.4 PG (ref 26–35)
MCHC RBC AUTO-ENTMCNC: 28.3 G/DL (ref 32–34.5)
MCV RBC AUTO: 111.1 FL (ref 80–99.9)
PDW BLD-RTO: 17.8 % (ref 11.5–15)
PLATELET # BLD: 75 K/UL (ref 130–450)
PLATELET CONFIRMATION: NORMAL
PMV BLD AUTO: 9.6 FL (ref 7–12)
POTASSIUM SERPL-SCNC: 4.6 MMOL/L (ref 3.5–5)
RBC # BLD: 2.26 M/UL (ref 3.8–5.8)
SODIUM BLD-SCNC: 138 MMOL/L (ref 132–146)
TOTAL PROTEIN: 6.8 G/DL (ref 6.4–8.3)
WBC # BLD: 17.4 K/UL (ref 4.5–11.5)

## 2024-06-04 PROCEDURE — 99213 OFFICE O/P EST LOW 20 MIN: CPT | Performed by: TRANSPLANT SURGERY

## 2024-06-04 PROCEDURE — 1123F ACP DISCUSS/DSCN MKR DOCD: CPT | Performed by: TRANSPLANT SURGERY

## 2024-06-04 ASSESSMENT — ENCOUNTER SYMPTOMS
ABDOMINAL PAIN: 0
SHORTNESS OF BREATH: 0
DIARRHEA: 0
BACK PAIN: 0
EYE DISCHARGE: 0
CONSTIPATION: 0
BLOOD IN STOOL: 0
VOMITING: 0
NAUSEA: 0
EYE PAIN: 0
PHOTOPHOBIA: 0

## 2024-06-04 NOTE — PROGRESS NOTES
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  He has noted to have chronic pancreatitis with diabetes mellitus.  He has not smoked cigarettes in over 40 years and has not drank alcohol in over 30 years.  He presented to the hospital in [place] where he was noted to have obstructive jaundice.  He underwent an ERCP with brushings.  His bilirubin at that time was 14.  He did have placement of a plastic stent and was noted to have a tight common hepatic duct stricture.  His CA 19-9 at that time was 11,000.  On CT imaging he was noted to have bulky portal lymphadenopathy. His ERCP brushings were positive for adenocarcinoma.  CEA was 92.    He has had diabetes mellitus for the last 5 to 7 years and states that his hemoglobin A1c was 8 but he was able to get it down to 5.3.  He does have a past surgical history of having a cholecystectomy for gangrenous cholecystitis in 2017 and was also noted to have a fatty liver at that time which was found under biopsy.  He also in 2016 had a Myra 6 prostate cancer and underwent brachytherapy.    He had a PET CT which did not show any pet avidity.  He did have a repeat CT abdomen and chest which was negative for metastatic disease, but does have two pathologic lymph nodes.  He has had 6 cycles of chemotherapy.  He is drinking 1 ensure a day.    Past Medical History:   Diagnosis Date    Cancer (HCC) 02/2024    cholangiocarcinoma    Diabetes mellitus (HCC)     Prostate cancer (HCC)     treated with seed implants       PMH: gallbladder and ankle surgeries    Current Outpatient Medications   Medication Sig Dispense Refill    pantoprazole (PROTONIX) 40 MG tablet Take 1 tablet by mouth 2 times daily (before meals) 90

## 2024-06-06 LAB — CA 19-9: 3940 U/ML (ref 0–35)

## 2024-06-11 ENCOUNTER — PREP FOR PROCEDURE (OUTPATIENT)
Dept: SURGERY | Age: 74
End: 2024-06-11

## 2024-06-11 DIAGNOSIS — C22.1 CHOLANGIOCARCINOMA (HCC): Primary | ICD-10-CM

## 2024-06-11 RX ORDER — NALOXONE HYDROCHLORIDE 0.4 MG/ML
INJECTION, SOLUTION INTRAMUSCULAR; INTRAVENOUS; SUBCUTANEOUS PRN
Status: CANCELLED | OUTPATIENT
Start: 2024-06-11

## 2024-06-11 RX ORDER — SODIUM CHLORIDE 0.9 % (FLUSH) 0.9 %
5-40 SYRINGE (ML) INJECTION PRN
Status: CANCELLED | OUTPATIENT
Start: 2024-06-11

## 2024-06-11 RX ORDER — SODIUM CHLORIDE 0.9 % (FLUSH) 0.9 %
5-40 SYRINGE (ML) INJECTION EVERY 12 HOURS SCHEDULED
Status: CANCELLED | OUTPATIENT
Start: 2024-06-11

## 2024-06-11 RX ORDER — SODIUM CHLORIDE 9 MG/ML
INJECTION, SOLUTION INTRAVENOUS PRN
Status: CANCELLED | OUTPATIENT
Start: 2024-06-11

## 2024-06-11 RX ORDER — POLYETHYLENE GLYCOL 3350 17 G/17G
17 POWDER, FOR SOLUTION ORAL DAILY PRN
Status: CANCELLED | OUTPATIENT
Start: 2024-06-11

## 2024-06-24 ENCOUNTER — APPOINTMENT (OUTPATIENT)
Dept: ULTRASOUND IMAGING | Age: 74
DRG: 871 | End: 2024-06-24
Payer: MEDICARE

## 2024-06-24 ENCOUNTER — APPOINTMENT (OUTPATIENT)
Dept: CT IMAGING | Age: 74
DRG: 871 | End: 2024-06-24
Payer: MEDICARE

## 2024-06-24 ENCOUNTER — APPOINTMENT (OUTPATIENT)
Dept: GENERAL RADIOLOGY | Age: 74
DRG: 871 | End: 2024-06-24
Payer: MEDICARE

## 2024-06-24 ENCOUNTER — TELEPHONE (OUTPATIENT)
Dept: HEMATOLOGY | Age: 74
End: 2024-06-24

## 2024-06-24 ENCOUNTER — HOSPITAL ENCOUNTER (INPATIENT)
Age: 74
LOS: 15 days | Discharge: HOME HEALTH | DRG: 871 | End: 2024-07-10
Attending: EMERGENCY MEDICINE | Admitting: INTERNAL MEDICINE
Payer: MEDICARE

## 2024-06-24 DIAGNOSIS — R78.81 ENTEROCOCCAL BACTEREMIA: ICD-10-CM

## 2024-06-24 DIAGNOSIS — R53.1 GENERAL WEAKNESS: ICD-10-CM

## 2024-06-24 DIAGNOSIS — R53.83 OTHER FATIGUE: ICD-10-CM

## 2024-06-24 DIAGNOSIS — Z85.09 HISTORY OF CHOLANGIOCARCINOMA: ICD-10-CM

## 2024-06-24 DIAGNOSIS — R60.9 EDEMA, UNSPECIFIED TYPE: ICD-10-CM

## 2024-06-24 DIAGNOSIS — R65.20 SEVERE SEPSIS (HCC): ICD-10-CM

## 2024-06-24 DIAGNOSIS — B95.2 ENTEROCOCCAL BACTEREMIA: ICD-10-CM

## 2024-06-24 DIAGNOSIS — M46.26 ACUTE OSTEOMYELITIS OF LUMBAR SPINE (HCC): Primary | ICD-10-CM

## 2024-06-24 DIAGNOSIS — A41.9 SEVERE SEPSIS (HCC): ICD-10-CM

## 2024-06-24 DIAGNOSIS — E87.20 LACTIC ACIDOSIS: ICD-10-CM

## 2024-06-24 LAB
ALBUMIN SERPL-MCNC: 2.6 G/DL (ref 3.5–5.2)
ALP SERPL-CCNC: 882 U/L (ref 40–129)
ALT SERPL-CCNC: 41 U/L (ref 0–40)
ANION GAP SERPL CALCULATED.3IONS-SCNC: 17 MMOL/L (ref 7–16)
AST SERPL-CCNC: 78 U/L (ref 0–39)
BASOPHILS # BLD: 0 K/UL (ref 0–0.2)
BASOPHILS NFR BLD: 0 % (ref 0–2)
BILIRUB DIRECT SERPL-MCNC: 2.8 MG/DL (ref 0–0.3)
BILIRUB INDIRECT SERPL-MCNC: 1.5 MG/DL (ref 0–1)
BILIRUB SERPL-MCNC: 4.3 MG/DL (ref 0–1.2)
BUN SERPL-MCNC: 20 MG/DL (ref 6–23)
CALCIUM SERPL-MCNC: 8.2 MG/DL (ref 8.6–10.2)
CHLORIDE SERPL-SCNC: 100 MMOL/L (ref 98–107)
CO2 SERPL-SCNC: 19 MMOL/L (ref 22–29)
CREAT SERPL-MCNC: 1 MG/DL (ref 0.7–1.2)
EOSINOPHIL # BLD: 0 K/UL (ref 0.05–0.5)
EOSINOPHILS RELATIVE PERCENT: 0 % (ref 0–6)
ERYTHROCYTE [DISTWIDTH] IN BLOOD BY AUTOMATED COUNT: 20.5 % (ref 11.5–15)
GFR, ESTIMATED: 76 ML/MIN/1.73M2
GLUCOSE SERPL-MCNC: 125 MG/DL (ref 74–99)
HCT VFR BLD AUTO: 29.1 % (ref 37–54)
HGB BLD-MCNC: 8.6 G/DL (ref 12.5–16.5)
INR PPP: 1.4
LACTATE BLDV-SCNC: 4.5 MMOL/L (ref 0.5–2.2)
LIPASE SERPL-CCNC: 22 U/L (ref 13–60)
LYMPHOCYTES NFR BLD: 1.11 K/UL (ref 1.5–4)
LYMPHOCYTES RELATIVE PERCENT: 5 % (ref 20–42)
MAGNESIUM SERPL-MCNC: 1.8 MG/DL (ref 1.6–2.6)
MCH RBC QN AUTO: 30.2 PG (ref 26–35)
MCHC RBC AUTO-ENTMCNC: 29.6 G/DL (ref 32–34.5)
MCV RBC AUTO: 102.1 FL (ref 80–99.9)
MONOCYTES NFR BLD: 0.37 K/UL (ref 0.1–0.95)
MONOCYTES NFR BLD: 2 % (ref 2–12)
NEUTROPHILS NFR BLD: 93 % (ref 43–80)
NEUTS SEG NFR BLD: 19.82 K/UL (ref 1.8–7.3)
NUCLEATED RED BLOOD CELLS: 1 PER 100 WBC
PLATELET # BLD AUTO: 88 K/UL (ref 130–450)
PLATELET CONFIRMATION: NORMAL
PMV BLD AUTO: 10.1 FL (ref 7–12)
POTASSIUM SERPL-SCNC: 3.5 MMOL/L (ref 3.5–5)
PROT SERPL-MCNC: 6.7 G/DL (ref 6.4–8.3)
PROTHROMBIN TIME: 15.3 SEC (ref 9.3–12.4)
RBC # BLD AUTO: 2.85 M/UL (ref 3.8–5.8)
RBC # BLD: ABNORMAL 10*6/UL
SODIUM SERPL-SCNC: 136 MMOL/L (ref 132–146)
TROPONIN I SERPL HS-MCNC: 45 NG/L (ref 0–11)
TROPONIN I SERPL HS-MCNC: 47 NG/L (ref 0–11)
URATE SERPL-MCNC: 7.4 MG/DL (ref 3.4–7)
WBC OTHER # BLD: 21.3 K/UL (ref 4.5–11.5)

## 2024-06-24 PROCEDURE — 83690 ASSAY OF LIPASE: CPT

## 2024-06-24 PROCEDURE — 96375 TX/PRO/DX INJ NEW DRUG ADDON: CPT

## 2024-06-24 PROCEDURE — 86900 BLOOD TYPING SEROLOGIC ABO: CPT

## 2024-06-24 PROCEDURE — 74177 CT ABD & PELVIS W/CONTRAST: CPT

## 2024-06-24 PROCEDURE — 84550 ASSAY OF BLOOD/URIC ACID: CPT

## 2024-06-24 PROCEDURE — 6360000004 HC RX CONTRAST MEDICATION: Performed by: RADIOLOGY

## 2024-06-24 PROCEDURE — 86901 BLOOD TYPING SEROLOGIC RH(D): CPT

## 2024-06-24 PROCEDURE — 84484 ASSAY OF TROPONIN QUANT: CPT

## 2024-06-24 PROCEDURE — 99285 EMERGENCY DEPT VISIT HI MDM: CPT

## 2024-06-24 PROCEDURE — 87040 BLOOD CULTURE FOR BACTERIA: CPT

## 2024-06-24 PROCEDURE — 83605 ASSAY OF LACTIC ACID: CPT

## 2024-06-24 PROCEDURE — 6370000000 HC RX 637 (ALT 250 FOR IP): Performed by: EMERGENCY MEDICINE

## 2024-06-24 PROCEDURE — 82248 BILIRUBIN DIRECT: CPT

## 2024-06-24 PROCEDURE — 85025 COMPLETE CBC W/AUTO DIFF WBC: CPT

## 2024-06-24 PROCEDURE — 2580000003 HC RX 258: Performed by: RADIOLOGY

## 2024-06-24 PROCEDURE — 93005 ELECTROCARDIOGRAM TRACING: CPT

## 2024-06-24 PROCEDURE — 2580000003 HC RX 258

## 2024-06-24 PROCEDURE — 87154 CUL TYP ID BLD PTHGN 6+ TRGT: CPT

## 2024-06-24 PROCEDURE — 86923 COMPATIBILITY TEST ELECTRIC: CPT

## 2024-06-24 PROCEDURE — 86850 RBC ANTIBODY SCREEN: CPT

## 2024-06-24 PROCEDURE — 93970 EXTREMITY STUDY: CPT

## 2024-06-24 PROCEDURE — 85610 PROTHROMBIN TIME: CPT

## 2024-06-24 PROCEDURE — 83735 ASSAY OF MAGNESIUM: CPT

## 2024-06-24 PROCEDURE — 2580000003 HC RX 258: Performed by: EMERGENCY MEDICINE

## 2024-06-24 PROCEDURE — 99222 1ST HOSP IP/OBS MODERATE 55: CPT | Performed by: INTERNAL MEDICINE

## 2024-06-24 PROCEDURE — 71046 X-RAY EXAM CHEST 2 VIEWS: CPT

## 2024-06-24 PROCEDURE — 80053 COMPREHEN METABOLIC PANEL: CPT

## 2024-06-24 PROCEDURE — 96374 THER/PROPH/DIAG INJ IV PUSH: CPT

## 2024-06-24 RX ORDER — OXYCODONE HYDROCHLORIDE AND ACETAMINOPHEN 5; 325 MG/1; MG/1
1 TABLET ORAL ONCE
Status: COMPLETED | OUTPATIENT
Start: 2024-06-24 | End: 2024-06-24

## 2024-06-24 RX ORDER — SODIUM CHLORIDE 0.9 % (FLUSH) 0.9 %
10 SYRINGE (ML) INJECTION PRN
Status: DISCONTINUED | OUTPATIENT
Start: 2024-06-24 | End: 2024-07-10 | Stop reason: HOSPADM

## 2024-06-24 RX ORDER — 0.9 % SODIUM CHLORIDE 0.9 %
1000 INTRAVENOUS SOLUTION INTRAVENOUS ONCE
Status: COMPLETED | OUTPATIENT
Start: 2024-06-24 | End: 2024-06-25

## 2024-06-24 RX ORDER — 0.9 % SODIUM CHLORIDE 0.9 %
1000 INTRAVENOUS SOLUTION INTRAVENOUS ONCE
Status: COMPLETED | OUTPATIENT
Start: 2024-06-24 | End: 2024-06-24

## 2024-06-24 RX ADMIN — Medication 10 ML: at 23:29

## 2024-06-24 RX ADMIN — SODIUM CHLORIDE 1000 ML: 9 INJECTION, SOLUTION INTRAVENOUS at 18:28

## 2024-06-24 RX ADMIN — SODIUM CHLORIDE 1000 ML: 9 INJECTION, SOLUTION INTRAVENOUS at 21:28

## 2024-06-24 RX ADMIN — IOPAMIDOL 75 ML: 755 INJECTION, SOLUTION INTRAVENOUS at 23:29

## 2024-06-24 RX ADMIN — OXYCODONE HYDROCHLORIDE AND ACETAMINOPHEN 1 TABLET: 5; 325 TABLET ORAL at 18:27

## 2024-06-24 ASSESSMENT — LIFESTYLE VARIABLES: HOW OFTEN DO YOU HAVE A DRINK CONTAINING ALCOHOL: NEVER

## 2024-06-24 NOTE — TELEPHONE ENCOUNTER
The patients son Otf called the office. He stated that he is very concerned about the patient. The patient is not eating much. He is also having a hard time walking and they just had to order a wheelchair for the patient. The patients son also stated that he feels the patient is going down mentally and cognitively. The patient son said he is worried because he works 12 hours shifts and is scared to leave his dad alone for that amount of time. The patient was to have an appt tomorrow at [facility] and then on then with Dr Peterson on 06/26/2024. I told the patients son if his dad is that weak and he is not eating that he should bring him the ER to get evaluated. I told the patients son to bring him to [facility] er. The patients son Otf vocalized understanding and stated he will get his father together and bring him directly to the er  Electronically signed by Erika Escalante MA on 6/24/2024 at 2:16 PM

## 2024-06-24 NOTE — ED PROVIDER NOTES
Department of Emergency Medicine     Written by: Socrates Mcmullen MD  Patient Name: Raudel Herndon  Admit Date: 2024  5:19 PM  MRN: [id number]                   : 1950    HPI     Chief Complaint   Patient presents with    Fatigue     Pt states he has been too weak to get up out of the chair. Unable to eat. Generalized body aches.     Generalized Body Aches       Raudel Herndon is a 73 y.o. male that presents to the ED with concerns for fatigue and tiredness.  Diagnosed with biliary cancer about 6 months ago.  Following Dr. Peterson and Dr. Reynoso.  On chemotherapy.  Last chemotherapy was last week.  Has always been jaundiced, son is concerned that he is too weak now to get up and do basic daily activities.  He is concerned that he goes to work and is unable to take care of his father.  The patient denies any chest pain shortness of breath abdominal pain nausea vomiting thromboembolic events.  No history of anticoagulation antiplatelet use.  He is seeking placement temporarily.      Review of systems   Pertinent positives and negatives mentioned in the HPI/MDM.      Physical   Physical Exam  Constitutional:       General: He is not in acute distress.     Appearance: Normal appearance.   Eyes:      Extraocular Movements: Extraocular movements intact.      Pupils: Pupils are equal, round, and reactive to light.   Cardiovascular:      Rate and Rhythm: Normal rate and regular rhythm.   Pulmonary:      Effort: Pulmonary effort is normal. No respiratory distress.   Abdominal:      Palpations: Abdomen is soft.   Musculoskeletal:         General: No swelling or tenderness. Normal range of motion.   Skin:     General: Skin is warm and dry.      Capillary Refill: Capillary refill takes less than 2 seconds.   Neurological:      Mental Status: He is alert and oriented to person, place, and time. Mental status is at baseline.            Chart review: The patient followed with hepatobiliary on 2024 for

## 2024-06-24 NOTE — ED NOTES
Department of Emergency Medicine  FIRST PROVIDER TRIAGE NOTE             Independent MLP           6/24/24  5:16 PM EDT    Date of Encounter: 6/24/24   MRN: [id number]      HPI: Raudel Herndon is a 73 y.o. male who presents to the ED for No chief complaint on file.  Weakness for several weeks. On chemo-cholangiocarcinoma. No appetite. Denies pains.     ROS: Negative for cp or sob.    PE: Gen Appearance/Constitutional: alert  Musculoskeletal: moves all extremities x 4     Initial Plan of Care: All treatment areas with department are currently occupied. Plan to order/Initiate the following while awaiting opening in ED: EKG.  Initiate Treatment-Testing, Proceed toTreatment Area When Bed Available for ED Attending/MLP to Continue Care    Electronically signed by ELDA Lenz NP   DD: 6/24/24       John Hess APRN - NP  06/24/24 3816

## 2024-06-25 PROBLEM — N39.0 UTI (URINARY TRACT INFECTION): Status: ACTIVE | Noted: 2024-06-25

## 2024-06-25 PROBLEM — R62.7 FAILURE TO THRIVE IN ADULT: Status: ACTIVE | Noted: 2024-06-25

## 2024-06-25 LAB
ALBUMIN SERPL-MCNC: 2.1 G/DL (ref 3.5–5.2)
ALP SERPL-CCNC: 691 U/L (ref 40–129)
ALT SERPL-CCNC: 32 U/L (ref 0–40)
ANION GAP SERPL CALCULATED.3IONS-SCNC: 9 MMOL/L (ref 7–16)
AST SERPL-CCNC: 56 U/L (ref 0–39)
BACTERIA URNS QL MICRO: ABNORMAL
BILIRUB SERPL-MCNC: 3.2 MG/DL (ref 0–1.2)
BILIRUB UR QL STRIP: ABNORMAL
BUN SERPL-MCNC: 19 MG/DL (ref 6–23)
CALCIUM SERPL-MCNC: 7.8 MG/DL (ref 8.6–10.2)
CASTS #/AREA URNS LPF: ABNORMAL /LPF
CHLORIDE SERPL-SCNC: 104 MMOL/L (ref 98–107)
CHOLEST SERPL-MCNC: 108 MG/DL
CLARITY UR: CLEAR
CO2 SERPL-SCNC: 24 MMOL/L (ref 22–29)
COLOR UR: ABNORMAL
CREAT SERPL-MCNC: 1 MG/DL (ref 0.7–1.2)
EKG ATRIAL RATE: 106 BPM
EKG P AXIS: 41 DEGREES
EKG P-R INTERVAL: 120 MS
EKG Q-T INTERVAL: 334 MS
EKG QRS DURATION: 76 MS
EKG QTC CALCULATION (BAZETT): 443 MS
EKG R AXIS: 36 DEGREES
EKG T AXIS: -68 DEGREES
EKG VENTRICULAR RATE: 106 BPM
EPI CELLS #/AREA URNS HPF: ABNORMAL /HPF
ERYTHROCYTE [DISTWIDTH] IN BLOOD BY AUTOMATED COUNT: 20.4 % (ref 11.5–15)
GFR, ESTIMATED: 79 ML/MIN/1.73M2
GLUCOSE SERPL-MCNC: 96 MG/DL (ref 74–99)
GLUCOSE UR STRIP-MCNC: NEGATIVE MG/DL
HBA1C MFR BLD: 4.3 % (ref 4–5.6)
HCT VFR BLD AUTO: 23.3 % (ref 37–54)
HCT VFR BLD AUTO: 24.5 % (ref 37–54)
HDLC SERPL-MCNC: 19 MG/DL
HGB BLD-MCNC: 6.9 G/DL (ref 12.5–16.5)
HGB BLD-MCNC: 7.7 G/DL (ref 12.5–16.5)
HGB UR QL STRIP.AUTO: NEGATIVE
KETONES UR STRIP-MCNC: ABNORMAL MG/DL
LACTATE BLDV-SCNC: 1.1 MMOL/L (ref 0.5–2.2)
LACTATE BLDV-SCNC: 2 MMOL/L (ref 0.5–2.2)
LDLC SERPL CALC-MCNC: 65 MG/DL
LEUKOCYTE ESTERASE UR QL STRIP: ABNORMAL
MAGNESIUM SERPL-MCNC: 1.8 MG/DL (ref 1.6–2.6)
MCH RBC QN AUTO: 30.4 PG (ref 26–35)
MCHC RBC AUTO-ENTMCNC: 29.6 G/DL (ref 32–34.5)
MCV RBC AUTO: 102.6 FL (ref 80–99.9)
NITRITE UR QL STRIP: POSITIVE
PH UR STRIP: 6 [PH] (ref 5–9)
PHOSPHATE SERPL-MCNC: 2.8 MG/DL (ref 2.5–4.5)
PLATELET # BLD AUTO: 74 K/UL (ref 130–450)
PLATELET CONFIRMATION: NORMAL
PMV BLD AUTO: 10.4 FL (ref 7–12)
POTASSIUM SERPL-SCNC: 3.5 MMOL/L (ref 3.5–5)
PROT SERPL-MCNC: 5.5 G/DL (ref 6.4–8.3)
PROT UR STRIP-MCNC: 30 MG/DL
RBC # BLD AUTO: 2.27 M/UL (ref 3.8–5.8)
RBC #/AREA URNS HPF: ABNORMAL /HPF
SODIUM SERPL-SCNC: 137 MMOL/L (ref 132–146)
SP GR UR STRIP: 1.01 (ref 1–1.03)
TRIGL SERPL-MCNC: 120 MG/DL
TSH SERPL DL<=0.05 MIU/L-ACNC: 0.78 UIU/ML (ref 0.27–4.2)
UROBILINOGEN UR STRIP-ACNC: 2 EU/DL (ref 0–1)
VLDLC SERPL CALC-MCNC: 24 MG/DL
WBC #/AREA URNS HPF: ABNORMAL /HPF
WBC OTHER # BLD: 12.4 K/UL (ref 4.5–11.5)

## 2024-06-25 PROCEDURE — 87077 CULTURE AEROBIC IDENTIFY: CPT

## 2024-06-25 PROCEDURE — 6360000002 HC RX W HCPCS: Performed by: INTERNAL MEDICINE

## 2024-06-25 PROCEDURE — 2580000003 HC RX 258: Performed by: INTERNAL MEDICINE

## 2024-06-25 PROCEDURE — 84100 ASSAY OF PHOSPHORUS: CPT

## 2024-06-25 PROCEDURE — 83605 ASSAY OF LACTIC ACID: CPT

## 2024-06-25 PROCEDURE — 85014 HEMATOCRIT: CPT

## 2024-06-25 PROCEDURE — 2140000000 HC CCU INTERMEDIATE R&B

## 2024-06-25 PROCEDURE — 81001 URINALYSIS AUTO W/SCOPE: CPT

## 2024-06-25 PROCEDURE — 30233N1 TRANSFUSION OF NONAUTOLOGOUS RED BLOOD CELLS INTO PERIPHERAL VEIN, PERCUTANEOUS APPROACH: ICD-10-PCS | Performed by: INTERNAL MEDICINE

## 2024-06-25 PROCEDURE — 83735 ASSAY OF MAGNESIUM: CPT

## 2024-06-25 PROCEDURE — 6360000002 HC RX W HCPCS

## 2024-06-25 PROCEDURE — 99232 SBSQ HOSP IP/OBS MODERATE 35: CPT | Performed by: INTERNAL MEDICINE

## 2024-06-25 PROCEDURE — 6370000000 HC RX 637 (ALT 250 FOR IP): Performed by: INTERNAL MEDICINE

## 2024-06-25 PROCEDURE — 2580000003 HC RX 258

## 2024-06-25 PROCEDURE — 80061 LIPID PANEL: CPT

## 2024-06-25 PROCEDURE — P9016 RBC LEUKOCYTES REDUCED: HCPCS

## 2024-06-25 PROCEDURE — 84443 ASSAY THYROID STIM HORMONE: CPT

## 2024-06-25 PROCEDURE — 85018 HEMOGLOBIN: CPT

## 2024-06-25 PROCEDURE — 36415 COLL VENOUS BLD VENIPUNCTURE: CPT

## 2024-06-25 PROCEDURE — 80053 COMPREHEN METABOLIC PANEL: CPT

## 2024-06-25 PROCEDURE — 93010 ELECTROCARDIOGRAM REPORT: CPT | Performed by: INTERNAL MEDICINE

## 2024-06-25 PROCEDURE — 87086 URINE CULTURE/COLONY COUNT: CPT

## 2024-06-25 PROCEDURE — 83036 HEMOGLOBIN GLYCOSYLATED A1C: CPT

## 2024-06-25 PROCEDURE — 85027 COMPLETE CBC AUTOMATED: CPT

## 2024-06-25 PROCEDURE — 87040 BLOOD CULTURE FOR BACTERIA: CPT

## 2024-06-25 RX ORDER — HYDRALAZINE HYDROCHLORIDE 20 MG/ML
10 INJECTION INTRAMUSCULAR; INTRAVENOUS EVERY 6 HOURS PRN
Status: DISCONTINUED | OUTPATIENT
Start: 2024-06-25 | End: 2024-07-10 | Stop reason: HOSPADM

## 2024-06-25 RX ORDER — LANOLIN ALCOHOL/MO/W.PET/CERES
3 CREAM (GRAM) TOPICAL NIGHTLY PRN
Status: DISCONTINUED | OUTPATIENT
Start: 2024-06-25 | End: 2024-07-10 | Stop reason: HOSPADM

## 2024-06-25 RX ORDER — ONDANSETRON 2 MG/ML
4 INJECTION INTRAMUSCULAR; INTRAVENOUS EVERY 6 HOURS PRN
Status: DISCONTINUED | OUTPATIENT
Start: 2024-06-25 | End: 2024-07-10 | Stop reason: HOSPADM

## 2024-06-25 RX ORDER — ONDANSETRON 4 MG/1
4 TABLET, ORALLY DISINTEGRATING ORAL EVERY 8 HOURS PRN
Status: DISCONTINUED | OUTPATIENT
Start: 2024-06-25 | End: 2024-07-10 | Stop reason: HOSPADM

## 2024-06-25 RX ORDER — METRONIDAZOLE 500 MG/100ML
500 INJECTION, SOLUTION INTRAVENOUS EVERY 8 HOURS
Status: DISCONTINUED | OUTPATIENT
Start: 2024-06-25 | End: 2024-06-27

## 2024-06-25 RX ORDER — POTASSIUM CHLORIDE 20 MEQ/1
40 TABLET, EXTENDED RELEASE ORAL PRN
Status: DISCONTINUED | OUTPATIENT
Start: 2024-06-25 | End: 2024-06-28

## 2024-06-25 RX ORDER — PIOGLITAZONEHYDROCHLORIDE 30 MG/1
30 TABLET ORAL DAILY
Status: ON HOLD | COMMUNITY
End: 2024-07-05 | Stop reason: HOSPADM

## 2024-06-25 RX ORDER — POTASSIUM CHLORIDE 7.45 MG/ML
10 INJECTION INTRAVENOUS PRN
Status: DISCONTINUED | OUTPATIENT
Start: 2024-06-25 | End: 2024-06-28

## 2024-06-25 RX ORDER — PREDNISONE 10 MG/1
10 TABLET ORAL DAILY
Status: ON HOLD | COMMUNITY
End: 2024-07-05

## 2024-06-25 RX ORDER — OXYCODONE HYDROCHLORIDE AND ACETAMINOPHEN 5; 325 MG/1; MG/1
1 TABLET ORAL EVERY 6 HOURS PRN
Status: ON HOLD | COMMUNITY
End: 2024-07-05 | Stop reason: HOSPADM

## 2024-06-25 RX ORDER — CANDESARTAN CILEXETIL AND HYDROCHLOROTHIAZIDE 16; 12.5 MG/1; MG/1
1 TABLET ORAL DAILY
Status: DISCONTINUED | OUTPATIENT
Start: 2024-06-25 | End: 2024-06-25 | Stop reason: SDUPTHER

## 2024-06-25 RX ORDER — SODIUM CHLORIDE 0.9 % (FLUSH) 0.9 %
5-40 SYRINGE (ML) INJECTION PRN
Status: DISCONTINUED | OUTPATIENT
Start: 2024-06-25 | End: 2024-07-10 | Stop reason: HOSPADM

## 2024-06-25 RX ORDER — SODIUM CHLORIDE 0.9 % (FLUSH) 0.9 %
5-40 SYRINGE (ML) INJECTION EVERY 12 HOURS SCHEDULED
Status: DISCONTINUED | OUTPATIENT
Start: 2024-06-25 | End: 2024-07-10 | Stop reason: HOSPADM

## 2024-06-25 RX ORDER — ACETAMINOPHEN 650 MG/1
650 SUPPOSITORY RECTAL EVERY 6 HOURS PRN
Status: DISCONTINUED | OUTPATIENT
Start: 2024-06-25 | End: 2024-07-03

## 2024-06-25 RX ORDER — HYDROCHLOROTHIAZIDE 25 MG/1
12.5 TABLET ORAL DAILY
Status: DISCONTINUED | OUTPATIENT
Start: 2024-06-25 | End: 2024-07-10 | Stop reason: HOSPADM

## 2024-06-25 RX ORDER — ACETAMINOPHEN 325 MG/1
650 TABLET ORAL EVERY 6 HOURS PRN
Status: DISCONTINUED | OUTPATIENT
Start: 2024-06-25 | End: 2024-07-03

## 2024-06-25 RX ORDER — BENZONATATE 100 MG/1
100 CAPSULE ORAL 3 TIMES DAILY PRN
Status: DISCONTINUED | OUTPATIENT
Start: 2024-06-25 | End: 2024-07-10 | Stop reason: HOSPADM

## 2024-06-25 RX ORDER — METRONIDAZOLE 500 MG/100ML
500 INJECTION, SOLUTION INTRAVENOUS ONCE
Status: COMPLETED | OUTPATIENT
Start: 2024-06-25 | End: 2024-06-25

## 2024-06-25 RX ORDER — VALSARTAN 80 MG/1
80 TABLET ORAL DAILY
Status: DISCONTINUED | OUTPATIENT
Start: 2024-06-25 | End: 2024-07-10 | Stop reason: HOSPADM

## 2024-06-25 RX ORDER — PANTOPRAZOLE SODIUM 40 MG/1
40 TABLET, DELAYED RELEASE ORAL
Status: DISCONTINUED | OUTPATIENT
Start: 2024-06-25 | End: 2024-07-10 | Stop reason: HOSPADM

## 2024-06-25 RX ORDER — ENOXAPARIN SODIUM 100 MG/ML
40 INJECTION SUBCUTANEOUS DAILY
Status: DISCONTINUED | OUTPATIENT
Start: 2024-06-25 | End: 2024-07-10 | Stop reason: HOSPADM

## 2024-06-25 RX ORDER — SODIUM CHLORIDE 9 MG/ML
INJECTION, SOLUTION INTRAVENOUS CONTINUOUS
Status: DISCONTINUED | OUTPATIENT
Start: 2024-06-25 | End: 2024-06-28

## 2024-06-25 RX ORDER — POLYETHYLENE GLYCOL 3350 17 G/17G
17 POWDER, FOR SOLUTION ORAL DAILY PRN
Status: DISCONTINUED | OUTPATIENT
Start: 2024-06-25 | End: 2024-06-26

## 2024-06-25 RX ORDER — SODIUM CHLORIDE 9 MG/ML
INJECTION, SOLUTION INTRAVENOUS PRN
Status: DISCONTINUED | OUTPATIENT
Start: 2024-06-25 | End: 2024-07-10 | Stop reason: HOSPADM

## 2024-06-25 RX ORDER — MAGNESIUM SULFATE IN WATER 40 MG/ML
2000 INJECTION, SOLUTION INTRAVENOUS PRN
Status: DISCONTINUED | OUTPATIENT
Start: 2024-06-25 | End: 2024-06-28

## 2024-06-25 RX ORDER — CALCIUM CARBONATE 500 MG/1
500 TABLET, CHEWABLE ORAL 3 TIMES DAILY PRN
Status: DISCONTINUED | OUTPATIENT
Start: 2024-06-25 | End: 2024-07-10 | Stop reason: HOSPADM

## 2024-06-25 RX ADMIN — PANTOPRAZOLE SODIUM 40 MG: 40 TABLET, DELAYED RELEASE ORAL at 18:53

## 2024-06-25 RX ADMIN — CEFTRIAXONE SODIUM 2000 MG: 2 INJECTION, POWDER, FOR SOLUTION INTRAMUSCULAR; INTRAVENOUS at 00:20

## 2024-06-25 RX ADMIN — PANTOPRAZOLE SODIUM 40 MG: 40 TABLET, DELAYED RELEASE ORAL at 08:55

## 2024-06-25 RX ADMIN — METRONIDAZOLE 500 MG: 500 INJECTION, SOLUTION INTRAVENOUS at 19:01

## 2024-06-25 RX ADMIN — AMPICILLIN SODIUM 2000 MG: 2 INJECTION, POWDER, FOR SOLUTION INTRAVENOUS at 18:57

## 2024-06-25 RX ADMIN — AMPICILLIN SODIUM 2000 MG: 2 INJECTION, POWDER, FOR SOLUTION INTRAVENOUS at 22:34

## 2024-06-25 RX ADMIN — WATER 2000 MG: 1 INJECTION INTRAMUSCULAR; INTRAVENOUS; SUBCUTANEOUS at 18:53

## 2024-06-25 RX ADMIN — SODIUM CHLORIDE, PRESERVATIVE FREE 10 ML: 5 INJECTION INTRAVENOUS at 22:12

## 2024-06-25 RX ADMIN — METRONIDAZOLE 500 MG: 500 INJECTION, SOLUTION INTRAVENOUS at 00:23

## 2024-06-25 RX ADMIN — ACETAMINOPHEN 650 MG: 325 TABLET ORAL at 18:53

## 2024-06-25 RX ADMIN — SODIUM CHLORIDE: 9 INJECTION, SOLUTION INTRAVENOUS at 04:09

## 2024-06-25 RX ADMIN — SODIUM CHLORIDE, PRESERVATIVE FREE 5 ML: 5 INJECTION INTRAVENOUS at 10:46

## 2024-06-25 RX ADMIN — SODIUM CHLORIDE: 9 INJECTION, SOLUTION INTRAVENOUS at 22:11

## 2024-06-25 ASSESSMENT — LIFESTYLE VARIABLES
HOW MANY STANDARD DRINKS CONTAINING ALCOHOL DO YOU HAVE ON A TYPICAL DAY: PATIENT DOES NOT DRINK
HOW OFTEN DO YOU HAVE A DRINK CONTAINING ALCOHOL: NEVER

## 2024-06-25 ASSESSMENT — PAIN DESCRIPTION - LOCATION: LOCATION: BACK

## 2024-06-25 ASSESSMENT — PAIN DESCRIPTION - ORIENTATION: ORIENTATION: LOWER

## 2024-06-25 ASSESSMENT — PAIN SCALES - GENERAL: PAINLEVEL_OUTOF10: 6

## 2024-06-25 ASSESSMENT — PAIN - FUNCTIONAL ASSESSMENT: PAIN_FUNCTIONAL_ASSESSMENT: NONE - DENIES PAIN

## 2024-06-25 NOTE — H&P
FINDINGS: The visualized veins of the bilateral lower extremities are patent and free of echogenic thrombus. The veins demonstrate good compressibility with normal color flow study and spectral analysis.     No evidence of DVT in either lower extremity.           Assessment and Plan  Patient is a 73 y.o. male who presented with FTT.   The active problem list is as follows:    Principal Problem:    Failure to thrive in adult  Active Problems:    Cholangiocarcinoma (HCC)    Moderate protein-calorie malnutrition (HCC)    Obstructive jaundice due to cancer (HCC)    Intrahepatic cholestatic liver disease    UTI (urinary tract infection)  Resolved Problems:    * No resolved hospital problems. *      Failure to thrive/decrease po intake- PTOT  Cholangiocarcinoma- normally follows with Dr. Reynoso  UTI- rocephin, follow cultures.   Lactic acidosis- now normalized  Leukocytosis- follow   Chronic Anemia- transfuse for hemoglobin <7  Thrombocytopenia- monitor CBC        Routine labs in the morning.  DVT prophylaxis.  Please see orders for further management and care.  If there are any questions after 7am, please contact [facility] Hospitalist that is assuming care.      Additional work up or/and treatment plan may be added today or thereafter based on clinical progression. I am managing admission portion of patient care. Some medical issues are handled by other specialists. Additional work up and treatment should be done by my colleague hospitalist who assumes care.       Sindhu Knight,     3:16 AM  6/25/2024

## 2024-06-25 NOTE — CONSENT
Informed Consent for Blood Component Transfusion Note    I have discussed with the patient the rationale for blood component transfusion; its benefits in treating or preventing fatigue, organ damage, or death; and its risk which includes mild transfusion reactions, rare risk of blood borne infection, or more serious but rare reactions. I have discussed the alternatives to transfusion, including the risk and consequences of not receiving transfusion. The patient had an opportunity to ask questions and had agreed to proceed with transfusion of blood components.    Electronically signed by Toño Shah MD on 6/25/24 at 2:41 PM EDT

## 2024-06-26 ENCOUNTER — APPOINTMENT (OUTPATIENT)
Age: 74
DRG: 871 | End: 2024-06-26
Attending: INTERNAL MEDICINE
Payer: MEDICARE

## 2024-06-26 PROBLEM — E43 SEVERE PROTEIN-CALORIE MALNUTRITION (HCC): Status: ACTIVE | Noted: 2024-02-29

## 2024-06-26 PROBLEM — R78.81 ENTEROCOCCAL BACTEREMIA: Status: ACTIVE | Noted: 2024-06-26

## 2024-06-26 PROBLEM — B95.2 ENTEROCOCCAL BACTEREMIA: Status: ACTIVE | Noted: 2024-06-26

## 2024-06-26 PROBLEM — K83.09 CHOLANGITIS: Status: ACTIVE | Noted: 2024-06-26

## 2024-06-26 PROBLEM — E43 SEVERE PROTEIN-CALORIE MALNUTRITION (HCC): Status: ACTIVE | Noted: 2024-06-26

## 2024-06-26 LAB
ABO/RH: NORMAL
ALBUMIN SERPL-MCNC: 1.9 G/DL (ref 3.5–5.2)
ALP SERPL-CCNC: 626 U/L (ref 40–129)
ALT SERPL-CCNC: 28 U/L (ref 0–40)
ANION GAP SERPL CALCULATED.3IONS-SCNC: 12 MMOL/L (ref 7–16)
ANTIBODY SCREEN: NEGATIVE
ARM BAND NUMBER: NORMAL
AST SERPL-CCNC: 59 U/L (ref 0–39)
BASOPHILS # BLD: 0.03 K/UL (ref 0–0.2)
BASOPHILS NFR BLD: 0 % (ref 0–2)
BILIRUB SERPL-MCNC: 3 MG/DL (ref 0–1.2)
BLOOD BANK BLOOD PRODUCT EXPIRATION DATE: NORMAL
BLOOD BANK DISPENSE STATUS: NORMAL
BLOOD BANK ISBT PRODUCT BLOOD TYPE: 2800
BLOOD BANK PRODUCT CODE: NORMAL
BLOOD BANK SAMPLE EXPIRATION: NORMAL
BLOOD BANK UNIT TYPE AND RH: NORMAL
BPU ID: NORMAL
BUN SERPL-MCNC: 13 MG/DL (ref 6–23)
CALCIUM SERPL-MCNC: 7.2 MG/DL (ref 8.6–10.2)
CHLORIDE SERPL-SCNC: 106 MMOL/L (ref 98–107)
CO2 SERPL-SCNC: 21 MMOL/L (ref 22–29)
COMPONENT: NORMAL
CREAT SERPL-MCNC: 0.8 MG/DL (ref 0.7–1.2)
CROSSMATCH RESULT: NORMAL
EOSINOPHIL # BLD: 0 K/UL (ref 0.05–0.5)
EOSINOPHILS RELATIVE PERCENT: 0 % (ref 0–6)
ERYTHROCYTE [DISTWIDTH] IN BLOOD BY AUTOMATED COUNT: 19.4 % (ref 11.5–15)
GFR, ESTIMATED: >90 ML/MIN/1.73M2
GLUCOSE SERPL-MCNC: 72 MG/DL (ref 74–99)
HCT VFR BLD AUTO: 26.8 % (ref 37–54)
HGB BLD-MCNC: 8 G/DL (ref 12.5–16.5)
IMM GRANULOCYTES # BLD AUTO: 0.72 K/UL (ref 0–0.58)
IMM GRANULOCYTES NFR BLD: 5 % (ref 0–5)
LYMPHOCYTES NFR BLD: 1.29 K/UL (ref 1.5–4)
LYMPHOCYTES RELATIVE PERCENT: 10 % (ref 20–42)
MAGNESIUM SERPL-MCNC: 1.6 MG/DL (ref 1.6–2.6)
MCH RBC QN AUTO: 30.1 PG (ref 26–35)
MCHC RBC AUTO-ENTMCNC: 29.9 G/DL (ref 32–34.5)
MCV RBC AUTO: 100.8 FL (ref 80–99.9)
MONOCYTES NFR BLD: 0.61 K/UL (ref 0.1–0.95)
MONOCYTES NFR BLD: 5 % (ref 2–12)
NEUTROPHILS NFR BLD: 80 % (ref 43–80)
NEUTS SEG NFR BLD: 10.8 K/UL (ref 1.8–7.3)
PLATELET # BLD AUTO: 89 K/UL (ref 130–450)
PLATELET CONFIRMATION: NORMAL
PMV BLD AUTO: 10.4 FL (ref 7–12)
POTASSIUM SERPL-SCNC: 2.8 MMOL/L (ref 3.5–5)
POTASSIUM SERPL-SCNC: 3.5 MMOL/L (ref 3.5–5)
PROT SERPL-MCNC: 5.1 G/DL (ref 6.4–8.3)
RBC # BLD AUTO: 2.66 M/UL (ref 3.8–5.8)
RBC # BLD: ABNORMAL 10*6/UL
SODIUM SERPL-SCNC: 139 MMOL/L (ref 132–146)
TRANSFUSION STATUS: NORMAL
UNIT DIVISION: 0
UNIT ISSUE DATE/TIME: NORMAL
WBC # BLD: ABNORMAL 10*3/UL
WBC OTHER # BLD: 13.5 K/UL (ref 4.5–11.5)

## 2024-06-26 PROCEDURE — 85025 COMPLETE CBC W/AUTO DIFF WBC: CPT

## 2024-06-26 PROCEDURE — 2580000003 HC RX 258: Performed by: INTERNAL MEDICINE

## 2024-06-26 PROCEDURE — 97161 PT EVAL LOW COMPLEX 20 MIN: CPT

## 2024-06-26 PROCEDURE — 99232 SBSQ HOSP IP/OBS MODERATE 35: CPT | Performed by: INTERNAL MEDICINE

## 2024-06-26 PROCEDURE — 6360000002 HC RX W HCPCS: Performed by: INTERNAL MEDICINE

## 2024-06-26 PROCEDURE — 6370000000 HC RX 637 (ALT 250 FOR IP): Performed by: INTERNAL MEDICINE

## 2024-06-26 PROCEDURE — 36415 COLL VENOUS BLD VENIPUNCTURE: CPT

## 2024-06-26 PROCEDURE — 93306 TTE W/DOPPLER COMPLETE: CPT | Performed by: INTERNAL MEDICINE

## 2024-06-26 PROCEDURE — 97165 OT EVAL LOW COMPLEX 30 MIN: CPT

## 2024-06-26 PROCEDURE — 99222 1ST HOSP IP/OBS MODERATE 55: CPT | Performed by: TRANSPLANT SURGERY

## 2024-06-26 PROCEDURE — 97530 THERAPEUTIC ACTIVITIES: CPT

## 2024-06-26 PROCEDURE — 93306 TTE W/DOPPLER COMPLETE: CPT

## 2024-06-26 PROCEDURE — 83735 ASSAY OF MAGNESIUM: CPT

## 2024-06-26 PROCEDURE — 2140000000 HC CCU INTERMEDIATE R&B

## 2024-06-26 PROCEDURE — 84132 ASSAY OF SERUM POTASSIUM: CPT

## 2024-06-26 PROCEDURE — 2580000003 HC RX 258

## 2024-06-26 PROCEDURE — 93356 MYOCRD STRAIN IMG SPCKL TRCK: CPT | Performed by: INTERNAL MEDICINE

## 2024-06-26 PROCEDURE — 80053 COMPREHEN METABOLIC PANEL: CPT

## 2024-06-26 RX ORDER — OXYCODONE HYDROCHLORIDE AND ACETAMINOPHEN 5; 325 MG/1; MG/1
1 TABLET ORAL EVERY 6 HOURS PRN
Status: DISCONTINUED | OUTPATIENT
Start: 2024-06-26 | End: 2024-07-02

## 2024-06-26 RX ORDER — POLYETHYLENE GLYCOL 3350 17 G/17G
17 POWDER, FOR SOLUTION ORAL DAILY
Status: DISCONTINUED | OUTPATIENT
Start: 2024-06-26 | End: 2024-07-10 | Stop reason: HOSPADM

## 2024-06-26 RX ORDER — INDOMETHACIN 100 MG
100 SUPPOSITORY, RECTAL RECTAL ONCE
Status: COMPLETED | OUTPATIENT
Start: 2024-06-28 | End: 2024-06-28

## 2024-06-26 RX ORDER — SODIUM CHLORIDE, SODIUM LACTATE, POTASSIUM CHLORIDE, AND CALCIUM CHLORIDE .6; .31; .03; .02 G/100ML; G/100ML; G/100ML; G/100ML
800 INJECTION, SOLUTION INTRAVENOUS ONCE
Status: COMPLETED | OUTPATIENT
Start: 2024-06-28 | End: 2024-06-28

## 2024-06-26 RX ADMIN — METRONIDAZOLE 500 MG: 500 INJECTION, SOLUTION INTRAVENOUS at 16:39

## 2024-06-26 RX ADMIN — PANTOPRAZOLE SODIUM 40 MG: 40 TABLET, DELAYED RELEASE ORAL at 15:37

## 2024-06-26 RX ADMIN — OXYCODONE HYDROCHLORIDE AND ACETAMINOPHEN 1 TABLET: 5; 325 TABLET ORAL at 23:28

## 2024-06-26 RX ADMIN — AMPICILLIN SODIUM 2000 MG: 2 INJECTION, POWDER, FOR SOLUTION INTRAVENOUS at 06:51

## 2024-06-26 RX ADMIN — POTASSIUM CHLORIDE 10 MEQ: 7.46 INJECTION, SOLUTION INTRAVENOUS at 14:47

## 2024-06-26 RX ADMIN — AMPICILLIN SODIUM 2000 MG: 2 INJECTION, POWDER, FOR SOLUTION INTRAVENOUS at 02:52

## 2024-06-26 RX ADMIN — Medication 3 MG: at 23:28

## 2024-06-26 RX ADMIN — AMPICILLIN SODIUM 2000 MG: 2 INJECTION, POWDER, FOR SOLUTION INTRAVENOUS at 18:25

## 2024-06-26 RX ADMIN — POTASSIUM CHLORIDE 10 MEQ: 7.46 INJECTION, SOLUTION INTRAVENOUS at 15:39

## 2024-06-26 RX ADMIN — AMPICILLIN SODIUM 2000 MG: 2 INJECTION, POWDER, FOR SOLUTION INTRAVENOUS at 10:04

## 2024-06-26 RX ADMIN — POTASSIUM CHLORIDE 10 MEQ: 7.46 INJECTION, SOLUTION INTRAVENOUS at 16:40

## 2024-06-26 RX ADMIN — AMPICILLIN SODIUM 2000 MG: 2 INJECTION, POWDER, FOR SOLUTION INTRAVENOUS at 13:53

## 2024-06-26 RX ADMIN — SODIUM CHLORIDE: 9 INJECTION, SOLUTION INTRAVENOUS at 15:33

## 2024-06-26 RX ADMIN — MAGNESIUM SULFATE HEPTAHYDRATE 2000 MG: 40 INJECTION, SOLUTION INTRAVENOUS at 14:50

## 2024-06-26 RX ADMIN — POTASSIUM CHLORIDE 10 MEQ: 7.46 INJECTION, SOLUTION INTRAVENOUS at 13:43

## 2024-06-26 RX ADMIN — METRONIDAZOLE 500 MG: 500 INJECTION, SOLUTION INTRAVENOUS at 01:22

## 2024-06-26 RX ADMIN — METRONIDAZOLE 500 MG: 500 INJECTION, SOLUTION INTRAVENOUS at 08:12

## 2024-06-26 RX ADMIN — POTASSIUM CHLORIDE 10 MEQ: 7.46 INJECTION, SOLUTION INTRAVENOUS at 17:49

## 2024-06-26 RX ADMIN — AMPICILLIN SODIUM 2000 MG: 2 INJECTION, POWDER, FOR SOLUTION INTRAVENOUS at 23:35

## 2024-06-26 RX ADMIN — WATER 2000 MG: 1 INJECTION INTRAMUSCULAR; INTRAVENOUS; SUBCUTANEOUS at 16:31

## 2024-06-26 RX ADMIN — POTASSIUM CHLORIDE 10 MEQ: 7.46 INJECTION, SOLUTION INTRAVENOUS at 19:01

## 2024-06-26 ASSESSMENT — PAIN SCALES - GENERAL
PAINLEVEL_OUTOF10: 6
PAINLEVEL_OUTOF10: 0

## 2024-06-26 ASSESSMENT — PAIN DESCRIPTION - DESCRIPTORS: DESCRIPTORS: ACHING;DISCOMFORT;SORE

## 2024-06-26 ASSESSMENT — PAIN DESCRIPTION - LOCATION: LOCATION: BUTTOCKS;BACK

## 2024-06-26 ASSESSMENT — PAIN SCALES - WONG BAKER: WONGBAKER_NUMERICALRESPONSE: NO HURT

## 2024-06-26 NOTE — FLOWSHEET NOTE
Pt arrived to the unit with the following belongings:   06/25/24 6309   Belongings   Dental Appliances None   Vision - Corrective Lenses Eyeglasses;At bedside   Hearing Aid None   Clothing Socks;Hat;Undergarments;Footwear;Shirt;Pants;At bedside   Jewelry None   Electronic Devices Cell Phone;;At bedside   Weapons (Notify Protective Services/Security) None   Other Valuables Money;Wallet;Credit/Debit Card;At bedside  ($32 in cash at bedside)   Home Medications None   Valuables Given To Patient   Provide Name(s) of Who Valuable(s) Were Given To patient     + cane

## 2024-06-26 NOTE — CARE COORDINATION
Transition of care: Enterococcus bacteremia and complicated UTI, lactic acidosis. ID, GI and oncology consulted. IV rocephin 2000mg q 24 hrs, iv flagyl 500mg q 8 hrs, iv ampicillin 2000mg 6 times per day, IVFs at 125ml/hr.  CMP and CBC w/diff ordered for today. Echo ordered. NPO. Needs ERCP with stent exchange. Cholangiocarcinoma. On chemo every 2 weeks. Met with pt in room. Pt said he lives with his son, Otf, in a 2 story home. Pt resides on 1st level. Has 3 stairs with 1 rail to enter home. Pt said he was independent with ADLs. Family provides transportation. Not a . DME-FWW, cane and 3:1 commode. Pt said he uses the FWW or the cane as needed. His plan is to return home when medically ready. Pt was agreeable to have [facility] setup and had no preference for [facility] agency. Referral was made to [facility] and they do not provide services to the area where pt's lives. Pt is from [place] which is in [facility]. Referral was made to [facility] and they are unable to accept due to where pt's lives. Left voicemail referral to [facility]. #[phone]. Await their return call. PCP is [facility] and pharmacy is [facility] in [place]. Cm/sw will follow.     Case Management Assessment  Initial Evaluation    Date/Time of Evaluation: 6/26/2024 2:29 PM  Assessment Completed by: Delma Mcdonald RN    If patient is discharged prior to next notation, then this note serves as note for discharge by case management.    Patient Name: Raudel Herndon                   YOB: 1950  Diagnosis: Lactic acidosis [E87.20]  General weakness [R53.1]  Failure to thrive in adult [R62.7]  Enterococcal bacteremia [R78.81, B95.2]  Severe sepsis (HCC) [A41.9, R65.20]  Other fatigue [R53.83]  History of cholangiocarcinoma [Z85.09]                   Date / Time: 6/24/2024  5:19 PM    Patient Admission Status: Inpatient   Readmission Risk (Low < 19, Mod (19-27), High > 27): Readmission Risk Score: 20.3    Current PCP:

## 2024-06-26 NOTE — ACP (ADVANCE CARE PLANNING)
Advance Care Planning   The patient has the following advanced directives on file:  Advance Directives       Power of  Living Will ACP-Advance Directive ACP-Power of     Not on File Not on File Not on File Not on File            CONTACT:     Primary Decision Maker: Otf Herndon - Child - [phone]    The Patient has the following current code status:    Code Status: Full Code

## 2024-06-27 ENCOUNTER — ANESTHESIA EVENT (OUTPATIENT)
Dept: ENDOSCOPY | Age: 74
End: 2024-06-27
Payer: MEDICARE

## 2024-06-27 LAB
ACB COMPLEX DNA BLD POS QL NAA+NON-PROBE: NOT DETECTED
ALBUMIN SERPL-MCNC: 1.8 G/DL (ref 3.5–5.2)
ALP SERPL-CCNC: 701 U/L (ref 40–129)
ALT SERPL-CCNC: 26 U/L (ref 0–40)
ANION GAP SERPL CALCULATED.3IONS-SCNC: 11 MMOL/L (ref 7–16)
AST SERPL-CCNC: 58 U/L (ref 0–39)
B FRAGILIS DNA BLD POS QL NAA+NON-PROBE: NOT DETECTED
BILIRUB SERPL-MCNC: 2.4 MG/DL (ref 0–1.2)
BIOFIRE TEST COMMENT: ABNORMAL
BUN SERPL-MCNC: 10 MG/DL (ref 6–23)
C ALBICANS DNA BLD POS QL NAA+NON-PROBE: NOT DETECTED
C AURIS DNA BLD POS QL NAA+NON-PROBE: NOT DETECTED
C GATTII+NEOFOR DNA BLD POS QL NAA+N-PRB: NOT DETECTED
C GLABRATA DNA BLD POS QL NAA+NON-PROBE: NOT DETECTED
C KRUSEI DNA BLD POS QL NAA+NON-PROBE: NOT DETECTED
C PARAP DNA BLD POS QL NAA+NON-PROBE: NOT DETECTED
C TROPICLS DNA BLD POS QL NAA+NON-PROBE: NOT DETECTED
CALCIUM SERPL-MCNC: 7.2 MG/DL (ref 8.6–10.2)
CHLORIDE SERPL-SCNC: 112 MMOL/L (ref 98–107)
CO2 SERPL-SCNC: 19 MMOL/L (ref 22–29)
CREAT SERPL-MCNC: 0.8 MG/DL (ref 0.7–1.2)
E CLOAC COMP DNA BLD POS NAA+NON-PROBE: NOT DETECTED
E COLI DNA BLD POS QL NAA+NON-PROBE: NOT DETECTED
E FAECALIS DNA BLD POS QL NAA+NON-PROBE: DETECTED
E FAECIUM DNA BLD POS QL NAA+NON-PROBE: NOT DETECTED
ECHO AR MAX VEL PISA: 3.5 M/S
ECHO AV AREA PEAK VELOCITY: 3 CM2
ECHO AV AREA VTI: 3.5 CM2
ECHO AV AREA/BSA PEAK VELOCITY: 1.5 CM2/M2
ECHO AV AREA/BSA VTI: 1.7 CM2/M2
ECHO AV CUSP MM: 2.2 CM
ECHO AV MEAN GRADIENT: 5 MMHG
ECHO AV MEAN VELOCITY: 1.1 M/S
ECHO AV PEAK GRADIENT: 10 MMHG
ECHO AV PEAK VELOCITY: 1.6 M/S
ECHO AV REGURGITANT PHT: 900.8 MILLISECOND
ECHO AV VELOCITY RATIO: 0.75
ECHO AV VTI: 29.5 CM
ECHO BSA: 2.05 M2
ECHO EST RA PRESSURE: 3 MMHG
ECHO LA DIAMETER INDEX: 2.18 CM/M2
ECHO LA DIAMETER: 4.5 CM
ECHO LA VOL A-L A2C: 53 ML (ref 18–58)
ECHO LA VOL A-L A4C: 57 ML (ref 18–58)
ECHO LA VOL MOD A2C: 51 ML (ref 18–58)
ECHO LA VOL MOD A4C: 55 ML (ref 18–58)
ECHO LA VOLUME AREA LENGTH: 61 ML
ECHO LA VOLUME INDEX A-L A2C: 26 ML/M2 (ref 16–34)
ECHO LA VOLUME INDEX A-L A4C: 28 ML/M2 (ref 16–34)
ECHO LA VOLUME INDEX AREA LENGTH: 30 ML/M2 (ref 16–34)
ECHO LA VOLUME INDEX MOD A2C: 25 ML/M2 (ref 16–34)
ECHO LA VOLUME INDEX MOD A4C: 27 ML/M2 (ref 16–34)
ECHO LV FRACTIONAL SHORTENING: 34 % (ref 28–44)
ECHO LV GLOBAL LONGITUDINAL STRAIN (GLS): -18.5 %
ECHO LV INTERNAL DIMENSION DIASTOLE INDEX: 2.43 CM/M2
ECHO LV INTERNAL DIMENSION DIASTOLIC: 5 CM (ref 4.2–5.9)
ECHO LV INTERNAL DIMENSION SYSTOLIC INDEX: 1.6 CM/M2
ECHO LV INTERNAL DIMENSION SYSTOLIC: 3.3 CM
ECHO LV ISOVOLUMETRIC RELAXATION TIME (IVRT): 110.7 MS
ECHO LV IVSD: 1.1 CM (ref 0.6–1)
ECHO LV IVSS: 1.5 CM
ECHO LV MASS 2D: 182 G (ref 88–224)
ECHO LV MASS INDEX 2D: 88.3 G/M2 (ref 49–115)
ECHO LV POSTERIOR WALL DIASTOLIC: 0.9 CM (ref 0.6–1)
ECHO LV POSTERIOR WALL SYSTOLIC: 1.6 CM
ECHO LV RELATIVE WALL THICKNESS RATIO: 0.36
ECHO LVOT AREA: 3.8 CM2
ECHO LVOT AV VTI INDEX: 0.89
ECHO LVOT DIAM: 2.2 CM
ECHO LVOT MEAN GRADIENT: 3 MMHG
ECHO LVOT PEAK GRADIENT: 6 MMHG
ECHO LVOT PEAK VELOCITY: 1.2 M/S
ECHO LVOT STROKE VOLUME INDEX: 48.7 ML/M2
ECHO LVOT SV: 100.3 ML
ECHO LVOT VTI: 26.4 CM
ECHO MV "A" WAVE DURATION: 133.8 MSEC
ECHO MV A VELOCITY: 0.54 M/S
ECHO MV AREA PHT: 2.4 CM2
ECHO MV AREA VTI: 3.6 CM2
ECHO MV E DECELERATION TIME (DT): 205.1 MS
ECHO MV E VELOCITY: 0.67 M/S
ECHO MV E/A RATIO: 1.24
ECHO MV LVOT VTI INDEX: 1.05
ECHO MV MAX VELOCITY: 1.1 M/S
ECHO MV MEAN GRADIENT: 2 MMHG
ECHO MV MEAN VELOCITY: 0.6 M/S
ECHO MV PEAK GRADIENT: 5 MMHG
ECHO MV PRESSURE HALF TIME (PHT): 91.1 MS
ECHO MV VTI: 27.8 CM
ECHO PV MAX VELOCITY: 0.9 M/S
ECHO PV MEAN GRADIENT: 2 MMHG
ECHO PV MEAN VELOCITY: 0.6 M/S
ECHO PV PEAK GRADIENT: 3 MMHG
ECHO PV VTI: 17.6 CM
ECHO PVEIN A DURATION: 170.7 MS
ECHO PVEIN A VELOCITY: 0.3 M/S
ECHO PVEIN PEAK D VELOCITY: 0.5 M/S
ECHO PVEIN PEAK S VELOCITY: 0.7 M/S
ECHO PVEIN S/D RATIO: 1.4
ECHO RIGHT VENTRICULAR SYSTOLIC PRESSURE (RVSP): 27 MMHG
ECHO RV INTERNAL DIMENSION: 3.5 CM
ECHO RV TAPSE: 2.8 CM (ref 1.7–?)
ECHO TV REGURGITANT MAX VELOCITY: 2.47 M/S
ECHO TV REGURGITANT PEAK GRADIENT: 24 MMHG
ENTEROBACTERALES DNA BLD POS NAA+N-PRB: NOT DETECTED
ERYTHROCYTE [DISTWIDTH] IN BLOOD BY AUTOMATED COUNT: 19.9 % (ref 11.5–15)
GFR, ESTIMATED: >90 ML/MIN/1.73M2
GLUCOSE SERPL-MCNC: 72 MG/DL (ref 74–99)
GP B STREP DNA BLD POS QL NAA+NON-PROBE: NOT DETECTED
HAEM INFLU DNA BLD POS QL NAA+NON-PROBE: NOT DETECTED
HCT VFR BLD AUTO: 28.8 % (ref 37–54)
HGB BLD-MCNC: 8.5 G/DL (ref 12.5–16.5)
K OXYTOCA DNA BLD POS QL NAA+NON-PROBE: NOT DETECTED
KLEBSIELLA SP DNA BLD POS QL NAA+NON-PRB: NOT DETECTED
KLEBSIELLA SP DNA BLD POS QL NAA+NON-PRB: NOT DETECTED
L MONOCYTOG DNA BLD POS QL NAA+NON-PROBE: NOT DETECTED
LIPASE SERPL-CCNC: 24 U/L (ref 13–60)
MAGNESIUM SERPL-MCNC: 1.9 MG/DL (ref 1.6–2.6)
MCH RBC QN AUTO: 31.8 PG (ref 26–35)
MCHC RBC AUTO-ENTMCNC: 29.5 G/DL (ref 32–34.5)
MCV RBC AUTO: 107.9 FL (ref 80–99.9)
MICROORGANISM SPEC CULT: ABNORMAL
MICROORGANISM/AGENT SPEC: ABNORMAL
N MEN DNA BLD POS QL NAA+NON-PROBE: NOT DETECTED
P AERUGINOSA DNA BLD POS NAA+NON-PROBE: NOT DETECTED
PHOSPHATE SERPL-MCNC: 1.9 MG/DL (ref 2.5–4.5)
PHOSPHATE SERPL-MCNC: 2.3 MG/DL (ref 2.5–4.5)
PLATELET # BLD AUTO: 85 K/UL (ref 130–450)
PLATELET CONFIRMATION: NORMAL
PMV BLD AUTO: 9.8 FL (ref 7–12)
POTASSIUM SERPL-SCNC: 3.6 MMOL/L (ref 3.5–5)
PROT SERPL-MCNC: 4.9 G/DL (ref 6.4–8.3)
PROTEUS SP DNA BLD POS QL NAA+NON-PROBE: NOT DETECTED
RBC # BLD AUTO: 2.67 M/UL (ref 3.8–5.8)
S AUREUS DNA BLD POS QL NAA+NON-PROBE: NOT DETECTED
S AUREUS+CONS DNA BLD POS NAA+NON-PROBE: NOT DETECTED
S EPIDERMIDIS DNA BLD POS QL NAA+NON-PRB: NOT DETECTED
S LUGDUNENSIS DNA BLD POS QL NAA+NON-PRB: NOT DETECTED
S MALTOPHILIA DNA BLD POS QL NAA+NON-PRB: NOT DETECTED
S MARCESCENS DNA BLD POS NAA+NON-PROBE: NOT DETECTED
S PNEUM DNA BLD POS QL NAA+NON-PROBE: NOT DETECTED
S PYO DNA BLD POS QL NAA+NON-PROBE: NOT DETECTED
SALMONELLA DNA BLD POS QL NAA+NON-PROBE: NOT DETECTED
SERVICE CMNT-IMP: ABNORMAL
SODIUM SERPL-SCNC: 142 MMOL/L (ref 132–146)
SPECIMEN DESCRIPTION: ABNORMAL
STREPTOCOCCUS DNA BLD POS NAA+NON-PROBE: NOT DETECTED
VANA+VANB ISLT/SPM QL: NOT DETECTED
WBC OTHER # BLD: 8.6 K/UL (ref 4.5–11.5)

## 2024-06-27 PROCEDURE — 6370000000 HC RX 637 (ALT 250 FOR IP)

## 2024-06-27 PROCEDURE — 83735 ASSAY OF MAGNESIUM: CPT

## 2024-06-27 PROCEDURE — 99232 SBSQ HOSP IP/OBS MODERATE 35: CPT | Performed by: TRANSPLANT SURGERY

## 2024-06-27 PROCEDURE — 6370000000 HC RX 637 (ALT 250 FOR IP): Performed by: INTERNAL MEDICINE

## 2024-06-27 PROCEDURE — 2580000003 HC RX 258: Performed by: INTERNAL MEDICINE

## 2024-06-27 PROCEDURE — 36415 COLL VENOUS BLD VENIPUNCTURE: CPT

## 2024-06-27 PROCEDURE — 80053 COMPREHEN METABOLIC PANEL: CPT

## 2024-06-27 PROCEDURE — 85027 COMPLETE CBC AUTOMATED: CPT

## 2024-06-27 PROCEDURE — 1200000000 HC SEMI PRIVATE

## 2024-06-27 PROCEDURE — 2580000003 HC RX 258

## 2024-06-27 PROCEDURE — 87040 BLOOD CULTURE FOR BACTERIA: CPT

## 2024-06-27 PROCEDURE — 6360000002 HC RX W HCPCS: Performed by: INTERNAL MEDICINE

## 2024-06-27 PROCEDURE — 83690 ASSAY OF LIPASE: CPT

## 2024-06-27 PROCEDURE — 84100 ASSAY OF PHOSPHORUS: CPT

## 2024-06-27 PROCEDURE — 2500000003 HC RX 250 WO HCPCS

## 2024-06-27 PROCEDURE — 99232 SBSQ HOSP IP/OBS MODERATE 35: CPT | Performed by: INTERNAL MEDICINE

## 2024-06-27 RX ADMIN — POTASSIUM CHLORIDE 40 MEQ: 1500 TABLET, EXTENDED RELEASE ORAL at 01:32

## 2024-06-27 RX ADMIN — AMPICILLIN SODIUM 2000 MG: 2 INJECTION, POWDER, FOR SOLUTION INTRAVENOUS at 21:42

## 2024-06-27 RX ADMIN — ANORECTAL OINTMENT: 15.7; .44; 24; 20.6 OINTMENT TOPICAL at 18:42

## 2024-06-27 RX ADMIN — AMPICILLIN SODIUM 2000 MG: 2 INJECTION, POWDER, FOR SOLUTION INTRAVENOUS at 02:44

## 2024-06-27 RX ADMIN — SODIUM CHLORIDE: 9 INJECTION, SOLUTION INTRAVENOUS at 08:51

## 2024-06-27 RX ADMIN — ACETAMINOPHEN 650 MG: 325 TABLET ORAL at 21:32

## 2024-06-27 RX ADMIN — SODIUM CHLORIDE, PRESERVATIVE FREE 10 ML: 5 INJECTION INTRAVENOUS at 08:45

## 2024-06-27 RX ADMIN — SODIUM CHLORIDE, PRESERVATIVE FREE 10 ML: 5 INJECTION INTRAVENOUS at 21:33

## 2024-06-27 RX ADMIN — AMPICILLIN SODIUM 2000 MG: 2 INJECTION, POWDER, FOR SOLUTION INTRAVENOUS at 06:12

## 2024-06-27 RX ADMIN — AMPICILLIN SODIUM 2000 MG: 2 INJECTION, POWDER, FOR SOLUTION INTRAVENOUS at 14:11

## 2024-06-27 RX ADMIN — Medication 250 MG: at 14:06

## 2024-06-27 RX ADMIN — Medication 250 MG: at 18:33

## 2024-06-27 RX ADMIN — AMPICILLIN SODIUM 2000 MG: 2 INJECTION, POWDER, FOR SOLUTION INTRAVENOUS at 18:35

## 2024-06-27 RX ADMIN — SODIUM PHOSPHATE, MONOBASIC, MONOHYDRATE AND SODIUM PHOSPHATE, DIBASIC, ANHYDROUS 30 MMOL: 142; 276 INJECTION, SOLUTION INTRAVENOUS at 10:48

## 2024-06-27 RX ADMIN — AMPICILLIN SODIUM 2000 MG: 2 INJECTION, POWDER, FOR SOLUTION INTRAVENOUS at 10:50

## 2024-06-27 RX ADMIN — Medication 250 MG: at 21:32

## 2024-06-27 RX ADMIN — PANTOPRAZOLE SODIUM 40 MG: 40 TABLET, DELAYED RELEASE ORAL at 06:11

## 2024-06-27 RX ADMIN — METRONIDAZOLE 500 MG: 500 INJECTION, SOLUTION INTRAVENOUS at 08:50

## 2024-06-27 RX ADMIN — POTASSIUM BICARBONATE 40 MEQ: 782 TABLET, EFFERVESCENT ORAL at 08:45

## 2024-06-27 RX ADMIN — POLYETHYLENE GLYCOL 3350 17 G: 17 POWDER, FOR SOLUTION ORAL at 08:45

## 2024-06-27 RX ADMIN — PANTOPRAZOLE SODIUM 40 MG: 40 TABLET, DELAYED RELEASE ORAL at 18:33

## 2024-06-27 RX ADMIN — METRONIDAZOLE 500 MG: 500 INJECTION, SOLUTION INTRAVENOUS at 01:32

## 2024-06-27 ASSESSMENT — PAIN DESCRIPTION - DESCRIPTORS: DESCRIPTORS: ACHING;DISCOMFORT;SORE

## 2024-06-27 ASSESSMENT — PAIN SCALES - GENERAL
PAINLEVEL_OUTOF10: 6
PAINLEVEL_OUTOF10: 6

## 2024-06-27 ASSESSMENT — PAIN DESCRIPTION - PAIN TYPE: TYPE: CHRONIC PAIN

## 2024-06-27 ASSESSMENT — PAIN DESCRIPTION - FREQUENCY: FREQUENCY: CONTINUOUS

## 2024-06-27 ASSESSMENT — PAIN DESCRIPTION - LOCATION
LOCATION: BACK
LOCATION: BACK

## 2024-06-27 ASSESSMENT — PAIN DESCRIPTION - ORIENTATION
ORIENTATION: MID;LOWER
ORIENTATION: MID

## 2024-06-27 ASSESSMENT — PAIN - FUNCTIONAL ASSESSMENT: PAIN_FUNCTIONAL_ASSESSMENT: PREVENTS OR INTERFERES SOME ACTIVE ACTIVITIES AND ADLS

## 2024-06-27 ASSESSMENT — PAIN DESCRIPTION - ONSET: ONSET: ON-GOING

## 2024-06-27 NOTE — PATIENT CARE CONFERENCE
P Quality Flow/Interdisciplinary Rounds Progress Note        Quality Flow Rounds held on June 27, 2024    Disciplines Attending:  Bedside Nurse, , , and Nursing Unit Leadership    Raudel Herndon was admitted on 6/24/2024  5:19 PM    Anticipated Discharge Date:       Disposition:    Chris Score:  Chris Scale Score: 18    Readmission Risk              Risk of Unplanned Readmission:  19           Discussed patient goal for the day, patient clinical progression, and barriers to discharge.  The following Goal(s) of the Day/Commitment(s) have been identified:  downgrade/discharge planning       Miranda Marie RN  June 27, 2024

## 2024-06-27 NOTE — CARE COORDINATION
Transition of care update: ERCP with possible stent exchange/removal tomorrow am. IV rocephin 2000mg q 24 hrs, iv flagyl 500mg q 8 hrs, iv ampicillin 2000mg 6 times per day, IVFs at 125ml/hr. Labs noted and chart reviewed. [facility] referral was given to Ginette #[phone] option #1 with [facility]. Clinicals were faxed to her at fax#[phone].    1445  Met with pt in room. Informed him referral was made to [facility]. We discussed ed. Provided pt with ed list. Pt's goal is to return home when when medically ready with [facility]. Cm/sw will follow.

## 2024-06-27 NOTE — FLOWSHEET NOTE
Inpatient Wound Care(initial evaluation)     Admit Date: 6/24/2024  5:19 PM    Reason for consult:  buttocks    Significant history:  per H & P  Patient presents with    Fatigue       Pt states he has been too weak to get up out of the chair. Unable to eat. Generalized body aches.     Generalized Body Aches   Past medical history that includes cholangiocarcinoma, chronic pancreatitis, diabetes  presents to the ER with failure to thrive and fatigue.  He was diagnosed with  cholangiocarcinoma around 6 months ago and follows with Dr. Reynoso.  He is on chemotherapy.  Last chemotherapy was last week.  Son is concerned he is too weak to take care of himself.  Son goes to work and is unable to take care of his father. He has had decreased po intake over the past few weeks, states he has had no appetite. Also has UTI. Will be admitted for possible placement.    Wound history:  admitted with wounds from home    Findings:     06/27/24 1450   Skin Integumentary    Skin Condition/Temp Poor turgor   Skin Integrity Ecchymosis;Redness   Location BUE   Skin Integrity Site 2   Skin Integrity Location 2 Abrasion   Location 2 left posterior lower leg   Skin Integrity Site 3   Skin Integrity Location 3   (old scars, scar tissue)    Location 3 RLE   Skin Integrity Site 4   Skin Integrity Location 4   (soft, blanchable)   Location 4 heels   Wound 06/25/24 Buttocks Right;Inner   Date First Assessed/Time First Assessed: (c) 06/25/24 2300   Present on Original Admission: Yes  Location: Buttocks  Wound Location Orientation: Right;Inner   Wound Image    Dressing/Treatment Calmoseptine/zinc oxide with menthol   Wound Length (cm) 2.2 cm   Wound Width (cm) 2 cm   Wound Depth (cm) 0.4 cm   Wound Surface Area (cm^2) 4.4 cm^2   Change in Wound Size % (l*w) -29.41   Wound Volume (cm^3) 1.76 cm^3   Wound Healing % -4   Wound Assessment Pink/red   Drainage Amount Scant (moist but unmeasurable)   Drainage Description Serosanguinous   Odor None

## 2024-06-28 ENCOUNTER — ANESTHESIA (OUTPATIENT)
Dept: ENDOSCOPY | Age: 74
End: 2024-06-28
Payer: MEDICARE

## 2024-06-28 ENCOUNTER — APPOINTMENT (OUTPATIENT)
Dept: GENERAL RADIOLOGY | Age: 74
DRG: 871 | End: 2024-06-28
Payer: MEDICARE

## 2024-06-28 LAB
ALBUMIN SERPL-MCNC: 1.7 G/DL (ref 3.5–5.2)
ALP SERPL-CCNC: 709 U/L (ref 40–129)
ALT SERPL-CCNC: 24 U/L (ref 0–40)
ANION GAP SERPL CALCULATED.3IONS-SCNC: 10 MMOL/L (ref 7–16)
AST SERPL-CCNC: 54 U/L (ref 0–39)
BILIRUB SERPL-MCNC: 2.3 MG/DL (ref 0–1.2)
BUN SERPL-MCNC: 10 MG/DL (ref 6–23)
CALCIUM SERPL-MCNC: 7 MG/DL (ref 8.6–10.2)
CHLORIDE SERPL-SCNC: 113 MMOL/L (ref 98–107)
CO2 SERPL-SCNC: 21 MMOL/L (ref 22–29)
CREAT SERPL-MCNC: 0.8 MG/DL (ref 0.7–1.2)
ERYTHROCYTE [DISTWIDTH] IN BLOOD BY AUTOMATED COUNT: 19.9 % (ref 11.5–15)
GFR, ESTIMATED: >90 ML/MIN/1.73M2
GLUCOSE SERPL-MCNC: 70 MG/DL (ref 74–99)
HCT VFR BLD AUTO: 27.8 % (ref 37–54)
HGB BLD-MCNC: 8.1 G/DL (ref 12.5–16.5)
LIPASE SERPL-CCNC: 32 U/L (ref 13–60)
MAGNESIUM SERPL-MCNC: 1.8 MG/DL (ref 1.6–2.6)
MCH RBC QN AUTO: 31.5 PG (ref 26–35)
MCHC RBC AUTO-ENTMCNC: 29.1 G/DL (ref 32–34.5)
MCV RBC AUTO: 108.2 FL (ref 80–99.9)
MICROORGANISM SPEC CULT: ABNORMAL
MICROORGANISM SPEC CULT: ABNORMAL
PHOSPHATE SERPL-MCNC: 3.4 MG/DL (ref 2.5–4.5)
PLATELET # BLD AUTO: 94 K/UL (ref 130–450)
PLATELET CONFIRMATION: NORMAL
PMV BLD AUTO: 9.9 FL (ref 7–12)
POTASSIUM SERPL-SCNC: 3.3 MMOL/L (ref 3.5–5)
PROT SERPL-MCNC: 4.7 G/DL (ref 6.4–8.3)
RBC # BLD AUTO: 2.57 M/UL (ref 3.8–5.8)
SERVICE CMNT-IMP: ABNORMAL
SODIUM SERPL-SCNC: 144 MMOL/L (ref 132–146)
SPECIMEN DESCRIPTION: ABNORMAL
WBC OTHER # BLD: 7.8 K/UL (ref 4.5–11.5)

## 2024-06-28 PROCEDURE — 7100000000 HC PACU RECOVERY - FIRST 15 MIN: Performed by: INTERNAL MEDICINE

## 2024-06-28 PROCEDURE — 2580000003 HC RX 258: Performed by: INTERNAL MEDICINE

## 2024-06-28 PROCEDURE — 6360000002 HC RX W HCPCS: Performed by: INTERNAL MEDICINE

## 2024-06-28 PROCEDURE — 3700000001 HC ADD 15 MINUTES (ANESTHESIA): Performed by: INTERNAL MEDICINE

## 2024-06-28 PROCEDURE — 2580000003 HC RX 258

## 2024-06-28 PROCEDURE — 2500000003 HC RX 250 WO HCPCS

## 2024-06-28 PROCEDURE — 6360000004 HC RX CONTRAST MEDICATION: Performed by: INTERNAL MEDICINE

## 2024-06-28 PROCEDURE — 0FC98ZZ EXTIRPATION OF MATTER FROM COMMON BILE DUCT, VIA NATURAL OR ARTIFICIAL OPENING ENDOSCOPIC: ICD-10-PCS | Performed by: INTERNAL MEDICINE

## 2024-06-28 PROCEDURE — 6370000000 HC RX 637 (ALT 250 FOR IP)

## 2024-06-28 PROCEDURE — BF131ZZ FLUOROSCOPY OF GALLBLADDER AND BILE DUCTS USING LOW OSMOLAR CONTRAST: ICD-10-PCS | Performed by: INTERNAL MEDICINE

## 2024-06-28 PROCEDURE — 2709999900 HC NON-CHARGEABLE SUPPLY: Performed by: INTERNAL MEDICINE

## 2024-06-28 PROCEDURE — 99232 SBSQ HOSP IP/OBS MODERATE 35: CPT | Performed by: INTERNAL MEDICINE

## 2024-06-28 PROCEDURE — 3E033XZ INTRODUCTION OF VASOPRESSOR INTO PERIPHERAL VEIN, PERCUTANEOUS APPROACH: ICD-10-PCS | Performed by: INTERNAL MEDICINE

## 2024-06-28 PROCEDURE — 1200000000 HC SEMI PRIVATE

## 2024-06-28 PROCEDURE — 7100000001 HC PACU RECOVERY - ADDTL 15 MIN: Performed by: INTERNAL MEDICINE

## 2024-06-28 PROCEDURE — 36415 COLL VENOUS BLD VENIPUNCTURE: CPT

## 2024-06-28 PROCEDURE — 83690 ASSAY OF LIPASE: CPT

## 2024-06-28 PROCEDURE — 2720000010 HC SURG SUPPLY STERILE: Performed by: INTERNAL MEDICINE

## 2024-06-28 PROCEDURE — 6360000002 HC RX W HCPCS: Performed by: NURSE ANESTHETIST, CERTIFIED REGISTERED

## 2024-06-28 PROCEDURE — 74330 X-RAY BILE/PANC ENDOSCOPY: CPT

## 2024-06-28 PROCEDURE — 84100 ASSAY OF PHOSPHORUS: CPT

## 2024-06-28 PROCEDURE — 80053 COMPREHEN METABOLIC PANEL: CPT

## 2024-06-28 PROCEDURE — 85027 COMPLETE CBC AUTOMATED: CPT

## 2024-06-28 PROCEDURE — 6370000000 HC RX 637 (ALT 250 FOR IP): Performed by: INTERNAL MEDICINE

## 2024-06-28 PROCEDURE — 2580000003 HC RX 258: Performed by: NURSE PRACTITIONER

## 2024-06-28 PROCEDURE — 3700000000 HC ANESTHESIA ATTENDED CARE: Performed by: INTERNAL MEDICINE

## 2024-06-28 PROCEDURE — 2580000003 HC RX 258: Performed by: NURSE ANESTHETIST, CERTIFIED REGISTERED

## 2024-06-28 PROCEDURE — 6360000002 HC RX W HCPCS

## 2024-06-28 PROCEDURE — 6370000000 HC RX 637 (ALT 250 FOR IP): Performed by: NURSE PRACTITIONER

## 2024-06-28 PROCEDURE — 0F798ZZ DILATION OF COMMON BILE DUCT, VIA NATURAL OR ARTIFICIAL OPENING ENDOSCOPIC: ICD-10-PCS | Performed by: INTERNAL MEDICINE

## 2024-06-28 PROCEDURE — 83735 ASSAY OF MAGNESIUM: CPT

## 2024-06-28 PROCEDURE — 3609018800 HC ERCP DX COLLECTION SPECIMEN BRUSHING/WASHING: Performed by: INTERNAL MEDICINE

## 2024-06-28 RX ORDER — SODIUM CHLORIDE 0.9 % (FLUSH) 0.9 %
5-40 SYRINGE (ML) INJECTION PRN
Status: DISCONTINUED | OUTPATIENT
Start: 2024-06-28 | End: 2024-06-28 | Stop reason: HOSPADM

## 2024-06-28 RX ORDER — SODIUM CHLORIDE 0.9 % (FLUSH) 0.9 %
5-40 SYRINGE (ML) INJECTION EVERY 12 HOURS SCHEDULED
Status: DISCONTINUED | OUTPATIENT
Start: 2024-06-28 | End: 2024-06-28 | Stop reason: HOSPADM

## 2024-06-28 RX ORDER — NALOXONE HYDROCHLORIDE 0.4 MG/ML
INJECTION, SOLUTION INTRAMUSCULAR; INTRAVENOUS; SUBCUTANEOUS PRN
Status: DISCONTINUED | OUTPATIENT
Start: 2024-06-28 | End: 2024-06-28 | Stop reason: HOSPADM

## 2024-06-28 RX ORDER — MAGNESIUM SULFATE IN WATER 40 MG/ML
4000 INJECTION, SOLUTION INTRAVENOUS ONCE
Status: DISCONTINUED | OUTPATIENT
Start: 2024-06-28 | End: 2024-06-28 | Stop reason: SDUPTHER

## 2024-06-28 RX ORDER — PROPOFOL 10 MG/ML
INJECTION, EMULSION INTRAVENOUS PRN
Status: DISCONTINUED | OUTPATIENT
Start: 2024-06-28 | End: 2024-06-28 | Stop reason: SDUPTHER

## 2024-06-28 RX ORDER — MAGNESIUM SULFATE HEPTAHYDRATE 40 MG/ML
4000 INJECTION, SOLUTION INTRAVENOUS ONCE
Status: COMPLETED | OUTPATIENT
Start: 2024-06-28 | End: 2024-06-28

## 2024-06-28 RX ORDER — SODIUM CHLORIDE 9 MG/ML
INJECTION, SOLUTION INTRAVENOUS PRN
Status: DISCONTINUED | OUTPATIENT
Start: 2024-06-28 | End: 2024-06-28 | Stop reason: HOSPADM

## 2024-06-28 RX ORDER — POTASSIUM CHLORIDE 20 MEQ/1
40 TABLET, EXTENDED RELEASE ORAL ONCE
Status: COMPLETED | OUTPATIENT
Start: 2024-06-28 | End: 2024-06-28

## 2024-06-28 RX ORDER — PHENYLEPHRINE HCL IN 0.9% NACL 1 MG/10 ML
SYRINGE (ML) INTRAVENOUS PRN
Status: DISCONTINUED | OUTPATIENT
Start: 2024-06-28 | End: 2024-06-28 | Stop reason: SDUPTHER

## 2024-06-28 RX ORDER — DEXTROSE, SODIUM CHLORIDE, SODIUM LACTATE, POTASSIUM CHLORIDE, AND CALCIUM CHLORIDE 5; .6; .31; .03; .02 G/100ML; G/100ML; G/100ML; G/100ML; G/100ML
INJECTION, SOLUTION INTRAVENOUS CONTINUOUS PRN
Status: DISCONTINUED | OUTPATIENT
Start: 2024-06-28 | End: 2024-06-28 | Stop reason: SDUPTHER

## 2024-06-28 RX ADMIN — PROPOFOL 75 MCG/KG/MIN: 10 INJECTION, EMULSION INTRAVENOUS at 07:29

## 2024-06-28 RX ADMIN — SODIUM CHLORIDE, POTASSIUM CHLORIDE, SODIUM LACTATE AND CALCIUM CHLORIDE 800 ML: 600; 310; 30; 20 INJECTION, SOLUTION INTRAVENOUS at 05:26

## 2024-06-28 RX ADMIN — AMPICILLIN SODIUM 2000 MG: 2 INJECTION, POWDER, FOR SOLUTION INTRAVENOUS at 23:02

## 2024-06-28 RX ADMIN — PANTOPRAZOLE SODIUM 40 MG: 40 TABLET, DELAYED RELEASE ORAL at 18:16

## 2024-06-28 RX ADMIN — SODIUM PHOSPHATE, MONOBASIC, MONOHYDRATE AND SODIUM PHOSPHATE, DIBASIC, ANHYDROUS 30 MMOL: 142; 276 INJECTION, SOLUTION INTRAVENOUS at 02:21

## 2024-06-28 RX ADMIN — MAGNESIUM SULFATE HEPTAHYDRATE 4000 MG: 40 INJECTION, SOLUTION INTRAVENOUS at 10:14

## 2024-06-28 RX ADMIN — SODIUM CHLORIDE, PRESERVATIVE FREE 10 ML: 5 INJECTION INTRAVENOUS at 10:15

## 2024-06-28 RX ADMIN — PROPOFOL 100 MG: 10 INJECTION, EMULSION INTRAVENOUS at 07:25

## 2024-06-28 RX ADMIN — OXYCODONE HYDROCHLORIDE AND ACETAMINOPHEN 1 TABLET: 5; 325 TABLET ORAL at 20:03

## 2024-06-28 RX ADMIN — ANORECTAL OINTMENT: 15.7; .44; 24; 20.6 OINTMENT TOPICAL at 10:14

## 2024-06-28 RX ADMIN — POTASSIUM CHLORIDE 40 MEQ: 20 TABLET, EXTENDED RELEASE ORAL at 10:43

## 2024-06-28 RX ADMIN — SODIUM CHLORIDE, SODIUM LACTATE, POTASSIUM CHLORIDE, CALCIUM CHLORIDE AND DEXTROSE MONOHYDRATE: 5; 600; 310; 30; 20 INJECTION, SOLUTION INTRAVENOUS at 07:20

## 2024-06-28 RX ADMIN — SODIUM CHLORIDE, PRESERVATIVE FREE 10 ML: 5 INJECTION INTRAVENOUS at 20:00

## 2024-06-28 RX ADMIN — AMPICILLIN SODIUM 2000 MG: 2 INJECTION, POWDER, FOR SOLUTION INTRAVENOUS at 05:43

## 2024-06-28 RX ADMIN — Medication 250 MG: at 10:09

## 2024-06-28 RX ADMIN — ANORECTAL OINTMENT: 15.7; .44; 24; 20.6 OINTMENT TOPICAL at 18:21

## 2024-06-28 RX ADMIN — Medication 100 MCG: at 07:37

## 2024-06-28 RX ADMIN — SODIUM PHOSPHATE, MONOBASIC, MONOHYDRATE 30 MMOL: 276; 142 INJECTION, SOLUTION INTRAVENOUS at 11:10

## 2024-06-28 RX ADMIN — AMPICILLIN SODIUM 2000 MG: 2 INJECTION, POWDER, FOR SOLUTION INTRAVENOUS at 18:21

## 2024-06-28 RX ADMIN — Medication 100 MG: at 05:59

## 2024-06-28 RX ADMIN — AMPICILLIN SODIUM 2000 MG: 2 INJECTION, POWDER, FOR SOLUTION INTRAVENOUS at 10:16

## 2024-06-28 RX ADMIN — AMPICILLIN SODIUM 2000 MG: 2 INJECTION, POWDER, FOR SOLUTION INTRAVENOUS at 01:55

## 2024-06-28 RX ADMIN — Medication 3 MG: at 21:55

## 2024-06-28 ASSESSMENT — PAIN DESCRIPTION - ORIENTATION: ORIENTATION: LOWER

## 2024-06-28 ASSESSMENT — PAIN SCALES - GENERAL: PAINLEVEL_OUTOF10: 7

## 2024-06-28 ASSESSMENT — PAIN DESCRIPTION - LOCATION: LOCATION: BACK

## 2024-06-28 NOTE — CARE COORDINATION
CM update note.  Patient was a transfer from [facility].  ERCP today showing diffuse intrahepatic duct stricturing.  ID is following.  Pt is on IV ampicillin 6 times per day.  Await ID input and plan.  Referral was made to [facility].  Spoke with Bianca ([phone] option 1).  If patient would require antibiotics, they use [facility].  Next start of care is Monday.  Referral called to Otf with [facility].  He will not be able to obtain IV antibiotics over the weekend.  Met with patient.  Discussed MAG as well.  He reports at home he was having a hard time getting out of his chair and getting around his home.  He agrees MAG is the best option for him.  He as has chosen 1. [facility] 2. [facility].  Referral information faxed to Allison at [facility] ([phone]).   They out of network with patient insurance.  No beds are available at [facility].  Obtained additional choices.  Referral called to Rosibel at [facility].  Await acceptance.  Call [phone] if patient would happen to go home with [facility].  CM/SW to follow.  Dima Mehta RN  [phone].      The Plan for Transition of Care is related to the following treatment goals: PLOF    The Patient and/or patient representative  was provided with a choice of provider and agrees   with the discharge plan. [x] Yes [] No    Freedom of choice list was provided with basic dialogue that supports the patient's individualized plan of care/goals, treatment preferences and shares the quality data associated with the providers. [x] Yes [] No

## 2024-06-28 NOTE — PROCEDURES
PROCEDURE PERFORMED:  ERCP with any interventions as indicated     PREOPERATIVE DIAGNOSES:  Ascending cholangitis concerns     POSTOPERATIVE DIAGNOSES:  Obstructive jaundice, bacteremia     ANESTHESIA:  LMAC.     DESCRIPTION OF PROCEDURE:  With the patient in supine position, the Olympus side-viewing video duodenoscope was introduced into the esophagus, advanced through the GE junction into the gastric body, advanced through the pylorus into duodenal bulb, second portion of duodenum, the ampulla was visualized with metal stent the previously plastic biliary stent appears to have been evacuated. The duct was cannulated via the stent with a 39 sphinctertome over the 025 guide wire. A cholangiogram was performed to confirm placement. Biliary stent appeared to be patent, disease process appears to have progressed from prior studies as there was more diffuse intrahepatic duct stricturing with no main stricture. No significant dilation/beading. The CBD stent was sweeped with 12mm balloon and some debris was noted no appreciable stones or pustulous material. Good flow of bile/contrast was observed. Decision made not to place plastic biliary stent.  Pictures were taken. The scope was retrieved, area decompressed, and the scope was withdrawn. The patient tolerated the procedure well.    Blood loss was minimal and self limited      Assessment:  1) Diffuse intrahepatic duct stricturing - balloon sweeps of UC-SEMS with removal of scant sludge/debris  2) Stent debris/biliary sludge - s/p balloon extraction  No signs of cholangitis.     Plan:  1) NPO for 2 hours if no signs of PEP ok to advance diet to CL and then as tolerated  2) OK to resume medications, including any anticoagulation  3) GI will continue to follow whilst in the hospital, Please call/PS the service if any questions or concerns.   4) Assessment and plan was discussed with his wife post procedure

## 2024-06-28 NOTE — ANESTHESIA PRE PROCEDURE
102/56   06/04/24 (!) 122/56   04/23/24 (!) 109/53       NPO Status: Time of last liquid consumption: 2300                        Time of last solid consumption: 2000                        Date of last liquid consumption: 06/27/24                        Date of last solid food consumption: 06/27/24    BMI:   Wt Readings from Last 3 Encounters:   06/26/24 81.6 kg (180 lb)   06/04/24 83.9 kg (185 lb)   04/23/24 94.8 kg (209 lb)     Body mass index is 23.75 kg/m².    CBC:   Lab Results   Component Value Date/Time    WBC 7.8 06/28/2024 04:22 AM    RBC 2.57 06/28/2024 04:22 AM    HGB 8.1 06/28/2024 04:22 AM    HCT 27.8 06/28/2024 04:22 AM    .2 06/28/2024 04:22 AM    RDW 19.9 06/28/2024 04:22 AM    PLT 94 06/28/2024 04:22 AM       CMP:   Lab Results   Component Value Date/Time     06/28/2024 04:22 AM    K 3.3 06/28/2024 04:22 AM     06/28/2024 04:22 AM    CO2 21 06/28/2024 04:22 AM    BUN 10 06/28/2024 04:22 AM    CREATININE 0.8 06/28/2024 04:22 AM    LABGLOM >90 06/28/2024 04:22 AM    LABGLOM >60 03/19/2024 06:01 AM    GLUCOSE 70 06/28/2024 04:22 AM    CALCIUM 7.0 06/28/2024 04:22 AM    BILITOT 2.3 06/28/2024 04:22 AM    ALKPHOS 709 06/28/2024 04:22 AM    AST 54 06/28/2024 04:22 AM    ALT 24 06/28/2024 04:22 AM       POC Tests:   No results for input(s): \"POCGLU\", \"POCNA\", \"POCK\", \"POCCL\", \"POCBUN\", \"POCHEMO\", \"POCHCT\" in the last 72 hours.      Coags:   Lab Results   Component Value Date/Time    PROTIME 15.3 06/24/2024 06:12 PM    INR 1.4 06/24/2024 06:12 PM       HCG (If Applicable): No results found for: \"PREGTESTUR\", \"PREGSERUM\", \"HCG\", \"HCGQUANT\"     ABGs: No results found for: \"PHART\", \"PO2ART\", \"YKY0SCR\", \"QDF9BLX\", \"BEART\", \"F0ULGEEC\"     Type & Screen (If Applicable):  No results found for: \"LABABO\"    Drug/Infectious Status (If Applicable):  Lab Results   Component Value Date/Time    HEPCAB NONREACTIVE 03/15/2024 05:20 PM       COVID-19 Screening (If Applicable): No results found for:

## 2024-06-29 LAB
ANION GAP SERPL CALCULATED.3IONS-SCNC: 10 MMOL/L (ref 7–16)
BASOPHILS # BLD: 0 K/UL (ref 0–0.2)
BASOPHILS NFR BLD: 0 % (ref 0–2)
BUN SERPL-MCNC: 13 MG/DL (ref 6–23)
CALCIUM SERPL-MCNC: 7.2 MG/DL (ref 8.6–10.2)
CHLORIDE SERPL-SCNC: 108 MMOL/L (ref 98–107)
CO2 SERPL-SCNC: 20 MMOL/L (ref 22–29)
CREAT SERPL-MCNC: 0.9 MG/DL (ref 0.7–1.2)
EOSINOPHIL # BLD: 0 K/UL (ref 0.05–0.5)
EOSINOPHILS RELATIVE PERCENT: 0 % (ref 0–6)
ERYTHROCYTE [DISTWIDTH] IN BLOOD BY AUTOMATED COUNT: 19.9 % (ref 11.5–15)
GFR, ESTIMATED: >90 ML/MIN/1.73M2
GLUCOSE SERPL-MCNC: 133 MG/DL (ref 74–99)
HCT VFR BLD AUTO: 30.8 % (ref 37–54)
HGB BLD-MCNC: 8.9 G/DL (ref 12.5–16.5)
LIPASE SERPL-CCNC: 33 U/L (ref 13–60)
LYMPHOCYTES NFR BLD: 0.13 K/UL (ref 1.5–4)
LYMPHOCYTES RELATIVE PERCENT: 2 % (ref 20–42)
MCH RBC QN AUTO: 30.7 PG (ref 26–35)
MCHC RBC AUTO-ENTMCNC: 28.9 G/DL (ref 32–34.5)
MCV RBC AUTO: 106.2 FL (ref 80–99.9)
METAMYELOCYTES ABSOLUTE COUNT: 0.06 K/UL (ref 0–0.12)
METAMYELOCYTES: 1 % (ref 0–1)
MONOCYTES NFR BLD: 0.5 K/UL (ref 0.1–0.95)
MONOCYTES NFR BLD: 7 % (ref 2–12)
MYELOCYTES ABSOLUTE COUNT: 0.06 K/UL
MYELOCYTES: 1 %
NEUTROPHILS NFR BLD: 89 % (ref 43–80)
NEUTS SEG NFR BLD: 6.35 K/UL (ref 1.8–7.3)
NUCLEATED RED BLOOD CELLS: 1 PER 100 WBC
PHOSPHATE SERPL-MCNC: 3.4 MG/DL (ref 2.5–4.5)
PLATELET # BLD AUTO: 97 K/UL (ref 130–450)
PLATELET CONFIRMATION: NORMAL
PMV BLD AUTO: 9.3 FL (ref 7–12)
POTASSIUM SERPL-SCNC: 3.8 MMOL/L (ref 3.5–5)
RBC # BLD AUTO: 2.9 M/UL (ref 3.8–5.8)
RBC # BLD: ABNORMAL 10*6/UL
SODIUM SERPL-SCNC: 138 MMOL/L (ref 132–146)
WBC OTHER # BLD: 7.1 K/UL (ref 4.5–11.5)

## 2024-06-29 PROCEDURE — 6360000002 HC RX W HCPCS: Performed by: INTERNAL MEDICINE

## 2024-06-29 PROCEDURE — 80053 COMPREHEN METABOLIC PANEL: CPT

## 2024-06-29 PROCEDURE — 36415 COLL VENOUS BLD VENIPUNCTURE: CPT

## 2024-06-29 PROCEDURE — 6360000002 HC RX W HCPCS

## 2024-06-29 PROCEDURE — 6370000000 HC RX 637 (ALT 250 FOR IP): Performed by: INTERNAL MEDICINE

## 2024-06-29 PROCEDURE — 2580000003 HC RX 258: Performed by: INTERNAL MEDICINE

## 2024-06-29 PROCEDURE — 80048 BASIC METABOLIC PNL TOTAL CA: CPT

## 2024-06-29 PROCEDURE — 6370000000 HC RX 637 (ALT 250 FOR IP)

## 2024-06-29 PROCEDURE — 99232 SBSQ HOSP IP/OBS MODERATE 35: CPT | Performed by: TRANSPLANT SURGERY

## 2024-06-29 PROCEDURE — 85025 COMPLETE CBC W/AUTO DIFF WBC: CPT

## 2024-06-29 PROCEDURE — 1200000000 HC SEMI PRIVATE

## 2024-06-29 PROCEDURE — 84100 ASSAY OF PHOSPHORUS: CPT

## 2024-06-29 PROCEDURE — 99232 SBSQ HOSP IP/OBS MODERATE 35: CPT | Performed by: INTERNAL MEDICINE

## 2024-06-29 PROCEDURE — 83690 ASSAY OF LIPASE: CPT

## 2024-06-29 RX ORDER — FUROSEMIDE 10 MG/ML
40 INJECTION INTRAMUSCULAR; INTRAVENOUS ONCE
Status: COMPLETED | OUTPATIENT
Start: 2024-06-29 | End: 2024-06-29

## 2024-06-29 RX ADMIN — PANTOPRAZOLE SODIUM 40 MG: 40 TABLET, DELAYED RELEASE ORAL at 17:13

## 2024-06-29 RX ADMIN — SODIUM CHLORIDE, PRESERVATIVE FREE 10 ML: 5 INJECTION INTRAVENOUS at 20:24

## 2024-06-29 RX ADMIN — PANTOPRAZOLE SODIUM 40 MG: 40 TABLET, DELAYED RELEASE ORAL at 06:29

## 2024-06-29 RX ADMIN — Medication 250 MG: at 13:14

## 2024-06-29 RX ADMIN — AMPICILLIN SODIUM 2000 MG: 2 INJECTION, POWDER, FOR SOLUTION INTRAVENOUS at 02:32

## 2024-06-29 RX ADMIN — FUROSEMIDE 40 MG: 10 INJECTION, SOLUTION INTRAMUSCULAR; INTRAVENOUS at 08:21

## 2024-06-29 RX ADMIN — ENOXAPARIN SODIUM 40 MG: 100 INJECTION SUBCUTANEOUS at 08:20

## 2024-06-29 RX ADMIN — OXYCODONE HYDROCHLORIDE AND ACETAMINOPHEN 1 TABLET: 5; 325 TABLET ORAL at 13:18

## 2024-06-29 RX ADMIN — AMPICILLIN SODIUM 2000 MG: 2 INJECTION, POWDER, FOR SOLUTION INTRAVENOUS at 10:02

## 2024-06-29 RX ADMIN — AMPICILLIN SODIUM 2000 MG: 2 INJECTION, POWDER, FOR SOLUTION INTRAVENOUS at 07:12

## 2024-06-29 RX ADMIN — OXYCODONE HYDROCHLORIDE AND ACETAMINOPHEN 1 TABLET: 5; 325 TABLET ORAL at 06:31

## 2024-06-29 RX ADMIN — SODIUM CHLORIDE, PRESERVATIVE FREE 10 ML: 5 INJECTION INTRAVENOUS at 08:20

## 2024-06-29 RX ADMIN — Medication 250 MG: at 17:14

## 2024-06-29 RX ADMIN — AMPICILLIN SODIUM 2000 MG: 2 INJECTION, POWDER, FOR SOLUTION INTRAVENOUS at 14:39

## 2024-06-29 RX ADMIN — ANORECTAL OINTMENT: 15.7; .44; 24; 20.6 OINTMENT TOPICAL at 18:48

## 2024-06-29 RX ADMIN — OXYCODONE HYDROCHLORIDE AND ACETAMINOPHEN 1 TABLET: 5; 325 TABLET ORAL at 20:27

## 2024-06-29 RX ADMIN — AMPICILLIN SODIUM 2000 MG: 2 INJECTION, POWDER, FOR SOLUTION INTRAVENOUS at 22:10

## 2024-06-29 RX ADMIN — POLYETHYLENE GLYCOL 3350 17 G: 17 POWDER, FOR SOLUTION ORAL at 08:21

## 2024-06-29 RX ADMIN — AMPICILLIN SODIUM 2000 MG: 2 INJECTION, POWDER, FOR SOLUTION INTRAVENOUS at 18:48

## 2024-06-29 RX ADMIN — Medication 250 MG: at 20:24

## 2024-06-29 ASSESSMENT — PAIN SCALES - GENERAL
PAINLEVEL_OUTOF10: 9
PAINLEVEL_OUTOF10: 9
PAINLEVEL_OUTOF10: 7

## 2024-06-29 ASSESSMENT — PAIN DESCRIPTION - LOCATION
LOCATION: BACK
LOCATION: BACK
LOCATION: BACK;BUTTOCKS

## 2024-06-29 ASSESSMENT — PAIN DESCRIPTION - DESCRIPTORS
DESCRIPTORS: ACHING;BURNING;SORE
DESCRIPTORS: ACHING;SORE

## 2024-06-29 ASSESSMENT — PAIN DESCRIPTION - ORIENTATION: ORIENTATION: LOWER

## 2024-06-29 NOTE — ANESTHESIA POSTPROCEDURE EVALUATION
Department of Anesthesiology  Postprocedure Note    Patient: Raudel Herndon  MRN: [id number]  YOB: 1950  Date of evaluation: 6/28/2024    Procedure Summary       Date: 06/28/24 Room / Location: [place] [address] / [facility]    Anesthesia Start: 0720 Anesthesia Stop: 0759    Procedure: ENDOSCOPIC RETROGRADE CHOLANGIOPANCREATOGRAPHY/ POSSIBLE STENT Diagnosis:       Obstructive jaundice      (Obstructive jaundice [K83.1])    Surgeons: Raza Lane DO Responsible Provider: Clinton Gillette DO    Anesthesia Type: MAC, general ASA Status: 4            Anesthesia Type: No value filed.    Andrews Phase I: Andrews Score: 9    Andrews Phase II:      Anesthesia Post Evaluation    Patient location during evaluation: PACU  Patient participation: complete - patient participated  Level of consciousness: awake and alert  Pain score: 1  Airway patency: patent  Nausea & Vomiting: no nausea and no vomiting  Cardiovascular status: hemodynamically stable  Respiratory status: acceptable  Hydration status: euvolemic  Pain management: adequate    No notable events documented.

## 2024-06-30 LAB
ALBUMIN SERPL-MCNC: 1.7 G/DL (ref 3.5–5.2)
ALP SERPL-CCNC: 867 U/L (ref 40–129)
ALT SERPL-CCNC: 27 U/L (ref 0–40)
ANION GAP SERPL CALCULATED.3IONS-SCNC: 11 MMOL/L (ref 7–16)
AST SERPL-CCNC: 62 U/L (ref 0–39)
BILIRUB SERPL-MCNC: 2.1 MG/DL (ref 0–1.2)
BUN SERPL-MCNC: 11 MG/DL (ref 6–23)
CALCIUM SERPL-MCNC: 7 MG/DL (ref 8.6–10.2)
CHLORIDE SERPL-SCNC: 111 MMOL/L (ref 98–107)
CO2 SERPL-SCNC: 19 MMOL/L (ref 22–29)
CREAT SERPL-MCNC: 0.8 MG/DL (ref 0.7–1.2)
GFR, ESTIMATED: >90 ML/MIN/1.73M2
GLUCOSE SERPL-MCNC: 86 MG/DL (ref 74–99)
PHOSPHATE SERPL-MCNC: 3.3 MG/DL (ref 2.5–4.5)
POTASSIUM SERPL-SCNC: 3.7 MMOL/L (ref 3.5–5)
PROT SERPL-MCNC: 4.9 G/DL (ref 6.4–8.3)
SODIUM SERPL-SCNC: 141 MMOL/L (ref 132–146)

## 2024-06-30 PROCEDURE — 6360000002 HC RX W HCPCS: Performed by: INTERNAL MEDICINE

## 2024-06-30 PROCEDURE — 99232 SBSQ HOSP IP/OBS MODERATE 35: CPT | Performed by: INTERNAL MEDICINE

## 2024-06-30 PROCEDURE — 6370000000 HC RX 637 (ALT 250 FOR IP): Performed by: INTERNAL MEDICINE

## 2024-06-30 PROCEDURE — 2580000003 HC RX 258: Performed by: INTERNAL MEDICINE

## 2024-06-30 PROCEDURE — 6360000002 HC RX W HCPCS

## 2024-06-30 PROCEDURE — 84100 ASSAY OF PHOSPHORUS: CPT

## 2024-06-30 PROCEDURE — 97530 THERAPEUTIC ACTIVITIES: CPT

## 2024-06-30 PROCEDURE — 36415 COLL VENOUS BLD VENIPUNCTURE: CPT

## 2024-06-30 PROCEDURE — 97535 SELF CARE MNGMENT TRAINING: CPT

## 2024-06-30 PROCEDURE — 1200000000 HC SEMI PRIVATE

## 2024-06-30 PROCEDURE — P9047 ALBUMIN (HUMAN), 25%, 50ML: HCPCS

## 2024-06-30 PROCEDURE — 99232 SBSQ HOSP IP/OBS MODERATE 35: CPT | Performed by: TRANSPLANT SURGERY

## 2024-06-30 PROCEDURE — 6370000000 HC RX 637 (ALT 250 FOR IP)

## 2024-06-30 RX ORDER — FUROSEMIDE 10 MG/ML
40 INJECTION INTRAMUSCULAR; INTRAVENOUS ONCE
Status: COMPLETED | OUTPATIENT
Start: 2024-06-30 | End: 2024-06-30

## 2024-06-30 RX ORDER — CYCLOBENZAPRINE HCL 10 MG
10 TABLET ORAL 3 TIMES DAILY PRN
Status: DISCONTINUED | OUTPATIENT
Start: 2024-06-30 | End: 2024-07-10 | Stop reason: HOSPADM

## 2024-06-30 RX ORDER — ALBUMIN (HUMAN) 12.5 G/50ML
25 SOLUTION INTRAVENOUS ONCE
Status: COMPLETED | OUTPATIENT
Start: 2024-06-30 | End: 2024-06-30

## 2024-06-30 RX ADMIN — ANORECTAL OINTMENT: 15.7; .44; 24; 20.6 OINTMENT TOPICAL at 19:09

## 2024-06-30 RX ADMIN — AMPICILLIN SODIUM 2000 MG: 2 INJECTION, POWDER, FOR SOLUTION INTRAVENOUS at 02:11

## 2024-06-30 RX ADMIN — CYCLOBENZAPRINE 10 MG: 10 TABLET, FILM COATED ORAL at 19:14

## 2024-06-30 RX ADMIN — OXYCODONE HYDROCHLORIDE AND ACETAMINOPHEN 1 TABLET: 5; 325 TABLET ORAL at 06:32

## 2024-06-30 RX ADMIN — PANTOPRAZOLE SODIUM 40 MG: 40 TABLET, DELAYED RELEASE ORAL at 15:51

## 2024-06-30 RX ADMIN — POLYETHYLENE GLYCOL 3350 17 G: 17 POWDER, FOR SOLUTION ORAL at 08:56

## 2024-06-30 RX ADMIN — ALBUMIN (HUMAN) 25 G: 0.25 INJECTION, SOLUTION INTRAVENOUS at 08:54

## 2024-06-30 RX ADMIN — AMPICILLIN SODIUM 2000 MG: 2 INJECTION, POWDER, FOR SOLUTION INTRAVENOUS at 22:22

## 2024-06-30 RX ADMIN — AMPICILLIN SODIUM 2000 MG: 2 INJECTION, POWDER, FOR SOLUTION INTRAVENOUS at 10:17

## 2024-06-30 RX ADMIN — AMPICILLIN SODIUM 2000 MG: 2 INJECTION, POWDER, FOR SOLUTION INTRAVENOUS at 13:57

## 2024-06-30 RX ADMIN — SODIUM CHLORIDE, PRESERVATIVE FREE 10 ML: 5 INJECTION INTRAVENOUS at 08:57

## 2024-06-30 RX ADMIN — ANORECTAL OINTMENT: 15.7; .44; 24; 20.6 OINTMENT TOPICAL at 08:55

## 2024-06-30 RX ADMIN — FUROSEMIDE 40 MG: 10 INJECTION, SOLUTION INTRAMUSCULAR; INTRAVENOUS at 08:56

## 2024-06-30 RX ADMIN — AMPICILLIN SODIUM 2000 MG: 2 INJECTION, POWDER, FOR SOLUTION INTRAVENOUS at 19:03

## 2024-06-30 RX ADMIN — Medication 250 MG: at 08:56

## 2024-06-30 RX ADMIN — OXYCODONE HYDROCHLORIDE AND ACETAMINOPHEN 1 TABLET: 5; 325 TABLET ORAL at 15:57

## 2024-06-30 RX ADMIN — PANTOPRAZOLE SODIUM 40 MG: 40 TABLET, DELAYED RELEASE ORAL at 06:29

## 2024-06-30 RX ADMIN — OXYCODONE HYDROCHLORIDE AND ACETAMINOPHEN 1 TABLET: 5; 325 TABLET ORAL at 22:21

## 2024-06-30 RX ADMIN — CYCLOBENZAPRINE 10 MG: 10 TABLET, FILM COATED ORAL at 10:22

## 2024-06-30 RX ADMIN — ENOXAPARIN SODIUM 40 MG: 100 INJECTION SUBCUTANEOUS at 08:55

## 2024-06-30 RX ADMIN — AMPICILLIN SODIUM 2000 MG: 2 INJECTION, POWDER, FOR SOLUTION INTRAVENOUS at 06:26

## 2024-06-30 ASSESSMENT — PAIN SCALES - GENERAL
PAINLEVEL_OUTOF10: 7
PAINLEVEL_OUTOF10: 8
PAINLEVEL_OUTOF10: 8
PAINLEVEL_OUTOF10: 7
PAINLEVEL_OUTOF10: 9

## 2024-06-30 ASSESSMENT — PAIN DESCRIPTION - LOCATION
LOCATION: BACK;CHEST
LOCATION: BACK

## 2024-06-30 ASSESSMENT — PAIN DESCRIPTION - DESCRIPTORS
DESCRIPTORS: SPASM
DESCRIPTORS: SPASM
DESCRIPTORS: ACHING;SORE;SHOOTING
DESCRIPTORS: STABBING

## 2024-06-30 ASSESSMENT — PAIN DESCRIPTION - ORIENTATION
ORIENTATION: LOWER

## 2024-07-01 ENCOUNTER — HOSPITAL ENCOUNTER (INPATIENT)
Age: 74
Discharge: HOME | DRG: 871 | End: 2024-07-03
Attending: INTERNAL MEDICINE
Payer: MEDICARE

## 2024-07-01 ENCOUNTER — ANESTHESIA EVENT (OUTPATIENT)
Age: 74
End: 2024-07-01
Payer: MEDICARE

## 2024-07-01 ENCOUNTER — ANESTHESIA (OUTPATIENT)
Age: 74
End: 2024-07-01
Payer: MEDICARE

## 2024-07-01 VITALS
TEMPERATURE: 97.9 F | HEART RATE: 83 BPM | DIASTOLIC BLOOD PRESSURE: 61 MMHG | SYSTOLIC BLOOD PRESSURE: 125 MMHG | RESPIRATION RATE: 20 BRPM | OXYGEN SATURATION: 95 %

## 2024-07-01 LAB
ECHO BSA: 2.05 M2
PHOSPHATE SERPL-MCNC: 3.3 MG/DL (ref 2.5–4.5)

## 2024-07-01 PROCEDURE — 2580000003 HC RX 258: Performed by: INTERNAL MEDICINE

## 2024-07-01 PROCEDURE — 99232 SBSQ HOSP IP/OBS MODERATE 35: CPT | Performed by: INTERNAL MEDICINE

## 2024-07-01 PROCEDURE — 1200000000 HC SEMI PRIVATE

## 2024-07-01 PROCEDURE — B24BZZ4 ULTRASONOGRAPHY OF HEART WITH AORTA, TRANSESOPHAGEAL: ICD-10-PCS | Performed by: INTERNAL MEDICINE

## 2024-07-01 PROCEDURE — 93320 DOPPLER ECHO COMPLETE: CPT | Performed by: INTERNAL MEDICINE

## 2024-07-01 PROCEDURE — 6370000000 HC RX 637 (ALT 250 FOR IP): Performed by: INTERNAL MEDICINE

## 2024-07-01 PROCEDURE — 7100000010 HC PHASE II RECOVERY - FIRST 15 MIN: Performed by: INTERNAL MEDICINE

## 2024-07-01 PROCEDURE — 2580000003 HC RX 258: Performed by: NURSE ANESTHETIST, CERTIFIED REGISTERED

## 2024-07-01 PROCEDURE — 93325 DOPPLER ECHO COLOR FLOW MAPG: CPT | Performed by: INTERNAL MEDICINE

## 2024-07-01 PROCEDURE — 3700000000 HC ANESTHESIA ATTENDED CARE: Performed by: INTERNAL MEDICINE

## 2024-07-01 PROCEDURE — 93312 ECHO TRANSESOPHAGEAL: CPT | Performed by: INTERNAL MEDICINE

## 2024-07-01 PROCEDURE — 36415 COLL VENOUS BLD VENIPUNCTURE: CPT

## 2024-07-01 PROCEDURE — 99232 SBSQ HOSP IP/OBS MODERATE 35: CPT | Performed by: TRANSPLANT SURGERY

## 2024-07-01 PROCEDURE — 93320 DOPPLER ECHO COMPLETE: CPT

## 2024-07-01 PROCEDURE — 6360000002 HC RX W HCPCS: Performed by: NURSE ANESTHETIST, CERTIFIED REGISTERED

## 2024-07-01 PROCEDURE — 84100 ASSAY OF PHOSPHORUS: CPT

## 2024-07-01 PROCEDURE — 6360000002 HC RX W HCPCS: Performed by: INTERNAL MEDICINE

## 2024-07-01 PROCEDURE — 3700000001 HC ADD 15 MINUTES (ANESTHESIA): Performed by: INTERNAL MEDICINE

## 2024-07-01 RX ORDER — SODIUM CHLORIDE, SODIUM LACTATE, POTASSIUM CHLORIDE, CALCIUM CHLORIDE 600; 310; 30; 20 MG/100ML; MG/100ML; MG/100ML; MG/100ML
INJECTION, SOLUTION INTRAVENOUS CONTINUOUS PRN
Status: DISCONTINUED | OUTPATIENT
Start: 2024-07-01 | End: 2024-07-01 | Stop reason: SDUPTHER

## 2024-07-01 RX ORDER — PROPOFOL 10 MG/ML
INJECTION, EMULSION INTRAVENOUS PRN
Status: DISCONTINUED | OUTPATIENT
Start: 2024-07-01 | End: 2024-07-01 | Stop reason: SDUPTHER

## 2024-07-01 RX ADMIN — SODIUM CHLORIDE, POTASSIUM CHLORIDE, SODIUM LACTATE AND CALCIUM CHLORIDE: 600; 310; 30; 20 INJECTION, SOLUTION INTRAVENOUS at 14:52

## 2024-07-01 RX ADMIN — PANTOPRAZOLE SODIUM 40 MG: 40 TABLET, DELAYED RELEASE ORAL at 17:25

## 2024-07-01 RX ADMIN — AMPICILLIN SODIUM 2000 MG: 2 INJECTION, POWDER, FOR SOLUTION INTRAVENOUS at 02:33

## 2024-07-01 RX ADMIN — PANTOPRAZOLE SODIUM 40 MG: 40 TABLET, DELAYED RELEASE ORAL at 06:04

## 2024-07-01 RX ADMIN — AMPICILLIN SODIUM 2000 MG: 2 INJECTION, POWDER, FOR SOLUTION INTRAVENOUS at 17:25

## 2024-07-01 RX ADMIN — ANORECTAL OINTMENT: 15.7; .44; 24; 20.6 OINTMENT TOPICAL at 17:25

## 2024-07-01 RX ADMIN — PROPOFOL 180 MG: 10 INJECTION, EMULSION INTRAVENOUS at 14:57

## 2024-07-01 RX ADMIN — CYCLOBENZAPRINE 10 MG: 10 TABLET, FILM COATED ORAL at 21:06

## 2024-07-01 RX ADMIN — AMPICILLIN SODIUM 2000 MG: 2 INJECTION, POWDER, FOR SOLUTION INTRAVENOUS at 22:24

## 2024-07-01 RX ADMIN — SODIUM CHLORIDE, PRESERVATIVE FREE 10 ML: 5 INJECTION INTRAVENOUS at 09:21

## 2024-07-01 RX ADMIN — CYCLOBENZAPRINE 10 MG: 10 TABLET, FILM COATED ORAL at 06:09

## 2024-07-01 RX ADMIN — ANORECTAL OINTMENT: 15.7; .44; 24; 20.6 OINTMENT TOPICAL at 09:19

## 2024-07-01 RX ADMIN — OXYCODONE HYDROCHLORIDE AND ACETAMINOPHEN 1 TABLET: 5; 325 TABLET ORAL at 06:10

## 2024-07-01 RX ADMIN — AMPICILLIN SODIUM 2000 MG: 2 INJECTION, POWDER, FOR SOLUTION INTRAVENOUS at 06:06

## 2024-07-01 RX ADMIN — AMPICILLIN SODIUM 2000 MG: 2 INJECTION, POWDER, FOR SOLUTION INTRAVENOUS at 09:23

## 2024-07-01 ASSESSMENT — PAIN SCALES - GENERAL
PAINLEVEL_OUTOF10: 9
PAINLEVEL_OUTOF10: 9
PAINLEVEL_OUTOF10: 4
PAINLEVEL_OUTOF10: 10
PAINLEVEL_OUTOF10: 10

## 2024-07-01 ASSESSMENT — PAIN SCALES - WONG BAKER
WONGBAKER_NUMERICALRESPONSE: NO HURT
WONGBAKER_NUMERICALRESPONSE: NO HURT

## 2024-07-01 ASSESSMENT — PAIN DESCRIPTION - DESCRIPTORS
DESCRIPTORS: ACHING;DISCOMFORT;TENDER
DESCRIPTORS: SPASM;DISCOMFORT

## 2024-07-01 ASSESSMENT — PAIN DESCRIPTION - LOCATION
LOCATION: BACK

## 2024-07-01 ASSESSMENT — PAIN DESCRIPTION - PAIN TYPE: TYPE: CHRONIC PAIN

## 2024-07-01 ASSESSMENT — PAIN DESCRIPTION - ORIENTATION
ORIENTATION: LOWER;UPPER
ORIENTATION: RIGHT;LEFT

## 2024-07-01 ASSESSMENT — PAIN - FUNCTIONAL ASSESSMENT: PAIN_FUNCTIONAL_ASSESSMENT: PREVENTS OR INTERFERES SOME ACTIVE ACTIVITIES AND ADLS

## 2024-07-01 NOTE — PROCEDURES
PROCEDURE NOTE  Date: 7/1/2024   Name: Raudel Herndon  YOB: 1950    ProceduresPRELIMINARY TRANSESOPHAGEAL ECHOCARDIOGRAPHY REPORT    Indications for study: Bacteremia    Study performed using (Sedation): Per anesthesia    Complications: None    Preliminary findings: Normal LV function, normal RV function, no hemodynamically significant valve disease, no evidence of vegetations, no left atrial or left atrial appendage thrombus (no evidence of spontaneous echo contrast), no intraatrial shunting on bubble study, no significant atherosclerosis of the aorta.    For patient status during procedure, refer to intra-procedure sedation flowsheet.    The complete JULIO report will follow - see \"CARDIOLOGY\" Section in Epic.      Clinton Dinero MD  [facility]

## 2024-07-01 NOTE — CARE COORDINATION
CM update note.  [facility] is unable to accept.  Met with patient and his son Otf at the Bedside.  Both agree with MAG at discharge.  They have chosen 1. [facility] in [place], [place] [phone]  2. [facility] in [facility] [phone].  VM message left with [facility].  Referral information faxed to Raisa at [facility] at [phone].  Pt is s/p ERCP on 6/28.  Plan is for JULIO today.  ID following.  Patient remains on IV ampicillin 6 times per day.  CM/SW to follow.  Dima Mehta RN [phone].      1345:  Pt insurance changed to [employer] on 7/1.  [facility] is not in network with patient insurance.  Await return call from [facility].  Dima Mehta RN [phone].

## 2024-07-01 NOTE — ANESTHESIA POSTPROCEDURE EVALUATION
Department of Anesthesiology  Postprocedure Note    Patient: Raudel Herndon  MRN: [id number]  YOB: 1950  Date of evaluation: 7/1/2024    Procedure Summary       Date: 07/01/24 Room / Location: [facility] Cardiac Cath Lab; [facility] Noninvasive Cardiology    Anesthesia Start: 1452 Anesthesia Stop: 1514    Procedure: JULIO (PRN CONTRAST/BUBBLE/3D) Diagnosis: Enterococcal bacteremia    Scheduled Providers: Clinton Dinero MD Responsible Provider: Sadiq Ward MD    Anesthesia Type: MAC ASA Status: 3            Anesthesia Type: No value filed.    Andrews Phase I:      Andrews Phase II:      Anesthesia Post Evaluation    Patient location during evaluation: PACU  Patient participation: complete - patient participated  Level of consciousness: awake  Pain score: 3  Airway patency: patent  Nausea & Vomiting: no nausea and no vomiting  Cardiovascular status: blood pressure returned to baseline  Respiratory status: acceptable  Hydration status: euvolemic    No notable events documented.

## 2024-07-01 NOTE — ANESTHESIA PRE PROCEDURE
Dental:    (+) edentulous      Pulmonary:Negative Pulmonary ROS                              Cardiovascular:             Beta Blocker:  Not on Beta Blocker         Neuro/Psych:   Negative Neuro/Psych ROS              GI/Hepatic/Renal:   (+) liver disease (Cholangitis. Obstructive jaundice):, renal disease (NANCY):          Endo/Other:    (+) Diabetes, malignancy/cancer (Cholangiocellular carcinoma).                  ROS comment: Past history of prostate cancer 7 years ago Abdominal:             Vascular: negative vascular ROS.         Other Findings:             Anesthesia Plan      MAC     ASA 3       Induction: intravenous.      Anesthetic plan and risks discussed with patient.      Plan discussed with CRNA and attending.                    TULIO RODRIGUES RN   7/1/2024

## 2024-07-02 LAB
MICROORGANISM SPEC CULT: NORMAL
MICROORGANISM SPEC CULT: NORMAL
PHOSPHATE SERPL-MCNC: 3.5 MG/DL (ref 2.5–4.5)
SERVICE CMNT-IMP: NORMAL
SERVICE CMNT-IMP: NORMAL
SPECIMEN DESCRIPTION: NORMAL
SPECIMEN DESCRIPTION: NORMAL

## 2024-07-02 PROCEDURE — 6360000002 HC RX W HCPCS: Performed by: INTERNAL MEDICINE

## 2024-07-02 PROCEDURE — 99232 SBSQ HOSP IP/OBS MODERATE 35: CPT | Performed by: TRANSPLANT SURGERY

## 2024-07-02 PROCEDURE — 6370000000 HC RX 637 (ALT 250 FOR IP): Performed by: INTERNAL MEDICINE

## 2024-07-02 PROCEDURE — 2580000003 HC RX 258: Performed by: INTERNAL MEDICINE

## 2024-07-02 PROCEDURE — 6370000000 HC RX 637 (ALT 250 FOR IP): Performed by: FAMILY MEDICINE

## 2024-07-02 PROCEDURE — 36415 COLL VENOUS BLD VENIPUNCTURE: CPT

## 2024-07-02 PROCEDURE — 1200000000 HC SEMI PRIVATE

## 2024-07-02 PROCEDURE — 99232 SBSQ HOSP IP/OBS MODERATE 35: CPT | Performed by: FAMILY MEDICINE

## 2024-07-02 PROCEDURE — 84100 ASSAY OF PHOSPHORUS: CPT

## 2024-07-02 RX ORDER — OXYCODONE HYDROCHLORIDE AND ACETAMINOPHEN 5; 325 MG/1; MG/1
1 TABLET ORAL EVERY 4 HOURS PRN
Status: DISCONTINUED | OUTPATIENT
Start: 2024-07-02 | End: 2024-07-03

## 2024-07-02 RX ADMIN — SODIUM CHLORIDE, PRESERVATIVE FREE 10 ML: 5 INJECTION INTRAVENOUS at 08:50

## 2024-07-02 RX ADMIN — AMPICILLIN SODIUM 2000 MG: 2 INJECTION, POWDER, FOR SOLUTION INTRAVENOUS at 02:22

## 2024-07-02 RX ADMIN — ANORECTAL OINTMENT: 15.7; .44; 24; 20.6 OINTMENT TOPICAL at 08:49

## 2024-07-02 RX ADMIN — ENOXAPARIN SODIUM 40 MG: 100 INJECTION SUBCUTANEOUS at 08:49

## 2024-07-02 RX ADMIN — OXYCODONE HYDROCHLORIDE AND ACETAMINOPHEN 1 TABLET: 5; 325 TABLET ORAL at 02:23

## 2024-07-02 RX ADMIN — POLYETHYLENE GLYCOL 3350 17 G: 17 POWDER, FOR SOLUTION ORAL at 08:49

## 2024-07-02 RX ADMIN — OXYCODONE HYDROCHLORIDE AND ACETAMINOPHEN 1 TABLET: 5; 325 TABLET ORAL at 11:02

## 2024-07-02 RX ADMIN — AMPICILLIN SODIUM 2000 MG: 2 INJECTION, POWDER, FOR SOLUTION INTRAVENOUS at 14:10

## 2024-07-02 RX ADMIN — PANTOPRAZOLE SODIUM 40 MG: 40 TABLET, DELAYED RELEASE ORAL at 16:45

## 2024-07-02 RX ADMIN — AMPICILLIN SODIUM 2000 MG: 2 INJECTION, POWDER, FOR SOLUTION INTRAVENOUS at 11:06

## 2024-07-02 RX ADMIN — OXYCODONE HYDROCHLORIDE AND ACETAMINOPHEN 1 TABLET: 5; 325 TABLET ORAL at 18:52

## 2024-07-02 RX ADMIN — OXYCODONE HYDROCHLORIDE AND ACETAMINOPHEN 1 TABLET: 5; 325 TABLET ORAL at 22:55

## 2024-07-02 RX ADMIN — AMPICILLIN SODIUM 2000 MG: 2 INJECTION, POWDER, FOR SOLUTION INTRAVENOUS at 22:21

## 2024-07-02 RX ADMIN — CYCLOBENZAPRINE 10 MG: 10 TABLET, FILM COATED ORAL at 05:55

## 2024-07-02 RX ADMIN — PANTOPRAZOLE SODIUM 40 MG: 40 TABLET, DELAYED RELEASE ORAL at 05:53

## 2024-07-02 RX ADMIN — AMPICILLIN SODIUM 2000 MG: 2 INJECTION, POWDER, FOR SOLUTION INTRAVENOUS at 06:02

## 2024-07-02 RX ADMIN — AMPICILLIN SODIUM 2000 MG: 2 INJECTION, POWDER, FOR SOLUTION INTRAVENOUS at 18:17

## 2024-07-02 RX ADMIN — CYCLOBENZAPRINE 10 MG: 10 TABLET, FILM COATED ORAL at 18:52

## 2024-07-02 ASSESSMENT — PAIN SCALES - GENERAL
PAINLEVEL_OUTOF10: 7
PAINLEVEL_OUTOF10: 7
PAINLEVEL_OUTOF10: 8
PAINLEVEL_OUTOF10: 8
PAINLEVEL_OUTOF10: 9
PAINLEVEL_OUTOF10: 9
PAINLEVEL_OUTOF10: 8

## 2024-07-02 ASSESSMENT — PAIN DESCRIPTION - ORIENTATION
ORIENTATION: RIGHT;LEFT

## 2024-07-02 ASSESSMENT — PAIN DESCRIPTION - PAIN TYPE: TYPE: CHRONIC PAIN

## 2024-07-02 ASSESSMENT — PAIN DESCRIPTION - LOCATION
LOCATION: BACK

## 2024-07-02 ASSESSMENT — PAIN DESCRIPTION - DESCRIPTORS
DESCRIPTORS: ACHING;DISCOMFORT
DESCRIPTORS: ACHING;DISCOMFORT;TENDER
DESCRIPTORS: ACHING;CRUSHING;DISCOMFORT

## 2024-07-02 ASSESSMENT — PAIN - FUNCTIONAL ASSESSMENT: PAIN_FUNCTIONAL_ASSESSMENT: PREVENTS OR INTERFERES SOME ACTIVE ACTIVITIES AND ADLS

## 2024-07-02 NOTE — CARE COORDINATION
I called and left a voicemail message for Ina in admissions at [facility] in [place], [place] phone# [phone] to see if they can accept patient. Await call back. Per ID note today, Repeat blood cultures no growth so far. Continue Ampicillin. TTE negative for vegetations, JULIO no vegetations. CRP, SED, Procalcitionin. MRI thoracic and lumbar spine. Per internal med note today, patient complains of back pain requesting adjustment in his pain regimen. Will need a 7000 closer to discharge for the accepting facility. Will determine mode of transportation closer to discharge.  Yakelin Rico RN   [phone]

## 2024-07-03 LAB
ALBUMIN SERPL-MCNC: 1.7 G/DL (ref 3.5–5.2)
ALP SERPL-CCNC: 924 U/L (ref 40–129)
ALT SERPL-CCNC: 24 U/L (ref 0–40)
ANION GAP SERPL CALCULATED.3IONS-SCNC: 10 MMOL/L (ref 7–16)
AST SERPL-CCNC: 59 U/L (ref 0–39)
BASOPHILS # BLD: 0.04 K/UL (ref 0–0.2)
BASOPHILS NFR BLD: 1 % (ref 0–2)
BILIRUB SERPL-MCNC: 1.9 MG/DL (ref 0–1.2)
BUN SERPL-MCNC: 34 MG/DL (ref 6–23)
CALCIUM SERPL-MCNC: 7.5 MG/DL (ref 8.6–10.2)
CHLORIDE SERPL-SCNC: 107 MMOL/L (ref 98–107)
CO2 SERPL-SCNC: 23 MMOL/L (ref 22–29)
CREAT SERPL-MCNC: 1.1 MG/DL (ref 0.7–1.2)
CRP SERPL HS-MCNC: 69 MG/L (ref 0–5)
EOSINOPHIL # BLD: 0.06 K/UL (ref 0.05–0.5)
EOSINOPHILS RELATIVE PERCENT: 2 % (ref 0–6)
ERYTHROCYTE [DISTWIDTH] IN BLOOD BY AUTOMATED COUNT: 18.8 % (ref 11.5–15)
ERYTHROCYTE [SEDIMENTATION RATE] IN BLOOD BY WESTERGREN METHOD: 60 MM/HR (ref 0–15)
GFR, ESTIMATED: 68 ML/MIN/1.73M2
GLUCOSE SERPL-MCNC: 70 MG/DL (ref 74–99)
HCT VFR BLD AUTO: 26.1 % (ref 37–54)
HGB BLD-MCNC: 7.9 G/DL (ref 12.5–16.5)
IMM GRANULOCYTES # BLD AUTO: 0.06 K/UL (ref 0–0.58)
IMM GRANULOCYTES NFR BLD: 2 % (ref 0–5)
LYMPHOCYTES NFR BLD: 0.91 K/UL (ref 1.5–4)
LYMPHOCYTES RELATIVE PERCENT: 23 % (ref 20–42)
MAGNESIUM SERPL-MCNC: 2 MG/DL (ref 1.6–2.6)
MCH RBC QN AUTO: 31.9 PG (ref 26–35)
MCHC RBC AUTO-ENTMCNC: 30.3 G/DL (ref 32–34.5)
MCV RBC AUTO: 105.2 FL (ref 80–99.9)
MONOCYTES NFR BLD: 0.38 K/UL (ref 0.1–0.95)
MONOCYTES NFR BLD: 9 % (ref 2–12)
NEUTROPHILS NFR BLD: 64 % (ref 43–80)
NEUTS SEG NFR BLD: 2.58 K/UL (ref 1.8–7.3)
PHOSPHATE SERPL-MCNC: 3.2 MG/DL (ref 2.5–4.5)
PLATELET # BLD AUTO: 68 K/UL (ref 130–450)
PLATELET CONFIRMATION: NORMAL
PMV BLD AUTO: 10 FL (ref 7–12)
POTASSIUM SERPL-SCNC: 4 MMOL/L (ref 3.5–5)
PROCALCITONIN SERPL-MCNC: 0.58 NG/ML (ref 0–0.08)
PROT SERPL-MCNC: 5.1 G/DL (ref 6.4–8.3)
RBC # BLD AUTO: 2.48 M/UL (ref 3.8–5.8)
RBC # BLD: ABNORMAL 10*6/UL
SODIUM SERPL-SCNC: 140 MMOL/L (ref 132–146)
WBC OTHER # BLD: 4 K/UL (ref 4.5–11.5)

## 2024-07-03 PROCEDURE — 6370000000 HC RX 637 (ALT 250 FOR IP): Performed by: FAMILY MEDICINE

## 2024-07-03 PROCEDURE — 85025 COMPLETE CBC W/AUTO DIFF WBC: CPT

## 2024-07-03 PROCEDURE — 80053 COMPREHEN METABOLIC PANEL: CPT

## 2024-07-03 PROCEDURE — 83735 ASSAY OF MAGNESIUM: CPT

## 2024-07-03 PROCEDURE — 99232 SBSQ HOSP IP/OBS MODERATE 35: CPT | Performed by: TRANSPLANT SURGERY

## 2024-07-03 PROCEDURE — 2580000003 HC RX 258: Performed by: INTERNAL MEDICINE

## 2024-07-03 PROCEDURE — 6360000002 HC RX W HCPCS: Performed by: INTERNAL MEDICINE

## 2024-07-03 PROCEDURE — 84145 PROCALCITONIN (PCT): CPT

## 2024-07-03 PROCEDURE — 6370000000 HC RX 637 (ALT 250 FOR IP): Performed by: INTERNAL MEDICINE

## 2024-07-03 PROCEDURE — 99232 SBSQ HOSP IP/OBS MODERATE 35: CPT | Performed by: FAMILY MEDICINE

## 2024-07-03 PROCEDURE — 85652 RBC SED RATE AUTOMATED: CPT

## 2024-07-03 PROCEDURE — 84100 ASSAY OF PHOSPHORUS: CPT

## 2024-07-03 PROCEDURE — 1200000000 HC SEMI PRIVATE

## 2024-07-03 PROCEDURE — 86140 C-REACTIVE PROTEIN: CPT

## 2024-07-03 PROCEDURE — 36415 COLL VENOUS BLD VENIPUNCTURE: CPT

## 2024-07-03 RX ORDER — ACETAMINOPHEN 500 MG
1000 TABLET ORAL EVERY 8 HOURS SCHEDULED
Status: DISCONTINUED | OUTPATIENT
Start: 2024-07-03 | End: 2024-07-10 | Stop reason: HOSPADM

## 2024-07-03 RX ORDER — OXYCODONE HYDROCHLORIDE 10 MG/1
10 TABLET ORAL EVERY 4 HOURS PRN
Status: DISCONTINUED | OUTPATIENT
Start: 2024-07-03 | End: 2024-07-10 | Stop reason: HOSPADM

## 2024-07-03 RX ADMIN — OXYCODONE HYDROCHLORIDE 10 MG: 10 TABLET ORAL at 21:05

## 2024-07-03 RX ADMIN — OXYCODONE HYDROCHLORIDE 10 MG: 10 TABLET ORAL at 09:42

## 2024-07-03 RX ADMIN — SODIUM CHLORIDE, PRESERVATIVE FREE 10 ML: 5 INJECTION INTRAVENOUS at 08:57

## 2024-07-03 RX ADMIN — OXYCODONE HYDROCHLORIDE 10 MG: 10 TABLET ORAL at 16:06

## 2024-07-03 RX ADMIN — PANTOPRAZOLE SODIUM 40 MG: 40 TABLET, DELAYED RELEASE ORAL at 05:41

## 2024-07-03 RX ADMIN — CYCLOBENZAPRINE 10 MG: 10 TABLET, FILM COATED ORAL at 18:10

## 2024-07-03 RX ADMIN — ANORECTAL OINTMENT: 15.7; .44; 24; 20.6 OINTMENT TOPICAL at 08:53

## 2024-07-03 RX ADMIN — ACETAMINOPHEN 1000 MG: 500 TABLET ORAL at 15:29

## 2024-07-03 RX ADMIN — ANORECTAL OINTMENT: 15.7; .44; 24; 20.6 OINTMENT TOPICAL at 18:12

## 2024-07-03 RX ADMIN — CYCLOBENZAPRINE 10 MG: 10 TABLET, FILM COATED ORAL at 08:52

## 2024-07-03 RX ADMIN — AMPICILLIN SODIUM 2000 MG: 2 INJECTION, POWDER, FOR SOLUTION INTRAVENOUS at 02:39

## 2024-07-03 RX ADMIN — ENOXAPARIN SODIUM 40 MG: 100 INJECTION SUBCUTANEOUS at 08:52

## 2024-07-03 RX ADMIN — AMPICILLIN SODIUM 2000 MG: 2 INJECTION, POWDER, FOR SOLUTION INTRAVENOUS at 05:45

## 2024-07-03 RX ADMIN — AMPICILLIN SODIUM 2000 MG: 2 INJECTION, POWDER, FOR SOLUTION INTRAVENOUS at 15:29

## 2024-07-03 RX ADMIN — ACETAMINOPHEN 1000 MG: 500 TABLET ORAL at 22:14

## 2024-07-03 RX ADMIN — AMPICILLIN SODIUM 2000 MG: 2 INJECTION, POWDER, FOR SOLUTION INTRAVENOUS at 22:14

## 2024-07-03 RX ADMIN — OXYCODONE HYDROCHLORIDE AND ACETAMINOPHEN 1 TABLET: 5; 325 TABLET ORAL at 05:41

## 2024-07-03 RX ADMIN — AMPICILLIN SODIUM 2000 MG: 2 INJECTION, POWDER, FOR SOLUTION INTRAVENOUS at 18:10

## 2024-07-03 RX ADMIN — PANTOPRAZOLE SODIUM 40 MG: 40 TABLET, DELAYED RELEASE ORAL at 16:05

## 2024-07-03 RX ADMIN — AMPICILLIN SODIUM 2000 MG: 2 INJECTION, POWDER, FOR SOLUTION INTRAVENOUS at 10:01

## 2024-07-03 RX ADMIN — SODIUM CHLORIDE, PRESERVATIVE FREE 10 ML: 5 INJECTION INTRAVENOUS at 21:06

## 2024-07-03 ASSESSMENT — PAIN SCALES - GENERAL
PAINLEVEL_OUTOF10: 10
PAINLEVEL_OUTOF10: 7
PAINLEVEL_OUTOF10: 6
PAINLEVEL_OUTOF10: 10

## 2024-07-03 ASSESSMENT — PAIN DESCRIPTION - DESCRIPTORS
DESCRIPTORS: ACHING;DISCOMFORT;TENDER
DESCRIPTORS: ACHING;DISCOMFORT
DESCRIPTORS: ACHING;CRUSHING;DISCOMFORT

## 2024-07-03 ASSESSMENT — PAIN DESCRIPTION - PAIN TYPE: TYPE: CHRONIC PAIN;ACUTE PAIN

## 2024-07-03 ASSESSMENT — PAIN - FUNCTIONAL ASSESSMENT: PAIN_FUNCTIONAL_ASSESSMENT: PREVENTS OR INTERFERES SOME ACTIVE ACTIVITIES AND ADLS

## 2024-07-03 ASSESSMENT — PAIN DESCRIPTION - ORIENTATION
ORIENTATION: RIGHT;LEFT

## 2024-07-03 ASSESSMENT — PAIN DESCRIPTION - LOCATION
LOCATION: BACK

## 2024-07-03 NOTE — CARE COORDINATION
Social Work/Case Management Transition of Care Planning (Katya Guevara [facility] [phone]):  Per report and chart review, MRI of thoracic and lumbar spine are pending.  Patient is on IV Ampicillin q4.  ID is following.  Referral was made to [facility] in [place], [place] (P: [phone]).  No return call despite multiple attempts.  Patient has no preference for facilities as long as it is close to his home.  Spoke with Lynn shah [facility].  She indicated [facility] is a facility in [place], [place] and they are in network with his insurance.  Met with patient at bedside.  He is agreeable to this.  Pre-cert to be started today.  ALFA completed.  7000 completed.  Discharge envelope with ambulette form is in the soft chart.  Attempted to call patient's son x2  to update him but the line was busy.  CM/SW will follow.  RAMYA Rayo  7/3/2024    Update:  Spoke with patient's son, Otf, to update on accepting facility.  He is in agreement as well.  CM/SW will follow.  RAMYA Rayo  7/3/2024

## 2024-07-03 NOTE — DISCHARGE INSTR - COC
Elimination:  Continence:   Bowel: No  Bladder: No  Urinary Catheter: None   Colostomy/Ileostomy/Ileal Conduit: No       Date of Last BM: 7/8/24    Intake/Output Summary (Last 24 hours) at 7/3/2024 1157  Last data filed at 7/3/2024 0533  Gross per 24 hour   Intake 240 ml   Output 520 ml   Net -280 ml     I/O last 3 completed shifts:  In: 720 [P.O.:720]  Out: 770 [Urine:770]    Safety Concerns:     At Risk for Falls    Impairments/Disabilities:      Vision-glasses    Nutrition Therapy:  Current Nutrition Therapy:   - Oral Diet:  General    Routes of Feeding: Oral  Liquids: Thin Liquids  Daily Fluid Restriction: no  Last Modified Barium Swallow with Video (Video Swallowing Test): not done    Treatments at the Time of Hospital Discharge:   Respiratory Treatments: none  Oxygen Therapy:  is not on home oxygen therapy.  Ventilator:    - No ventilator support    Rehab Therapies: Physical Therapy and Occupational Therapy  Weight Bearing Status/Restrictions: No weight bearing restrictions  Other Medical Equipment (for information only, NOT a DME order):  walker, bedside commode, and hospital bed  Other Treatments: TLSO brace when OOB    Patient's personal belongings (please select all that are sent with patient):  Glasses    RN SIGNATURE:  Electronically signed by Christa Mondragon RN on 7/10/24 at 2:59 PM EDT    CASE MANAGEMENT/SOCIAL WORK SECTION    Inpatient Status Date: 6/25/24    Readmission Risk Assessment Score:  Readmission Risk              Risk of Unplanned Readmission:  28           Discharging to Facility/ Agency   Name: [facility]  Address:  [address] [place] [address]  Phone:  [phone]  Fax:    Dialysis Facility (if applicable)   Name:  Address:  Dialysis Schedule:  Phone:  Fax:    / signature: Electronically signed by RAMYA Rayo on 7/3/2024 at 11:58 AM      PHYSICIAN SECTION    Prognosis: Good    Condition at Discharge: Stable    Rehab Potential

## 2024-07-04 ENCOUNTER — APPOINTMENT (OUTPATIENT)
Dept: MRI IMAGING | Age: 74
DRG: 871 | End: 2024-07-04
Payer: MEDICARE

## 2024-07-04 PROBLEM — R60.9 EDEMA: Status: ACTIVE | Noted: 2024-07-04

## 2024-07-04 PROBLEM — M46.26 ACUTE OSTEOMYELITIS OF LUMBAR SPINE (HCC): Status: ACTIVE | Noted: 2024-07-04

## 2024-07-04 LAB
ALBUMIN SERPL-MCNC: 1.8 G/DL (ref 3.5–5.2)
ALP SERPL-CCNC: 982 U/L (ref 40–129)
ALT SERPL-CCNC: 27 U/L (ref 0–40)
ANION GAP SERPL CALCULATED.3IONS-SCNC: 8 MMOL/L (ref 7–16)
AST SERPL-CCNC: 66 U/L (ref 0–39)
BASOPHILS # BLD: 0.04 K/UL (ref 0–0.2)
BASOPHILS NFR BLD: 1 % (ref 0–2)
BILIRUB SERPL-MCNC: 2 MG/DL (ref 0–1.2)
BUN SERPL-MCNC: 30 MG/DL (ref 6–23)
CALCIUM SERPL-MCNC: 7.7 MG/DL (ref 8.6–10.2)
CHLORIDE SERPL-SCNC: 106 MMOL/L (ref 98–107)
CO2 SERPL-SCNC: 24 MMOL/L (ref 22–29)
CREAT SERPL-MCNC: 1.2 MG/DL (ref 0.7–1.2)
EOSINOPHIL # BLD: 0.05 K/UL (ref 0.05–0.5)
EOSINOPHILS RELATIVE PERCENT: 1 % (ref 0–6)
ERYTHROCYTE [DISTWIDTH] IN BLOOD BY AUTOMATED COUNT: 18.5 % (ref 11.5–15)
GFR, ESTIMATED: 67 ML/MIN/1.73M2
GLUCOSE SERPL-MCNC: 79 MG/DL (ref 74–99)
HCT VFR BLD AUTO: 28.8 % (ref 37–54)
HGB BLD-MCNC: 8.4 G/DL (ref 12.5–16.5)
IMM GRANULOCYTES # BLD AUTO: 0.04 K/UL (ref 0–0.58)
IMM GRANULOCYTES NFR BLD: 1 % (ref 0–5)
LYMPHOCYTES NFR BLD: 1.05 K/UL (ref 1.5–4)
LYMPHOCYTES RELATIVE PERCENT: 26 % (ref 20–42)
MAGNESIUM SERPL-MCNC: 2 MG/DL (ref 1.6–2.6)
MCH RBC QN AUTO: 30.9 PG (ref 26–35)
MCHC RBC AUTO-ENTMCNC: 29.2 G/DL (ref 32–34.5)
MCV RBC AUTO: 105.9 FL (ref 80–99.9)
MONOCYTES NFR BLD: 0.47 K/UL (ref 0.1–0.95)
MONOCYTES NFR BLD: 12 % (ref 2–12)
NEUTROPHILS NFR BLD: 60 % (ref 43–80)
NEUTS SEG NFR BLD: 2.42 K/UL (ref 1.8–7.3)
PHOSPHATE SERPL-MCNC: 3.4 MG/DL (ref 2.5–4.5)
PLATELET # BLD AUTO: 61 K/UL (ref 130–450)
PLATELET CONFIRMATION: NORMAL
PMV BLD AUTO: 10.2 FL (ref 7–12)
POTASSIUM SERPL-SCNC: 4.3 MMOL/L (ref 3.5–5)
PROT SERPL-MCNC: 5.6 G/DL (ref 6.4–8.3)
RBC # BLD AUTO: 2.72 M/UL (ref 3.8–5.8)
SODIUM SERPL-SCNC: 138 MMOL/L (ref 132–146)
WBC OTHER # BLD: 4.1 K/UL (ref 4.5–11.5)

## 2024-07-04 PROCEDURE — 83735 ASSAY OF MAGNESIUM: CPT

## 2024-07-04 PROCEDURE — 6360000004 HC RX CONTRAST MEDICATION: Performed by: RADIOLOGY

## 2024-07-04 PROCEDURE — 2580000003 HC RX 258: Performed by: INTERNAL MEDICINE

## 2024-07-04 PROCEDURE — A9579 GAD-BASE MR CONTRAST NOS,1ML: HCPCS | Performed by: RADIOLOGY

## 2024-07-04 PROCEDURE — 85025 COMPLETE CBC W/AUTO DIFF WBC: CPT

## 2024-07-04 PROCEDURE — 99232 SBSQ HOSP IP/OBS MODERATE 35: CPT | Performed by: FAMILY MEDICINE

## 2024-07-04 PROCEDURE — 36415 COLL VENOUS BLD VENIPUNCTURE: CPT

## 2024-07-04 PROCEDURE — 6360000002 HC RX W HCPCS: Performed by: INTERNAL MEDICINE

## 2024-07-04 PROCEDURE — 6370000000 HC RX 637 (ALT 250 FOR IP): Performed by: FAMILY MEDICINE

## 2024-07-04 PROCEDURE — 84100 ASSAY OF PHOSPHORUS: CPT

## 2024-07-04 PROCEDURE — 72157 MRI CHEST SPINE W/O & W/DYE: CPT

## 2024-07-04 PROCEDURE — 80053 COMPREHEN METABOLIC PANEL: CPT

## 2024-07-04 PROCEDURE — 72158 MRI LUMBAR SPINE W/O & W/DYE: CPT

## 2024-07-04 PROCEDURE — 99222 1ST HOSP IP/OBS MODERATE 55: CPT | Performed by: NEUROLOGICAL SURGERY

## 2024-07-04 PROCEDURE — 6370000000 HC RX 637 (ALT 250 FOR IP): Performed by: INTERNAL MEDICINE

## 2024-07-04 PROCEDURE — 1200000000 HC SEMI PRIVATE

## 2024-07-04 RX ORDER — SODIUM CHLORIDE 9 MG/ML
INJECTION, SOLUTION INTRAVENOUS PRN
Status: DISCONTINUED | OUTPATIENT
Start: 2024-07-04 | End: 2024-07-10 | Stop reason: HOSPADM

## 2024-07-04 RX ORDER — SODIUM CHLORIDE 0.9 % (FLUSH) 0.9 %
5-40 SYRINGE (ML) INJECTION EVERY 12 HOURS SCHEDULED
Status: DISCONTINUED | OUTPATIENT
Start: 2024-07-04 | End: 2024-07-10 | Stop reason: HOSPADM

## 2024-07-04 RX ORDER — LIDOCAINE HYDROCHLORIDE 10 MG/ML
50 INJECTION, SOLUTION EPIDURAL; INFILTRATION; INTRACAUDAL; PERINEURAL ONCE
Status: DISCONTINUED | OUTPATIENT
Start: 2024-07-04 | End: 2024-07-10 | Stop reason: HOSPADM

## 2024-07-04 RX ORDER — SODIUM CHLORIDE 0.9 % (FLUSH) 0.9 %
5-40 SYRINGE (ML) INJECTION PRN
Status: DISCONTINUED | OUTPATIENT
Start: 2024-07-04 | End: 2024-07-10 | Stop reason: HOSPADM

## 2024-07-04 RX ADMIN — AMPICILLIN SODIUM 2000 MG: 2 INJECTION, POWDER, FOR SOLUTION INTRAVENOUS at 17:32

## 2024-07-04 RX ADMIN — ACETAMINOPHEN 1000 MG: 500 TABLET ORAL at 15:06

## 2024-07-04 RX ADMIN — AMPICILLIN SODIUM 2000 MG: 2 INJECTION, POWDER, FOR SOLUTION INTRAVENOUS at 10:06

## 2024-07-04 RX ADMIN — AMPICILLIN SODIUM 2000 MG: 2 INJECTION, POWDER, FOR SOLUTION INTRAVENOUS at 06:17

## 2024-07-04 RX ADMIN — OXYCODONE HYDROCHLORIDE 10 MG: 10 TABLET ORAL at 06:06

## 2024-07-04 RX ADMIN — AMPICILLIN SODIUM 2000 MG: 2 INJECTION, POWDER, FOR SOLUTION INTRAVENOUS at 22:45

## 2024-07-04 RX ADMIN — AMPICILLIN SODIUM 2000 MG: 2 INJECTION, POWDER, FOR SOLUTION INTRAVENOUS at 15:06

## 2024-07-04 RX ADMIN — ANORECTAL OINTMENT: 15.7; .44; 24; 20.6 OINTMENT TOPICAL at 17:34

## 2024-07-04 RX ADMIN — ACETAMINOPHEN 1000 MG: 500 TABLET ORAL at 22:45

## 2024-07-04 RX ADMIN — CYCLOBENZAPRINE 10 MG: 10 TABLET, FILM COATED ORAL at 17:32

## 2024-07-04 RX ADMIN — SODIUM CHLORIDE, PRESERVATIVE FREE 10 ML: 5 INJECTION INTRAVENOUS at 10:04

## 2024-07-04 RX ADMIN — ANORECTAL OINTMENT: 15.7; .44; 24; 20.6 OINTMENT TOPICAL at 10:03

## 2024-07-04 RX ADMIN — SODIUM CHLORIDE, PRESERVATIVE FREE 10 ML: 5 INJECTION INTRAVENOUS at 22:46

## 2024-07-04 RX ADMIN — ENOXAPARIN SODIUM 40 MG: 100 INJECTION SUBCUTANEOUS at 10:02

## 2024-07-04 RX ADMIN — GADOTERIDOL 18 ML: 279.3 INJECTION, SOLUTION INTRAVENOUS at 09:12

## 2024-07-04 RX ADMIN — OXYCODONE HYDROCHLORIDE 10 MG: 10 TABLET ORAL at 17:32

## 2024-07-04 RX ADMIN — ACETAMINOPHEN 1000 MG: 500 TABLET ORAL at 06:03

## 2024-07-04 RX ADMIN — CYCLOBENZAPRINE 10 MG: 10 TABLET, FILM COATED ORAL at 06:06

## 2024-07-04 RX ADMIN — AMPICILLIN SODIUM 2000 MG: 2 INJECTION, POWDER, FOR SOLUTION INTRAVENOUS at 02:38

## 2024-07-04 RX ADMIN — PANTOPRAZOLE SODIUM 40 MG: 40 TABLET, DELAYED RELEASE ORAL at 15:06

## 2024-07-04 RX ADMIN — PANTOPRAZOLE SODIUM 40 MG: 40 TABLET, DELAYED RELEASE ORAL at 06:05

## 2024-07-04 ASSESSMENT — PAIN DESCRIPTION - ONSET: ONSET: ON-GOING

## 2024-07-04 ASSESSMENT — PAIN SCALES - GENERAL
PAINLEVEL_OUTOF10: 6
PAINLEVEL_OUTOF10: 10
PAINLEVEL_OUTOF10: 10
PAINLEVEL_OUTOF10: 2

## 2024-07-04 ASSESSMENT — PAIN DESCRIPTION - DESCRIPTORS
DESCRIPTORS: ACHING;DISCOMFORT;DULL
DESCRIPTORS: ACHING;DISCOMFORT;DULL

## 2024-07-04 ASSESSMENT — PAIN DESCRIPTION - ORIENTATION
ORIENTATION: RIGHT;LEFT;LOWER
ORIENTATION: RIGHT;LEFT;LOWER

## 2024-07-04 ASSESSMENT — PAIN DESCRIPTION - FREQUENCY: FREQUENCY: CONTINUOUS

## 2024-07-04 ASSESSMENT — PAIN DESCRIPTION - LOCATION
LOCATION: BACK
LOCATION: BACK

## 2024-07-04 ASSESSMENT — PAIN - FUNCTIONAL ASSESSMENT: PAIN_FUNCTIONAL_ASSESSMENT: PREVENTS OR INTERFERES SOME ACTIVE ACTIVITIES AND ADLS

## 2024-07-04 ASSESSMENT — PAIN DESCRIPTION - PAIN TYPE: TYPE: ACUTE PAIN;CHRONIC PAIN

## 2024-07-04 NOTE — DISCHARGE INSTRUCTIONS
sensitivity. If the patient has a fever or increasing drainage or foul odor from a wound. Notify the treating physician in a timely manner  Stool specimen. If diarrhea occurs while on antibiotics, send stools for C. difficile and WBCs.  When a drug is discontinued due to a low white blood cell count (WBCs) draw two consecutive CBC with differential and CMP.         ALLERGIC OR ADVERSE REACTIONS TO MEDICATIONS  Mild reaction: (itching, with or without rash):  Administer Benadryl 50mg po x 1, then 25mg po q6h prn.   Notify office or physician in a timely matter.  Moderate reaction (itching with or without rash and/or wheezing, dyspnea, itchy throat):  Administer Benadryl 50 mg IV push x 1.   Notify office or physician in a timely manner.  Severe reaction i.e. Anaphylaxis (wheezing or stidor, sudden rash, lightheadedness, hypotension):  Administer epinephrine subcutaneous 0.3mg (1:1000) x 1 dose.   May repeat twice every 5 minutes if needed.  Call EMS and notify office or physician immediately.  For all above reactions: administer Solu-Cortef 100mg slow IV push x 1.    VASCULAR ACCESS DRESSING CHANGE PROTOCOL  Cleanse insertion site with Shur-Clens® or equivalent three times.  Secure catheter with Steri-Strips®, Bone®, or equivalent securing device.  Apply Opsite® 3000 or equivalent transparent dressing.  Change dressing twice weekly, maintaining sterile technique. If there is a BioPatch®, SilverSite® or equivalent, change once weekly only or as needed.    FOLLOW-UP VISITS  Nursing staff should call the office during business hours to schedule a follow-up appointment once the patient is admitted to the service or facility. Every effort should be made to have patient follow-up within 2 weeks of discharge.  Continue IV antibiotic therapy until patient is seen in the office or unless specific stop date is noted on the original order or unless otherwise ordered by physician.  Call office to ensure stop date is correct

## 2024-07-05 ENCOUNTER — APPOINTMENT (OUTPATIENT)
Dept: ULTRASOUND IMAGING | Age: 74
DRG: 871 | End: 2024-07-05
Payer: MEDICARE

## 2024-07-05 LAB
ALBUMIN SERPL-MCNC: 1.8 G/DL (ref 3.5–5.2)
ALP SERPL-CCNC: 1041 U/L (ref 40–129)
ALT SERPL-CCNC: 31 U/L (ref 0–40)
ANION GAP SERPL CALCULATED.3IONS-SCNC: 12 MMOL/L (ref 7–16)
AST SERPL-CCNC: 70 U/L (ref 0–39)
BASOPHILS # BLD: 0.03 K/UL (ref 0–0.2)
BASOPHILS NFR BLD: 0 % (ref 0–2)
BILIRUB SERPL-MCNC: 2.9 MG/DL (ref 0–1.2)
BUN SERPL-MCNC: 22 MG/DL (ref 6–23)
CALCIUM SERPL-MCNC: 7.7 MG/DL (ref 8.6–10.2)
CHLORIDE SERPL-SCNC: 106 MMOL/L (ref 98–107)
CO2 SERPL-SCNC: 21 MMOL/L (ref 22–29)
CREAT SERPL-MCNC: 1 MG/DL (ref 0.7–1.2)
D-DIMER QUANTITATIVE: 1437 NG/ML DDU (ref 0–230)
EOSINOPHIL # BLD: 0.01 K/UL (ref 0.05–0.5)
EOSINOPHILS RELATIVE PERCENT: 0 % (ref 0–6)
ERYTHROCYTE [DISTWIDTH] IN BLOOD BY AUTOMATED COUNT: 18.3 % (ref 11.5–15)
GFR, ESTIMATED: 81 ML/MIN/1.73M2
GLUCOSE SERPL-MCNC: 89 MG/DL (ref 74–99)
HCT VFR BLD AUTO: 29 % (ref 37–54)
HGB BLD-MCNC: 8.5 G/DL (ref 12.5–16.5)
IMM GRANULOCYTES # BLD AUTO: 0.07 K/UL (ref 0–0.58)
IMM GRANULOCYTES NFR BLD: 1 % (ref 0–5)
LACTATE BLDV-SCNC: 4.1 MMOL/L (ref 0.5–2.2)
LYMPHOCYTES NFR BLD: 0.89 K/UL (ref 1.5–4)
LYMPHOCYTES RELATIVE PERCENT: 11 % (ref 20–42)
MCH RBC QN AUTO: 31 PG (ref 26–35)
MCHC RBC AUTO-ENTMCNC: 29.3 G/DL (ref 32–34.5)
MCV RBC AUTO: 105.8 FL (ref 80–99.9)
MONOCYTES NFR BLD: 0.63 K/UL (ref 0.1–0.95)
MONOCYTES NFR BLD: 8 % (ref 2–12)
NEUTROPHILS NFR BLD: 80 % (ref 43–80)
NEUTS SEG NFR BLD: 6.47 K/UL (ref 1.8–7.3)
PLATELET # BLD AUTO: 67 K/UL (ref 130–450)
PLATELET CONFIRMATION: NORMAL
PMV BLD AUTO: 10.3 FL (ref 7–12)
POTASSIUM SERPL-SCNC: 4.2 MMOL/L (ref 3.5–5)
PROT SERPL-MCNC: 5.9 G/DL (ref 6.4–8.3)
RBC # BLD AUTO: 2.74 M/UL (ref 3.8–5.8)
SODIUM SERPL-SCNC: 139 MMOL/L (ref 132–146)
TROPONIN I SERPL HS-MCNC: 48 NG/L (ref 0–11)
TSH SERPL DL<=0.05 MIU/L-ACNC: 1.47 UIU/ML (ref 0.27–4.2)
WBC OTHER # BLD: 8.1 K/UL (ref 4.5–11.5)

## 2024-07-05 PROCEDURE — 80053 COMPREHEN METABOLIC PANEL: CPT

## 2024-07-05 PROCEDURE — 6360000002 HC RX W HCPCS: Performed by: INTERNAL MEDICINE

## 2024-07-05 PROCEDURE — 93970 EXTREMITY STUDY: CPT

## 2024-07-05 PROCEDURE — 2580000003 HC RX 258: Performed by: FAMILY MEDICINE

## 2024-07-05 PROCEDURE — 93005 ELECTROCARDIOGRAM TRACING: CPT | Performed by: FAMILY MEDICINE

## 2024-07-05 PROCEDURE — 2580000003 HC RX 258: Performed by: INTERNAL MEDICINE

## 2024-07-05 PROCEDURE — 1200000000 HC SEMI PRIVATE

## 2024-07-05 PROCEDURE — 87040 BLOOD CULTURE FOR BACTERIA: CPT

## 2024-07-05 PROCEDURE — 84443 ASSAY THYROID STIM HORMONE: CPT

## 2024-07-05 PROCEDURE — 85379 FIBRIN DEGRADATION QUANT: CPT

## 2024-07-05 PROCEDURE — 85025 COMPLETE CBC W/AUTO DIFF WBC: CPT

## 2024-07-05 PROCEDURE — 99232 SBSQ HOSP IP/OBS MODERATE 35: CPT | Performed by: FAMILY MEDICINE

## 2024-07-05 PROCEDURE — 83605 ASSAY OF LACTIC ACID: CPT

## 2024-07-05 PROCEDURE — 84484 ASSAY OF TROPONIN QUANT: CPT

## 2024-07-05 PROCEDURE — 6370000000 HC RX 637 (ALT 250 FOR IP): Performed by: INTERNAL MEDICINE

## 2024-07-05 PROCEDURE — 6370000000 HC RX 637 (ALT 250 FOR IP): Performed by: FAMILY MEDICINE

## 2024-07-05 PROCEDURE — 36415 COLL VENOUS BLD VENIPUNCTURE: CPT

## 2024-07-05 RX ORDER — SODIUM CHLORIDE 9 MG/ML
INJECTION, SOLUTION INTRAVENOUS CONTINUOUS
Status: ACTIVE | OUTPATIENT
Start: 2024-07-05 | End: 2024-07-06

## 2024-07-05 RX ORDER — OXYCODONE HYDROCHLORIDE 10 MG/1
10 TABLET ORAL EVERY 4 HOURS PRN
Qty: 12 TABLET | Refills: 0 | Status: SHIPPED | OUTPATIENT
Start: 2024-07-05 | End: 2024-07-07

## 2024-07-05 RX ORDER — POLYETHYLENE GLYCOL 3350 17 G/17G
17 POWDER, FOR SOLUTION ORAL DAILY
Qty: 30 PACKET | Refills: 0
Start: 2024-07-06 | End: 2024-08-05

## 2024-07-05 RX ORDER — PREDNISONE 10 MG/1
5 TABLET ORAL DAILY
Qty: 5 TABLET | Refills: 0
Start: 2024-07-05

## 2024-07-05 RX ORDER — METOPROLOL TARTRATE 50 MG/1
50 TABLET, FILM COATED ORAL
Status: COMPLETED | OUTPATIENT
Start: 2024-07-05 | End: 2024-07-05

## 2024-07-05 RX ORDER — CYCLOBENZAPRINE HCL 10 MG
10 TABLET ORAL 3 TIMES DAILY PRN
Qty: 30 TABLET | Refills: 0
Start: 2024-07-05 | End: 2024-07-15

## 2024-07-05 RX ORDER — SODIUM CHLORIDE 9 MG/ML
INJECTION, SOLUTION INTRAVENOUS CONTINUOUS
Status: DISCONTINUED | OUTPATIENT
Start: 2024-07-05 | End: 2024-07-05

## 2024-07-05 RX ORDER — LANOLIN ALCOHOL/MO/W.PET/CERES
3 CREAM (GRAM) TOPICAL NIGHTLY PRN
Qty: 30 TABLET | Refills: 0
Start: 2024-07-05

## 2024-07-05 RX ORDER — CALCIUM CARBONATE 500 MG/1
500 TABLET, CHEWABLE ORAL 3 TIMES DAILY PRN
Qty: 10 TABLET | Refills: 0
Start: 2024-07-05 | End: 2024-08-04

## 2024-07-05 RX ORDER — 0.9 % SODIUM CHLORIDE 0.9 %
500 INTRAVENOUS SOLUTION INTRAVENOUS ONCE
Status: COMPLETED | OUTPATIENT
Start: 2024-07-05 | End: 2024-07-05

## 2024-07-05 RX ORDER — BENZONATATE 100 MG/1
100 CAPSULE ORAL 3 TIMES DAILY PRN
Qty: 15 CAPSULE | Refills: 0
Start: 2024-07-05 | End: 2024-07-12

## 2024-07-05 RX ADMIN — ENOXAPARIN SODIUM 40 MG: 100 INJECTION SUBCUTANEOUS at 08:31

## 2024-07-05 RX ADMIN — AMPICILLIN SODIUM 2000 MG: 2 INJECTION, POWDER, FOR SOLUTION INTRAVENOUS at 15:08

## 2024-07-05 RX ADMIN — SODIUM CHLORIDE: 9 INJECTION, SOLUTION INTRAVENOUS at 18:04

## 2024-07-05 RX ADMIN — OXYCODONE HYDROCHLORIDE 10 MG: 10 TABLET ORAL at 08:41

## 2024-07-05 RX ADMIN — METOPROLOL TARTRATE 50 MG: 50 TABLET, FILM COATED ORAL at 15:04

## 2024-07-05 RX ADMIN — ACETAMINOPHEN 1000 MG: 500 TABLET ORAL at 05:15

## 2024-07-05 RX ADMIN — ANORECTAL OINTMENT: 15.7; .44; 24; 20.6 OINTMENT TOPICAL at 18:05

## 2024-07-05 RX ADMIN — AMPICILLIN SODIUM 2000 MG: 2 INJECTION, POWDER, FOR SOLUTION INTRAVENOUS at 08:41

## 2024-07-05 RX ADMIN — POLYETHYLENE GLYCOL 3350 17 G: 17 POWDER, FOR SOLUTION ORAL at 08:31

## 2024-07-05 RX ADMIN — AMPICILLIN SODIUM 2000 MG: 2 INJECTION, POWDER, FOR SOLUTION INTRAVENOUS at 21:55

## 2024-07-05 RX ADMIN — ANORECTAL OINTMENT: 15.7; .44; 24; 20.6 OINTMENT TOPICAL at 08:31

## 2024-07-05 RX ADMIN — SODIUM CHLORIDE 500 ML: 9 INJECTION, SOLUTION INTRAVENOUS at 15:02

## 2024-07-05 RX ADMIN — AMPICILLIN SODIUM 2000 MG: 2 INJECTION, POWDER, FOR SOLUTION INTRAVENOUS at 18:00

## 2024-07-05 RX ADMIN — SODIUM CHLORIDE, PRESERVATIVE FREE 10 ML: 5 INJECTION INTRAVENOUS at 08:32

## 2024-07-05 RX ADMIN — AMPICILLIN SODIUM 2000 MG: 2 INJECTION, POWDER, FOR SOLUTION INTRAVENOUS at 05:19

## 2024-07-05 RX ADMIN — ACETAMINOPHEN 1000 MG: 500 TABLET ORAL at 21:55

## 2024-07-05 RX ADMIN — PANTOPRAZOLE SODIUM 40 MG: 40 TABLET, DELAYED RELEASE ORAL at 05:15

## 2024-07-05 ASSESSMENT — PAIN - FUNCTIONAL ASSESSMENT: PAIN_FUNCTIONAL_ASSESSMENT: PREVENTS OR INTERFERES SOME ACTIVE ACTIVITIES AND ADLS

## 2024-07-05 ASSESSMENT — PAIN SCALES - GENERAL
PAINLEVEL_OUTOF10: 8
PAINLEVEL_OUTOF10: 0
PAINLEVEL_OUTOF10: 0

## 2024-07-05 ASSESSMENT — PAIN DESCRIPTION - ORIENTATION: ORIENTATION: MID

## 2024-07-05 ASSESSMENT — PAIN DESCRIPTION - DESCRIPTORS: DESCRIPTORS: ACHING;DISCOMFORT

## 2024-07-05 ASSESSMENT — PAIN DESCRIPTION - LOCATION
LOCATION: BACK
LOCATION: BACK

## 2024-07-05 NOTE — CARE COORDINATION
Social Work/Case Management Transition of Care Planning (Katya Guevara [facility] [phone]):  Per report and chart review, MRI of the lumbar and thoracic spine were completed. They showed T12-L3 discitis.  Patient is on IV ampicillin q4. Per ID, patient will need it for a total of 6 weeks.  PICC has been ordered.  Neurosurgery was consulted.  No surgical intervention at this time.  TLSO has been ordered.  PT/OT evals are needed but cannot be done until TLSO is delivered. Met with patient and his son, Otf, at bedside.  Discharge plan is to [facility].  Pre-cert was started on 7/3. ALFA completed. 7000 completed. Discharge envelope with ambulette form is in the soft chart. CM/SW will follow. RAMYA Rayo  7/5/2024    Update:  Pre-cert has been approved for [facility].  However, discharge is on hold at this time due to elevated heart rate.  Stat blood cultures have been ordered.   RAMYA Rayo  7/5/2024

## 2024-07-05 NOTE — DISCHARGE SUMMARY
Hospital Medicine Discharge Summary    Patient ID: Raudel Herndon      Patient's PCP: Villa Lemus PA    Admit Date: 6/24/2024     Discharge Date:  7/5/2024    Admitting Physician: Sindhu Knight DO     Discharge Physician: Goldy Cardona MD    Discharge Diagnoses:       Active Hospital Problems    Diagnosis Date Noted    Edema [R60.9] 07/04/2024    Acute osteomyelitis of lumbar spine (HCC) [M46.26] 07/04/2024    Severe protein-calorie malnutrition (HCC) [E43] 06/26/2024    Enterococcal bacteremia [R78.81, B95.2] 06/26/2024    Cholangitis [K83.09] 06/26/2024    Failure to thrive in adult [R62.7] 06/25/2024    UTI (urinary tract infection) [N39.0] 06/25/2024    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    Hospital Course:   HPI from chart :   \"  Raudel Herndon is a 73 y.o. male with a past medical history that includes cholangiocarcinoma, chronic pancreatitis, diabetes who presents to [facility] ER complaining of fatigue, body aches. He was diagnosed with  cholangiocarcinoma around 6 months ago and follows with Dr. Reynoso.  He is on chemotherapy.  Last chemotherapy was last week. Upon presentation to the ER, routine labwork was performed which revealed lactic acid 4.5, glucose 125, troponin 47, ALT [place] 82, ALT 41, AST 78, bili 4.3, WBC 21.3, hemoglobin 8.6, platelets 88. CXR was negative. Patient was found to have a UTI and was started on Rocephin. He was also noted to have acute on chronic anemia with Hgb of 6.9 and given one unit of blood. Lactic acidosis resolved with fluids. Blood pressures noted to be soft on admission as well. Blood cultures returned positive for E. Faecalis. ID was consulted. Patient on Rocephin,

## 2024-07-06 LAB
ALBUMIN SERPL-MCNC: 1.5 G/DL (ref 3.5–5.2)
ALP SERPL-CCNC: 897 U/L (ref 40–129)
ALT SERPL-CCNC: 27 U/L (ref 0–40)
ANION GAP SERPL CALCULATED.3IONS-SCNC: 9 MMOL/L (ref 7–16)
AST SERPL-CCNC: 68 U/L (ref 0–39)
BASOPHILS # BLD: 0.09 K/UL (ref 0–0.2)
BASOPHILS NFR BLD: 2 % (ref 0–2)
BILIRUB SERPL-MCNC: 2.5 MG/DL (ref 0–1.2)
BUN SERPL-MCNC: 23 MG/DL (ref 6–23)
CALCIUM SERPL-MCNC: 7.5 MG/DL (ref 8.6–10.2)
CHLORIDE SERPL-SCNC: 108 MMOL/L (ref 98–107)
CO2 SERPL-SCNC: 21 MMOL/L (ref 22–29)
CREAT SERPL-MCNC: 1.1 MG/DL (ref 0.7–1.2)
EOSINOPHIL # BLD: 0 K/UL (ref 0.05–0.5)
EOSINOPHILS RELATIVE PERCENT: 0 % (ref 0–6)
ERYTHROCYTE [DISTWIDTH] IN BLOOD BY AUTOMATED COUNT: 18.4 % (ref 11.5–15)
GFR, ESTIMATED: 74 ML/MIN/1.73M2
GLUCOSE SERPL-MCNC: 77 MG/DL (ref 74–99)
HCT VFR BLD AUTO: 27.7 % (ref 37–54)
HGB BLD-MCNC: 7.8 G/DL (ref 12.5–16.5)
LACTATE BLDV-SCNC: 2.1 MMOL/L (ref 0.5–2.2)
LYMPHOCYTES NFR BLD: 0.19 K/UL (ref 1.5–4)
LYMPHOCYTES RELATIVE PERCENT: 4 % (ref 20–42)
MAGNESIUM SERPL-MCNC: 1.9 MG/DL (ref 1.6–2.6)
MCH RBC QN AUTO: 32.2 PG (ref 26–35)
MCHC RBC AUTO-ENTMCNC: 28.2 G/DL (ref 32–34.5)
MCV RBC AUTO: 114.5 FL (ref 80–99.9)
MONOCYTES NFR BLD: 0.05 K/UL (ref 0.1–0.95)
MONOCYTES NFR BLD: 1 % (ref 2–12)
MYELOCYTES ABSOLUTE COUNT: 0.05 K/UL
MYELOCYTES: 1 %
NEUTROPHILS NFR BLD: 93 % (ref 43–80)
NEUTS SEG NFR BLD: 4.93 K/UL (ref 1.8–7.3)
NUCLEATED RED BLOOD CELLS: 1 PER 100 WBC
PHOSPHATE SERPL-MCNC: 3.7 MG/DL (ref 2.5–4.5)
PLATELET CONFIRMATION: NORMAL
PLATELET, FLUORESCENCE: 59 K/UL (ref 130–450)
PMV BLD AUTO: 9.3 FL (ref 7–12)
POTASSIUM SERPL-SCNC: 4.1 MMOL/L (ref 3.5–5)
PROT SERPL-MCNC: 5.3 G/DL (ref 6.4–8.3)
RBC # BLD AUTO: 2.42 M/UL (ref 3.8–5.8)
RBC # BLD: ABNORMAL 10*6/UL
SODIUM SERPL-SCNC: 138 MMOL/L (ref 132–146)
WBC OTHER # BLD: 5.3 K/UL (ref 4.5–11.5)

## 2024-07-06 PROCEDURE — 6370000000 HC RX 637 (ALT 250 FOR IP): Performed by: FAMILY MEDICINE

## 2024-07-06 PROCEDURE — 2580000003 HC RX 258: Performed by: INTERNAL MEDICINE

## 2024-07-06 PROCEDURE — 1200000000 HC SEMI PRIVATE

## 2024-07-06 PROCEDURE — 97535 SELF CARE MNGMENT TRAINING: CPT

## 2024-07-06 PROCEDURE — 84100 ASSAY OF PHOSPHORUS: CPT

## 2024-07-06 PROCEDURE — 80053 COMPREHEN METABOLIC PANEL: CPT

## 2024-07-06 PROCEDURE — 99232 SBSQ HOSP IP/OBS MODERATE 35: CPT | Performed by: FAMILY MEDICINE

## 2024-07-06 PROCEDURE — 6370000000 HC RX 637 (ALT 250 FOR IP): Performed by: INTERNAL MEDICINE

## 2024-07-06 PROCEDURE — 36415 COLL VENOUS BLD VENIPUNCTURE: CPT

## 2024-07-06 PROCEDURE — 6360000002 HC RX W HCPCS: Performed by: INTERNAL MEDICINE

## 2024-07-06 PROCEDURE — 83735 ASSAY OF MAGNESIUM: CPT

## 2024-07-06 PROCEDURE — 97530 THERAPEUTIC ACTIVITIES: CPT

## 2024-07-06 PROCEDURE — 85025 COMPLETE CBC W/AUTO DIFF WBC: CPT

## 2024-07-06 PROCEDURE — 83605 ASSAY OF LACTIC ACID: CPT

## 2024-07-06 RX ADMIN — VANCOMYCIN HYDROCHLORIDE 1500 MG: 10 INJECTION, POWDER, LYOPHILIZED, FOR SOLUTION INTRAVENOUS at 15:32

## 2024-07-06 RX ADMIN — PANTOPRAZOLE SODIUM 40 MG: 40 TABLET, DELAYED RELEASE ORAL at 06:27

## 2024-07-06 RX ADMIN — OXYCODONE HYDROCHLORIDE 10 MG: 10 TABLET ORAL at 20:41

## 2024-07-06 RX ADMIN — CYCLOBENZAPRINE 10 MG: 10 TABLET, FILM COATED ORAL at 10:09

## 2024-07-06 RX ADMIN — AMPICILLIN SODIUM 2000 MG: 2 INJECTION, POWDER, FOR SOLUTION INTRAVENOUS at 06:26

## 2024-07-06 RX ADMIN — ANORECTAL OINTMENT: 15.7; .44; 24; 20.6 OINTMENT TOPICAL at 01:10

## 2024-07-06 RX ADMIN — PANTOPRAZOLE SODIUM 40 MG: 40 TABLET, DELAYED RELEASE ORAL at 15:25

## 2024-07-06 RX ADMIN — OXYCODONE HYDROCHLORIDE 10 MG: 10 TABLET ORAL at 15:25

## 2024-07-06 RX ADMIN — OXYCODONE HYDROCHLORIDE 10 MG: 10 TABLET ORAL at 10:06

## 2024-07-06 RX ADMIN — ACETAMINOPHEN 1000 MG: 500 TABLET ORAL at 22:17

## 2024-07-06 RX ADMIN — CYCLOBENZAPRINE 10 MG: 10 TABLET, FILM COATED ORAL at 20:40

## 2024-07-06 RX ADMIN — AMPICILLIN SODIUM 2000 MG: 2 INJECTION, POWDER, FOR SOLUTION INTRAVENOUS at 10:06

## 2024-07-06 RX ADMIN — ANORECTAL OINTMENT: 15.7; .44; 24; 20.6 OINTMENT TOPICAL at 10:14

## 2024-07-06 RX ADMIN — SODIUM CHLORIDE, PRESERVATIVE FREE 10 ML: 5 INJECTION INTRAVENOUS at 10:13

## 2024-07-06 RX ADMIN — ACETAMINOPHEN 1000 MG: 500 TABLET ORAL at 15:23

## 2024-07-06 RX ADMIN — ANORECTAL OINTMENT: 15.7; .44; 24; 20.6 OINTMENT TOPICAL at 18:00

## 2024-07-06 RX ADMIN — SODIUM CHLORIDE, PRESERVATIVE FREE 10 ML: 5 INJECTION INTRAVENOUS at 09:59

## 2024-07-06 RX ADMIN — SODIUM CHLORIDE, PRESERVATIVE FREE 10 ML: 5 INJECTION INTRAVENOUS at 20:41

## 2024-07-06 RX ADMIN — ACETAMINOPHEN 1000 MG: 500 TABLET ORAL at 06:26

## 2024-07-06 RX ADMIN — ENOXAPARIN SODIUM 40 MG: 100 INJECTION SUBCUTANEOUS at 09:58

## 2024-07-06 RX ADMIN — AMPICILLIN SODIUM 2000 MG: 2 INJECTION, POWDER, FOR SOLUTION INTRAVENOUS at 02:57

## 2024-07-06 ASSESSMENT — PAIN SCALES - WONG BAKER
WONGBAKER_NUMERICALRESPONSE: HURTS A LITTLE BIT
WONGBAKER_NUMERICALRESPONSE: HURTS A LITTLE BIT

## 2024-07-06 ASSESSMENT — PAIN DESCRIPTION - LOCATION
LOCATION: BACK;LEG
LOCATION: BACK;LEG
LOCATION: BACK
LOCATION: BACK

## 2024-07-06 ASSESSMENT — PAIN DESCRIPTION - DESCRIPTORS
DESCRIPTORS: ACHING
DESCRIPTORS: BURNING;THROBBING
DESCRIPTORS: ACHING

## 2024-07-06 ASSESSMENT — PAIN SCALES - GENERAL
PAINLEVEL_OUTOF10: 1
PAINLEVEL_OUTOF10: 8
PAINLEVEL_OUTOF10: 2
PAINLEVEL_OUTOF10: 5
PAINLEVEL_OUTOF10: 8

## 2024-07-06 ASSESSMENT — PAIN DESCRIPTION - ORIENTATION
ORIENTATION: MID
ORIENTATION: LEFT;RIGHT;LOWER
ORIENTATION: LEFT;RIGHT;LOWER
ORIENTATION: LOWER

## 2024-07-06 ASSESSMENT — PAIN DESCRIPTION - PAIN TYPE: TYPE: ACUTE PAIN;CHRONIC PAIN

## 2024-07-06 ASSESSMENT — PAIN - FUNCTIONAL ASSESSMENT
PAIN_FUNCTIONAL_ASSESSMENT: PREVENTS OR INTERFERES SOME ACTIVE ACTIVITIES AND ADLS
PAIN_FUNCTIONAL_ASSESSMENT: PREVENTS OR INTERFERES SOME ACTIVE ACTIVITIES AND ADLS

## 2024-07-07 ENCOUNTER — APPOINTMENT (OUTPATIENT)
Dept: CT IMAGING | Age: 74
DRG: 871 | End: 2024-07-07
Attending: FAMILY MEDICINE
Payer: MEDICARE

## 2024-07-07 ENCOUNTER — APPOINTMENT (OUTPATIENT)
Dept: ULTRASOUND IMAGING | Age: 74
DRG: 871 | End: 2024-07-07
Payer: MEDICARE

## 2024-07-07 LAB
ALBUMIN SERPL-MCNC: 1.7 G/DL (ref 3.5–5.2)
ALP SERPL-CCNC: 876 U/L (ref 40–129)
ALT SERPL-CCNC: 29 U/L (ref 0–40)
ANION GAP SERPL CALCULATED.3IONS-SCNC: 10 MMOL/L (ref 7–16)
AST SERPL-CCNC: 69 U/L (ref 0–39)
BASOPHILS # BLD: 0.03 K/UL (ref 0–0.2)
BASOPHILS NFR BLD: 1 % (ref 0–2)
BILIRUB SERPL-MCNC: 2.6 MG/DL (ref 0–1.2)
BUN SERPL-MCNC: 25 MG/DL (ref 6–23)
CALCIUM SERPL-MCNC: 7.5 MG/DL (ref 8.6–10.2)
CHLORIDE SERPL-SCNC: 108 MMOL/L (ref 98–107)
CO2 SERPL-SCNC: 23 MMOL/L (ref 22–29)
CREAT SERPL-MCNC: 1.1 MG/DL (ref 0.7–1.2)
EOSINOPHIL # BLD: 0 K/UL (ref 0.05–0.5)
EOSINOPHILS RELATIVE PERCENT: 0 % (ref 0–6)
ERYTHROCYTE [DISTWIDTH] IN BLOOD BY AUTOMATED COUNT: 18 % (ref 11.5–15)
GFR, ESTIMATED: 73 ML/MIN/1.73M2
GLUCOSE SERPL-MCNC: 121 MG/DL (ref 74–99)
HCT VFR BLD AUTO: 26.2 % (ref 37–54)
HGB BLD-MCNC: 8.1 G/DL (ref 12.5–16.5)
IMM GRANULOCYTES # BLD AUTO: 0.08 K/UL (ref 0–0.58)
IMM GRANULOCYTES NFR BLD: 2 % (ref 0–5)
LYMPHOCYTES NFR BLD: 0.96 K/UL (ref 1.5–4)
LYMPHOCYTES RELATIVE PERCENT: 20 % (ref 20–42)
MAGNESIUM SERPL-MCNC: 2 MG/DL (ref 1.6–2.6)
MCH RBC QN AUTO: 32.7 PG (ref 26–35)
MCHC RBC AUTO-ENTMCNC: 30.9 G/DL (ref 32–34.5)
MCV RBC AUTO: 105.6 FL (ref 80–99.9)
MONOCYTES NFR BLD: 0.43 K/UL (ref 0.1–0.95)
MONOCYTES NFR BLD: 9 % (ref 2–12)
NEUTROPHILS NFR BLD: 68 % (ref 43–80)
NEUTS SEG NFR BLD: 3.23 K/UL (ref 1.8–7.3)
PHOSPHATE SERPL-MCNC: 3.4 MG/DL (ref 2.5–4.5)
PLATELET # BLD AUTO: 60 K/UL (ref 130–450)
PLATELET CONFIRMATION: NORMAL
PMV BLD AUTO: 10.6 FL (ref 7–12)
POTASSIUM SERPL-SCNC: 4.1 MMOL/L (ref 3.5–5)
PROT SERPL-MCNC: 5.3 G/DL (ref 6.4–8.3)
RBC # BLD AUTO: 2.48 M/UL (ref 3.8–5.8)
SODIUM SERPL-SCNC: 141 MMOL/L (ref 132–146)
WBC OTHER # BLD: 4.7 K/UL (ref 4.5–11.5)

## 2024-07-07 PROCEDURE — C1751 CATH, INF, PER/CENT/MIDLINE: HCPCS

## 2024-07-07 PROCEDURE — 99232 SBSQ HOSP IP/OBS MODERATE 35: CPT | Performed by: FAMILY MEDICINE

## 2024-07-07 PROCEDURE — 05HY33Z INSERTION OF INFUSION DEVICE INTO UPPER VEIN, PERCUTANEOUS APPROACH: ICD-10-PCS | Performed by: FAMILY MEDICINE

## 2024-07-07 PROCEDURE — 84100 ASSAY OF PHOSPHORUS: CPT

## 2024-07-07 PROCEDURE — 36415 COLL VENOUS BLD VENIPUNCTURE: CPT

## 2024-07-07 PROCEDURE — 2580000003 HC RX 258: Performed by: INTERNAL MEDICINE

## 2024-07-07 PROCEDURE — 6370000000 HC RX 637 (ALT 250 FOR IP): Performed by: INTERNAL MEDICINE

## 2024-07-07 PROCEDURE — 6370000000 HC RX 637 (ALT 250 FOR IP): Performed by: FAMILY MEDICINE

## 2024-07-07 PROCEDURE — 76937 US GUIDE VASCULAR ACCESS: CPT

## 2024-07-07 PROCEDURE — 36568 INSJ PICC <5 YR W/O IMAGING: CPT

## 2024-07-07 PROCEDURE — 71275 CT ANGIOGRAPHY CHEST: CPT

## 2024-07-07 PROCEDURE — 85025 COMPLETE CBC W/AUTO DIFF WBC: CPT

## 2024-07-07 PROCEDURE — 6360000002 HC RX W HCPCS: Performed by: INTERNAL MEDICINE

## 2024-07-07 PROCEDURE — 6360000004 HC RX CONTRAST MEDICATION: Performed by: RADIOLOGY

## 2024-07-07 PROCEDURE — 83735 ASSAY OF MAGNESIUM: CPT

## 2024-07-07 PROCEDURE — 80053 COMPREHEN METABOLIC PANEL: CPT

## 2024-07-07 PROCEDURE — 1200000000 HC SEMI PRIVATE

## 2024-07-07 PROCEDURE — 93970 EXTREMITY STUDY: CPT

## 2024-07-07 RX ADMIN — ENOXAPARIN SODIUM 40 MG: 100 INJECTION SUBCUTANEOUS at 09:14

## 2024-07-07 RX ADMIN — PANTOPRAZOLE SODIUM 40 MG: 40 TABLET, DELAYED RELEASE ORAL at 16:19

## 2024-07-07 RX ADMIN — SODIUM CHLORIDE, PRESERVATIVE FREE 10 ML: 5 INJECTION INTRAVENOUS at 09:15

## 2024-07-07 RX ADMIN — ANORECTAL OINTMENT: 15.7; .44; 24; 20.6 OINTMENT TOPICAL at 09:16

## 2024-07-07 RX ADMIN — OXYCODONE HYDROCHLORIDE 10 MG: 10 TABLET ORAL at 09:14

## 2024-07-07 RX ADMIN — VANCOMYCIN HYDROCHLORIDE 1500 MG: 10 INJECTION, POWDER, LYOPHILIZED, FOR SOLUTION INTRAVENOUS at 09:13

## 2024-07-07 RX ADMIN — CYCLOBENZAPRINE 10 MG: 10 TABLET, FILM COATED ORAL at 09:14

## 2024-07-07 RX ADMIN — ACETAMINOPHEN 1000 MG: 500 TABLET ORAL at 05:39

## 2024-07-07 RX ADMIN — SODIUM CHLORIDE, PRESERVATIVE FREE 10 ML: 5 INJECTION INTRAVENOUS at 21:28

## 2024-07-07 RX ADMIN — PANTOPRAZOLE SODIUM 40 MG: 40 TABLET, DELAYED RELEASE ORAL at 05:40

## 2024-07-07 RX ADMIN — ACETAMINOPHEN 1000 MG: 500 TABLET ORAL at 21:28

## 2024-07-07 RX ADMIN — IOPAMIDOL 75 ML: 755 INJECTION, SOLUTION INTRAVENOUS at 17:33

## 2024-07-07 RX ADMIN — ACETAMINOPHEN 1000 MG: 500 TABLET ORAL at 16:19

## 2024-07-07 ASSESSMENT — PAIN DESCRIPTION - DESCRIPTORS
DESCRIPTORS: PRESSURE;SHARP;DISCOMFORT
DESCRIPTORS: ACHING;TENDER
DESCRIPTORS: ACHING

## 2024-07-07 ASSESSMENT — PAIN SCALES - GENERAL
PAINLEVEL_OUTOF10: 7
PAINLEVEL_OUTOF10: 4
PAINLEVEL_OUTOF10: 2
PAINLEVEL_OUTOF10: 2
PAINLEVEL_OUTOF10: 8

## 2024-07-07 ASSESSMENT — PAIN DESCRIPTION - ORIENTATION
ORIENTATION: MID;UPPER;LOWER
ORIENTATION: RIGHT;LEFT;MID
ORIENTATION: RIGHT;MID;UPPER

## 2024-07-07 ASSESSMENT — PAIN DESCRIPTION - LOCATION
LOCATION: BACK
LOCATION: BACK;ARM

## 2024-07-07 ASSESSMENT — PAIN SCALES - WONG BAKER: WONGBAKER_NUMERICALRESPONSE: NO HURT

## 2024-07-07 ASSESSMENT — PAIN - FUNCTIONAL ASSESSMENT: PAIN_FUNCTIONAL_ASSESSMENT: PREVENTS OR INTERFERES SOME ACTIVE ACTIVITIES AND ADLS

## 2024-07-08 LAB
ALBUMIN SERPL-MCNC: 1.6 G/DL (ref 3.5–5.2)
ALP SERPL-CCNC: 1002 U/L (ref 40–129)
ALT SERPL-CCNC: 34 U/L (ref 0–40)
ANION GAP SERPL CALCULATED.3IONS-SCNC: 11 MMOL/L (ref 7–16)
AST SERPL-CCNC: 81 U/L (ref 0–39)
BASOPHILS # BLD: 0.03 K/UL (ref 0–0.2)
BASOPHILS # BLD: 0.07 K/UL (ref 0–0.2)
BASOPHILS NFR BLD: 1 % (ref 0–2)
BASOPHILS NFR BLD: 2 % (ref 0–2)
BILIRUB SERPL-MCNC: 2.9 MG/DL (ref 0–1.2)
BUN SERPL-MCNC: 22 MG/DL (ref 6–23)
CALCIUM SERPL-MCNC: 7.9 MG/DL (ref 8.6–10.2)
CHLORIDE SERPL-SCNC: 107 MMOL/L (ref 98–107)
CO2 SERPL-SCNC: 22 MMOL/L (ref 22–29)
CREAT SERPL-MCNC: 1 MG/DL (ref 0.7–1.2)
DATE LAST DOSE: NORMAL
EKG ATRIAL RATE: 146 BPM
EKG P AXIS: 25 DEGREES
EKG P-R INTERVAL: 112 MS
EKG Q-T INTERVAL: 268 MS
EKG QRS DURATION: 66 MS
EKG QTC CALCULATION (BAZETT): 417 MS
EKG R AXIS: 35 DEGREES
EKG T AXIS: 160 DEGREES
EKG VENTRICULAR RATE: 146 BPM
EOSINOPHIL # BLD: 0 K/UL (ref 0.05–0.5)
EOSINOPHIL # BLD: 0.01 K/UL (ref 0.05–0.5)
EOSINOPHILS RELATIVE PERCENT: 0 % (ref 0–6)
EOSINOPHILS RELATIVE PERCENT: 0 % (ref 0–6)
ERYTHROCYTE [DISTWIDTH] IN BLOOD BY AUTOMATED COUNT: 17.8 % (ref 11.5–15)
ERYTHROCYTE [DISTWIDTH] IN BLOOD BY AUTOMATED COUNT: 17.9 % (ref 11.5–15)
GFR, ESTIMATED: 81 ML/MIN/1.73M2
GLUCOSE SERPL-MCNC: 70 MG/DL (ref 74–99)
HCT VFR BLD AUTO: 25.5 % (ref 37–54)
HCT VFR BLD AUTO: 30.3 % (ref 37–54)
HGB BLD-MCNC: 7.5 G/DL (ref 12.5–16.5)
HGB BLD-MCNC: 8.7 G/DL (ref 12.5–16.5)
IMM GRANULOCYTES # BLD AUTO: 0.07 K/UL (ref 0–0.58)
IMM GRANULOCYTES NFR BLD: 2 % (ref 0–5)
LYMPHOCYTES NFR BLD: 0.4 K/UL (ref 1.5–4)
LYMPHOCYTES NFR BLD: 0.94 K/UL (ref 1.5–4)
LYMPHOCYTES RELATIVE PERCENT: 10 % (ref 20–42)
LYMPHOCYTES RELATIVE PERCENT: 22 % (ref 20–42)
MAGNESIUM SERPL-MCNC: 2 MG/DL (ref 1.6–2.6)
MCH RBC QN AUTO: 31.1 PG (ref 26–35)
MCH RBC QN AUTO: 31.2 PG (ref 26–35)
MCHC RBC AUTO-ENTMCNC: 28.7 G/DL (ref 32–34.5)
MCHC RBC AUTO-ENTMCNC: 29.4 G/DL (ref 32–34.5)
MCV RBC AUTO: 105.8 FL (ref 80–99.9)
MCV RBC AUTO: 108.6 FL (ref 80–99.9)
MONOCYTES NFR BLD: 0.22 K/UL (ref 0.1–0.95)
MONOCYTES NFR BLD: 0.47 K/UL (ref 0.1–0.95)
MONOCYTES NFR BLD: 11 % (ref 2–12)
MONOCYTES NFR BLD: 5 % (ref 2–12)
MYELOCYTES ABSOLUTE COUNT: 0.04 K/UL
MYELOCYTES: 1 %
NEUTROPHILS NFR BLD: 64 % (ref 43–80)
NEUTROPHILS NFR BLD: 83 % (ref 43–80)
NEUTS SEG NFR BLD: 2.72 K/UL (ref 1.8–7.3)
NEUTS SEG NFR BLD: 3.47 K/UL (ref 1.8–7.3)
PHOSPHATE SERPL-MCNC: 3.3 MG/DL (ref 2.5–4.5)
PLATELET # BLD AUTO: 36 K/UL (ref 130–450)
PLATELET # BLD AUTO: 54 K/UL (ref 130–450)
PLATELET CONFIRMATION: NORMAL
PLATELET CONFIRMATION: NORMAL
PMV BLD AUTO: 10.3 FL (ref 7–12)
PMV BLD AUTO: 10.4 FL (ref 7–12)
POTASSIUM SERPL-SCNC: 4.2 MMOL/L (ref 3.5–5)
PROT SERPL-MCNC: 6 G/DL (ref 6.4–8.3)
RBC # BLD AUTO: 2.41 M/UL (ref 3.8–5.8)
RBC # BLD AUTO: 2.79 M/UL (ref 3.8–5.8)
RBC # BLD: ABNORMAL 10*6/UL
RBC # BLD: ABNORMAL 10*6/UL
SODIUM SERPL-SCNC: 140 MMOL/L (ref 132–146)
TME LAST DOSE: NORMAL H
VANCOMYCIN DOSE: NORMAL MG
VANCOMYCIN SERPL-MCNC: 9.7 UG/ML (ref 5–40)
WBC # BLD: ABNORMAL 10*3/UL
WBC OTHER # BLD: 4.2 K/UL (ref 4.5–11.5)
WBC OTHER # BLD: 4.2 K/UL (ref 4.5–11.5)

## 2024-07-08 PROCEDURE — 2580000003 HC RX 258: Performed by: INTERNAL MEDICINE

## 2024-07-08 PROCEDURE — 97530 THERAPEUTIC ACTIVITIES: CPT

## 2024-07-08 PROCEDURE — 6360000002 HC RX W HCPCS: Performed by: INTERNAL MEDICINE

## 2024-07-08 PROCEDURE — 85025 COMPLETE CBC W/AUTO DIFF WBC: CPT

## 2024-07-08 PROCEDURE — 6370000000 HC RX 637 (ALT 250 FOR IP): Performed by: INTERNAL MEDICINE

## 2024-07-08 PROCEDURE — 83735 ASSAY OF MAGNESIUM: CPT

## 2024-07-08 PROCEDURE — 6360000002 HC RX W HCPCS: Performed by: FAMILY MEDICINE

## 2024-07-08 PROCEDURE — 6370000000 HC RX 637 (ALT 250 FOR IP): Performed by: FAMILY MEDICINE

## 2024-07-08 PROCEDURE — 36415 COLL VENOUS BLD VENIPUNCTURE: CPT

## 2024-07-08 PROCEDURE — 80053 COMPREHEN METABOLIC PANEL: CPT

## 2024-07-08 PROCEDURE — 80202 ASSAY OF VANCOMYCIN: CPT

## 2024-07-08 PROCEDURE — 93010 ELECTROCARDIOGRAM REPORT: CPT | Performed by: INTERNAL MEDICINE

## 2024-07-08 PROCEDURE — 97535 SELF CARE MNGMENT TRAINING: CPT

## 2024-07-08 PROCEDURE — 84100 ASSAY OF PHOSPHORUS: CPT

## 2024-07-08 PROCEDURE — L0464 TLSO 4MOD SACRO-SCAP PRE: HCPCS

## 2024-07-08 PROCEDURE — 99232 SBSQ HOSP IP/OBS MODERATE 35: CPT | Performed by: FAMILY MEDICINE

## 2024-07-08 PROCEDURE — 1200000000 HC SEMI PRIVATE

## 2024-07-08 RX ORDER — FUROSEMIDE 10 MG/ML
20 INJECTION INTRAMUSCULAR; INTRAVENOUS DAILY
Status: DISCONTINUED | OUTPATIENT
Start: 2024-07-08 | End: 2024-07-08

## 2024-07-08 RX ORDER — FUROSEMIDE 10 MG/ML
20 INJECTION INTRAMUSCULAR; INTRAVENOUS 2 TIMES DAILY
Status: DISCONTINUED | OUTPATIENT
Start: 2024-07-08 | End: 2024-07-10 | Stop reason: HOSPADM

## 2024-07-08 RX ADMIN — ENOXAPARIN SODIUM 40 MG: 100 INJECTION SUBCUTANEOUS at 08:19

## 2024-07-08 RX ADMIN — OXYCODONE HYDROCHLORIDE 10 MG: 10 TABLET ORAL at 21:55

## 2024-07-08 RX ADMIN — Medication 3 MG: at 21:55

## 2024-07-08 RX ADMIN — ANORECTAL OINTMENT: 15.7; .44; 24; 20.6 OINTMENT TOPICAL at 17:24

## 2024-07-08 RX ADMIN — PANTOPRAZOLE SODIUM 40 MG: 40 TABLET, DELAYED RELEASE ORAL at 17:20

## 2024-07-08 RX ADMIN — PANTOPRAZOLE SODIUM 40 MG: 40 TABLET, DELAYED RELEASE ORAL at 05:35

## 2024-07-08 RX ADMIN — OXYCODONE HYDROCHLORIDE 10 MG: 10 TABLET ORAL at 17:36

## 2024-07-08 RX ADMIN — CYCLOBENZAPRINE 10 MG: 10 TABLET, FILM COATED ORAL at 08:19

## 2024-07-08 RX ADMIN — ANORECTAL OINTMENT: 15.7; .44; 24; 20.6 OINTMENT TOPICAL at 08:20

## 2024-07-08 RX ADMIN — OXYCODONE HYDROCHLORIDE 10 MG: 10 TABLET ORAL at 08:19

## 2024-07-08 RX ADMIN — OXYCODONE HYDROCHLORIDE 10 MG: 10 TABLET ORAL at 13:27

## 2024-07-08 RX ADMIN — FUROSEMIDE 20 MG: 10 INJECTION, SOLUTION INTRAMUSCULAR; INTRAVENOUS at 17:20

## 2024-07-08 RX ADMIN — VANCOMYCIN HYDROCHLORIDE 750 MG: 10 INJECTION, POWDER, LYOPHILIZED, FOR SOLUTION INTRAVENOUS at 21:25

## 2024-07-08 RX ADMIN — POLYETHYLENE GLYCOL 3350 17 G: 17 POWDER, FOR SOLUTION ORAL at 08:19

## 2024-07-08 RX ADMIN — VANCOMYCIN HYDROCHLORIDE 1500 MG: 10 INJECTION, POWDER, LYOPHILIZED, FOR SOLUTION INTRAVENOUS at 08:31

## 2024-07-08 RX ADMIN — SODIUM CHLORIDE, PRESERVATIVE FREE 10 ML: 5 INJECTION INTRAVENOUS at 08:19

## 2024-07-08 RX ADMIN — ACETAMINOPHEN 1000 MG: 500 TABLET ORAL at 13:27

## 2024-07-08 RX ADMIN — ACETAMINOPHEN 1000 MG: 500 TABLET ORAL at 05:35

## 2024-07-08 RX ADMIN — CYCLOBENZAPRINE 10 MG: 10 TABLET, FILM COATED ORAL at 17:36

## 2024-07-08 RX ADMIN — FUROSEMIDE 20 MG: 10 INJECTION, SOLUTION INTRAMUSCULAR; INTRAVENOUS at 08:18

## 2024-07-08 ASSESSMENT — PAIN SCALES - GENERAL
PAINLEVEL_OUTOF10: 10
PAINLEVEL_OUTOF10: 8
PAINLEVEL_OUTOF10: 10

## 2024-07-08 ASSESSMENT — PAIN DESCRIPTION - DESCRIPTORS
DESCRIPTORS: THROBBING;SHARP;SORE
DESCRIPTORS: ACHING;SHARP;SPASM
DESCRIPTORS: SHARP;SPASM

## 2024-07-08 ASSESSMENT — PAIN DESCRIPTION - LOCATION
LOCATION: BACK

## 2024-07-08 ASSESSMENT — PAIN SCALES - WONG BAKER: WONGBAKER_NUMERICALRESPONSE: NO HURT

## 2024-07-08 NOTE — CARE COORDINATION
Platelet count 36 down from 60. Per attending not discharging today Picc line placed 7/7  iv vanco daily and lasix bid Per ID -Anticipating discharge on Monday 7/8  when vancomycin dosing will be finalized by pharmacy and orthopedic evaluation will be available about right shoulder. Per ortho-resident - Discussed with attending, no acute orthopedic surgical intervention at this time. Patient to follow-up on outpatient basis. Patient is from approximately 50 miles away and would like to follow-up with an orthopedic surgeon closer to him. Dr. Heller's information was provided in his discharge information if patient would like to follow-up.  Per prior  note- discharge plan is  [facility]) ALFA completed. 7000 completed. Discharge envelope with ambulette form is in the soft chart. Per Lynn ward thru 7/9 @ 7550. Asked for pt/ot updates today. Per Lynn - her building will  cost of ambulette.On day of discharge will need early d/c time to ensure transport.Electronically signed by Yasmeen Purdy RN on 7/8/2024 at 9:44 AM

## 2024-07-09 LAB
ALBUMIN SERPL-MCNC: 1.6 G/DL (ref 3.5–5.2)
ALP SERPL-CCNC: 1087 U/L (ref 40–129)
ALT SERPL-CCNC: 37 U/L (ref 0–40)
ANION GAP SERPL CALCULATED.3IONS-SCNC: 9 MMOL/L (ref 7–16)
AST SERPL-CCNC: 86 U/L (ref 0–39)
BASOPHILS # BLD: 0.03 K/UL (ref 0–0.2)
BASOPHILS NFR BLD: 1 % (ref 0–2)
BILIRUB SERPL-MCNC: 3.2 MG/DL (ref 0–1.2)
BUN SERPL-MCNC: 19 MG/DL (ref 6–23)
CALCIUM SERPL-MCNC: 8.2 MG/DL (ref 8.6–10.2)
CHLORIDE SERPL-SCNC: 108 MMOL/L (ref 98–107)
CO2 SERPL-SCNC: 25 MMOL/L (ref 22–29)
CREAT SERPL-MCNC: 0.9 MG/DL (ref 0.7–1.2)
DATE LAST DOSE: NORMAL
EKG ATRIAL RATE: 91 BPM
EKG P AXIS: 21 DEGREES
EKG P-R INTERVAL: 130 MS
EKG Q-T INTERVAL: 292 MS
EKG QRS DURATION: 86 MS
EKG QTC CALCULATION (BAZETT): 359 MS
EKG R AXIS: 14 DEGREES
EKG T AXIS: 20 DEGREES
EKG VENTRICULAR RATE: 91 BPM
EOSINOPHIL # BLD: 0.03 K/UL (ref 0.05–0.5)
EOSINOPHILS RELATIVE PERCENT: 1 % (ref 0–6)
ERYTHROCYTE [DISTWIDTH] IN BLOOD BY AUTOMATED COUNT: 18 % (ref 11.5–15)
GFR, ESTIMATED: >90 ML/MIN/1.73M2
GLUCOSE SERPL-MCNC: 88 MG/DL (ref 74–99)
HCT VFR BLD AUTO: 26.8 % (ref 37–54)
HGB BLD-MCNC: 8.1 G/DL (ref 12.5–16.5)
IMM GRANULOCYTES # BLD AUTO: 0.07 K/UL (ref 0–0.58)
IMM GRANULOCYTES NFR BLD: 2 % (ref 0–5)
LYMPHOCYTES NFR BLD: 1.33 K/UL (ref 1.5–4)
LYMPHOCYTES RELATIVE PERCENT: 28 % (ref 20–42)
MAGNESIUM SERPL-MCNC: 1.8 MG/DL (ref 1.6–2.6)
MCH RBC QN AUTO: 31.9 PG (ref 26–35)
MCHC RBC AUTO-ENTMCNC: 30.2 G/DL (ref 32–34.5)
MCV RBC AUTO: 105.5 FL (ref 80–99.9)
MONOCYTES NFR BLD: 0.66 K/UL (ref 0.1–0.95)
MONOCYTES NFR BLD: 14 % (ref 2–12)
NEUTROPHILS NFR BLD: 56 % (ref 43–80)
NEUTS SEG NFR BLD: 2.69 K/UL (ref 1.8–7.3)
PHOSPHATE SERPL-MCNC: 3.3 MG/DL (ref 2.5–4.5)
PLATELET # BLD AUTO: 62 K/UL (ref 130–450)
PLATELET CONFIRMATION: NORMAL
PMV BLD AUTO: 10.3 FL (ref 7–12)
POTASSIUM SERPL-SCNC: 3.9 MMOL/L (ref 3.5–5)
PROT SERPL-MCNC: 5.9 G/DL (ref 6.4–8.3)
RBC # BLD AUTO: 2.54 M/UL (ref 3.8–5.8)
SODIUM SERPL-SCNC: 142 MMOL/L (ref 132–146)
TME LAST DOSE: NORMAL H
VANCOMYCIN DOSE: NORMAL MG
VANCOMYCIN SERPL-MCNC: 18.4 UG/ML (ref 5–40)
WBC OTHER # BLD: 4.8 K/UL (ref 4.5–11.5)

## 2024-07-09 PROCEDURE — 1200000000 HC SEMI PRIVATE

## 2024-07-09 PROCEDURE — 6360000002 HC RX W HCPCS: Performed by: FAMILY MEDICINE

## 2024-07-09 PROCEDURE — 6370000000 HC RX 637 (ALT 250 FOR IP): Performed by: INTERNAL MEDICINE

## 2024-07-09 PROCEDURE — 6370000000 HC RX 637 (ALT 250 FOR IP): Performed by: FAMILY MEDICINE

## 2024-07-09 PROCEDURE — 6360000002 HC RX W HCPCS: Performed by: INTERNAL MEDICINE

## 2024-07-09 PROCEDURE — 80202 ASSAY OF VANCOMYCIN: CPT

## 2024-07-09 PROCEDURE — 85025 COMPLETE CBC W/AUTO DIFF WBC: CPT

## 2024-07-09 PROCEDURE — 2580000003 HC RX 258: Performed by: INTERNAL MEDICINE

## 2024-07-09 PROCEDURE — 84100 ASSAY OF PHOSPHORUS: CPT

## 2024-07-09 PROCEDURE — 99232 SBSQ HOSP IP/OBS MODERATE 35: CPT | Performed by: FAMILY MEDICINE

## 2024-07-09 PROCEDURE — 80053 COMPREHEN METABOLIC PANEL: CPT

## 2024-07-09 PROCEDURE — 83735 ASSAY OF MAGNESIUM: CPT

## 2024-07-09 PROCEDURE — 93010 ELECTROCARDIOGRAM REPORT: CPT | Performed by: INTERNAL MEDICINE

## 2024-07-09 PROCEDURE — 93005 ELECTROCARDIOGRAM TRACING: CPT | Performed by: FAMILY MEDICINE

## 2024-07-09 RX ORDER — FUROSEMIDE 20 MG/1
20 TABLET ORAL 2 TIMES DAILY
Qty: 60 TABLET | Refills: 0
Start: 2024-07-09

## 2024-07-09 RX ADMIN — VANCOMYCIN HYDROCHLORIDE 1000 MG: 1 INJECTION, POWDER, LYOPHILIZED, FOR SOLUTION INTRAVENOUS at 09:03

## 2024-07-09 RX ADMIN — ACETAMINOPHEN 1000 MG: 500 TABLET ORAL at 21:38

## 2024-07-09 RX ADMIN — POLYETHYLENE GLYCOL 3350 17 G: 17 POWDER, FOR SOLUTION ORAL at 08:51

## 2024-07-09 RX ADMIN — SODIUM CHLORIDE, PRESERVATIVE FREE 10 ML: 5 INJECTION INTRAVENOUS at 21:48

## 2024-07-09 RX ADMIN — PANTOPRAZOLE SODIUM 40 MG: 40 TABLET, DELAYED RELEASE ORAL at 16:35

## 2024-07-09 RX ADMIN — VANCOMYCIN HYDROCHLORIDE 1000 MG: 1 INJECTION, POWDER, LYOPHILIZED, FOR SOLUTION INTRAVENOUS at 21:40

## 2024-07-09 RX ADMIN — ACETAMINOPHEN 1000 MG: 500 TABLET ORAL at 14:20

## 2024-07-09 RX ADMIN — FUROSEMIDE 20 MG: 10 INJECTION, SOLUTION INTRAMUSCULAR; INTRAVENOUS at 08:51

## 2024-07-09 RX ADMIN — OXYCODONE HYDROCHLORIDE 10 MG: 10 TABLET ORAL at 08:50

## 2024-07-09 RX ADMIN — ANORECTAL OINTMENT: 15.7; .44; 24; 20.6 OINTMENT TOPICAL at 08:51

## 2024-07-09 RX ADMIN — ANORECTAL OINTMENT: 15.7; .44; 24; 20.6 OINTMENT TOPICAL at 16:35

## 2024-07-09 RX ADMIN — ACETAMINOPHEN 1000 MG: 500 TABLET ORAL at 06:09

## 2024-07-09 RX ADMIN — OXYCODONE HYDROCHLORIDE 10 MG: 10 TABLET ORAL at 16:35

## 2024-07-09 RX ADMIN — SODIUM CHLORIDE, PRESERVATIVE FREE 10 ML: 5 INJECTION INTRAVENOUS at 08:51

## 2024-07-09 RX ADMIN — OXYCODONE HYDROCHLORIDE 10 MG: 10 TABLET ORAL at 21:44

## 2024-07-09 RX ADMIN — PANTOPRAZOLE SODIUM 40 MG: 40 TABLET, DELAYED RELEASE ORAL at 06:09

## 2024-07-09 RX ADMIN — FUROSEMIDE 20 MG: 10 INJECTION, SOLUTION INTRAMUSCULAR; INTRAVENOUS at 17:25

## 2024-07-09 ASSESSMENT — PAIN SCALES - GENERAL
PAINLEVEL_OUTOF10: 8
PAINLEVEL_OUTOF10: 5
PAINLEVEL_OUTOF10: 6
PAINLEVEL_OUTOF10: 10
PAINLEVEL_OUTOF10: 8
PAINLEVEL_OUTOF10: 10

## 2024-07-09 ASSESSMENT — PAIN DESCRIPTION - DESCRIPTORS
DESCRIPTORS: ACHING
DESCRIPTORS: SPASM;SORE
DESCRIPTORS: SHARP;THROBBING
DESCRIPTORS: SHARP;ACHING

## 2024-07-09 ASSESSMENT — PAIN DESCRIPTION - LOCATION
LOCATION: GENERALIZED
LOCATION: BACK
LOCATION: BACK;GENERALIZED
LOCATION: BACK

## 2024-07-09 NOTE — DISCHARGE SUMMARY
Hospital Medicine Discharge Summary    Patient ID: Raudel Herndon      Patient's PCP: Villa Lemus PA    Admit Date: 6/24/2024     Discharge Date:  7/9/2024    Admitting Physician: Sindhu Knight DO     Discharge Physician: Goldy Cardona MD    Discharge Diagnoses:       Active Hospital Problems    Diagnosis Date Noted    Edema [R60.9] 07/04/2024    Acute osteomyelitis of lumbar spine (HCC) [M46.26] 07/04/2024    Severe protein-calorie malnutrition (HCC) [E43] 06/26/2024    Enterococcal bacteremia [R78.81, B95.2] 06/26/2024    Cholangitis [K83.09] 06/26/2024    Failure to thrive in adult [R62.7] 06/25/2024    UTI (urinary tract infection) [N39.0] 06/25/2024    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    Hospital Course:   HPI from chart :   \"  Raudel Herndon is a 73 y.o. male with a past medical history that includes cholangiocarcinoma, chronic pancreatitis, diabetes who presents to [facility] ER complaining of fatigue, body aches. He was diagnosed with  cholangiocarcinoma around 6 months ago and follows with Dr. Reynoso.  He is on chemotherapy.  Last chemotherapy was last week. Upon presentation to the ER, routine labwork was performed which revealed lactic acid 4.5, glucose 125, troponin 47, ALT [place] 82, ALT 41, AST 78, bili 4.3, WBC 21.3, hemoglobin 8.6, platelets 88. CXR was negative. Patient was found to have a UTI and was started on Rocephin. He was also noted to have acute on chronic anemia with Hgb of 6.9 and given one unit of blood. Lactic acidosis resolved with fluids. Blood pressures noted to be soft on admission as well. Blood cultures returned positive for E. Faecalis. ID was consulted. Patient on Rocephin,

## 2024-07-09 NOTE — CARE COORDINATION
CM update note.  Discharge plan is [facility].  Precert is good through today.  Spoke with Lynn from [facility].  She will work on getting an extension on the precert.  Amanda with CM to check on status.  Per ID no discharge today because Vancomycin is not therapeutic.  Attending notified.  Stuart/destination/7000 are completed.  Ambulette form with envelope is on the soft chart.  Per Lynn, her building will  cost of ambulette.  Will need early discharge time on day of discharge to ensure transport as this facility is 51 miles away.  CM/SW to follow.  Dima Mehta RN -[phone].

## 2024-07-09 NOTE — CONSULTS
[place]              [address] [place] [address]                              CONSULTATION      PATIENT NAME: AKIL KIM               : 1950  MED REC NO: [id number]                        ROOM: [id number]  ACCOUNT NO: [id number]                       ADMIT DATE: 2024  PROVIDER: Cat Gonzalez MD    NEUROSURGERY CONSULT    CONSULT DATE: 2024      REASON FOR CONSULTATION:  Osteomyelitis and diskitis at T12-L1 and L2-L3 and also severe back pain.    HISTORY OF PRESENT ILLNESS:  The patient is a 74-year-old gentleman who was admitted to the hospital on  for generalized body aches, failure to thrive, and he does have a history of cholangiocarcinoma and chronic pancreatitis.  He had been complaining of worsening back pain and describes a sharp stabbing pain.  Pain is made worse with movement and activity, and better with rest.  He denies any new numbness or tingling or loss of control of bowel or bladder function.  Does admit to some weakness in his legs.  Of note, he did have Enterococcus bacteremia and was also noted to have an Enterococcus UTI.  Due to his worsening back pain, an MRI of his lumbar spine was obtained and also an MRI of his thoracic spine and showed vertebral body osteomyelitis and diskitis at T12-L1, L1-L2, and L2-L3, for which Neurosurgery Service was consulted.    PAST MEDICAL HISTORY:  Positive for cholangiocarcinoma, gangrenous cholecystitis, diabetes.    PAST SURGICAL HISTORY:  Positive for ankle fusion, ERCP, eye surgery, liver biopsy, right hip replacement surgery, left total knee arthroplasty.    FAMILY HISTORY:  Positive for cancer in his mother.    SOCIAL HISTORY:  Negative tobacco uses.  He does not consume any alcoholic beverages.    ALLERGIES:  INCLUDE ADHESIVE TAPE.    REVIEW OF SYSTEMS:  HEENT:  Negative for headache, double vision, blurred vision.  CARDIOVASCULAR:  Negative for chest pain, arrhythmia, 
Hepatobiliary and pancreatic surgery   Consult Note      Patient's Name/Date of Birth: Raudel Herndon / 1950    Date: June 25, 2024     PCP: Luca Peterson MD     Reason for consult:: C/f cholangitis    HPI:   Raudel Herndon is a 73 y.o. male who presents for evaluation of weakness and fatigue.  Patient is known to hepatobiliary and pancreatic surgery service.  He has a history of cholangiocarcinoma.  He has received an ERCP with stent placement for obstructive jaundice in February.  Patient was receiving chemotherapy and is scheduled for open procedure in August.  Patient currently is complaining only mainly of fatigue.  He is also said that he has been having loss of appetite.  Patient says that he last ate 2 days ago.  He denies any nausea or vomiting.  Denies any abdominal pain.  Currently, his vital signs are stable.  His BMP is unremarkable.  His hepatic functional panel shows an ALT/AST of 32/56.  His bilirubin is 3.2.  His CBC shows a white blood count of 12.4 and platelet count of 74.  His hemoglobin is 6.9.  His blood culture grew E faecalis CTAP with no clear evidence of biliary stent obstruction.    Patient Active Problem List   Diagnosis    Cholangiocarcinoma (HCC)    Cholangiocellular carcinoma (HCC)    Obstructive jaundice due to malignant neoplasm (HCC)    NANCY (acute kidney injury) (HCC)    Moderate protein-calorie malnutrition (HCC)    Obstructive jaundice    Obstructive jaundice due to cancer (HCC)    Intrahepatic cholestatic liver disease    Failure to thrive in adult    UTI (urinary tract infection)       Allergies   Allergen Reactions    Adhesive Tape Rash       Past Medical History:   Diagnosis Date    Cancer (HCC) 02/2024    cholangiocarcinoma    Diabetes mellitus (HCC)     Prostate cancer (HCC)     treated with seed implants       Past Surgical History:   Procedure Laterality Date    ANKLE FUSION Right 2022    COLONOSCOPY      ERCP N/A 02/29/2024    ENDOSCOPIC RETROGRADE 
[facility]  [facility]    Consultation Note     Admit Date: 6/24/2024  5:19 PM    Reason for Consult:   E faecalis bacteremia    Attending Physician:  Toño Shah MD     Chief Complaint: Weakness, body ache    HISTORY OF PRESENT ILLNESS:   73-year-old male with past medical history of chronic pancreatitis, DM, status post ERCP with diagnosis of cholangiocarcinoma, status post stent in common hepatic duct, cholangiocarcinoma on chemotherapy presented with generalized weakness, body aches.  Blood cultures positive for Enterococcus faecalis.  CT abdomen showed biliary stent, bilateral nonobstructive renal calculi, periportal lymph nodes.  ID consulted for bacteremia.    Past Medical History:        Diagnosis Date    Cancer (HCC) 02/2024    cholangiocarcinoma    Diabetes mellitus (HCC)     Prostate cancer (HCC)     treated with seed implants     Past Surgical History:        Procedure Laterality Date    ANKLE FUSION Right 2022    COLONOSCOPY      ERCP N/A 02/29/2024    ENDOSCOPIC RETROGRADE CHOLANGIOPANCREATOGRAPHY performed by Raza Lane DO at [facility]    ERCP N/A 02/29/2024    ENDOSCOPIC RETROGRADE CHOLANGIOPANCREATOGRAPHY DILATION BALLOON performed by Raza Lane DO at [facility]    ERCP N/A 02/29/2024    ENDOSCOPIC RETROGRADE CHOLANGIOPANCREATOGRAPHY STENT REMOVAL performed by Raza Lane DO at [facility]    ERCP N/A 02/29/2024    ENDOSCOPIC RETROGRADE CHOLANGIOPANCREATOGRAPHY STENT INSERTION performed by Raza Lane DO at [facility]    EYE SURGERY Bilateral 2022    IR BIOPSY LIVER PERCUTANEOUS  3/18/2024    IR BIOPSY LIVER PERCUTANEOUS 3/18/2024 John Mcpherson MD [facility]    JOINT REPLACEMENT Right 2022    hip    PORT SURGERY Right 3/8/2024    PORT INSERTION performed by Susie Barrera MD at [facility]    TOTAL KNEE ARTHROPLASTY Left      Current Medications:   Scheduled Meds:   sodium chloride flush  5-40 mL 
COLONOSCOPY      ERCP N/A 02/29/2024    ENDOSCOPIC RETROGRADE CHOLANGIOPANCREATOGRAPHY [place] Raza Lane [place] [facility]    ERCP N/A 02/29/2024    ENDOSCOPIC RETROGRADE CHOLANGIOPANCREATOGRAPHY DILATION BALLOON performed by Raza Lane DO [place] [facility]    ERCP N/A 02/29/2024    ENDOSCOPIC RETROGRADE CHOLANGIOPANCREATOGRAPHY STENT REMOVAL performed by Raza Lane DO [place] [facility]    ERCP N/A 02/29/2024    ENDOSCOPIC RETROGRADE CHOLANGIOPANCREATOGRAPHY STENT INSERTION performed by Raza Lane DO at [facility]    EYE SURGERY Bilateral 2022    IR BIOPSY LIVER PERCUTANEOUS  3/18/2024    IR BIOPSY LIVER PERCUTANEOUS 3/18/2024 John Mcpherson [place] [facility]    JOINT REPLACEMENT Right 2022    hip    PORT SURGERY Right 3/8/2024    PORT INSERTION performed by Susie Barrera [place] [facility] [place]    TOTAL KNEE ARTHROPLASTY Left        FAMILY Hx:  Family History   Problem Relation Age of Onset    Breast Cancer Mother     Cancer Mother        HOME MEDICATIONS:  Prior to Admission medications    Medication Sig Start Date End Date Taking? Authorizing Provider   pioglitazone (ACTOS) 30 MG tablet Take 1 tablet by mouth daily   Yes Kwadwo Valenzuela MD   predniSONE (DELTASONE) 10 MG tablet Take 1 tablet by mouth daily   Yes Kwadwo Valenzuela MD   oxyCODONE-acetaminophen (PERCOCET) 5-325 MG per tablet Take 1 tablet by mouth every 6 hours as needed for Pain. Max Daily Amount: 4 tablets   Yes Kwadwo Valenzuela MD   pantoprazole (PROTONIX) 40 MG tablet Take 1 tablet by mouth 2 times daily (before meals) 3/19/24   Elle Mosher DO   metFORMIN (GLUCOPHAGE) 500 MG tablet Take 1 tablet by mouth daily (with breakfast)    Kwadwo Valenzuela MD   candesartan-hydroCHLOROthiazide (ATACAND HCT) 16-12.5 MG per tablet Take 1 tablet by mouth daily    Kwadwo Valenzuela MD       ALLERGIES:  Adhesive tape    SOCIAL Hx:  Social History     Socioeconomic History 
intravenous contrast.  Multiplanar reformatted images are provided for review.  MIP images are provided for review. Automated exposure control, iterative reconstruction, and/or weight based adjustment of the mA/kV was utilized to reduce the radiation dose to as low as reasonably achievable. COMPARISON: CT chest dated 05/20/2024 HISTORY: ORDERING SYSTEM PROVIDED HISTORY: r/o PE .elevated d-dimmer . dyspnea and tachycardia. malignancy. TECHNOLOGIST PROVIDED HISTORY: Reason for exam:->r/o PE .elevated d-dimmer . dyspnea and tachycardia. malignancy. Additional Contrast?->None What reading provider will be dictating this exam?->CRC FINDINGS: Pulmonary Arteries: Pulmonary arteries are adequately opacified for evaluation.  No evidence of intraluminal filling defect to suggest pulmonary embolism.  Main pulmonary artery is normal in caliber. Mediastinum: No evidence of mediastinal lymphadenopathy.  The heart and pericardium demonstrate no acute abnormality.  There is no acute abnormality of the thoracic aorta. Lungs/pleura: Septal thickening and mosaicism of a lower lobe predominance interstitial edema pattern with decompensation moderate volume bilateral pleural effusions. Upper Abdomen: Visualized portions of the upper abdomen reveal biliary stent in place with perihepatic ascites on partial imaging. Soft Tissues/Bones: No acute osseous findings.  Mild body wall edema.     1. No acute pulmonary artery embolism. 2. Interstitial edema with decompensation and moderate volume bilateral pleural effusions. 3. Ascites.     Vascular duplex lower extremity venous bilateral    Result Date: 7/7/2024  EXAMINATION: DUPLEX VENOUS ULTRASOUND OF THE BILATERAL LOWER EXTREMITIES7/7/2024 1:13 pm TECHNIQUE: Duplex ultrasound using B-mode/gray scaled imaging and Doppler spectral analysis and color flow was obtained of the deep venous structures of the bilateral extremities. COMPARISON: None. HISTORY: ORDERING SYSTEM PROVIDED HISTORY: elevated 
the right.  Patient states that he bumped his shoulder and has been having pain ever since, denies any trauma to the shoulder otherwise.  Patient does have evidence of bilateral rotator cuff arthropathy.  We discussed nonoperative management this time as well as following up on an outpatient basis.  Patient was agreeable to plan.  All questions were answered at this time.  Weightbearing as tolerated to bilateral upper extremities  Pain control per admitting  PT/OT  Discussed with attending, no acute orthopedic surgical intervention at this time.  Patient to follow-up on outpatient basis.  Patient is from approximately 50 miles away and would like to follow-up with an orthopedic surgeon closer to him.  Dr. Heller's information was provided in his discharge information if patient would like to follow-up.  Orthopedics will continue to follow peripherally.  Please reach out with any questions.      Electronically signed by Jerry Duenas DO on 7/6/2024 at 9:02 PM  Note: This report was completed using [employer] voiced recognition software.  Every effort has been made to ensure accuracy; however, inadvertent computerized transcription errors may be present.

## 2024-07-10 VITALS
BODY MASS INDEX: 20.2 KG/M2 | HEART RATE: 85 BPM | WEIGHT: 152.44 LBS | DIASTOLIC BLOOD PRESSURE: 59 MMHG | OXYGEN SATURATION: 97 % | SYSTOLIC BLOOD PRESSURE: 122 MMHG | HEIGHT: 73 IN | RESPIRATION RATE: 18 BRPM | TEMPERATURE: 97.7 F

## 2024-07-10 LAB
ALBUMIN SERPL-MCNC: 1.4 G/DL (ref 3.5–5.2)
ALP SERPL-CCNC: 951 U/L (ref 40–129)
ALT SERPL-CCNC: 32 U/L (ref 0–40)
ANION GAP SERPL CALCULATED.3IONS-SCNC: 8 MMOL/L (ref 7–16)
AST SERPL-CCNC: 73 U/L (ref 0–39)
BASOPHILS # BLD: 0.03 K/UL (ref 0–0.2)
BASOPHILS NFR BLD: 1 % (ref 0–2)
BILIRUB SERPL-MCNC: 2.9 MG/DL (ref 0–1.2)
BUN SERPL-MCNC: 19 MG/DL (ref 6–23)
CALCIUM SERPL-MCNC: 7.4 MG/DL (ref 8.6–10.2)
CHLORIDE SERPL-SCNC: 108 MMOL/L (ref 98–107)
CO2 SERPL-SCNC: 25 MMOL/L (ref 22–29)
CREAT SERPL-MCNC: 0.9 MG/DL (ref 0.7–1.2)
DATE LAST DOSE: NORMAL
EOSINOPHIL # BLD: 0.02 K/UL (ref 0.05–0.5)
EOSINOPHILS RELATIVE PERCENT: 0 % (ref 0–6)
ERYTHROCYTE [DISTWIDTH] IN BLOOD BY AUTOMATED COUNT: 18 % (ref 11.5–15)
GFR, ESTIMATED: 87 ML/MIN/1.73M2
GLUCOSE SERPL-MCNC: 90 MG/DL (ref 74–99)
HCT VFR BLD AUTO: 23.6 % (ref 37–54)
HGB BLD-MCNC: 7.2 G/DL (ref 12.5–16.5)
IMM GRANULOCYTES # BLD AUTO: 0.06 K/UL (ref 0–0.58)
IMM GRANULOCYTES NFR BLD: 1 % (ref 0–5)
LYMPHOCYTES NFR BLD: 1.43 K/UL (ref 1.5–4)
LYMPHOCYTES RELATIVE PERCENT: 31 % (ref 20–42)
MAGNESIUM SERPL-MCNC: 1.5 MG/DL (ref 1.6–2.6)
MCH RBC QN AUTO: 32.3 PG (ref 26–35)
MCHC RBC AUTO-ENTMCNC: 30.5 G/DL (ref 32–34.5)
MCV RBC AUTO: 105.8 FL (ref 80–99.9)
MICROORGANISM SPEC CULT: NORMAL
MICROORGANISM SPEC CULT: NORMAL
MONOCYTES NFR BLD: 0.63 K/UL (ref 0.1–0.95)
MONOCYTES NFR BLD: 14 % (ref 2–12)
NEUTROPHILS NFR BLD: 53 % (ref 43–80)
NEUTS SEG NFR BLD: 2.47 K/UL (ref 1.8–7.3)
PHOSPHATE SERPL-MCNC: 3.6 MG/DL (ref 2.5–4.5)
PLATELET CONFIRMATION: NORMAL
PLATELET, FLUORESCENCE: 65 K/UL (ref 130–450)
PMV BLD AUTO: 10.2 FL (ref 7–12)
POTASSIUM SERPL-SCNC: 3.5 MMOL/L (ref 3.5–5)
PROT SERPL-MCNC: 5.2 G/DL (ref 6.4–8.3)
RBC # BLD AUTO: 2.23 M/UL (ref 3.8–5.8)
SERVICE CMNT-IMP: NORMAL
SERVICE CMNT-IMP: NORMAL
SODIUM SERPL-SCNC: 141 MMOL/L (ref 132–146)
SPECIMEN DESCRIPTION: NORMAL
SPECIMEN DESCRIPTION: NORMAL
TME LAST DOSE: NORMAL H
VANCOMYCIN DOSE: NORMAL MG
VANCOMYCIN SERPL-MCNC: 19.7 UG/ML (ref 5–40)
WBC OTHER # BLD: 4.6 K/UL (ref 4.5–11.5)

## 2024-07-10 PROCEDURE — 6370000000 HC RX 637 (ALT 250 FOR IP): Performed by: INTERNAL MEDICINE

## 2024-07-10 PROCEDURE — 84100 ASSAY OF PHOSPHORUS: CPT

## 2024-07-10 PROCEDURE — 83735 ASSAY OF MAGNESIUM: CPT

## 2024-07-10 PROCEDURE — 6360000002 HC RX W HCPCS: Performed by: INTERNAL MEDICINE

## 2024-07-10 PROCEDURE — 80202 ASSAY OF VANCOMYCIN: CPT

## 2024-07-10 PROCEDURE — 2580000003 HC RX 258: Performed by: INTERNAL MEDICINE

## 2024-07-10 PROCEDURE — 99239 HOSP IP/OBS DSCHRG MGMT >30: CPT | Performed by: FAMILY MEDICINE

## 2024-07-10 PROCEDURE — 80053 COMPREHEN METABOLIC PANEL: CPT

## 2024-07-10 PROCEDURE — 6370000000 HC RX 637 (ALT 250 FOR IP): Performed by: FAMILY MEDICINE

## 2024-07-10 PROCEDURE — 85025 COMPLETE CBC W/AUTO DIFF WBC: CPT

## 2024-07-10 PROCEDURE — 6360000002 HC RX W HCPCS: Performed by: FAMILY MEDICINE

## 2024-07-10 RX ORDER — LANOLIN ALCOHOL/MO/W.PET/CERES
400 CREAM (GRAM) TOPICAL ONCE
Status: COMPLETED | OUTPATIENT
Start: 2024-07-10 | End: 2024-07-10

## 2024-07-10 RX ORDER — MAGNESIUM OXIDE 400 MG/1
400 TABLET ORAL DAILY
Qty: 30 TABLET | Refills: 0
Start: 2024-07-10

## 2024-07-10 RX ADMIN — PANTOPRAZOLE SODIUM 40 MG: 40 TABLET, DELAYED RELEASE ORAL at 06:19

## 2024-07-10 RX ADMIN — ACETAMINOPHEN 1000 MG: 500 TABLET ORAL at 06:19

## 2024-07-10 RX ADMIN — SODIUM CHLORIDE, PRESERVATIVE FREE 10 ML: 5 INJECTION INTRAVENOUS at 10:14

## 2024-07-10 RX ADMIN — ACETAMINOPHEN 1000 MG: 500 TABLET ORAL at 14:04

## 2024-07-10 RX ADMIN — FUROSEMIDE 20 MG: 10 INJECTION, SOLUTION INTRAMUSCULAR; INTRAVENOUS at 10:04

## 2024-07-10 RX ADMIN — VANCOMYCIN HYDROCHLORIDE 1000 MG: 1 INJECTION, POWDER, LYOPHILIZED, FOR SOLUTION INTRAVENOUS at 10:13

## 2024-07-10 RX ADMIN — Medication 400 MG: at 15:44

## 2024-07-10 RX ADMIN — ANORECTAL OINTMENT: 15.7; .44; 24; 20.6 OINTMENT TOPICAL at 10:14

## 2024-07-10 RX ADMIN — SODIUM CHLORIDE, PRESERVATIVE FREE 10 ML: 5 INJECTION INTRAVENOUS at 10:04

## 2024-07-10 RX ADMIN — OXYCODONE HYDROCHLORIDE 10 MG: 10 TABLET ORAL at 14:07

## 2024-07-10 RX ADMIN — CYCLOBENZAPRINE 10 MG: 10 TABLET, FILM COATED ORAL at 14:07

## 2024-07-10 RX ADMIN — PANTOPRAZOLE SODIUM 40 MG: 40 TABLET, DELAYED RELEASE ORAL at 15:44

## 2024-07-10 ASSESSMENT — PAIN SCALES - GENERAL
PAINLEVEL_OUTOF10: 9
PAINLEVEL_OUTOF10: 6
PAINLEVEL_OUTOF10: 0
PAINLEVEL_OUTOF10: 0

## 2024-07-10 ASSESSMENT — PAIN DESCRIPTION - DESCRIPTORS: DESCRIPTORS: ACHING;DISCOMFORT;SORE

## 2024-07-10 ASSESSMENT — PAIN DESCRIPTION - LOCATION: LOCATION: BACK

## 2024-07-10 ASSESSMENT — PAIN DESCRIPTION - ORIENTATION: ORIENTATION: LOWER

## 2024-07-10 NOTE — PROGRESS NOTES
[facility] Hospitalist Progress Note    SYNOPSIS: Patient admitted on 2024 for Failure to thrive in adult    Raudel Herndon is a 73 y.o. male with a past medical history that includes cholangiocarcinoma, chronic pancreatitis, diabetes who presents to [facility] ER complaining of fatigue, body aches. He was diagnosed with  cholangiocarcinoma around 6 months ago and follows with Dr. Reynoso.  He is on chemotherapy.  Last chemotherapy was last week. Upon presentation to the ER, routine labwork was performed which revealed lactic acid 4.5, glucose 125, troponin 47, ALT [place] 82, ALT 41, AST 78, bili 4.3, WBC 21.3, hemoglobin 8.6, platelets 88. CXR was negative. Patient was found to have a UTI and was started on Rocephin. HE was also noted to have acute on chronic anemia with Hgb of 6.9 and given one unit of blood. Lactic acidosis resolved with fluids. Blood pressures noted to be soft on admission as well.     SUBJECTIVE:  Stable overnight. No other overnight issues reported.   Patient seen and examined  Records reviewed.   Patient endorses feeling tired at this time. No new changes.       Temp (24hrs), Av.9 °F (36.6 °C), Min:97.8 °F (36.6 °C), Max:98.2 °F (36.8 °C)    DIET: ADULT DIET; Regular  CODE: Full Code    Intake/Output Summary (Last 24 hours) at 2024 1419  Last data filed at 2024 0155  Gross per 24 hour   Intake 100 ml   Output --   Net 100 ml       Review of Systems  All bolded are positive; please see HPI  General:  Fever, chills, diaphoresis, fatigue, malaise, night sweats, weight loss  Psychological:  Anxiety, disorientation, hallucinations.  ENT:  Epistaxis, headaches, vertigo, visual changes.  Cardiovascular:  Chest pain, irregular heartbeats, palpitations, paroxysmal nocturnal dyspnea.  Respiratory:  Shortness of breath, coughing, sputum production, hemoptysis, wheezing, orthopnea.  Gastrointestinal:  Nausea, vomiting, diarrhea, heartburn, constipation, abdominal pain, 
       [facility] Hospitalist Progress Note    SYNOPSIS: Patient admitted on 2024 for Failure to thrive in adult    Raudel Herndon is a 73 y.o. male with a past medical history that includes cholangiocarcinoma, chronic pancreatitis, diabetes who presents to [facility] ER complaining of fatigue, body aches. He was diagnosed with  cholangiocarcinoma around 6 months ago and follows with Dr. Reynoso.  He is on chemotherapy.  Last chemotherapy was last week. Upon presentation to the ER, routine labwork was performed which revealed lactic acid 4.5, glucose 125, troponin 47, ALT Des 82, ALT 41, AST 78, bili 4.3, WBC 21.3, hemoglobin 8.6, platelets 88. CXR was negative. Patient was found to have a UTI and was started on Rocephin. He was also noted to have acute on chronic anemia with Hgb of 6.9 and given one unit of blood. Lactic acidosis resolved with fluids. Blood pressures noted to be soft on admission as well. Blood cultures returned positive for E. Faecalis. ID was consulted. Patient on Rocephin, ampicillin, and flagyl. Patient has known cholangiocarcinoma with hepatic stent in place. Due to bacteremia and concern for possible cholangitis, HBS consulted. ERCP with sweeping done  with diffuse intrahepatic duct stricturing noted, no stent placement. JULIO pending.     SUBJECTIVE:  Stable overnight. No other overnight issues reported.   Patient seen and examined  Records reviewed.     Temp (24hrs), Av.5 °F (36.4 °C), Min:97.2 °F (36.2 °C), Max:97.7 °F (36.5 °C)    DIET: ADULT DIET; Regular  ADULT ORAL NUTRITION SUPPLEMENT; Breakfast, Lunch, Dinner; Standard High Calorie/High Protein Oral Supplement  Diet NPO Exceptions are: Sips of Water with Meds  ADULT ORAL NUTRITION SUPPLEMENT; Breakfast, Dinner, Lunch, AM Snack, PM Snack; Wound Healing Oral Supplement  CODE: Full Code    Intake/Output Summary (Last 24 hours) at 2024 1008  Last data filed at 2024 2030  Gross per 24 hour   Intake --   Output 200 ml 
       [facility] Hospitalist Progress Note    SYNOPSIS: Patient admitted on 2024 for Failure to thrive in adult    Raudel Herndon is a 73 y.o. male with a past medical history that includes cholangiocarcinoma, chronic pancreatitis, diabetes who presents to [facility] ER complaining of fatigue, body aches. He was diagnosed with  cholangiocarcinoma around 6 months ago and follows with Dr. Reynoso.  He is on chemotherapy.  Last chemotherapy was last week. Upon presentation to the ER, routine labwork was performed which revealed lactic acid 4.5, glucose 125, troponin 47, ALT [place] 82, ALT 41, AST 78, bili 4.3, WBC 21.3, hemoglobin 8.6, platelets 88. CXR was negative. Patient was found to have a UTI and was started on Rocephin. He was also noted to have acute on chronic anemia with Hgb of 6.9 and given one unit of blood. Lactic acidosis resolved with fluids. Blood pressures noted to be soft on admission as well. Blood cultures returned positive for E. Faecalis. ID was consulted. Patient on Rocephin, ampicillin, and flagyl. Patient has known cholangiocarcinoma with hepatic stent in place. Due to bacteremia and concern for possible cholangitis, HBS consulted. ERCP with sweeping done  with diffuse intrahepatic duct stricturing noted, no stent placement. JULIO pending.     SUBJECTIVE:  Patient seen and examined chart reviewed discussed with nursing staff no overnight events reported patient is stable, lower extremity Dopplers negative for DVT CTA shows no PE but shows pulmonary vascular congestion and pleural effusions.  Will start patient on IV Lasix continue to closely monitor him he has a drop in his his platelet count today is 36 K with no bleeding.  Will continue to monitor this is likely due to his malignancy and chemo use.  Recheck platelet count this afternoon hold Lovenox    Temp (24hrs), Av.3 °F (36.3 °C), Min:97 °F (36.1 °C), Max:97.6 °F (36.4 °C)    DIET: ADULT DIET; Regular  ADULT ORAL NUTRITION 
       [facility] Hospitalist Progress Note    SYNOPSIS: Patient admitted on 2024 for Failure to thrive in adult    Raudel Herndon is a 73 y.o. male with a past medical history that includes cholangiocarcinoma, chronic pancreatitis, diabetes who presents to [facility] ER complaining of fatigue, body aches. He was diagnosed with  cholangiocarcinoma around 6 months ago and follows with Dr. Reynoso.  He is on chemotherapy.  Last chemotherapy was last week. Upon presentation to the ER, routine labwork was performed which revealed lactic acid 4.5, glucose 125, troponin 47, ALT Des 82, ALT 41, AST 78, bili 4.3, WBC 21.3, hemoglobin 8.6, platelets 88. CXR was negative. Patient was found to have a UTI and was started on Rocephin. He was also noted to have acute on chronic anemia with Hgb of 6.9 and given one unit of blood. Lactic acidosis resolved with fluids. Blood pressures noted to be soft on admission as well. Blood cultures returned positive for E. Faecalis. ID was consulted. Patient on Rocephin, ampicillin, and flagyl. Patient has known cholangiocarcinoma with hepatic stent in place. Due to bacteremia and concern for possible cholangitis, HBS consulted. ERCP with stent exchange done  with diffuse intrahepatic duct stricturing. JULIO pending.     SUBJECTIVE:  Stable overnight. No other overnight issues reported.   Patient seen and examined  Records reviewed.     Temp (24hrs), Av.5 °F (36.4 °C), Min:97.2 °F (36.2 °C), Max:97.7 °F (36.5 °C)    DIET: ADULT DIET; Regular  ADULT ORAL NUTRITION SUPPLEMENT; Breakfast, Lunch, Dinner; Standard High Calorie/High Protein Oral Supplement  ADULT ORAL NUTRITION SUPPLEMENT; Breakfast, Dinner; Wound Healing Oral Supplement  Diet NPO Exceptions are: Sips of Water with Meds  CODE: Full Code    Intake/Output Summary (Last 24 hours) at 2024 1326  Last data filed at 2024 1724  Gross per 24 hour   Intake --   Output 250 ml   Net -250 ml         Review of Systems  All 
       [facility] Hospitalist Progress Note    SYNOPSIS: Patient admitted on 2024 for Failure to thrive in adult    Raudel Herndon is a 73 y.o. male with a past medical history that includes cholangiocarcinoma, chronic pancreatitis, diabetes who presents to [facility] ER complaining of fatigue, body aches. He was diagnosed with  cholangiocarcinoma around 6 months ago and follows with Dr. Reynoso.  He is on chemotherapy.  Last chemotherapy was last week. Upon presentation to the ER, routine labwork was performed which revealed lactic acid 4.5, glucose 125, troponin 47, ALT [place] 82, ALT 41, AST 78, bili 4.3, WBC 21.3, hemoglobin 8.6, platelets 88. CXR was negative. Patient was found to have a UTI and was started on Rocephin. He was also noted to have acute on chronic anemia with Hgb of 6.9 and given one unit of blood. Lactic acidosis resolved with fluids. Blood pressures noted to be soft on admission as well. Blood cultures returned positive for E. Faecalis. ID was consulted. Patient on Rocephin, ampicillin, and flagyl. Patient has known cholangiocarcinoma with hepatic stent in place. Due to bacteremia and concern for possible cholangitis, HBS consulted. ERCP with sweeping done  with diffuse intrahepatic duct stricturing noted, no stent placement. JULIO pending.     SUBJECTIVE:  Patient seen and examined chart reviewed discussed with nursing staff no overnight events reported patient is comfortable this morning.  His heart rate is within normal limits he is afebrile normal WBC count his antibiotics was switched to IV vancomycin  Patient was evaluated by orthopedic surgery no plans for surgical interventions at this point.  Patient is an elevated D-dimer.  Will check lower extremity Doppler to rule out DVTs.  CTA of the chest to rule out PE.    Temp (24hrs), Av.4 °F (36.3 °C), Min:97.3 °F (36.3 °C), Max:97.4 °F (36.3 °C)    DIET: ADULT DIET; Regular  ADULT ORAL NUTRITION SUPPLEMENT; Breakfast, Lunch, Dinner; 
       [facility] Hospitalist Progress Note    SYNOPSIS: Patient admitted on 2024 for Failure to thrive in adult    Raudel Herndon is a 73 y.o. male with a past medical history that includes cholangiocarcinoma, chronic pancreatitis, diabetes who presents to [facility] ER complaining of fatigue, body aches. He was diagnosed with  cholangiocarcinoma around 6 months ago and follows with Dr. Reynoso.  He is on chemotherapy.  Last chemotherapy was last week. Upon presentation to the ER, routine labwork was performed which revealed lactic acid 4.5, glucose 125, troponin 47, ALT [place] 82, ALT 41, AST 78, bili 4.3, WBC 21.3, hemoglobin 8.6, platelets 88. CXR was negative. Patient was found to have a UTI and was started on Rocephin. He was also noted to have acute on chronic anemia with Hgb of 6.9 and given one unit of blood. Lactic acidosis resolved with fluids. Blood pressures noted to be soft on admission as well. Blood cultures returned positive for E. Faecalis. ID was consulted. Patient on Rocephin, ampicillin, and flagyl. Patient has known cholangiocarcinoma with hepatic stent in place. Due to bacteremia and concern for possible cholangitis, HBS consulted. ERCP with sweeping done  with diffuse intrahepatic duct stricturing noted, no stent placement. JULIO pending.     SUBJECTIVE:  Patient seen and examined chart reviewed discussed with nursing staff and case management no overnight events reported.  Patient with plan for possible possible discharge today however patient vancomycin trough is not therapeutic.  Plan for possible discharge tomorrow, on remote currently patient having some questionable arrhythmias will obtain twelve-lead EKG with plan for possible discharge tomorrow  Patient denies any chest pain or shortness of breath no dizziness lightheadedness    Temp (24hrs), Av.3 °F (36.3 °C), Min:97 °F (36.1 °C), Max:97.5 °F (36.4 °C)    DIET: ADULT DIET; Regular  ADULT ORAL NUTRITION SUPPLEMENT; Breakfast, 
       [facility] Hospitalist Progress Note    SYNOPSIS: Patient admitted on 2024 for Failure to thrive in adult    Raudel Herndon is a 73 y.o. male with a past medical history that includes cholangiocarcinoma, chronic pancreatitis, diabetes who presents to [facility] ER complaining of fatigue, body aches. He was diagnosed with  cholangiocarcinoma around 6 months ago and follows with Dr. Reynoso.  He is on chemotherapy.  Last chemotherapy was last week. Upon presentation to the ER, routine labwork was performed which revealed lactic acid 4.5, glucose 125, troponin 47, ALT [place] 82, ALT 41, AST 78, bili 4.3, WBC 21.3, hemoglobin 8.6, platelets 88. CXR was negative. Patient was found to have a UTI and was started on Rocephin. He was also noted to have acute on chronic anemia with Hgb of 6.9 and given one unit of blood. Lactic acidosis resolved with fluids. Blood pressures noted to be soft on admission as well. Blood cultures returned positive for E. Faecalis. ID was consulted. Patient on Rocephin, ampicillin, and flagyl. Patient has known cholangiocarcinoma with hepatic stent in place. Due to bacteremia and concern for possible cholangitis, HBS consulted. ERCP with sweeping done  with diffuse intrahepatic duct stricturing noted, no stent placement. JULIO pending.     SUBJECTIVE:  Stable overnight. No other overnight issues reported.   Patient seen and examined  Records reviewed.     Temp (24hrs), Av.7 °F (36.5 °C), Min:97.6 °F (36.4 °C), Max:97.7 °F (36.5 °C)    DIET: Diet NPO Exceptions are: Sips of Water with Meds  CODE: Full Code    Intake/Output Summary (Last 24 hours) at 2024 1002  Last data filed at 2024 1200  Gross per 24 hour   Intake 240 ml   Output --   Net 240 ml         Review of Systems  All bolded are positive; please see HPI  General:  Fever, chills, diaphoresis, fatigue, malaise, night sweats, weight loss  Psychological:  Anxiety, disorientation, hallucinations.  ENT:  Epistaxis, 
       [facility] Hospitalist Progress Note    SYNOPSIS: Patient admitted on 2024 for Failure to thrive in adult    Raudel Herndon is a 73 y.o. male with a past medical history that includes cholangiocarcinoma, chronic pancreatitis, diabetes who presents to [facility] ER complaining of fatigue, body aches. He was diagnosed with  cholangiocarcinoma around 6 months ago and follows with Dr. Reynoso.  He is on chemotherapy.  Last chemotherapy was last week. Upon presentation to the ER, routine labwork was performed which revealed lactic acid 4.5, glucose 125, troponin 47, ALT [place] 82, ALT 41, AST 78, bili 4.3, WBC 21.3, hemoglobin 8.6, platelets 88. CXR was negative. Patient was found to have a UTI and was started on Rocephin. He was also noted to have acute on chronic anemia with Hgb of 6.9 and given one unit of blood. Lactic acidosis resolved with fluids. Blood pressures noted to be soft on admission as well. Blood cultures returned positive for E. Faecalis. ID was consulted. Patient on Rocephin, ampicillin, and flagyl. Patient has known cholangiocarcinoma with hepatic stent in place. Due to bacteremia and concern for possible cholangitis, HBS consulted. ERCP with sweeping done  with diffuse intrahepatic duct stricturing noted, no stent placement. JULIO pending.     SUBJECTIVE:  Seen and examined chart reviewed discussed with the nursing staff and case management patient complains of back pain requesting adjustment in his pain regimen no chest pain or shortness of breath    Temp (24hrs), Av °F (36.7 °C), Min:97.9 °F (36.6 °C), Max:98.2 °F (36.8 °C)    DIET: ADULT DIET; Regular  CODE: Full Code    Intake/Output Summary (Last 24 hours) at 2024 1243  Last data filed at 2024 0930  Gross per 24 hour   Intake 630 ml   Output 250 ml   Net 380 ml         Review of Systems  All bolded are positive; please see HPI  General:  Fever, chills, diaphoresis, fatigue, malaise, night sweats, weight 
     Nutrition Note    Consult Re: for poor appetite/intake and calorie count. Chart reviewed. Pt most recently assessed by RD on 6/26 (see RD note for full assessment). Calorie count started today and envelope placed on door for meal tickets/intake recording. Will collect and assess calorie count after 3 days. (unable to quantify sooner d/t low RD staffing on weekend). Recommend to also resume/start ONS: ensure + TID, tomeka BID to promote nutrient/PO intake per RD recs on 6/26. Will follow-up as planned.  Please reconsult if RD needed prior to follow-up.    Electronically signed by Puma Morrison RD on 6/28/24 at 12:45 PM EDT    Contact: ext [phone]     
  [facility]  [facility]  Progress Note    SUBJECTIVE:  Chief Complaint   Patient presents with    Fatigue     Pt states he has been too weak to get up out of the chair. Unable to eat. Generalized body aches.     Generalized Body Aches     Patient resting in bed. He states if he stays still he does not have back pain. If he moves his back pain is an 8/10.   Patient is tolerating medications. No reported adverse drug reactions.  No nausea, vomiting, diarrhea.    Review of systems:  As stated above in the chief complaint, otherwise negative.    Medications:  Scheduled Meds:   vancomycin  1,000 mg IntraVENous Q12H    furosemide  20 mg IntraVENous BID    sodium chloride flush  5-40 mL IntraVENous 2 times per day    lidocaine 1 % injection  50 mg IntraDERmal Once    acetaminophen  1,000 mg Oral 3 times per day    menthol-zinc oxide   Topical BID    polyethylene glycol  17 g Oral Daily    sodium chloride flush  5-40 mL IntraVENous 2 times per day    [Held by provider] enoxaparin  40 mg SubCUTAneous Daily    pantoprazole  40 mg Oral BID AC    [Held by provider] valsartan  80 mg Oral Daily    And    [Held by provider] hydroCHLOROthiazide  12.5 mg Oral Daily     Continuous Infusions:   sodium chloride      sodium chloride      sodium chloride       PRN Meds:sodium chloride flush, sodium chloride, oxyCODONE, cyclobenzaprine, menthol-zinc oxide **AND** menthol-zinc oxide, benzocaine-menthol, benzonatate, melatonin, Polyvinyl Alcohol-Povidone PF, sodium chloride, hydrALAZINE, calcium carbonate, sodium chloride flush, sodium chloride, ondansetron **OR** ondansetron, sodium chloride, sodium chloride flush    OBJECTIVE:  BP (!) 112/59   Pulse 94   Temp 97 °F (36.1 °C) (Temporal)   Resp 16   Ht 1.854 m (6' 0.99\")   Wt 69.1 kg (152 lb 7 oz)   SpO2 96%   BMI 20.12 kg/m²   Temp  Av.3 °F (36.3 °C)  Min: 97 °F (36.1 °C)  Max: 97.5 °F (36.4 °C)  Constitutional: No acute distress  Skin: Warm and 
  [facility]  [facility]  Progress Note    SUBJECTIVE:  Chief Complaint   Patient presents with    Fatigue     Pt states he has been too weak to get up out of the chair. Unable to eat. Generalized body aches.     Generalized Body Aches     Patient resting in bed.  Complaining of back pain around the belt area.  Patient is tolerating medications. No reported adverse drug reactions.  No nausea, vomiting, diarrhea.    Review of systems:  As stated above in the chief complaint, otherwise negative.    Medications:  Scheduled Meds:   menthol-zinc oxide   Topical BID    polyethylene glycol  17 g Oral Daily    sodium chloride flush  5-40 mL IntraVENous 2 times per day    enoxaparin  40 mg SubCUTAneous Daily    pantoprazole  40 mg Oral BID AC    [Held by provider] valsartan  80 mg Oral Daily    And    [Held by provider] hydroCHLOROthiazide  12.5 mg Oral Daily    ampicillin IV  2,000 mg IntraVENous 6 times per day     Continuous Infusions:   sodium chloride      sodium chloride       PRN Meds:oxyCODONE-acetaminophen, cyclobenzaprine, menthol-zinc oxide **AND** menthol-zinc oxide, benzocaine-menthol, benzonatate, melatonin, Polyvinyl Alcohol-Povidone PF, sodium chloride, hydrALAZINE, calcium carbonate, sodium chloride flush, sodium chloride, ondansetron **OR** ondansetron, acetaminophen **OR** acetaminophen, sodium chloride, sodium chloride flush    OBJECTIVE:  /62   Pulse 86   Temp 97.9 °F (36.6 °C) (Temporal)   Resp 17   Ht 1.854 m (6' 0.99\")   Wt 81.6 kg (180 lb)   SpO2 97%   BMI 23.75 kg/m²   Temp  Av °F (36.7 °C)  Min: 97.9 °F (36.6 °C)  Max: 98.2 °F (36.8 °C)  Constitutional: Appears ill  Skin: Warm and dry. No rashes were noted.   Neuro: Alert and oriented  HEENT: Round and reactive pupils.  Moist mucous membranes.  No ulcerations or thrush.  Chest: .Respirations unlabored. Breath sounds clear.   Cardiovascular: RRR  Abdomen: Abdomen soft.  Distended.  Bowel sounds 
  [facility] Quality Flow/Interdisciplinary Rounds Progress Note        Quality Flow Rounds held on June 26, 2024    Disciplines Attending:  Bedside Nurse, , , and Nursing Unit Leadership    Raudel Herndon was admitted on 6/24/2024  5:19 PM    Anticipated Discharge Date:       Disposition:    Chris Score:  Chris Scale Score: 18    Readmission Risk              Risk of Unplanned Readmission:  24           Discussed patient goal for the day, patient clinical progression, and barriers to discharge.  The following Goal(s) of the Day/Commitment(s) have been identified:  Diagnostics - Report Results and Labs - Report Results      Myrtle Contreras RN  June 26, 2024        
  [facility]  [facility]  Progress Note    SUBJECTIVE:  Chief Complaint   Patient presents with    Fatigue     Pt states he has been too weak to get up out of the chair. Unable to eat. Generalized body aches.     Generalized Body Aches     Patient resting in bed. No new complaints.    Patient is tolerating medications. No reported adverse drug reactions.  No nausea, vomiting, diarrhea.    Review of systems:  As stated above in the chief complaint, otherwise negative.    Medications:  Scheduled Meds:   vancomycin  1,000 mg IntraVENous Q12H    furosemide  20 mg IntraVENous BID    sodium chloride flush  5-40 mL IntraVENous 2 times per day    lidocaine 1 % injection  50 mg IntraDERmal Once    acetaminophen  1,000 mg Oral 3 times per day    menthol-zinc oxide   Topical BID    polyethylene glycol  17 g Oral Daily    sodium chloride flush  5-40 mL IntraVENous 2 times per day    [Held by provider] enoxaparin  40 mg SubCUTAneous Daily    pantoprazole  40 mg Oral BID AC    [Held by provider] valsartan  80 mg Oral Daily    And    [Held by provider] hydroCHLOROthiazide  12.5 mg Oral Daily     Continuous Infusions:   sodium chloride      sodium chloride      sodium chloride       PRN Meds:sodium chloride flush, sodium chloride, oxyCODONE, cyclobenzaprine, menthol-zinc oxide **AND** menthol-zinc oxide, benzocaine-menthol, benzonatate, melatonin, Polyvinyl Alcohol-Povidone PF, sodium chloride, hydrALAZINE, calcium carbonate, sodium chloride flush, sodium chloride, ondansetron **OR** ondansetron, sodium chloride, sodium chloride flush    OBJECTIVE:  BP (!) 122/59   Pulse 85   Temp 97.7 °F (36.5 °C) (Temporal)   Resp 16   Ht 1.854 m (6' 0.99\")   Wt 69.1 kg (152 lb 7 oz)   SpO2 97%   BMI 20.12 kg/m²   Temp  Av.5 °F (36.4 °C)  Min: 97.3 °F (36.3 °C)  Max: 97.7 °F (36.5 °C)  Constitutional: No acute distress  Skin: Warm and dry. No rashes were noted.   Neuro: Alert and oriented  HEENT: Round 
  [facility]  [facility]  Progress Note    SUBJECTIVE:  Chief Complaint   Patient presents with    Fatigue     Pt states he has been too weak to get up out of the chair. Unable to eat. Generalized body aches.     Generalized Body Aches     Patient sitting in chair eating lunch..  No complaints.  Patient is tolerating medications. No reported adverse drug reactions.  No nausea, vomiting, diarrhea.    Review of systems:  As stated above in the chief complaint, otherwise negative.    Medications:  Scheduled Meds:   furosemide  20 mg IntraVENous BID    vancomycin  1,500 mg IntraVENous Q24H    sodium chloride flush  5-40 mL IntraVENous 2 times per day    lidocaine 1 % injection  50 mg IntraDERmal Once    acetaminophen  1,000 mg Oral 3 times per day    menthol-zinc oxide   Topical BID    polyethylene glycol  17 g Oral Daily    sodium chloride flush  5-40 mL IntraVENous 2 times per day    [Held by provider] enoxaparin  40 mg SubCUTAneous Daily    pantoprazole  40 mg Oral BID AC    [Held by provider] valsartan  80 mg Oral Daily    And    [Held by provider] hydroCHLOROthiazide  12.5 mg Oral Daily     Continuous Infusions:   sodium chloride      sodium chloride      sodium chloride       PRN Meds:sodium chloride flush, sodium chloride, oxyCODONE, cyclobenzaprine, menthol-zinc oxide **AND** menthol-zinc oxide, benzocaine-menthol, benzonatate, melatonin, Polyvinyl Alcohol-Povidone PF, sodium chloride, hydrALAZINE, calcium carbonate, sodium chloride flush, sodium chloride, ondansetron **OR** ondansetron, sodium chloride, sodium chloride flush    OBJECTIVE:  BP (!) 112/59   Pulse 86   Temp 97 °F (36.1 °C) (Temporal)   Resp 16   Ht 1.854 m (6' 0.99\")   Wt 69.1 kg (152 lb 7 oz)   SpO2 97%   BMI 20.12 kg/m²   Temp  Av.3 °F (36.3 °C)  Min: 97 °F (36.1 °C)  Max: 97.6 °F (36.4 °C)  Constitutional: No acute distress  Skin: Warm and dry. No rashes were noted.   Neuro: Alert and oriented  HEENT: 
4 Eyes Skin Assessment     NAME:  Raudel Herndon  YOB: 1950  MEDICAL RECORD NUMBER:  [id number]    The patient is being assessed for  Transfer to New Unit    I agree that at least one RN has performed a thorough Head to Toe Skin Assessment on the patient. ALL assessment sites listed below have been assessed.      Areas assessed by both nurses:    Head, Face, Ears, Shoulders, Back, Chest, Arms, Elbows, Hands, Sacrum. Buttock, Coccyx, Ischium, Legs. Feet and Heels, and Under Medical Devices         Does the Patient have a Wound? Yes wound(s) were present on assessment. LDA wound assessment was Initiated and completed by RN  L marie abrasion  L elbow skin tear  L knee old surgical scar         Chris Prevention initiated by RN: Yes  Wound Care Orders initiated by RN: Yes    Pressure Injury (Stage 3,4, Unstageable, DTI, NWPT, and Complex wounds) if present, place Wound referral order by RN under : No    New Ostomies, if present place, Ostomy referral order under : No     Nurse 1 eSignature: Electronically signed by Nohemi Leblanc RN on 6/27/24 at 4:32 PM EDT    **SHARE this note so that the co-signing nurse can place an eSignature**    Nurse 2 eSignature: Electronically signed by Jacqueline Fay RN on 6/27/24 at 4:41 PM EDT   
4 Eyes Skin Assessment     NAME:  Raudel Herndon  YOB: 1950  MEDICAL RECORD NUMBER:  [id number]    The patient is being assessed for  Admission    I agree that at least one RN has performed a thorough Head to Toe Skin Assessment on the patient. ALL assessment sites listed below have been assessed.      Areas assessed by both nurses:    Head, Face, Ears, Shoulders, Back, Chest, Arms, Elbows, Hands, Sacrum. Buttock, Coccyx, Ischium, Legs. Feet and Heels, and Under Medical Devices         Does the Patient have a Wound? Yes wound(s) were present on assessment. LDA wound assessment was Initiated and completed by RN       Chris Prevention initiated by RN: Yes  Wound Care Orders initiated by RN: Yes    Pressure Injury (Stage 3,4, Unstageable, DTI, NWPT, and Complex wounds) if present, place Wound referral order by RN under : Yes    New Ostomies, if present place, Ostomy referral order under : No     Nurse 1 eSignature: Electronically signed by Eleazar Barry RN on 6/26/24 at 12:55 AM EDT    **SHARE this note so that the co-signing nurse can place an eSignature**    Nurse 2 eSignature: Electronically signed by Sasha Meek RN on 6/26/24 at 1:00 AM EDT  
Attempted to place picc line today. Pt has severe pain yelling out for his nurse and states his son is coming to talk to him. Will hold off today as pt is not  discharging today.   
Blood permit signed and placed in soft chart  
Blood transfusion complete at this time, patient tolerated well.   
CMU called reporting pt in and out of accelerated junctional rhythm. Pt reporting no symptoms, vital signs obtained. Attending notified, EKG ordered and completed.  
Called nurse to nurse to Félix MARTINEZ   
Chg double lumen picc Placement 7/7/2024    Product number: [id number]   Lot Number: [id number]      Ultrasound: yes   Left Basilic vein:                Upper Arm Circumference: (CM) 29cm    Size:(FR)/GUAGE 5.5fr/42cm    Exposed Length: (CM) 0    Internal Length: (CM) 42cm   Cut: (CM) 13cm   Vein Measurement: 0.60cm              Lidocaine Given: yes-given in picc kit  Aide Voss RN  7/7/2024  3:54 PM    mere                      
Comprehensive Nutrition Assessment    Type and Reason for Visit:  Reassess    Nutrition Recommendations/Plan:   Continue current diet w/ ONS to promote PO/nutrient intake and wound healing. Will continue to monitor.     Malnutrition Assessment:  Malnutrition Status:  Severe malnutrition (06/26/24 1258)    Context:  Chronic Illness     Findings of the 6 clinical characteristics of malnutrition:  Energy Intake:  75% or less estimated energy requirements for 1 month or longer  Weight Loss:  Greater than 7.5% over 3 months (27% x < 5 months)     Body Fat Loss:   (moderate) Buccal region, Triceps   Muscle Mass Loss:  Severe muscle mass loss Temples (temporalis), Clavicles (pectoralis & deltoids)  Fluid Accumulation:  No significant fluid accumulation     Strength:  Not Performed    Nutrition Assessment:    Pt adm fatigue and generalized body aches ; also adm w/ FTT and decreased po/appetite PTA ; concern for ascending cholangitis ;  NANCY and 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 S/p ERCP with sweeping done 6/28 with diffuse intrahepatic duct stricturing noted, no stent placement. Noted Echo and JULIO unremarkable. ID following- MRI of the spine showed discitis/osteomyelitis. PMHx Prostate CA, DM, Fatty liver, Chronic Pancreatitis; Pt reported PO intake ~ 20% of meals since CA dx w/ reported wt of 230-240# x 6 months ago; Hx of malnutrition ; Pt meets criteria for severe malnutrition. PT wounds noted ; s/p cholecystectomy for gangrenous cholecystitis in 2017 ; noted chronic anemia and thrombocytopenia. Calorie count x3 days completed on 7/1 d/t ongoing Poor PO intake. Pt. PO intake greatly improved starting on 6/30 per calorie count/doc flow intakes noted to be % at most meals since. Prior to 6/30 pt. was averaging 0-25% at most meals this admit but with good intake of ONS. \"Pt.  reported that he is \"not a fan of the food here\". Will continue 
Comprehensive Nutrition Assessment    Type and Reason for Visit:  Reassess    Nutrition Recommendations/Plan:   Pt. Currently NPO. Resume ONS Gen BID/Ensure TID (Vanilla) with diet/when appropriate.     Calorie count completed x3 days. Pt. PO intake appears greatly improved starting on 6/30 per calorie count/doc flow intakes noted to be % x2. Prior to 6/30 pt. was averaging 0-25% at most meals this admit but with good intake of ONS. \"Pt. reports he is not a fan of the food here\". Will continue to monitor PO/ONS intake closely and provide nutrition recs as appropriate.     Malnutrition Assessment:  Malnutrition Status:  Severe malnutrition (06/26/24 1258)    Context:  Chronic Illness     Findings of the 6 clinical characteristics of malnutrition:  Energy Intake:  75% or less estimated energy requirements for 1 month or longer  Weight Loss:  7.5% over 3 months     Body Fat Loss:   (moderate) Buccal region, Triceps   Muscle Mass Loss:  Severe muscle mass loss Temples (temporalis), Clavicles (pectoralis & deltoids)  Fluid Accumulation:  No significant fluid accumulation     Strength:  Not Performed    Nutrition Assessment:    Pt adm fatigue and generalized body aches ; also adm w/ FTT and decreased po/appetite PTA ; concern for ascending cholangitis ; noted NANCY and 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 S/p ERCP with sweeping done 6/28 with diffuse intrahepatic duct stricturing noted, no stent placement. Noted  ECHO unremarkable and JULIO ordered/pending. PMHx Prostate CA, DM, Fatty liver, Chronic Pancreatitis; Pt reported PO intake ~ 20% of meals since CA dx w/ reported wt of 230-240# x 6 months ago; Hx of malnutrition ; Pt meets criteria for severe malnutrition. PT Wounds noted ; s/p cholecystectomy for gangrenous cholecystitis in 2017 ; noted chronic anemia and thrombocytopenia ; Pt at risk for refeeding syndrome d/t ongoing poor PO x 
Dr. Hernandes notified of hem/onc consult. Pt added to census.  
General Surgery   Daily Progress Note      Patient's Name/Date of Birth: Raudel Herndon / 1950    Date: June 26, 2024     Patient Active Problem List   Diagnosis    Cholangiocarcinoma (HCC)    Cholangiocellular carcinoma (HCC)    Obstructive jaundice due to malignant neoplasm (HCC)    NANCY (acute kidney injury) (HCC)    Moderate protein-calorie malnutrition (HCC)    Obstructive jaundice    Obstructive jaundice due to cancer (HCC)    Intrahepatic cholestatic liver disease    Failure to thrive in adult    UTI (urinary tract infection)       Subjective: No complains. Ate yesterday but still not eating very well. Says he no appetite. No N/V. No abdominal pain.     Objective:  BP (!) 94/52   Pulse 72   Temp 97.4 °F (36.3 °C) (Temporal)   Resp 16   Ht 1.854 m (6' 1\")   Wt 82.1 kg (180 lb 14.4 oz)   SpO2 100%   BMI 23.87 kg/m²   Labs:  Recent Labs     06/24/24  1812 06/25/24  0815 06/25/24  1745   WBC 21.3* 12.4*  --    HGB 8.6* 6.9* 7.7*   HCT 29.1* 23.3* 24.5*     Recent Labs     06/24/24  1812 06/25/24  0815    137   K 3.5 3.5    104   CO2 19* 24   BUN 20 19   CREATININE 1.0 1.0     Recent Labs     06/24/24  1812 06/25/24  0815   ALKPHOS 882* 691*   ALT 41* 32   AST 78* 56*   BILITOT 4.3* 3.2*   BILIDIR 2.8*  --    LIPASE 22  --        General appearance:  lying in bed, NAD  Head: NCAT, PERRL, EOMI, red conjunctiva  Neck: supple, no masses, trachea midline  Lungs: symmetric chest rise, no audible wheezes  Heart: warm throughout  Abdomen: soft, non-distended, none tender to palpation throughout  Skin: warm and dry, no cyanosis.  Jaundiced.  Gu: no cva tenderness  Extremities: atraumatic, no focal motor deficits, no open wounds  Psych: no tremor, visual hallucinations      Assessment/Plan:  Raudel Herndon is a 73 y.o. male with history of cholangiocarcinoma with concern for ascending cholangitis     - NPO today for ERCP w stent exchange  - Ok for regular diet after  - Abx per ID   - Dietary consult 
Hepatobiliary Surgery   Daily Progress Note      Patient's Name/Date of Birth: Raudel Herndon / 1950    Date: July 1, 2024     Patient Active Problem List   Diagnosis    Cholangiocarcinoma (HCC)    Cholangiocellular carcinoma (HCC)    Obstructive jaundice due to malignant neoplasm (HCC)    NANCY (acute kidney injury) (HCC)    Severe protein-calorie malnutrition (HCC)    Obstructive jaundice    Obstructive jaundice due to cancer (HCC)    Intrahepatic cholestatic liver disease    Failure to thrive in adult    UTI (urinary tract infection)    Enterococcal bacteremia    Cholangitis       Subjective: No issues overnight. Feeling good. Eating more.   Objective:  /72   Pulse 88   Temp 97.7 °F (36.5 °C) (Temporal)   Resp 16   Ht 1.854 m (6' 1\")   Wt 81.6 kg (180 lb)   SpO2 95%   BMI 23.75 kg/m²   Labs:  Recent Labs     06/29/24  0951   WBC 7.1   HGB 8.9*   HCT 30.8*       Recent Labs     06/29/24  0449 06/29/24  1450    138   K 3.7 3.8   * 108*   CO2 19* 20*   BUN 11 13   CREATININE 0.8 0.9       Recent Labs     06/29/24  0449   ALKPHOS 867*   ALT 27   AST 62*   BILITOT 2.1*   LIPASE 33         General appearance:  lying in bed, NAD  Head: NCAT, PERRL, EOMI, red conjunctiva  Neck: supple, no masses, trachea midline  Lungs: symmetric chest rise, no audible wheezes  Heart: warm throughout  Abdomen: soft, non-distended, none tender to palpation throughout  Skin: warm and dry, no cyanosis.   Gu: no cva tenderness  Extremities: atraumatic, no focal motor deficits, no open wounds + edema   Psych: no tremor, visual hallucinations      Assessment/Plan:  Raudel Herndon is a 73 y.o. male with history of cholangiocarcinoma with concern for ascending cholangitis     - Diet as tolerated  - GI following stents exchanged, monitor HFP   - Abx per ID   - Calorie counts for malnutrition PO improving everyday, supplements ordered.   - Monitor Lfts/bili   - replace electrolytes PRN PO4 4, K 4, Mg 2  - Awaiting final 
Hepatobiliary Surgery   Daily Progress Note      Patient's Name/Date of Birth: Raudel Herndon / 1950    Date: July 2, 2024     Patient Active Problem List   Diagnosis    Cholangiocarcinoma (HCC)    Cholangiocellular carcinoma (HCC)    Obstructive jaundice due to malignant neoplasm (HCC)    NANCY (acute kidney injury) (HCC)    Severe protein-calorie malnutrition (HCC)    Obstructive jaundice    Obstructive jaundice due to cancer (HCC)    Intrahepatic cholestatic liver disease    Failure to thrive in adult    UTI (urinary tract infection)    Enterococcal bacteremia    Cholangitis       Subjective: No issues overnight.  Patient states that he is feeling better.  He is drinking 2 shakes a day and tolerating his diet.  He states that he ate more today.  His biggest issue is his back pain.    Objective:  /62   Pulse 86   Temp 97.9 °F (36.6 °C) (Temporal)   Resp 17   Ht 1.854 m (6' 0.99\")   Wt 81.6 kg (180 lb)   SpO2 97%   BMI 23.75 kg/m²   Labs:  No results for input(s): \"WBC\", \"HGB\", \"HCT\" in the last 72 hours.    Invalid input(s): \"PLR\"    No results for input(s): \"NA\", \"K\", \"CL\", \"CO2\", \"BUN\", \"CREATININE\" in the last 72 hours.    No results for input(s): \"ALKPHOS\", \"ALT\", \"AST\", \"BILITOT\", \"BILIDIR\", \"LABALBU\", \"LIPASE\" in the last 72 hours.      General appearance:  lying in bed, NAD  Head: NCAT, PERRL, EOMI, red conjunctiva  Neck: supple, no masses, trachea midline  Lungs: symmetric chest rise, no audible wheezes  Heart: warm throughout  Abdomen: soft, non-distended, none tender to palpation throughout  Skin: warm and dry, no cyanosis.   Gu: no cva tenderness  Extremities: atraumatic, no focal motor deficits, no open wounds + edema   Psych: no tremor, visual hallucinations      Assessment/Plan:  Raudel Herndon is a 73 y.o. male with history of cholangiocarcinoma with concern for ascending cholangitis     - Diet as tolerated  - GI following stents exchanged, monitor HFP   - Abx per ID   - Calorie 
Hepatobiliary Surgery   Daily Progress Note      Patient's Name/Date of Birth: Raudel Herndon / 1950    Date: July 3, 2024     Patient Active Problem List   Diagnosis    Cholangiocarcinoma (HCC)    Cholangiocellular carcinoma (HCC)    Obstructive jaundice due to malignant neoplasm (HCC)    NANCY (acute kidney injury) (HCC)    Severe protein-calorie malnutrition (HCC)    Obstructive jaundice    Obstructive jaundice due to cancer (HCC)    Intrahepatic cholestatic liver disease    Failure to thrive in adult    UTI (urinary tract infection)    Enterococcal bacteremia    Cholangitis       Subjective: No acute events ON. Pt has back pain worse with movement. Denies abdominal pain or n/v.     Objective:  /65   Pulse 83   Temp 97.9 °F (36.6 °C) (Oral)   Resp 17   Ht 1.854 m (6' 0.99\")   Wt 81.6 kg (180 lb)   SpO2 94%   BMI 23.75 kg/m²   Labs:  Recent Labs     07/03/24 0418   WBC 4.0*   HGB 7.9*   HCT 26.1*       Recent Labs     07/03/24  0418      K 4.0      CO2 23   BUN 34*   CREATININE 1.1       Recent Labs     07/03/24  0418   ALKPHOS 924*   ALT 24   AST 59*   BILITOT 1.9*         General appearance:  lying in bed, NAD  Head: NCAT, PERRL, EOMI, red conjunctiva  Neck: supple, no masses, trachea midline  Lungs: symmetric chest rise, no audible wheezes  Heart: warm throughout  Abdomen: soft, non-distended, non tender to palpation throughout  Skin: warm and dry, no cyanosis.   Gu: no cva tenderness  Extremities: atraumatic, no focal motor deficits, no open wounds  Psych: no tremor, visual hallucinations      Assessment/Plan:  Raudel Herndon is a 73 y.o. male with history of cholangiocarcinoma with concern for ascending cholangitis s/p stent  exchange 3/24    - Will await MRI results   - Diet as tolerated  - Abx per ID  - Monitor Lfts/bili   - replace electrolytes PRN PO4 4, K 4, Mg 2 per primary   - Rest per primary    Maricruz Toledo MD  PGY-1 General Surgery Resident    Attending Physician 
Hepatobiliary Surgery   Daily Progress Note      Patient's Name/Date of Birth: Raudel Herndon / 1950    Date: June 27, 2024     Patient Active Problem List   Diagnosis    Cholangiocarcinoma (HCC)    Cholangiocellular carcinoma (HCC)    Obstructive jaundice due to malignant neoplasm (HCC)    NANCY (acute kidney injury) (HCC)    Severe protein-calorie malnutrition (HCC)    Obstructive jaundice    Obstructive jaundice due to cancer (HCC)    Intrahepatic cholestatic liver disease    Failure to thrive in adult    UTI (urinary tract infection)    Enterococcal bacteremia    Cholangitis       Subjective: No complains. No appetite still.     Objective:  BP (!) 96/54   Pulse 69   Temp 97.3 °F (36.3 °C) (Temporal)   Resp 18   Ht 1.854 m (6' 1\")   Wt 81.6 kg (180 lb)   SpO2 98%   BMI 23.75 kg/m²   Labs:  Recent Labs     06/25/24  0815 06/25/24  1745 06/26/24  1000 06/27/24  0558   WBC 12.4*  --  13.5* 8.6   HGB 6.9* 7.7* 8.0* 8.5*   HCT 23.3* 24.5* 26.8* 28.8*       Recent Labs     06/25/24  0815 06/26/24  1000 06/26/24  1953 06/27/24  0558    139  --  142   K 3.5 2.8* 3.5 3.6    106  --  112*   CO2 24 21*  --  19*   BUN 19 13  --  10   CREATININE 1.0 0.8  --  0.8       Recent Labs     06/24/24  1812 06/25/24  0815 06/26/24  1000 06/27/24  0558   ALKPHOS 882* 691* 626* 701*   ALT 41* 32 28 26   AST 78* 56* 59* 58*   BILITOT 4.3* 3.2* 3.0* 2.4*   BILIDIR 2.8*  --   --   --    LIPASE 22  --   --  24         General appearance:  lying in bed, NAD  Head: NCAT, PERRL, EOMI, red conjunctiva  Neck: supple, no masses, trachea midline  Lungs: symmetric chest rise, no audible wheezes  Heart: warm throughout  Abdomen: soft, non-distended, none tender to palpation throughout  Skin: warm and dry, no cyanosis.  Jaundiced.  Gu: no cva tenderness  Extremities: atraumatic, no focal motor deficits, no open wounds  Psych: no tremor, visual hallucinations      Assessment/Plan:  Raudel Herndon is a 73 y.o. male with history of 
Hepatobiliary Surgery   Daily Progress Note      Patient's Name/Date of Birth: Raudel Herndon / 1950    Date: June 28, 2024     Patient Active Problem List   Diagnosis    Cholangiocarcinoma (HCC)    Cholangiocellular carcinoma (HCC)    Obstructive jaundice due to malignant neoplasm (HCC)    NANCY (acute kidney injury) (HCC)    Severe protein-calorie malnutrition (HCC)    Obstructive jaundice    Obstructive jaundice due to cancer (HCC)    Intrahepatic cholestatic liver disease    Failure to thrive in adult    UTI (urinary tract infection)    Enterococcal bacteremia    Cholangitis       Subjective: No complains. No appetite still.     Objective:  BP (!) 103/52   Pulse 70   Temp 98.6 °F (37 °C) (Temporal)   Resp 16   Ht 1.854 m (6' 1\")   Wt 81.6 kg (180 lb)   SpO2 97%   BMI 23.75 kg/m²   Labs:  Recent Labs     06/26/24  1000 06/27/24  0558 06/28/24  0422   WBC 13.5* 8.6 7.8   HGB 8.0* 8.5* 8.1*   HCT 26.8* 28.8* 27.8*       Recent Labs     06/26/24  1000 06/26/24  1953 06/27/24  0558 06/28/24  0422     --  142 144   K 2.8* 3.5 3.6 3.3*     --  112* 113*   CO2 21*  --  19* 21*   BUN 13  --  10 10   CREATININE 0.8  --  0.8 0.8       Recent Labs     06/26/24  1000 06/27/24  0558 06/28/24  0422   ALKPHOS 626* 701* 709*   ALT 28 26 24   AST 59* 58* 54*   BILITOT 3.0* 2.4* 2.3*   LIPASE  --  24 32         General appearance:  lying in bed, NAD  Head: NCAT, PERRL, EOMI, red conjunctiva  Neck: supple, no masses, trachea midline  Lungs: symmetric chest rise, no audible wheezes  Heart: warm throughout  Abdomen: soft, non-distended, none tender to palpation throughout  Skin: warm and dry, no cyanosis.  Jaundiced.  Gu: no cva tenderness  Extremities: atraumatic, no focal motor deficits, no open wounds  Psych: no tremor, visual hallucinations      Assessment/Plan:  Raudel Herndon is a 73 y.o. male with history of cholangiocarcinoma with concern for ascending cholangitis     - NPO for stent exchange  - ok for 
Hepatobiliary Surgery   Daily Progress Note      Patient's Name/Date of Birth: Raudel Herndon / 1950    Date: June 30, 2024     Patient Active Problem List   Diagnosis    Cholangiocarcinoma (HCC)    Cholangiocellular carcinoma (HCC)    Obstructive jaundice due to malignant neoplasm (HCC)    NANCY (acute kidney injury) (HCC)    Severe protein-calorie malnutrition (HCC)    Obstructive jaundice    Obstructive jaundice due to cancer (HCC)    Intrahepatic cholestatic liver disease    Failure to thrive in adult    UTI (urinary tract infection)    Enterococcal bacteremia    Cholangitis       Subjective: No issues overnight. Actually eating a little bit more, but not as much as he needs to. Drinking his ensures.      Objective:  BP (!) 117/59   Pulse 90   Temp 97.2 °F (36.2 °C) (Temporal)   Resp 16   Ht 1.854 m (6' 1\")   Wt 81.6 kg (180 lb)   SpO2 95%   BMI 23.75 kg/m²   Labs:  Recent Labs     06/28/24  0422 06/29/24  0951   WBC 7.8 7.1   HGB 8.1* 8.9*   HCT 27.8* 30.8*       Recent Labs     06/28/24  0422 06/29/24  0449 06/29/24  1450    141 138   K 3.3* 3.7 3.8   * 111* 108*   CO2 21* 19* 20*   BUN 10 11 13   CREATININE 0.8 0.8 0.9       Recent Labs     06/28/24  0422 06/29/24  0449   ALKPHOS 709* 867*   ALT 24 27   AST 54* 62*   BILITOT 2.3* 2.1*   LIPASE 32 33         General appearance:  lying in bed, NAD  Head: NCAT, PERRL, EOMI, red conjunctiva  Neck: supple, no masses, trachea midline  Lungs: symmetric chest rise, no audible wheezes  Heart: warm throughout  Abdomen: soft, non-distended, none tender to palpation throughout  Skin: warm and dry, no cyanosis.   Gu: no cva tenderness  Extremities: atraumatic, no focal motor deficits, no open wounds + edema   Psych: no tremor, visual hallucinations      Assessment/Plan:  Raudel Herndon is a 73 y.o. male with history of cholangiocarcinoma with concern for ascending cholangitis     - Diet as tolerated  - GI following, monitor HFP   - Albumin and then 
Messaged Dr. Lane regarding ERCP schedule and if he can eat. Dr. Lane has him schedule for procedure on Friday, okay to order diet  
Neurosurg consult called   
Notified Dr. Cardona of lactic acid level    
Nurse to nurse called to pcc  
Nurse to nurse called to receiving RN on 8WE via phone. All questions answered. All orders updated regarding pre-op surgery for tomorrow.   
OT SESSION ATTEMPT     Date:2024  Patient Name: Raudel Herndon  MRN: [id number]  : 1950  Room: [address] [place], [place] [address]     Occupational therapy orders received/chart review completed and OT session attempted this date:    [] unavailable due to other medical staff currently with pt   [] on hold, await MRI/ neurosurgical recommendations.   [] on hold per nursing staff secondary to lab / radiology results    [] declined Occupational Therapy  this date due to ___.  Benefits of participation in therapy reviewed with pt.    [] off unit   [x] Other: Therapist attempted x3 this date - awaiting arrival of TLSO for OOB activity. RN aware.    Will reattempt OT session at a later time/date.    Tobias Silva OTR/L; [id number]    
Occupational Therapy  OT BEDSIDE TREATMENT NOTE   [facility]  [address], [place]        Date:2024  Patient Name: Raudel Herndon  MRN: [id number]  : 1950  Room: [facility]     Attempted to see pt this afternoon for therapy, with pt declining of therapy, stating he is going home regardless, with have home health at home, and frustrated with current hospital stay. Will attempt at a later date/time.     Liz GARCIA [id number]    
Occupational Therapy  OT BEDSIDE TREATMENT NOTE   [facility]  [address] [place], [place]       Date:2024  Patient Name: Raudel Herndon  MRN: [id number]  : 1950  Room: [address] [place] [address]     Per OT Eval:     Evaluating OT: Tobias Silva OTR/L; [id number]        Referring Provider: Sindhu Knight DO     Specific Provider Orders/Date: OT Eval and Treat 24       Diagnosis: Failure to thrive in adult; UTI (urinary tract infection); Enterococcal bacteremia; Cholangitis.    Surgery: None this admission     Pertinent Medical History:  has a past medical history of Cancer (HCC), Diabetes mellitus (HCC), and Prostate cancer (HCC).      Recommended Adaptive Equipment: AE for LE bathing and dressing PRN      Precautions:  Fall Risk, active w/ chemotherapy (Cholangiocarcinoma)      Assessment of current deficits    [x] Functional mobility            [x]ADLs           [x] Strength                  []Cognition    [x] Functional transfers          [x] IADLs         [x] Safety Awareness   [x]Endurance    [x] Fine Coordination                         [x] Balance      [] Vision/perception   []Sensation      []Gross Motor Coordination             [] ROM           [] Delirium                   [] Motor Control      OT PLAN OF CARE   OT POC based on physician orders, patient diagnosis and results of clinical assessment     Frequency/Duration 1-3 days/wk for 2 weeks PRN   Specific OT Treatment Interventions to include:   * Instruction/training on adapted ADL techniques and AE recommendations to increase functional independence within precautions       * Training on energy conservation strategies, correct breathing pattern and techniques to improve independence/tolerance for self-care routine  * Functional transfer/mobility training/DME recommendations for increased independence, safety, and fall prevention  * Patient/Family education to increase follow through 
Occupational Therapy  OT SESSION ATTEMPT     Date:2024  Patient Name: Raudel Herndon  MRN: [id number]  : 1950  [facility]     Attempted OT session this date:    [] unavailable due to other medical staff currently with pt   [] on hold per nursing staff   [x] Patient on hold per nursing staff secondary to waiting on MRI results fof Lumbar Spine.    [] politely declined treatment this date due to   [] off unit    [] Other:     Will reattempt OT session at a later time.      Nerissa GARCIA, CLT  [id number]    
Occupational Therapy  Occupational Therapy    Date:7/3/2024  Patient Name: Raudel Herndon  MRN: [id number]  : 1950  Room: [address] [place] [address]     OT orders received and chart reviewed. OT eval on hold at this time pending MRI results. Pt in significant pain this date as well.   OT will follow and re-attempt eval as appropriate, pending MRI results, at a later time/date.     Marti Espinoza, KILEY, OTR/L [id number]    
Ortho consult called   
PICC line dressing changed using sterile precautions.  Skin tear noted to arm in area of PICC dressing.  Wound care consulted as skin tear is seeping moderate amount of bloody drainage.   
PROGRESS NOTE        Patient Presents with/Seen in Consultation For      Reason for consult: Cholangiocarcinoma, admitted due to sepsis, known to Dr. Reynoso  Chief complaint: Fatigue and bodyaches    Subjective:     Patient seen laying in bed in no apparent distress.  ERCP reviewed with patient.  No current complaints abdominal pain, nausea or vomiting.  Plan of care discussed with patient.    Review of Systems  Aside from what was mentioned in the PMH and HPI, essentially unremarkable, all others negative.    Objective:     /63   Pulse 87   Temp 97.7 °F (36.5 °C)   Resp 17   Ht 1.854 m (6' 1\")   Wt 81.6 kg (180 lb)   SpO2 95%   BMI 23.75 kg/m²     General appearance: alert, awake, laying in bed, and cooperative  Eyes: conjunctiva pale, sclera anicteric. PERRL.  Lungs: clear to auscultation bilaterally  Heart: regular rate and rhythm, no murmur, 2+ pulses; no edema  Abdomen: soft, non-tender; bowel sounds normal; no masses,  no organomegaly  Extremities: extremities without edema  Pulses: 2+ and symmetric  Skin: Skin color, texture, turgor normal.   Neurologic: Grossly normal    menthol-zinc oxide (CALMOSEPTINE) 0.44-20.6 % ointment, BID   And  menthol-zinc oxide (CALMOSEPTINE) 0.44-20.6 % ointment, 4x Daily PRN  polyethylene glycol (GLYCOLAX) packet 17 g, Daily  oxyCODONE-acetaminophen (PERCOCET) 5-325 MG per tablet 1 tablet, Q6H PRN  benzocaine-menthol (CEPACOL SORE THROAT) lozenge 1 lozenge, Q2H PRN  benzonatate (TESSALON) capsule 100 mg, TID PRN  melatonin tablet 3 mg, Nightly PRN  Polyvinyl Alcohol-Povidone PF (REFRESH) 1.4-0.6 % ophthalmic solution 1 drop, PRN  sodium chloride (OCEAN, BABY AYR) 0.65 % nasal spray 1 spray, PRN  hydrALAZINE (APRESOLINE) injection 10 mg, Q6H PRN  calcium carbonate (TUMS) chewable tablet 500 mg, TID PRN  sodium chloride flush 0.9 % injection 5-40 mL, 2 times per day  sodium chloride flush 0.9 % injection 5-40 mL, PRN  0.9 % sodium chloride infusion, 
Patient complaining of chills and notable tremor of BUE. Vital signs obtained. HR noted to be 140. Dr. Cardona notified. New orders obtained at this time   
Pharmacy Consultation Note  (Antibiotic Dosing and Monitoring)    Initial consult date: 7/6/24  Consulting physician/provider: Dr. Graham  Drug: Vancomycin  Indication: Enterococcus Bloodstream infection    Age/  Gender Height Weight IBW  Allergy Information   74 y.o./male 185.4 cm (6' 1\") 84.8 kg (187 lb)     Ideal body weight: 79.9 kg (176 lb 1.7 oz)   Adhesive tape      Renal Function:  Recent Labs     07/04/24  0508 07/05/24  1628 07/06/24  0413   BUN 30* 22 23   CREATININE 1.2 1.0 1.1       Intake/Output Summary (Last 24 hours) at 7/6/2024 1639  Last data filed at 7/6/2024 0656  Gross per 24 hour   Intake 1220 ml   Output 200 ml   Net 1020 ml       Vancomycin Monitoring:  Trough:  No results for input(s): \"VANCOTROUGH\" in the last 72 hours.  Random:  No results for input(s): \"VANCORANDOM\" in the last 72 hours.    Vancomycin Administration Times:  Recent vancomycin administrations                     vancomycin (VANCOCIN) 1500 mg in sodium chloride 0.9 % 250 mL IVPB (mg) 1,500 mg New Bag 07/06/24 1532                    Assessment:  Patient is a 74 y.o. male who has been initiated on vancomycin  Estimated Creatinine Clearance: 58 mL/min (based on SCr of 1.1 mg/dL).    Plan:  Will continue vancomycin 1500 mg IV every 24 hours  Will check vancomycin levels when appropriate  Will continue to monitor renal function   Pharmacy to follow      Carlos Holley RPH 7/6/2024 4:39 PM    KEVIN: [phone]  SEY: [phone]  SJW: [phone]    
Pharmacy Consultation Note  (Antibiotic Dosing and Monitoring)    Initial consult date: 7/6/24  Consulting physician/provider: Dr. Graham  Drug: Vancomycin  Indication: Enterococcus Bloodstream infection    Age/  Gender Height Weight IBW  Allergy Information   74 y.o./male 185.4 cm (6' 1\") 84.8 kg (187 lb)     Ideal body weight: 79.9 kg (176 lb 1.7 oz)   Adhesive tape      Renal Function:  Recent Labs     07/05/24  1628 07/06/24  0413 07/07/24  0505   BUN 22 23 25*   CREATININE 1.0 1.1 1.1         Intake/Output Summary (Last 24 hours) at 7/7/2024 0840  Last data filed at 7/7/2024 0538  Gross per 24 hour   Intake 240 ml   Output 500 ml   Net -260 ml         Vancomycin Monitoring:  Trough:  No results for input(s): \"VANCOTROUGH\" in the last 72 hours.  Random:  No results for input(s): \"VANCORANDOM\" in the last 72 hours.    Vancomycin Administration Times:  Recent vancomycin administrations                     vancomycin (VANCOCIN) 1500 mg in sodium chloride 0.9 % 250 mL IVPB (mg) 1,500 mg New Bag 07/06/24 1532                    Assessment:  Patient is a 74 y.o. male who has been initiated on vancomycin  Estimated Creatinine Clearance: 58 mL/min (based on SCr of 1.1 mg/dL).    Plan:  Will continue vancomycin 1500 mg IV every 24 hours  Will check vancomycin levels when appropriate  Will continue to monitor renal function   Pharmacy to follow    Tabby Carlson, LouD, BCPS, BCCCP 7/7/2024 8:42 AM     
Pharmacy Consultation Note  (Antibiotic Dosing and Monitoring)    Initial consult date: 7/6/24  Consulting physician/provider: Dr. Graham  Drug: Vancomycin  Indication: Enterococcus Bloodstream infection    Age/  Gender Height Weight IBW  Allergy Information   74 y.o./male 185.4 cm (6' 1\") 84.8 kg (187 lb)     Ideal body weight: 79.9 kg (176 lb 1.7 oz)   Adhesive tape      Renal Function:  Recent Labs     07/06/24  0413 07/07/24  0505 07/08/24  0430   BUN 23 25* 22   CREATININE 1.1 1.1 1.0         Intake/Output Summary (Last 24 hours) at 7/8/2024 0903  Last data filed at 7/8/2024 0613  Gross per 24 hour   Intake 260 ml   Output --   Net 260 ml         Vancomycin Monitoring:  Trough:  No results for input(s): \"VANCOTROUGH\" in the last 72 hours.  Random:  No results for input(s): \"VANCORANDOM\" in the last 72 hours.    Recent vancomycin administrations                     vancomycin (VANCOCIN) 1500 mg in sodium chloride 0.9 % 250 mL IVPB (mg) 1,500 mg New Bag 07/08/24 0831     1,500 mg New Bag 07/07/24 0913    vancomycin (VANCOCIN) 1500 mg in sodium chloride 0.9 % 250 mL IVPB (mg) 1,500 mg New Bag 07/06/24 1532                    Assessment:  Patient is a 74 y.o. male who has been initiated on vancomycin  Estimated Creatinine Clearance: 63 mL/min (based on SCr of 1 mg/dL).  Random level 7/8/24 = 9.7 mcg/mL     Plan:  Change to vanco 750mg IV q12h (this regimen will help to increase the trough while staying within the AUC target of 500)  Will check vancomycin levels when appropriate  Will continue to monitor renal function   Pharmacy to follow    Sukumar LewisDRaphael  7/8/2024  9:04 AM      
Pharmacy Consultation Note  (Antibiotic Dosing and Monitoring)    Initial consult date: 7/6/24  Consulting physician/provider: Dr. Graham  Drug: Vancomycin  Indication: Enterococcus Bloodstream infection    Age/  Gender Height Weight IBW  Allergy Information   74 y.o./male 185.4 cm (6' 1\") 84.8 kg (187 lb)     Ideal body weight: 79.9 kg (176 lb 1.7 oz)   Adhesive tape      Renal Function:  Recent Labs     07/07/24  0505 07/08/24  0430 07/09/24  0600   BUN 25* 22 19   CREATININE 1.1 1.0 0.9         Intake/Output Summary (Last 24 hours) at 7/9/2024 0819  Last data filed at 7/8/2024 1735  Gross per 24 hour   Intake --   Output 1200 ml   Net -1200 ml         Vancomycin Monitoring:  Trough:  No results for input(s): \"VANCOTROUGH\" in the last 72 hours.  Random:    Recent Labs     07/08/24  0430 07/09/24  0600   VANCORANDOM 9.7 18.4     Vancomycin Administration Times:  Recent vancomycin administrations                     vancomycin (VANCOCIN) 750 mg in sodium chloride 0.9 % 250 mL IVPB (mg) 750 mg New Bag 07/08/24 2125    vancomycin (VANCOCIN) 1500 mg in sodium chloride 0.9 % 250 mL IVPB (mg) 1,500 mg New Bag 07/08/24 0831     1,500 mg New Bag 07/07/24 0913    vancomycin (VANCOCIN) 1500 mg in sodium chloride 0.9 % 250 mL IVPB (mg) 1,500 mg New Bag 07/06/24 1532                  Assessment:  Patient is a 74 y.o. male who has been initiated on vancomycin  Estimated Creatinine Clearance: 70 mL/min (based on SCr of 0.9 mg/dL).  Random level 7/8/24 = 9.7 mcg/mL  Random level 7/9/24 = 18.4 mcg/mL, SCr improved to 0.9.     Plan:  Increase to vancomycin 1000 mg IV q12h (estimated , estimated trough 17.3)  Will check vancomycin levels when appropriate - check another level tomorrow, if patient still here.   Will continue to monitor renal function   Pharmacy to follow    Margie Anderson PharmD, BCPS 7/9/2024 8:19 AM        
Pharmacy Consultation Note  (Antibiotic Dosing and Monitoring)    Initial consult date: 7/6/24  Consulting physician/provider: Dr. Graham  Drug: Vancomycin  Indication: Enterococcus Bloodstream infection    Age/  Gender Height Weight IBW  Allergy Information   74 y.o./male 185.4 cm (6' 1\") 84.8 kg (187 lb)     Ideal body weight: 79.9 kg (176 lb 1.7 oz)   Adhesive tape      Renal Function:  Recent Labs     07/08/24  0430 07/09/24  0600 07/10/24  0438   BUN 22 19 19   CREATININE 1.0 0.9 0.9         Intake/Output Summary (Last 24 hours) at 7/10/2024 0909  Last data filed at 7/10/2024 0648  Gross per 24 hour   Intake 280 ml   Output 2500 ml   Net -2220 ml         Vancomycin Monitoring:  Trough:  No results for input(s): \"VANCOTROUGH\" in the last 72 hours.  Random:    Recent Labs     07/08/24  0430 07/09/24  0600 07/10/24  0438   VANCORANDOM 9.7 18.4 19.7       Vancomycin Administration Times:  Recent vancomycin administrations                     vancomycin (VANCOCIN) 750 mg in sodium chloride 0.9 % 250 mL IVPB (mg) 750 mg New Bag 07/08/24 2125    vancomycin (VANCOCIN) 1500 mg in sodium chloride 0.9 % 250 mL IVPB (mg) 1,500 mg New Bag 07/08/24 0831     1,500 mg New Bag 07/07/24 0913    vancomycin (VANCOCIN) 1500 mg in sodium chloride 0.9 % 250 mL IVPB (mg) 1,500 mg New Bag 07/06/24 1532                  Assessment:  Patient is a 74 y.o. male who has been initiated on vancomycin  Estimated Creatinine Clearance: 70 mL/min (based on SCr of 0.9 mg/dL).  Random level 7/8/24 = 9.7 mcg/mL  Random level 7/9/24 = 18.4 mcg/mL, SCr improved to 0.9.   Random level 7/10/24 = 19.7 mcg/mL    Plan:  Continue vancomycin 1000 mg IV q12h (estimated , estimated trough 17.3)  Will check vancomycin levels when appropriate   Will continue to monitor renal function   Pharmacy to follow    Handy Goldman PharmD, [facility] 7/10/2024 9:10 AM        
Pharmacy was consulted to dose/monitor vancomycin which has now been discontinued. Clinical Pharmacy will sign off at this time.    Adri Farooq, PharmD, BCIDP, BCPS 6/25/2024 5:35 PM  [phone]    
Physical Therapy    PT follow up orders received and chart review completed. Met with pt in room who was in severe back pain. Noted MRIs pending of thoracic and lumbar spine to r/o osteomyelitis. Will await imaging results prior to further PT intervention.     Will follow and re-attempt as appropriate. Thank you.    Mary Gary PT, DPT  [id number]      
Physical Therapy    PT order received and medical chart reviewed on 7/5/24. PT evaluation being held this date due to awaiting TLSO for OOB activity. Will re-attempt as able. Thank you.    Alicia Negro, PT, DPT  [id number]      
Physical Therapy    Pt on PT caseload for update. Met with pt in room who declined participation in PT treatment reporting frustration with situation. Will follow pt wishes at this time. Will continue to follow and reattempt as appropriate.     Mary Gary PT, DPT  [id number]     
Physical Therapy  Initial Assessment     Name: Raudel Herndon  : 1950  MRN: [id number]      Date of Service: 2024    Evaluating PT: Christian Mckay, PT, DPT [id number]      Room #:  6513/6513-A  Diagnosis:  Lactic acidosis [E87.20]  General weakness [R53.1]  Failure to thrive in adult [R62.7]  Enterococcal bacteremia [R78.81, B95.2]  Severe sepsis (HCC) [A41.9, R65.20]  Other fatigue [R53.83]  History of cholangiocarcinoma [Z85.09]  PMHx/PSHx:   has a past medical history of Cancer (HCC), Diabetes mellitus (HCC), and Prostate cancer (HCC).  Precautions:  Fall risk, Chemo    SUBJECTIVE:    Pt lives with son in a 2 story house with 3 stair(s) and 1 rail(s) to enter. Bed and bath are on the first floor. Pt ambulated with SPC vs WW prior to admission.    OBJECTIVE:   Initial Evaluation  Date: 24 Treatment Date: Short Term/ Long Term   Goals   AM-PAC 6 Clicks 15/24     Was pt agreeable to Eval/treatment? Yes     Does pt have pain? Lower back pain with activity     Bed Mobility  Rolling: NT  Supine to sit: ModA  Sit to supine: NT  Scooting: Delaney to EOB  Rolling: Independent    Supine to sit: Independent   Sit to supine: Independent   Scooting: Independent    Transfers Sit to stand: Delaney  Stand to sit: Delaney  Stand pivot: Delaney with WW  Sit to stand: Supervision  Stand to sit: Supervision  Stand pivot: Supervision with WW   Ambulation   60 feet with WW with Delaney  >200 feet with WW with Supervision   Stair negotiation: ascended and descended NT  >3 step(s) with 1 rail(s) with Supervision   ROM BUE: Refer to OT note  BLE: WFL     Strength BUE: Refer to OT note  BLE: NT     Balance Sitting EOB: SBA  Dynamic Standing: Delaney with WW  Sitting EOB: Independent   Dynamic Standing: Supervision with WW     Pt is A & O x: 4 to person, place, month/year, and situation.   Sensation: Pt denies numbness and tingling of extremities.   Edema: Unremarkable    Patient education  Pt educated on PT role in acute care 
Physical Therapy  Treatment Note    Name: Raudel Herndon  : 1950  MRN: [id number]      Date of Service: 2024    Evaluating PT: Christian Mckay, PT, DPT [id number]      Room #:  8423/8423-B  Diagnosis:  Lactic acidosis [E87.20]  General weakness [R53.1]  Failure to thrive in adult [R62.7]  Enterococcal bacteremia [R78.81, B95.2]  Severe sepsis (HCC) [A41.9, R65.20]  Other fatigue [R53.83]  History of cholangiocarcinoma [Z85.09]  PMHx/PSHx:   has a past medical history of Cancer (HCC), Diabetes mellitus (HCC), and Prostate cancer (HCC).  Precautions:  Fall risk, Chemo, TLSO (osteomyelitis/diskitis), WBAT BUEs     SUBJECTIVE:    Pt lives with son in a 2 story house with 3 stair(s) and 1 rail(s) to enter. Bed and bath are on the first floor. Pt ambulated with SPC vs WW prior to admission.    OBJECTIVE:   Initial Evaluation  Date: 24 Treatment   Date: 24 Short Term/ Long Term   Goals   AM-PAC 6 Clicks 15/24 12/24    Was pt agreeable to Eval/treatment? Yes Yes    Does pt have pain? Lower back pain with activity 8/10 LBP    Bed Mobility  Rolling: NT  Supine to sit: ModA  Sit to supine: NT  Scooting: Delaney to EOB Rolling: MaxA  Supine to sit: MaxA  Sit to supine: NT  Scooting: Delaney Rolling: Independent    Supine to sit: Independent   Sit to supine: Independent   Scooting: Independent    Transfers Sit to stand: Delaney  Stand to sit: Delaney  Stand pivot: Delaney with WW Sit to stand: ModA  Stand to sit: Delaney  Stand pivot: ModA with FWW Sit to stand: Supervision  Stand to sit: Supervision  Stand pivot: Supervision with WW   Ambulation   60 feet with WW with Delaney 3 feet bilateral steps (x2); few steps to chair with FWW and Delaney >200 feet with WW with Supervision   Stair negotiation: ascended and descended NT NT >3 step(s) with 1 rail(s) with Supervision   ROM BUE: Refer to OT note  BLE: WFL     Strength BUE: Refer to OT note  BLE: NT     Balance Sitting EOB: SBA  Dynamic Standing: Delaney with WW Sitting EOB: SBA  Dynamic 
Physical Therapy  Treatment Note    Name: Raudel Herndon  : 1950  MRN: [id number]      Date of Service: 2024    Evaluating PT: Christian Mckay, PT, DPT [id number]      Room #:  8408/8408-B  Diagnosis:  Lactic acidosis [E87.20]  General weakness [R53.1]  Failure to thrive in adult [R62.7]  Enterococcal bacteremia [R78.81, B95.2]  Severe sepsis (HCC) [A41.9, R65.20]  Other fatigue [R53.83]  History of cholangiocarcinoma [Z85.09]  PMHx/PSHx:   has a past medical history of Cancer (HCC), Diabetes mellitus (HCC), and Prostate cancer (HCC).  Precautions:  Fall risk, Chemo    SUBJECTIVE:    Pt lives with son in a 2 story house with 3 stair(s) and 1 rail(s) to enter. Bed and bath are on the first floor. Pt ambulated with SPC vs WW prior to admission.    OBJECTIVE:   Initial Evaluation  Date: 24 Treatment   Date: 24 Short Term/ Long Term   Goals   AM-PAC 6 Clicks 15/24 15/24    Was pt agreeable to Eval/treatment? Yes yes    Does pt have pain? Lower back pain with activity Unrated low back pain    Bed Mobility  Rolling: NT  Supine to sit: ModA  Sit to supine: NT  Scooting: Delaney to EOB Rolling: NT  Supine to sit: min A  Sit to supine: NT  Scooting: min A Rolling: Independent    Supine to sit: Independent   Sit to supine: Independent   Scooting: Independent    Transfers Sit to stand: Delaney  Stand to sit: Delaney  Stand pivot: Delaney with WW Sit to stand: mod A  Stand to sit: min A  Stand pivot: min A with ww Sit to stand: Supervision  Stand to sit: Supervision  Stand pivot: Supervision with WW   Ambulation   60 feet with WW with Delaney 10'x2 with ww min A >200 feet with WW with Supervision   Stair negotiation: ascended and descended NT NT >3 step(s) with 1 rail(s) with Supervision   ROM BUE: Refer to OT note  BLE: WFL     Strength BUE: Refer to OT note  BLE: NT     Balance Sitting EOB: SBA  Dynamic Standing: Delaney with WW Sitting EOB: SBA  Dynamic Standing: min A with ww Sitting EOB: Independent   Dynamic Standing: 
[facility]  [facility]  Progress Note      Chief Complaint   Patient presents with    Fatigue     Pt states he has been too weak to get up out of the chair. Unable to eat. Generalized body aches.     Generalized Body Aches       SUBJECTIVE:    Patient is tolerating medications. No reported adverse drug reactions.  No nausea, vomiting, diarrhea.    Review of systems:  As stated above in the chief complaint, otherwise negative.    Medications:  Scheduled Meds:   sodium phosphate IVPB (PERIPHERAL line)  30 mmol IntraVENous Once    potassium & sodium phosphates  1 packet Oral 4x Daily    polyethylene glycol  17 g Oral Daily    [START ON 2024] lactated ringers  800 mL IntraVENous Once    [START ON 2024] indomethacin  100 mg Rectal Once    sodium chloride flush  5-40 mL IntraVENous 2 times per day    [Held by provider] enoxaparin  40 mg SubCUTAneous Daily    pantoprazole  40 mg Oral BID AC    [Held by provider] valsartan  80 mg Oral Daily    And    [Held by provider] hydroCHLOROthiazide  12.5 mg Oral Daily    ampicillin IV  2,000 mg IntraVENous 6 times per day    cefTRIAXone (ROCEPHIN) IV  2,000 mg IntraVENous Q24H    metroNIDAZOLE  500 mg IntraVENous Q8H     Continuous Infusions:   sodium chloride      sodium chloride 125 mL/hr at 24 0851    sodium chloride       PRN Meds:oxyCODONE-acetaminophen, benzocaine-menthol, benzonatate, melatonin, Polyvinyl Alcohol-Povidone PF, sodium chloride, hydrALAZINE, calcium carbonate, sodium chloride flush, sodium chloride, potassium chloride **OR** potassium alternative oral replacement **OR** potassium chloride, magnesium sulfate, ondansetron **OR** ondansetron, acetaminophen **OR** acetaminophen, sodium chloride, sodium chloride flush    OBJECTIVE:  BP (!) 96/54   Pulse 78   Temp 96.8 °F (36 °C) (Temporal)   Resp 19   Ht 1.854 m (6' 1\")   Wt 81.6 kg (180 lb)   SpO2 98%   BMI 23.75 kg/m²   Temp  Av.3 °F (36.3 °C)  Min: 96.8 °F 
[facility]  [facility]  Progress Note      Chief Complaint   Patient presents with    Fatigue     Pt states he has been too weak to get up out of the chair. Unable to eat. Generalized body aches.     Generalized Body Aches       SUBJECTIVE:    Patient is tolerating medications. No reported adverse drug reactions.  No nausea, vomiting, diarrhea.    Review of systems:  As stated above in the chief complaint, otherwise negative.    Medications:  Scheduled Meds:   menthol-zinc oxide   Topical BID    polyethylene glycol  17 g Oral Daily    sodium chloride flush  5-40 mL IntraVENous 2 times per day    enoxaparin  40 mg SubCUTAneous Daily    pantoprazole  40 mg Oral BID AC    [Held by provider] valsartan  80 mg Oral Daily    And    [Held by provider] hydroCHLOROthiazide  12.5 mg Oral Daily    ampicillin IV  2,000 mg IntraVENous 6 times per day     Continuous Infusions:   sodium chloride      sodium chloride       PRN Meds:cyclobenzaprine, menthol-zinc oxide **AND** menthol-zinc oxide, oxyCODONE-acetaminophen, benzocaine-menthol, benzonatate, melatonin, Polyvinyl Alcohol-Povidone PF, sodium chloride, hydrALAZINE, calcium carbonate, sodium chloride flush, sodium chloride, ondansetron **OR** ondansetron, acetaminophen **OR** acetaminophen, sodium chloride, sodium chloride flush    OBJECTIVE:  /75   Pulse 88   Temp 97.7 °F (36.5 °C)   Resp 15   Ht 1.854 m (6' 1\")   Wt 81.6 kg (180 lb)   SpO2 95%   BMI 23.75 kg/m²   Temp  Av.5 °F (36.4 °C)  Min: 97.2 °F (36.2 °C)  Max: 97.7 °F (36.5 °C)  Constitutional: The patient is awake, alert, and oriented.   Skin: Warm and dry. No rashes were noted. No jaundice.  HEENT: Eyes show round, and reactive pupils. Moist mucous membranes, no ulcerations, no thrush.   Neck: Supple to movements. No lymphadenopathy.   Chest: No use of accessory muscles to breathe. Symmetrical expansion. Auscultation reveals no wheezing, crackles, or rhonchi. 
RN messaged IV team via perfect serve as myself and phlebotomy were unsuccessful drawing labs. Pt is active chemo.   
RN messaged wound care via perfect serve for new pt consult for wounds on bilateral buttocks/coccyx.   
[facility]  [facility]  Progress Note      Chief Complaint   Patient presents with    Fatigue     Pt states he has been too weak to get up out of the chair. Unable to eat. Generalized body aches.     Generalized Body Aches     Face to face encounter   SUBJECTIVE:  2024  Patient is in bed- feels cold today- having visible shaking/ rigors.   On room air-no documented fevers.   Patient is tolerating medications. No reported adverse drug reactions.  No nausea, vomiting, diarrhea.    Review of systems:  As stated above in the chief complaint, otherwise negative.    Medications:  Scheduled Meds:   sodium chloride flush  5-40 mL IntraVENous 2 times per day    lidocaine 1 % injection  50 mg IntraDERmal Once    acetaminophen  1,000 mg Oral 3 times per day    menthol-zinc oxide   Topical BID    polyethylene glycol  17 g Oral Daily    sodium chloride flush  5-40 mL IntraVENous 2 times per day    enoxaparin  40 mg SubCUTAneous Daily    pantoprazole  40 mg Oral BID AC    [Held by provider] valsartan  80 mg Oral Daily    And    [Held by provider] hydroCHLOROthiazide  12.5 mg Oral Daily     Continuous Infusions:   sodium chloride      sodium chloride      sodium chloride       PRN Meds:sodium chloride flush, sodium chloride, oxyCODONE, cyclobenzaprine, menthol-zinc oxide **AND** menthol-zinc oxide, benzocaine-menthol, benzonatate, melatonin, Polyvinyl Alcohol-Povidone PF, sodium chloride, hydrALAZINE, calcium carbonate, sodium chloride flush, sodium chloride, ondansetron **OR** ondansetron, sodium chloride, sodium chloride flush    OBJECTIVE:  /69   Pulse 77   Temp 96.9 °F (36.1 °C) (Temporal)   Resp 18   Ht 1.854 m (6' 0.99\")   Wt 69.1 kg (152 lb 7 oz)   SpO2 94%   BMI 20.12 kg/m²   Temp  Av °F (36.7 °C)  Min: 96.9 °F (36.1 °C)  Max: 98.7 °F (37.1 °C)  Constitutional: The patient is awake, alert, and oriented. Resting in bed.   Skin: Warm and dry. No rashes were noted. No 
[facility]  [facility]  Progress Note      Chief Complaint   Patient presents with    Fatigue     Pt states he has been too weak to get up out of the chair. Unable to eat. Generalized body aches.     Generalized Body Aches       SUBJECTIVE:    Patient is tolerating medications. No reported adverse drug reactions.  No nausea, vomiting, diarrhea.    Review of systems:  As stated above in the chief complaint, otherwise negative.    Medications:  Scheduled Meds:   polyethylene glycol  17 g Oral Daily    sodium chloride flush  5-40 mL IntraVENous 2 times per day    [Held by provider] enoxaparin  40 mg SubCUTAneous Daily    pantoprazole  40 mg Oral BID AC    [Held by provider] valsartan  80 mg Oral Daily    And    [Held by provider] hydroCHLOROthiazide  12.5 mg Oral Daily    ampicillin IV  2,000 mg IntraVENous 6 times per day    cefTRIAXone (ROCEPHIN) IV  2,000 mg IntraVENous Q24H    metroNIDAZOLE  500 mg IntraVENous Q8H     Continuous Infusions:   sodium chloride      sodium chloride 125 mL/hr at 24 1225    sodium chloride       PRN Meds:benzocaine-menthol, benzonatate, melatonin, Polyvinyl Alcohol-Povidone PF, sodium chloride, hydrALAZINE, calcium carbonate, sodium chloride flush, sodium chloride, potassium chloride **OR** potassium alternative oral replacement **OR** potassium chloride, magnesium sulfate, ondansetron **OR** ondansetron, acetaminophen **OR** acetaminophen, sodium chloride, sodium chloride flush    OBJECTIVE:  /65   Pulse 71   Temp 97.3 °F (36.3 °C) (Temporal)   Resp 16   Ht 1.854 m (6' 1\")   Wt 82.1 kg (180 lb 14.4 oz)   SpO2 99%   BMI 23.87 kg/m²   Temp  Av.7 °F (36.5 °C)  Min: 97 °F (36.1 °C)  Max: 98.1 °F (36.7 °C)  Constitutional: The patient is awake, alert, and oriented.   Skin: Warm and dry. No rashes were noted. No jaundice.  HEENT: Eyes show round, and reactive pupils. Moist mucous membranes, no ulcerations, no thrush.   Neck: Supple to 
[facility]  [facility]  Progress Note      Chief Complaint   Patient presents with    Fatigue     Pt states he has been too weak to get up out of the chair. Unable to eat. Generalized body aches.     Generalized Body Aches     Face to face encounter   SUBJECTIVE:  2024  Patient is in bed- feels cold today- having visible shaking/ rigors.   On room air-no documented fevers.   Patient is tolerating medications. No reported adverse drug reactions.  No nausea, vomiting, diarrhea.    Review of systems:  As stated above in the chief complaint, otherwise negative.    Medications:  Scheduled Meds:   vancomycin  1,500 mg IntraVENous Q24H    sodium chloride flush  5-40 mL IntraVENous 2 times per day    lidocaine 1 % injection  50 mg IntraDERmal Once    acetaminophen  1,000 mg Oral 3 times per day    menthol-zinc oxide   Topical BID    polyethylene glycol  17 g Oral Daily    sodium chloride flush  5-40 mL IntraVENous 2 times per day    enoxaparin  40 mg SubCUTAneous Daily    pantoprazole  40 mg Oral BID AC    [Held by provider] valsartan  80 mg Oral Daily    And    [Held by provider] hydroCHLOROthiazide  12.5 mg Oral Daily     Continuous Infusions:   sodium chloride      sodium chloride      sodium chloride       PRN Meds:sodium chloride flush, sodium chloride, oxyCODONE, cyclobenzaprine, menthol-zinc oxide **AND** menthol-zinc oxide, benzocaine-menthol, benzonatate, melatonin, Polyvinyl Alcohol-Povidone PF, sodium chloride, hydrALAZINE, calcium carbonate, sodium chloride flush, sodium chloride, ondansetron **OR** ondansetron, sodium chloride, sodium chloride flush    OBJECTIVE:  /69   Pulse 84   Temp 97.4 °F (36.3 °C) (Temporal)   Resp 18   Ht 1.854 m (6' 0.99\")   Wt 69.1 kg (152 lb 7 oz)   SpO2 98%   BMI 20.12 kg/m²   Temp  Av.4 °F (36.3 °C)  Min: 97.3 °F (36.3 °C)  Max: 97.4 °F (36.3 °C)  Constitutional: The patient is awake, alert, and oriented. Resting in bed.   Skin: 
Verified that [employer] received requested information for TLSO brace. Re-faxed information    
Visited the patient. He stated that he is feeling better since he came in to the hospital and hoping to go home soonest. Anointed him and prayed for him. E was appreciative.    IOANA Aden.PH,B.,[id number]  
[facility]  [facility]  Progress Note      Chief Complaint   Patient presents with    Fatigue     Pt states he has been too weak to get up out of the chair. Unable to eat. Generalized body aches.     Generalized Body Aches       SUBJECTIVE:    Patient is tolerating medications. No reported adverse drug reactions.  No nausea, vomiting, diarrhea.    Review of systems:  As stated above in the chief complaint, otherwise negative.    Medications:  Scheduled Meds:   acetaminophen  1,000 mg Oral 3 times per day    menthol-zinc oxide   Topical BID    polyethylene glycol  17 g Oral Daily    sodium chloride flush  5-40 mL IntraVENous 2 times per day    enoxaparin  40 mg SubCUTAneous Daily    pantoprazole  40 mg Oral BID AC    [Held by provider] valsartan  80 mg Oral Daily    And    [Held by provider] hydroCHLOROthiazide  12.5 mg Oral Daily    ampicillin IV  2,000 mg IntraVENous 6 times per day     Continuous Infusions:   sodium chloride      sodium chloride       PRN Meds:oxyCODONE, cyclobenzaprine, menthol-zinc oxide **AND** menthol-zinc oxide, benzocaine-menthol, benzonatate, melatonin, Polyvinyl Alcohol-Povidone PF, sodium chloride, hydrALAZINE, calcium carbonate, sodium chloride flush, sodium chloride, ondansetron **OR** ondansetron, sodium chloride, sodium chloride flush    OBJECTIVE:  BP (!) 110/58   Pulse 81   Temp 97.3 °F (36.3 °C) (Temporal)   Resp 18   Ht 1.854 m (6' 0.99\")   Wt 69.1 kg (152 lb 7 oz)   SpO2 95%   BMI 20.12 kg/m²   Temp  Av.5 °F (36.4 °C)  Min: 97.3 °F (36.3 °C)  Max: 97.9 °F (36.6 °C)  Constitutional: The patient is awake, alert, and oriented.   Skin: Warm and dry. No rashes were noted. No jaundice.  HEENT: Eyes show round, and reactive pupils. Moist mucous membranes, no ulcerations, no thrush.   Neck: Supple to movements. No lymphadenopathy.   Chest: No use of accessory muscles to breathe. Symmetrical expansion. Auscultation reveals no wheezing, crackles, 
GI following, monitor HFP   - off IVF for peripheral edema, will trial 20 lasix today   - Abx per ID   - Calorie counts for malnutrition, continues to have low PO intake, supplements.   - Monitor Lfts/bili   - replace electrolytes PRN PO4 4, K 4, Mg 2  - Rest per primary      Gracie Hodges MD  PGY-3 General Surgery Resident    Attending Physician Statement:    Chief Complaint:   Chief Complaint   Patient presents with    Fatigue     Pt states he has been too weak to get up out of the chair. Unable to eat. Generalized body aches.     Generalized Body Aches       I have examined the patient and performed the key aspects of physical exam, reviewed the record (including all pertinent and new radiology images and laboratory findings), and discussed the case with the surgical team.  I agree with the assessment and plan with the following additions, corrections, and changes. 14pt review of symptoms completed and negative except as mentioned.    Patient is doing well after his ERCP.  Continue to check bilirubin  Antibiotics per infectious disease  Continue diet  Continue physical therapy    Raza Reynoso MD  06/29/24  2:34 PM    
Type: Multiple, Open Wounds, Wound Consult Pending (wounds x 3)       Current Nutrition Intake & Therapies:    Average Meal Intake: 1-25%, 0% (Pt reports PO intake ~20% most meals)  Average Supplements Intake: None Ordered  ADULT DIET; Regular    Anthropometric Measures:  Height: 185.4 cm (6' 1\")  Ideal Body Weight (IBW): 184 lbs (84 kg)    Admission Body Weight: 82.1 kg (181 lb) (6/25 standing scale; first measured)  Current Body Weight: 82.1 kg (181 lb) (standing scale; 6/25), 98.4 % IBW. Weight Source: Standing Scale  Current BMI (kg/m2): 23.9  Usual Body Weight: 95 kg (209 lb 7 oz) (2/29/24 bedscale ; EMR shows weight loss of 29# in the past ~3 months (209# to 180#) (14%))  % Weight Change (Calculated): -13.6  Weight Adjustment For: No Adjustment                 BMI Categories: Normal Weight (BMI 22.0 to 24.9) age over 65    Estimated Daily Nutrient Needs:  Energy Requirements Based On: Formula  Weight Used for Energy Requirements: Current  Energy (kcal/day): MSJ 1.3 SF; 1898-5266  Weight Used for Protein Requirements: Current  Protein (g/day): 115-130  (1.4-1.6g/kg CBW ; monitor LFT's)  Method Used for Fluid Requirements: 1 ml/kcal  Fluid (ml/day): 6930-7180    Nutrition Diagnosis:   Severe malnutrition, In context of chronic illness related to catabolic illness (hx of cholangiocarcinoma w/ current chemotherapy) as evidenced by poor intake prior to admission, intake 0-25%, severe muscle loss, weight loss 7.5% in 3 months, moderate loss of subcutaneous fat    Nutrition Interventions:   Food and/or Nutrient Delivery: Start Oral Nutrition Supplement, Continue Current Diet (Start ONS; Gen BID/Ensure TID (Vanilla))  Nutrition Education/Counseling: Education initiated (ONS)  Coordination of Nutrition Care: Continue to monitor while inpatient       Goals:     Goals: PO intake 50% or greater, by next RD assessment       Nutrition Monitoring and Evaluation:   Behavioral-Environmental Outcomes: None 
loss  Psychological:  Anxiety, disorientation, hallucinations.  ENT:  Epistaxis, headaches, vertigo, visual changes.  Cardiovascular:  Chest pain, irregular heartbeats, palpitations, paroxysmal nocturnal dyspnea.  Respiratory:  Shortness of breath, coughing, sputum production, hemoptysis, wheezing, orthopnea.  Gastrointestinal:  Nausea, vomiting, diarrhea, heartburn, constipation, abdominal pain, hematemesis, hematochezia, melena, acholic stools  Genito-Urinary:  Dysuria, urgency, frequency, hematuria  Musculoskeletal:  Joint pain, joint stiffness, joint swelling, muscle pain  Neurology:  Headache, focal neurological deficits, weakness, numbness, paresthesia  Derm:  Rashes, ulcers, excoriations, bruising  Extremities:  Decreased ROM, peripheral edema, mottling      OBJECTIVE:    BP (!) 141/76   Pulse 92   Temp 97.5 °F (36.4 °C) (Oral)   Resp 18   Ht 1.854 m (6' 0.99\")   Wt 69.1 kg (152 lb 7 oz)   SpO2 95%   BMI 20.12 kg/m²     General appearance:  awake, alert, and oriented to person, place, time, and purpose; appears stated age and cooperative; no apparent distress no labored breathing  HEENT:  Conjunctivae/corneas clear.   Neck: Supple. No jugular venous distention.   Respiratory: symmetrical; clear to auscultation bilaterally; no wheezes; no rhonchi; no rales  Cardiovascular: rhythm regular; rate controlled; no murmurs  Abdomen: Soft, nontender, nondistended  Extremities:  peripheral pulses present; no peripheral edema; no ulcers  Musculoskeletal: No clubbing, cyanosis, no bilateral lower extremity edema. Brisk capillary refill.   Skin:  No rashes  on visible skin  Neurologic: awake, alert and following commands     ASSESSMENT and PLAN:    --Severe sepsis, poa secondary to  Enterococcus bacteremia secondary to  Complicated UTI.  Lactic acidosis and hypotension resolved  Echo unremarkable JULIO unremarkable ID following currently on IV Unasyn  MRI of the thoracic and lumbar spine 
rhonchi.   Cardiovascular: S1 and S2 are rhythmic and regular. No murmurs appreciated.   Abdomen: Positive bowel sounds to auscultation. Benign to palpation. No masses felt. No hepatosplenomegaly.  Genitourinary: No pain in the lower abdomen  Extremities: No clubbing, no cyanosis, no edema.  Musculoskeletal: No pain in range of motion of any joints  Neurological: Following commands, no focal neurodeficit  Lines: peripheral    Laboratory and Tests Review:  Lab Results   Component Value Date    WBC 4.1 (L) 07/04/2024    WBC 4.0 (L) 07/03/2024    WBC 7.1 06/29/2024    HGB 8.4 (L) 07/04/2024    HCT 28.8 (L) 07/04/2024    .9 (H) 07/04/2024    PLT 61 (L) 07/04/2024     Lab Results   Component Value Date    NEUTROABS 2.42 07/04/2024    NEUTROABS 2.58 07/03/2024    NEUTROABS 6.35 06/29/2024     No results found for: \"CRPHS\"  Lab Results   Component Value Date    ALT 27 07/04/2024    AST 66 (H) 07/04/2024    ALKPHOS 982 (H) 07/04/2024    BILITOT 2.0 (H) 07/04/2024     Lab Results   Component Value Date/Time     07/04/2024 05:08 AM    K 4.3 07/04/2024 05:08 AM     07/04/2024 05:08 AM    CO2 24 07/04/2024 05:08 AM    BUN 30 07/04/2024 05:08 AM    CREATININE 1.2 07/04/2024 05:08 AM    CREATININE 1.1 07/03/2024 04:18 AM    CREATININE 0.9 06/29/2024 02:50 PM    LABGLOM 67 07/04/2024 05:08 AM    LABGLOM >60 03/19/2024 06:01 AM    GLUCOSE 79 07/04/2024 05:08 AM    CALCIUM 7.7 07/04/2024 05:08 AM    BILITOT 2.0 07/04/2024 05:08 AM    ALKPHOS 982 07/04/2024 05:08 AM    AST 66 07/04/2024 05:08 AM    ALT 27 07/04/2024 05:08 AM     Lab Results   Component Value Date    CRP 69.0 (H) 07/03/2024     Lab Results   Component Value Date    SEDRATE 60 (H) 07/03/2024     Radiology:      Microbiology:   No results found for: \"BC\", \"ORG\"  No results found for: \"BLOODCULT2\", \"ORG\"  No results found for: \"WNDABS\"  No results found for: \"RESPSMEAR\"  No results found for: \"MPNEUMO\", \"CLAMYDCU\", \"LABLEGI\", \"AFBCX\", \"FUNGSM\", 
with ww Sitting EOB: Independent   Dynamic Standing: Supervision with WW     Pt is A & O x 3  Sensation:  Pt denies numbness and tingling to extremities  Edema:  unremarkable    Vitals:  SpO2 and HR were stable during session    Therapeutic Exercises:    Bed mobility: supine>sit, cued for positioning at EOB  Transfers: STS x2, cued for hand placement and postural correction  Ambulation: 2' with ww  BLE AROM    Patient education  Pt educated on safety with functional mobility    Patient response to education:   Pt verbalized understanding Pt demonstrated skill Pt requires further education in this area   yes partial yes     ASSESSMENT:    Comments:  Pt supine in bed upon entering, pt agreeable to participate. Pt instructed to transfer to EOB, completing transfer with assist of trunk. Pt sitting upright with good sitting balance, assisted with donning TLSO in seated position. Pt with no c/o dizziness with position change initially. Pt then cued for hand placement and instructed to stand from EOB. Pt standing with fair balance with ww. Pt began to c/o dizziness in standing. Pt instructed to transfer to bedside chair, cueing provided for positioning and hand placement. BP assessed, reading 128/71. Pt felt more comfortable in bedside chair. Pt demonstrated limited tolerance to functional mobility, 2/2 pain and dizziness. Pt remained in bedside chair at end of session. Pt positioned for comfort with all needs met and call bell in reach prior to exiting.    Treatment:  Patient practiced and was instructed in the following treatment:    Bed mobility training - pt given verbal and tactile cues to facilitate proper sequencing and safety during rolling and supine>sit as well as provided with physical assistance to complete task  Sitting EOB for >5 minutes for upright tolerance, postural awareness and BLE ROM   STS and pivot transfer training - pt educated on proper hand and foot placement, safety and sequencing, and use of 
   ALT 26 06/27/2024 05:58 AM     Hepatic Function Panel:    Lab Results   Component Value Date/Time    ALKPHOS 701 06/27/2024 05:58 AM    ALT 26 06/27/2024 05:58 AM    AST 58 06/27/2024 05:58 AM    BILITOT 2.4 06/27/2024 05:58 AM    BILIDIR 2.8 06/24/2024 06:12 PM    IBILI 1.5 06/24/2024 06:12 PM     No components found for: \"CHLPL\"    Lab Results   Component Value Date    TRIG 120 06/25/2024       Lab Results   Component Value Date    HDL 19 (L) 06/25/2024     No components found for: \"LDLCALC\"  No components found for: \"LABVLDL\"   PT/INR:    Lab Results   Component Value Date/Time    PROTIME 15.3 06/24/2024 06:12 PM    INR 1.4 06/24/2024 06:12 PM     IRON:    Lab Results   Component Value Date/Time    IRON 94 03/15/2024 05:20 PM     Iron Saturation:  No components found for: \"PERCENTFE\"  FERRITIN:    Lab Results   Component Value Date/Time    FERRITIN 2,098 03/15/2024 05:20 PM         Assessment:     Obstructive jaundice secondary to cholangiocarcinoma (extrahepatic), appears baseline unlikely stent occlusion at this point, UC-SEMS was placed with transversing plastic biliary stent which was placed March.  Patient to require removal of transversing plastic with potential replacement  Anemia, normocytic hypochromic; multifactorial  Constipation  Bacteremia-defer    Plan:   ERCP with possible stent exchange/removal tomorrow a.m. with Dr. Lane.  Orders placed  N.p.o. after midnight  Diet as tolerated today  Hold anticoagulants for procedure  Continue bowel regimen  Antibiotics per ID  Protonix 40 mg twice daily  Labs in a.m.  Supportive care  Will follow      Note: This report was completed utilizing computer voice recognition software. Every effort has been made to ensure accuracy, however; inadvertent computerized transcription errors may be present.     Discussed with Dr. Lane  Plan per Dr. Domingo Jaime APRN-NP-C 6/27/2024  7:41 AM For Dr. Lane      GI Attending Addendum:  The patient was 
JULIO unremarkable ID following currently on IV Unasyn  MRI of the thoracic and lumbar spine pending    --Cholangiocarcinoma   Follows with Dr. Reynoso, currently on chemotherapy  Consulted this admission   HBS following  ERCP with sweeping done 6/28 with diffuse intrahepatic duct stricturing noted, no stent placement    --Acute on chronic anemia   Hgb 6.9 on 6/25, one unit transfused   Hgb remains stable since    DVT Prophylaxis [x] Lovenox, []  Heparin, [] SCDs, [] Ambulation   GI Prophylaxis [] PPI,  [] H2 Blocker,  [] Carafate,  [x] Diet/Tube Feeds   Disposition Patient requires continued admission due to awaiting JULIO and ID clearance   MDM [] Low, [x] Moderate,[]  High       Medications:  REVIEWED DAILY    Infusion Medications    sodium chloride      sodium chloride       Scheduled Medications    acetaminophen  1,000 mg Oral 3 times per day    menthol-zinc oxide   Topical BID    polyethylene glycol  17 g Oral Daily    sodium chloride flush  5-40 mL IntraVENous 2 times per day    enoxaparin  40 mg SubCUTAneous Daily    pantoprazole  40 mg Oral BID AC    [Held by provider] valsartan  80 mg Oral Daily    And    [Held by provider] hydroCHLOROthiazide  12.5 mg Oral Daily    ampicillin IV  2,000 mg IntraVENous 6 times per day     PRN Meds: oxyCODONE, cyclobenzaprine, menthol-zinc oxide **AND** menthol-zinc oxide, benzocaine-menthol, benzonatate, melatonin, Polyvinyl Alcohol-Povidone PF, sodium chloride, hydrALAZINE, calcium carbonate, sodium chloride flush, sodium chloride, ondansetron **OR** ondansetron, sodium chloride, sodium chloride flush    Labs:     Recent Labs     07/03/24 0418   WBC 4.0*   HGB 7.9*   HCT 26.1*   PLT 68*         Recent Labs     07/01/24  0534 07/02/24  0435 07/03/24 0418   NA  --   --  140   K  --   --  4.0   CL  --   --  107   CO2  --   --  23   BUN  --   --  34*   CREATININE  --   --  1.1   CALCIUM  --   --  7.5*   PHOS 3.3 3.5 3.2         Recent Labs     07/03/24 0418   ALKPHOS 924* 
\"CXCATHTIP\"  No results found for: \"BFCS\"  No results found for: \"CXSURG\"  No results found for: \"LABURIN\"  No results found for: \"MRSAC\"    ASSESSMENT:  Cholangiocarcinoma, status post chemotherapy  Status post ERCP with common hepatic duct stent  Enterococcus faecalis bacteremia  S/p cholecystectomy for gangrenous cholecystitis in 2017  Lactic acidosis improving  Elevated alkaline phosphatase  Hyperbilirubinemia  Transaminitis  Enterococcus faecalis UTI, E faecalis in the urine likely coming from the bacteremia  Status post ERCP without any evidence of cholangitis, biliary stent without any evidence of infection     Plan:    Repeat blood cultures no growth so far  Continue Ampicillin  TTE negative for vegetations, JULIO ordered  ID will continue to follow    Manish Graham MD  1:45 PM  7/1/2024  
\"LABFUNG\"  No results found for: \"CULTRESP\"  No results found for: \"CXCATHTIP\"  No results found for: \"BFCS\"  No results found for: \"CXSURG\"  No results found for: \"LABURIN\"  No results found for: \"MRSAC\"    ASSESSMENT:  Cholangiocarcinoma, status post chemotherapy  Status post ERCP with common hepatic duct stent  Enterococcus faecalis bacteremia  S/p cholecystectomy for gangrenous cholecystitis in 2017  Lactic acidosis improving  Elevated alkaline phosphatase  Hyperbilirubinemia  Transaminitis  Enterococcus faecalis UTI  Status post ERCP without any evidence of cholangitis, biliary stent without any evidence of infection     Plan:    Repeat blood cultures no growth so far  Continue Ampicillin  TTE negative for vegetations, JULIO ordered  ID will continue to follow    Manish Graham MD  5:10 PM  6/28/2024  
cultures NGTD  Obtain ECHO  Continue Rocephin, ampicillin, and flagyl    ID consulted  HBS following. Possible ERCP with stent exchange today.   Monitor cultures   Daily CBC  Hold home antihypertensives    Cholangiocarcinoma   Follows with Dr. Reynoso, currently on chemotherapy  Consulted this admission     Acute on chronic anemia   Hgb 6.9 on 6/25, one unit transfused   CBC pending fro today        DVT Prophylaxis [x] Lovenox, []  Heparin, [] SCDs, [] Ambulation   GI Prophylaxis [] PPI,  [] H2 Blocker,  [] Carafate,  [x] Diet/Tube Feeds   Disposition Patient requires continued admission due to awaiting clinical improvement and multispecialty clearance, anticipate dc home    MDM [] Low, [x] Moderate,[]  High       Medications:  REVIEWED DAILY    Infusion Medications    sodium chloride      sodium chloride 125 mL/hr at 06/26/24 0808    sodium chloride       Scheduled Medications    polyethylene glycol  17 g Oral Daily    sodium chloride flush  5-40 mL IntraVENous 2 times per day    [Held by provider] enoxaparin  40 mg SubCUTAneous Daily    pantoprazole  40 mg Oral BID AC    [Held by provider] valsartan  80 mg Oral Daily    And    [Held by provider] hydroCHLOROthiazide  12.5 mg Oral Daily    ampicillin IV  2,000 mg IntraVENous 6 times per day    cefTRIAXone (ROCEPHIN) IV  2,000 mg IntraVENous Q24H    metroNIDAZOLE  500 mg IntraVENous Q8H     PRN Meds: benzocaine-menthol, benzonatate, melatonin, Polyvinyl Alcohol-Povidone PF, sodium chloride, hydrALAZINE, calcium carbonate, sodium chloride flush, sodium chloride, potassium chloride **OR** potassium alternative oral replacement **OR** potassium chloride, magnesium sulfate, ondansetron **OR** ondansetron, acetaminophen **OR** acetaminophen, sodium chloride, sodium chloride flush    Labs:     Recent Labs     06/24/24  1812 06/25/24  0815 06/25/24  1745   WBC 21.3* 12.4*  --    HGB 8.6* 6.9* 7.7*   HCT 29.1* 23.3* 24.5*   PLT 88* 74*  --          Recent Labs     
resolved  Repeat blood cultures NGTD  ECHO unremarkable   Continue Rocephin, ampicillin, and flagyl    ID consulted  Monitor cultures   Daily CBC  Hold home antihypertensives    Cholangiocarcinoma   Follows with Dr. Reynoso, currently on chemotherapy  Consulted this admission   HBS following  ERCP with stent exchange done this morning showing diffuse intrahepatic duct stricturing  CLD for now and advance as tolerated    Acute on chronic anemia   Hgb 6.9 on 6/25, one unit transfused   Hgb remains stable since    DVT Prophylaxis [x] Lovenox, []  Heparin, [] SCDs, [] Ambulation   GI Prophylaxis [] PPI,  [] H2 Blocker,  [] Carafate,  [x] Diet/Tube Feeds   Disposition Patient requires continued admission due to awaiting HBS and ID clearance and plans regarding antibiotics    MDM [] Low, [x] Moderate,[]  High       Medications:  REVIEWED DAILY    Infusion Medications    sodium chloride      sodium chloride 40 mL/hr at 06/27/24 1719    sodium chloride       Scheduled Medications    potassium bicarb-citric acid  40 mEq Oral Once    sodium phosphate IVPB (PERIPHERAL line)  30 mmol IntraVENous Once    magnesium sulfate  4,000 mg IntraVENous Once    potassium & sodium phosphates  1 packet Oral 4x Daily    menthol-zinc oxide   Topical BID    polyethylene glycol  17 g Oral Daily    sodium chloride flush  5-40 mL IntraVENous 2 times per day    [Held by provider] enoxaparin  40 mg SubCUTAneous Daily    pantoprazole  40 mg Oral BID AC    [Held by provider] valsartan  80 mg Oral Daily    And    [Held by provider] hydroCHLOROthiazide  12.5 mg Oral Daily    ampicillin IV  2,000 mg IntraVENous 6 times per day     PRN Meds: menthol-zinc oxide **AND** menthol-zinc oxide, oxyCODONE-acetaminophen, benzocaine-menthol, benzonatate, melatonin, Polyvinyl Alcohol-Povidone PF, sodium chloride, hydrALAZINE, calcium carbonate, sodium chloride flush, sodium chloride, potassium chloride **OR** potassium alternative oral replacement **OR** 
results found for: \"CXCATHTIP\"  No results found for: \"BFCS\"  No results found for: \"CXSURG\"  No results found for: \"LABURIN\"  No results found for: \"MRSAC\"    ASSESSMENT:  Cholangiocarcinoma, status post chemotherapy  Status post ERCP with common hepatic duct stent  Enterococcus faecalis bacteremia  S/p cholecystectomy for gangrenous cholecystitis in 2017  Lactic acidosis improving  Elevated alkaline phosphatase  Hyperbilirubinemia  Transaminitis  Enterococcus faecalis UTI  Status post ERCP without any evidence of cholangitis, biliary stent without any evidence of infection     Plan:    Repeat blood cultures no growth so far  Continue Ampicillin  TTE negative for vegetations, JULIO ordered  ID will continue to follow    Manish Graham MD  12:13 PM  6/29/2024  
10*, distally WFL 4-/5 grossly tested Fair+  and FMC/dexterity noted during ADL tasks    Improve overall RUE strength WFL for participation in functional tasks         LUE 7-6-24:  Shoulder flex to ~ 30*, distally WFL     4-/5 grossly tested Fair+  and FMC/dexterity noted during ADL tasks Improve overall LUE strength WFL for participation in functional tasks        Moderate to Severe Edema Dennis UEs distally - elevated for support, RN aware      Education:  Pt was educated through out treatment regarding proper technique & safety with bed mobility, functional transfers, light mobility, importance of OOB activity & ADL compensatory strategies to ease tasks, improve safety & prevent falls to return home safely.      Comments: Upon arrival, pt was found sleeping in supine.  He was agreeable for therapy, nsg approved.   At end of session pt was properly positioned in armed chair, set up to eat meal.  Nsg aware, all lines and tubes intact, call light within reach.     Pt has made Fair progress towards set goals.   Continue with current plan of care    Treatment Time In: 0835              Treatment Time Out:  0859             Treatment Charges: Mins Units   Ther Ex  91548     Manual Therapy 40806     Thera Activities 95693     ADL/Home Mgt 55794 24 2   Neuro Re-ed 90053     Group Therapy      Orthotic manage/training  88479     Non-Billable Time     Total Timed Treatment 24 2       Brenna Barr, MOT, OTR/L  # [id number]          
and quality of life.    Treatment: OT treatment provided this date includes:   ADL-  Instruction/training on safety and adapted techniques for completion of ADLs: Therapist facilitated & pt educated on activity modifications/adaptations to promote implementation of fall prevention strategies, EC/WS strategies, & safety awareness throughout ADLs.   Mobility-  Instruction/training on safety and improved independence with bed mobility/functional transfers and functional mobility w/ use of ww.   Sitting EOB ~8 minutes to improve dynamic sitting balance and activity tolerance during ADLs.   Activity tolerance- Instruction/training on energy conservation/work simplification for completion of ADLs.   Skilled positioning/alignment-  Therapist facilitated proper positioning/alignment throughout session to maintain skin/joint integrity & proper body mechanics.     Rehab Potential: Good for established goals     LTG: maximize independence with ADLs to return to PLOF    Patient and/or family were instructed on functional diagnosis, prognosis/goals and OT plan of care. Demonstrated fair understanding.     Eval Complexity: Low  History: Expanded chart review of medical records and additional review of physical, cognitive, or psychosocial history related to current functional performance  Exam: 3+ performance deficits  Assistance/Modification: Min/mod assistance or modifications required to perform tasks. May have comorbidities that affect occupational performance.    Evaluation time includes thorough review of current medical information, gathering information on past medical & social history & PLOF, completion of standardized testing, informal observation of tasks, consultation with other medical professions/disciplines, assessment of data & development of POC/goals.     Time In: 2:30p  Time Out: 2:53p  Total Treatment Time: 8 minutes    Min Units   OT Eval Low 78075  x     OT Eval Medium 77952      OT Eval High 45192      OT 
benzocaine-menthol, benzonatate, melatonin, Polyvinyl Alcohol-Povidone PF, sodium chloride, hydrALAZINE, calcium carbonate, sodium chloride flush, sodium chloride, ondansetron **OR** ondansetron, sodium chloride, sodium chloride flush    Labs:     Recent Labs     07/04/24 0508 07/05/24 1628 07/06/24 0413   WBC 4.1* 8.1 5.3   HGB 8.4* 8.5* 7.8*   HCT 28.8* 29.0* 27.7*   PLT 61* 67*  --          Recent Labs     07/04/24 0508 07/05/24 1628 07/06/24 0413    139 138   K 4.3 4.2 4.1    106 108*   CO2 24 21* 21*   BUN 30* 22 23   CREATININE 1.2 1.0 1.1   CALCIUM 7.7* 7.7* 7.5*   PHOS 3.4  --  3.7         Recent Labs     07/04/24 0508 07/05/24 1628 07/06/24 0413   ALKPHOS 982* 1,041* 897*   ALT 27 31 27   AST 66* 70* 68*   BILITOT 2.0* 2.9* 2.5*         No results for input(s): \"INR\" in the last 72 hours.      No results for input(s): \"CKTOTAL\", \"TROPONINI\" in the last 72 hours.    Chronic labs:    Lab Results   Component Value Date    CHOL 108 06/25/2024    TRIG 120 06/25/2024    HDL 19 (L) 06/25/2024    TSH 1.47 07/05/2024    INR 1.4 06/24/2024    LABA1C 4.3 06/25/2024       Radiology: REVIEWED DAILY    +++++++++++++++++++++++++++++++++++++++++++++++++  Goldy Cardona MD  [facility] Hospitalist  [facility], [place]  +++++++++++++++++++++++++++++++++++++++++++++++++  NOTE: This report was transcribed using voice recognition software. Every effort was made to ensure accuracy; however, inadvertent computerized transcription errors may be present.      
distally WFL 4-/5 grossly tested Fair+  and FMC/dexterity noted during ADL tasks    Improve overall RUE strength WFL for participation in functional tasks         LUE 7-6-24:  Shoulder flex to ~ 30*, distally WFL       4-/5 grossly tested Fair+  and FMC/dexterity noted during ADL tasks Improve overall LUE strength WFL for participation in functional tasks           Comments: Upon arrival pt semi-supine in bed  and agreeable to OT treat. no visitors present throughout session. At end of session sitting in chair with arms  and alarm activated  all lines and tubes intact, call light within reach.   Treatment: Pt completed above ADLs and functional mobility with assist as noted including instructions and cuing as needed to promote increased participation and decreased need for assist. Pt needed extended time to complete all tasks. Pt edu on use of call light and waiting until staff present to attempt any functional mobility. Pt verbalized understanding and demonstrated ability to locate and press call button.     Patient demonstrated decreased independence and safety during completion of ADL/functional transfer/mobility tasks.  Pt would benefit from continued skilled OT to increase safety and independence with ADL/IADL tasks for functional independence and quality of life.  Pt has made fair progress towards set goals.       Treatment Time In: 1150              Treatment Time Out: 1217             Treatment Charges: Mins Units   Ther Ex  10260     Manual Therapy 40101     Thera Activities 51566 10 1   ADL/Home Mgt 92787 17 1   Neuro Re-ed 24391     Group Therapy      Orthotic manage/training  76909     Non-Billable Time     Total Timed Treatment 27 2       Aravind Mota OTR/L [id number]    
every 4 hour Iv antibiotics- Dr. Graham will be rounding tomorrow- can make changes to treatment plans per his recommendations      ELDA [place] - CNS  2:33 PM  7/5/2024    
sodium chloride flush, sodium chloride, ondansetron **OR** ondansetron, sodium chloride, sodium chloride flush    Labs:     Recent Labs     07/04/24 0508 07/05/24 1628 07/06/24 0413   WBC 4.1* 8.1 5.3   HGB 8.4* 8.5* 7.8*   HCT 28.8* 29.0* 27.7*   PLT 61* 67*  --          Recent Labs     07/04/24 0508 07/05/24 1628 07/06/24 0413    139 138   K 4.3 4.2 4.1    106 108*   CO2 24 21* 21*   BUN 30* 22 23   CREATININE 1.2 1.0 1.1   CALCIUM 7.7* 7.7* 7.5*   PHOS 3.4  --  3.7         Recent Labs     07/04/24 0508 07/05/24 1628 07/06/24 0413   ALKPHOS 982* 1,041* 897*   ALT 27 31 27   AST 66* 70* 68*   BILITOT 2.0* 2.9* 2.5*         No results for input(s): \"INR\" in the last 72 hours.      No results for input(s): \"CKTOTAL\", \"TROPONINI\" in the last 72 hours.    Chronic labs:    Lab Results   Component Value Date    CHOL 108 06/25/2024    TRIG 120 06/25/2024    HDL 19 (L) 06/25/2024    TSH 1.47 07/05/2024    INR 1.4 06/24/2024    LABA1C 4.3 06/25/2024       Radiology: REVIEWED DAILY    +++++++++++++++++++++++++++++++++++++++++++++++++  Goldy Cardona MD  [facility] [place] [place]  +++++++++++++++++++++++++++++++++++++++++++++++++  NOTE: This report was transcribed using voice recognition software. Every effort was made to ensure accuracy; however, inadvertent computerized transcription errors may be present.      
sodium chloride flush, sodium chloride, potassium chloride **OR** potassium alternative oral replacement **OR** potassium chloride, magnesium sulfate, ondansetron **OR** ondansetron, acetaminophen **OR** acetaminophen, sodium chloride, sodium chloride flush    Labs:     Recent Labs     06/25/24  0815 06/25/24  1745 06/26/24  1000 06/27/24  0558   WBC 12.4*  --  13.5* 8.6   HGB 6.9* 7.7* 8.0* 8.5*   HCT 23.3* 24.5* 26.8* 28.8*   PLT 74*  --  89* 85*         Recent Labs     06/25/24  0815 06/26/24  1000 06/26/24 1953 06/27/24  0558    139  --  142   K 3.5 2.8* 3.5 3.6    106  --  112*   CO2 24 21*  --  19*   BUN 19 13  --  10   CREATININE 1.0 0.8  --  0.8   CALCIUM 7.8* 7.2*  --  7.2*   PHOS 2.8  --   --  1.9*         Recent Labs     06/24/24  1812 06/25/24  0815 06/26/24  1000 06/27/24  0558   ALKPHOS 882* 691* 626* 701*   ALT 41* 32 28 26   AST 78* 56* 59* 58*   BILITOT 4.3* 3.2* 3.0* 2.4*   LIPASE 22  --   --  24         Recent Labs     06/24/24 1812   INR 1.4         No results for input(s): \"CKTOTAL\", \"TROPONINI\" in the last 72 hours.    Chronic labs:    Lab Results   Component Value Date    CHOL 108 06/25/2024    TRIG 120 06/25/2024    HDL 19 (L) 06/25/2024    TSH 0.78 06/25/2024    INR 1.4 06/24/2024    LABA1C 4.3 06/25/2024       Radiology: REVIEWED DAILY    +++++++++++++++++++++++++++++++++++++++++++++++++  Guantonieta Shah MD  [facility]- Hospitalist  [facility] [place], [place]  +++++++++++++++++++++++++++++++++++++++++++++++++  NOTE: This report was transcribed using voice recognition software. Every effort was made to ensure accuracy; however, inadvertent computerized transcription errors may be present.

## 2024-07-10 NOTE — PLAN OF CARE
Problem: Chronic Conditions and Co-morbidities  Goal: Patient's chronic conditions and co-morbidity symptoms are monitored and maintained or improved  6/26/2024 0922 by Brittanie Coppola RN  Outcome: Progressing  6/25/2024 2259 by Eleazar Barry, RN  Outcome: Progressing     Problem: Discharge Planning  Goal: Discharge to home or other facility with appropriate resources  Outcome: Progressing     Problem: Safety - Adult  Goal: Free from fall injury  6/25/2024 2259 by Eleazar Barry, RN  Outcome: Progressing     Problem: Skin/Tissue Integrity  Goal: Absence of new skin breakdown  Description: 1.  Monitor for areas of redness and/or skin breakdown  2.  Assess vascular access sites hourly  3.  Every 4-6 hours minimum:  Change oxygen saturation probe site  4.  Every 4-6 hours:  If on nasal continuous positive airway pressure, respiratory therapy assess nares and determine need for appliance change or resting period.  6/25/2024 2259 by Eleazra Barry, RN  Outcome: Progressing     
  Problem: Chronic Conditions and Co-morbidities  Goal: Patient's chronic conditions and co-morbidity symptoms are monitored and maintained or improved  6/26/2024 2139 by Eleazar Barry, RN  Outcome: Progressing     Problem: Discharge Planning  Goal: Discharge to home or other facility with appropriate resources  6/26/2024 2139 by Eleazar Barry, RN  Outcome: Progressing     Problem: Safety - Adult  Goal: Free from fall injury  Outcome: Progressing     Problem: Skin/Tissue Integrity  Goal: Absence of new skin breakdown  Description: 1.  Monitor for areas of redness and/or skin breakdown  2.  Assess vascular access sites hourly  3.  Every 4-6 hours minimum:  Change oxygen saturation probe site  4.  Every 4-6 hours:  If on nasal continuous positive airway pressure, respiratory therapy assess nares and determine need for appliance change or resting period.  Outcome: Progressing     Problem: ABCDS Injury Assessment  Goal: Absence of physical injury  Outcome: Progressing     
  Problem: Chronic Conditions and Co-morbidities  Goal: Patient's chronic conditions and co-morbidity symptoms are monitored and maintained or improved  6/27/2024 0745 by Laurel Smith RN  Outcome: Progressing     Problem: Discharge Planning  Goal: Discharge to home or other facility with appropriate resources  6/27/2024 0745 by Laurel Smith, RN  Outcome: Progressing     Problem: Safety - Adult  Goal: Free from fall injury  6/27/2024 0745 by Laurel Smith RN  Outcome: Progressing     Problem: Skin/Tissue Integrity  Goal: Absence of new skin breakdown  Description: 1.  Monitor for areas of redness and/or skin breakdown  2.  Assess vascular access sites hourly  3.  Every 4-6 hours minimum:  Change oxygen saturation probe site  4.  Every 4-6 hours:  If on nasal continuous positive airway pressure, respiratory therapy assess nares and determine need for appliance change or resting period.  6/27/2024 0745 by Laurel Smith, RN  Outcome: Progressing     Problem: Pain  Goal: Verbalizes/displays adequate comfort level or baseline comfort level  Outcome: Progressing     
  Problem: Chronic Conditions and Co-morbidities  Goal: Patient's chronic conditions and co-morbidity symptoms are monitored and maintained or improved  6/28/2024 1053 by Nohemi Leblanc RN  Outcome: Progressing  6/27/2024 2308 by Real Perez RN  Outcome: Progressing     Problem: Discharge Planning  Goal: Discharge to home or other facility with appropriate resources  6/28/2024 1053 by Nohemi Leblanc RN  Outcome: Progressing  6/27/2024 2308 by Real Perez RN  Outcome: Progressing     Problem: Safety - Adult  Goal: Free from fall injury  6/28/2024 1053 by Nohemi Leblanc RN  Outcome: Progressing  6/27/2024 2308 by Real Perez RN  Outcome: Progressing     Problem: Skin/Tissue Integrity  Goal: Absence of new skin breakdown  Description: 1.  Monitor for areas of redness and/or skin breakdown  2.  Assess vascular access sites hourly  3.  Every 4-6 hours minimum:  Change oxygen saturation probe site  4.  Every 4-6 hours:  If on nasal continuous positive airway pressure, respiratory therapy assess nares and determine need for appliance change or resting period.  6/28/2024 1053 by Nohemi Leblanc RN  Outcome: Progressing  6/27/2024 2308 by Real Perez RN  Outcome: Progressing     
  Problem: Chronic Conditions and Co-morbidities  Goal: Patient's chronic conditions and co-morbidity symptoms are monitored and maintained or improved  7/1/2024 1125 by Carrie James RN  Outcome: Progressing  7/1/2024 0042 by Carrie James RN  Outcome: Progressing     Problem: Discharge Planning  Goal: Discharge to home or other facility with appropriate resources  7/1/2024 1125 by Carrie James RN  Outcome: Progressing  7/1/2024 0042 by Carrie James RN  Outcome: Progressing     Problem: Safety - Adult  Goal: Free from fall injury  7/1/2024 1125 by Carrie James RN  Outcome: Progressing  7/1/2024 0042 by Carrie James RN  Outcome: Progressing     Problem: Skin/Tissue Integrity  Goal: Absence of new skin breakdown  Description: 1.  Monitor for areas of redness and/or skin breakdown  2.  Assess vascular access sites hourly  3.  Every 4-6 hours minimum:  Change oxygen saturation probe site  4.  Every 4-6 hours:  If on nasal continuous positive airway pressure, respiratory therapy assess nares and determine need for appliance change or resting period.  7/1/2024 1125 by Carrie James RN  Outcome: Progressing  7/1/2024 0042 by Carrie James RN  Outcome: Progressing     Problem: ABCDS Injury Assessment  Goal: Absence of physical injury  7/1/2024 1125 by Carrie James RN  Outcome: Progressing  7/1/2024 0042 by Carrie James RN  Outcome: Progressing     Problem: Pain  Goal: Verbalizes/displays adequate comfort level or baseline comfort level  7/1/2024 1125 by Carrie James RN  Outcome: Progressing  7/1/2024 0042 by Carrie James RN  Outcome: Progressing     Problem: Nutrition Deficit:  Goal: Optimize nutritional status  7/1/2024 1125 by Carrie James RN  Outcome: Progressing  Flowsheets (Taken 7/1/2024 1011 by Puma Morrison, SALINAS)  Nutrient intake appropriate for improving, restoring, or maintaining nutritional needs:   Monitor oral intake, labs, and treatment plans   
  Problem: Chronic Conditions and Co-morbidities  Goal: Patient's chronic conditions and co-morbidity symptoms are monitored and maintained or improved  7/2/2024 0008 by Sriram Mott RN  Outcome: Progressing  7/1/2024 1125 by Carrie James RN  Outcome: Progressing     Problem: Safety - Adult  Goal: Free from fall injury  7/2/2024 0008 by Sriram Mott RN  Outcome: Progressing  7/1/2024 1125 by Carrie James RN  Outcome: Progressing     Problem: Skin/Tissue Integrity  Goal: Absence of new skin breakdown  Description: 1.  Monitor for areas of redness and/or skin breakdown  2.  Assess vascular access sites hourly  3.  Every 4-6 hours minimum:  Change oxygen saturation probe site  4.  Every 4-6 hours:  If on nasal continuous positive airway pressure, respiratory therapy assess nares and determine need for appliance change or resting period.  7/2/2024 0008 by Sriram Mott RN  Outcome: Progressing  7/1/2024 1125 by Carrie James RN  Outcome: Progressing     Problem: Pain  Goal: Verbalizes/displays adequate comfort level or baseline comfort level  7/2/2024 0008 by Sriram Mott RN  Outcome: Progressing  7/1/2024 1125 by Carrie James RN  Outcome: Progressing     Problem: Nutrition Deficit:  Goal: Optimize nutritional status  7/2/2024 0008 by Sriram Mott RN  Outcome: Progressing  7/1/2024 1125 by Carrie James RN  Outcome: Progressing  Flowsheets (Taken 7/1/2024 1011 by Puma Morrison, RD)  Nutrient intake appropriate for improving, restoring, or maintaining nutritional needs:   Monitor oral intake, labs, and treatment plans   Recommend appropriate diets, oral nutritional supplements, and vitamin/mineral supplements     
  Problem: Chronic Conditions and Co-morbidities  Goal: Patient's chronic conditions and co-morbidity symptoms are monitored and maintained or improved  7/2/2024 1027 by Courtney Walsh RN  Outcome: Progressing  7/2/2024 0008 by Sriram Mott RN  Outcome: Progressing     Problem: Discharge Planning  Goal: Discharge to home or other facility with appropriate resources  7/2/2024 1027 by Courtney Walsh RN  Outcome: Progressing  7/2/2024 0008 by Sriram Mott RN  Outcome: Progressing     Problem: Safety - Adult  Goal: Free from fall injury  7/2/2024 1027 by Courtney Walsh RN  Outcome: Progressing  7/2/2024 0008 by Sriram Mott RN  Outcome: Progressing     Problem: Skin/Tissue Integrity  Goal: Absence of new skin breakdown  Description: 1.  Monitor for areas of redness and/or skin breakdown  2.  Assess vascular access sites hourly  3.  Every 4-6 hours minimum:  Change oxygen saturation probe site  4.  Every 4-6 hours:  If on nasal continuous positive airway pressure, respiratory therapy assess nares and determine need for appliance change or resting period.  7/2/2024 1027 by Courtney Walsh RN  Outcome: Progressing  7/2/2024 0008 by Sriram Mott RN  Outcome: Progressing     Problem: ABCDS Injury Assessment  Goal: Absence of physical injury  Outcome: Progressing     Problem: Pain  Goal: Verbalizes/displays adequate comfort level or baseline comfort level  7/2/2024 1027 by Courtney Walsh RN  Outcome: Progressing  7/2/2024 0008 by Sriram Mott RN  Outcome: Progressing     Problem: Nutrition Deficit:  Goal: Optimize nutritional status  7/2/2024 1027 by Courtney Walsh RN  Outcome: Progressing  7/2/2024 0008 by Sriram Mott RN  Outcome: Progressing     
  Problem: Chronic Conditions and Co-morbidities  Goal: Patient's chronic conditions and co-morbidity symptoms are monitored and maintained or improved  7/2/2024 1948 by Sriram Mott RN  Outcome: Progressing  7/2/2024 1027 by Courtney Walsh RN  Outcome: Progressing     Problem: Safety - Adult  Goal: Free from fall injury  7/2/2024 1948 by Sriram Mott RN  Outcome: Progressing  7/2/2024 1027 by Courtney Walsh RN  Outcome: Progressing     Problem: Skin/Tissue Integrity  Goal: Absence of new skin breakdown  Description: 1.  Monitor for areas of redness and/or skin breakdown  2.  Assess vascular access sites hourly  3.  Every 4-6 hours minimum:  Change oxygen saturation probe site  4.  Every 4-6 hours:  If on nasal continuous positive airway pressure, respiratory therapy assess nares and determine need for appliance change or resting period.  7/2/2024 1027 by Courtney Walsh RN  Outcome: Progressing     Problem: Pain  Goal: Verbalizes/displays adequate comfort level or baseline comfort level  7/2/2024 1948 by Sriram Mott RN  Outcome: Progressing  7/2/2024 1027 by Courtney Walsh RN  Outcome: Progressing     
  Problem: Chronic Conditions and Co-morbidities  Goal: Patient's chronic conditions and co-morbidity symptoms are monitored and maintained or improved  7/3/2024 2252 by Sriram Mott RN  Outcome: Progressing  7/3/2024 1814 by Courtney Walsh RN  Outcome: Progressing     Problem: Safety - Adult  Goal: Free from fall injury  7/3/2024 2252 by Sriram Mott RN  Outcome: Progressing  7/3/2024 1814 by Courtney Walsh RN  Outcome: Progressing     Problem: Pain  Goal: Verbalizes/displays adequate comfort level or baseline comfort level  7/3/2024 2252 by Sriram Mott RN  Outcome: Progressing  7/3/2024 1814 by Courtney Walsh RN  Outcome: Progressing     Problem: Nutrition Deficit:  Goal: Optimize nutritional status  7/3/2024 2252 by Sriram Mott RN  Outcome: Progressing  7/3/2024 1814 by Courtney Walsh RN  Outcome: Progressing     
  Problem: Chronic Conditions and Co-morbidities  Goal: Patient's chronic conditions and co-morbidity symptoms are monitored and maintained or improved  7/7/2024 0103 by Bouchra Cho RN  Outcome: Progressing  7/6/2024 1611 by Carrie James RN  Outcome: Progressing     Problem: Discharge Planning  Goal: Discharge to home or other facility with appropriate resources  7/7/2024 0103 by Bouchra Cho RN  Outcome: Progressing  7/6/2024 1611 by Carrie James RN  Outcome: Progressing     Problem: Safety - Adult  Goal: Free from fall injury  7/7/2024 0103 by Bouchra Cho RN  Outcome: Progressing  7/6/2024 1611 by Carrie James RN  Outcome: Progressing     Problem: Skin/Tissue Integrity  Goal: Absence of new skin breakdown  Description: 1.  Monitor for areas of redness and/or skin breakdown  2.  Assess vascular access sites hourly  3.  Every 4-6 hours minimum:  Change oxygen saturation probe site  4.  Every 4-6 hours:  If on nasal continuous positive airway pressure, respiratory therapy assess nares and determine need for appliance change or resting period.  7/7/2024 0103 by Bouchra Cho RN  Outcome: Progressing  7/6/2024 1611 by Carrie James RN  Outcome: Progressing     Problem: ABCDS Injury Assessment  Goal: Absence of physical injury  7/6/2024 1611 by Carrie James RN  Outcome: Progressing     Problem: Pain  Goal: Verbalizes/displays adequate comfort level or baseline comfort level  7/6/2024 1611 by Carrie James RN  Outcome: Progressing     Problem: Nutrition Deficit:  Goal: Optimize nutritional status  7/6/2024 1611 by Carrie James RN  Outcome: Progressing     Problem: Musculoskeletal - Adult  Goal: Return mobility to safest level of function  7/6/2024 1611 by Carrie James RN  Outcome: Progressing  Goal: Maintain proper alignment of affected body part  7/6/2024 1611 by Carrie James RN  Outcome: Progressing  Goal: Return ADL status to a safe level of 
  Problem: Chronic Conditions and Co-morbidities  Goal: Patient's chronic conditions and co-morbidity symptoms are monitored and maintained or improved  7/7/2024 0921 by Carrie James RN  Outcome: Progressing  7/7/2024 0103 by Bouchra Cho RN  Outcome: Progressing     Problem: Discharge Planning  Goal: Discharge to home or other facility with appropriate resources  7/7/2024 0921 by Carrie James RN  Outcome: Progressing  7/7/2024 0103 by Bouchra Cho RN  Outcome: Progressing     Problem: Safety - Adult  Goal: Free from fall injury  7/7/2024 0921 by Carrie James RN  Outcome: Progressing  7/7/2024 0103 by Bouchra Cho RN  Outcome: Progressing     Problem: Skin/Tissue Integrity  Goal: Absence of new skin breakdown  Description: 1.  Monitor for areas of redness and/or skin breakdown  2.  Assess vascular access sites hourly  3.  Every 4-6 hours minimum:  Change oxygen saturation probe site  4.  Every 4-6 hours:  If on nasal continuous positive airway pressure, respiratory therapy assess nares and determine need for appliance change or resting period.  7/7/2024 0921 by Carrie James RN  Outcome: Progressing  7/7/2024 0103 by Bouchra Cho RN  Outcome: Progressing     Problem: ABCDS Injury Assessment  Goal: Absence of physical injury  Outcome: Progressing     Problem: Pain  Goal: Verbalizes/displays adequate comfort level or baseline comfort level  Outcome: Progressing     Problem: Nutrition Deficit:  Goal: Optimize nutritional status  Outcome: Progressing     Problem: Musculoskeletal - Adult  Goal: Return mobility to safest level of function  Outcome: Progressing  Goal: Maintain proper alignment of affected body part  Outcome: Progressing  Goal: Return ADL status to a safe level of function  Outcome: Progressing     Problem: Skin/Tissue Integrity - Adult  Goal: Incisions, wounds, or drain sites healing without S/S of infection  Outcome: Progressing     Problem: Gastrointestinal - 
  Problem: Chronic Conditions and Co-morbidities  Goal: Patient's chronic conditions and co-morbidity symptoms are monitored and maintained or improved  7/8/2024 1407 by Ilene Fernández RN  Outcome: Progressing  7/8/2024 0334 by Janette Bowden RN  Outcome: Progressing     Problem: Discharge Planning  Goal: Discharge to home or other facility with appropriate resources  Outcome: Progressing     Problem: Safety - Adult  Goal: Free from fall injury  Outcome: Progressing     Problem: Skin/Tissue Integrity  Goal: Absence of new skin breakdown  Description: 1.  Monitor for areas of redness and/or skin breakdown  2.  Assess vascular access sites hourly  3.  Every 4-6 hours minimum:  Change oxygen saturation probe site  4.  Every 4-6 hours:  If on nasal continuous positive airway pressure, respiratory therapy assess nares and determine need for appliance change or resting period.  Outcome: Progressing     Problem: ABCDS Injury Assessment  Goal: Absence of physical injury  Outcome: Progressing     Problem: Skin/Tissue Integrity - Adult  Goal: Incisions, wounds, or drain sites healing without S/S of infection  Outcome: Progressing     Problem: Gastrointestinal - Adult  Goal: Maintains adequate nutritional intake  Outcome: Progressing     Problem: Genitourinary - Adult  Goal: Absence of urinary retention  Outcome: Progressing     
  Problem: Chronic Conditions and Co-morbidities  Goal: Patient's chronic conditions and co-morbidity symptoms are monitored and maintained or improved  7/9/2024 2253 by Hannah Phelps RN  Outcome: Progressing     Problem: Discharge Planning  Goal: Discharge to home or other facility with appropriate resources  7/9/2024 2253 by Hannah Phelps RN  Outcome: Progressing     Problem: Safety - Adult  Goal: Free from fall injury  7/9/2024 2253 by Hannah Phelps RN  Outcome: Progressing     Problem: Skin/Tissue Integrity  Goal: Absence of new skin breakdown  Description: 1.  Monitor for areas of redness and/or skin breakdown  2.  Assess vascular access sites hourly  3.  Every 4-6 hours minimum:  Change oxygen saturation probe site  4.  Every 4-6 hours:  If on nasal continuous positive airway pressure, respiratory therapy assess nares and determine need for appliance change or resting period.  7/9/2024 2253 by Hannah Phelps RN  Outcome: Progressing     Problem: ABCDS Injury Assessment  Goal: Absence of physical injury  7/9/2024 2253 by Hannah Phelps RN  Outcome: Progressing     Problem: Pain  Goal: Verbalizes/displays adequate comfort level or baseline comfort level  7/9/2024 2253 by Hannah Phelps RN  Outcome: Progressing     Problem: Nutrition Deficit:  Goal: Optimize nutritional status  7/9/2024 2253 by Hannah Phelps RN  Outcome: Progressing     
  Problem: Chronic Conditions and Co-morbidities  Goal: Patient's chronic conditions and co-morbidity symptoms are monitored and maintained or improved  Outcome: Progressing     
  Problem: Chronic Conditions and Co-morbidities  Goal: Patient's chronic conditions and co-morbidity symptoms are monitored and maintained or improved  Outcome: Progressing     Problem: Discharge Planning  Goal: Discharge to home or other facility with appropriate resources  Outcome: Progressing     Problem: Safety - Adult  Goal: Free from fall injury  Outcome: Progressing     Problem: Skin/Tissue Integrity  Goal: Absence of new skin breakdown  Description: 1.  Monitor for areas of redness and/or skin breakdown  2.  Assess vascular access sites hourly  3.  Every 4-6 hours minimum:  Change oxygen saturation probe site  4.  Every 4-6 hours:  If on nasal continuous positive airway pressure, respiratory therapy assess nares and determine need for appliance change or resting period.  Outcome: Progressing     Problem: ABCDS Injury Assessment  Goal: Absence of physical injury  Outcome: Progressing     
  Problem: Chronic Conditions and Co-morbidities  Goal: Patient's chronic conditions and co-morbidity symptoms are monitored and maintained or improved  Outcome: Progressing     Problem: Discharge Planning  Goal: Discharge to home or other facility with appropriate resources  Outcome: Progressing     Problem: Safety - Adult  Goal: Free from fall injury  Outcome: Progressing     Problem: Skin/Tissue Integrity  Goal: Absence of new skin breakdown  Description: 1.  Monitor for areas of redness and/or skin breakdown  2.  Assess vascular access sites hourly  3.  Every 4-6 hours minimum:  Change oxygen saturation probe site  4.  Every 4-6 hours:  If on nasal continuous positive airway pressure, respiratory therapy assess nares and determine need for appliance change or resting period.  Outcome: Progressing     Problem: ABCDS Injury Assessment  Goal: Absence of physical injury  Outcome: Progressing     Problem: Pain  Goal: Verbalizes/displays adequate comfort level or baseline comfort level  Outcome: Progressing     
  Problem: Chronic Conditions and Co-morbidities  Goal: Patient's chronic conditions and co-morbidity symptoms are monitored and maintained or improved  Outcome: Progressing     Problem: Discharge Planning  Goal: Discharge to home or other facility with appropriate resources  Outcome: Progressing     Problem: Safety - Adult  Goal: Free from fall injury  Outcome: Progressing     Problem: Skin/Tissue Integrity  Goal: Absence of new skin breakdown  Description: 1.  Monitor for areas of redness and/or skin breakdown  2.  Assess vascular access sites hourly  3.  Every 4-6 hours minimum:  Change oxygen saturation probe site  4.  Every 4-6 hours:  If on nasal continuous positive airway pressure, respiratory therapy assess nares and determine need for appliance change or resting period.  Outcome: Progressing     Problem: ABCDS Injury Assessment  Goal: Absence of physical injury  Outcome: Progressing     Problem: Pain  Goal: Verbalizes/displays adequate comfort level or baseline comfort level  Outcome: Progressing     Problem: Nutrition Deficit:  Goal: Optimize nutritional status  Outcome: Progressing     
  Problem: Chronic Conditions and Co-morbidities  Goal: Patient's chronic conditions and co-morbidity symptoms are monitored and maintained or improved  Outcome: Progressing     Problem: Discharge Planning  Goal: Discharge to home or other facility with appropriate resources  Outcome: Progressing     Problem: Safety - Adult  Goal: Free from fall injury  Outcome: Progressing     Problem: Skin/Tissue Integrity  Goal: Absence of new skin breakdown  Description: 1.  Monitor for areas of redness and/or skin breakdown  2.  Assess vascular access sites hourly  3.  Every 4-6 hours minimum:  Change oxygen saturation probe site  4.  Every 4-6 hours:  If on nasal continuous positive airway pressure, respiratory therapy assess nares and determine need for appliance change or resting period.  Outcome: Progressing     Problem: ABCDS Injury Assessment  Goal: Absence of physical injury  Outcome: Progressing     Problem: Pain  Goal: Verbalizes/displays adequate comfort level or baseline comfort level  Outcome: Progressing     Problem: Nutrition Deficit:  Goal: Optimize nutritional status  Outcome: Progressing     Problem: Musculoskeletal - Adult  Goal: Return mobility to safest level of function  Outcome: Progressing     Problem: Musculoskeletal - Adult  Goal: Maintain proper alignment of affected body part  Outcome: Progressing     Problem: Musculoskeletal - Adult  Goal: Return ADL status to a safe level of function  Outcome: Progressing     Problem: Skin/Tissue Integrity - Adult  Goal: Incisions, wounds, or drain sites healing without S/S of infection  Outcome: Progressing     Problem: Gastrointestinal - Adult  Goal: Maintains adequate nutritional intake  Outcome: Progressing     Problem: Infection - Adult  Goal: Absence of infection during hospitalization  Outcome: Progressing     
  Problem: Chronic Conditions and Co-morbidities  Goal: Patient's chronic conditions and co-morbidity symptoms are monitored and maintained or improved  Outcome: Progressing     Problem: Discharge Planning  Goal: Discharge to home or other facility with appropriate resources  Outcome: Progressing     Problem: Safety - Adult  Goal: Free from fall injury  Outcome: Progressing     Problem: Skin/Tissue Integrity  Goal: Absence of new skin breakdown  Description: 1.  Monitor for areas of redness and/or skin breakdown  2.  Assess vascular access sites hourly  3.  Every 4-6 hours minimum:  Change oxygen saturation probe site  4.  Every 4-6 hours:  If on nasal continuous positive airway pressure, respiratory therapy assess nares and determine need for appliance change or resting period.  Outcome: Progressing     Problem: ABCDS Injury Assessment  Goal: Absence of physical injury  Outcome: Progressing     Problem: Pain  Goal: Verbalizes/displays adequate comfort level or baseline comfort level  Outcome: Progressing     Problem: Nutrition Deficit:  Goal: Optimize nutritional status  Outcome: Progressing     Problem: Musculoskeletal - Adult  Goal: Return mobility to safest level of function  Outcome: Progressing  Goal: Maintain proper alignment of affected body part  Outcome: Progressing  Goal: Return ADL status to a safe level of function  Outcome: Progressing     Problem: Skin/Tissue Integrity - Adult  Goal: Incisions, wounds, or drain sites healing without S/S of infection  Outcome: Progressing     Problem: Gastrointestinal - Adult  Goal: Maintains adequate nutritional intake  Outcome: Progressing     Problem: Genitourinary - Adult  Goal: Absence of urinary retention  Outcome: Progressing     Problem: Infection - Adult  Goal: Absence of infection during hospitalization  Outcome: Progressing     
  Problem: Chronic Conditions and Co-morbidities  Goal: Patient's chronic conditions and co-morbidity symptoms are monitored and maintained or improved  Outcome: Progressing     Problem: Safety - Adult  Goal: Free from fall injury  Outcome: Progressing     Problem: Skin/Tissue Integrity  Goal: Absence of new skin breakdown  Description: 1.  Monitor for areas of redness and/or skin breakdown  2.  Assess vascular access sites hourly  3.  Every 4-6 hours minimum:  Change oxygen saturation probe site  4.  Every 4-6 hours:  If on nasal continuous positive airway pressure, respiratory therapy assess nares and determine need for appliance change or resting period.  Outcome: Progressing     
RN  Outcome: Adequate for Discharge     Problem: Skin/Tissue Integrity - Adult  Goal: Incisions, wounds, or drain sites healing without S/S of infection  7/10/2024 1501 by Christa Mondragon RN  Outcome: Adequate for Discharge     Problem: Gastrointestinal - Adult  Goal: Maintains adequate nutritional intake  7/10/2024 1501 by Christa Mondragon RN  Outcome: Adequate for Discharge     Problem: Genitourinary - Adult  Goal: Absence of urinary retention  Outcome: Adequate for Discharge     Problem: Infection - Adult  Goal: Absence of infection during hospitalization  7/10/2024 1501 by Christa Mondragon RN  Outcome: Adequate for Discharge     
by Decarbo, Hannah, RN  Outcome: Progressing     Problem: Gastrointestinal - Adult  Goal: Maintains adequate nutritional intake  7/8/2024 2256 by Hannah Phelps RN  Outcome: Progressing     Problem: Genitourinary - Adult  Goal: Absence of urinary retention  7/8/2024 2256 by Hannah Phelps RN  Outcome: Progressing     Problem: Infection - Adult  Goal: Absence of infection during hospitalization  7/8/2024 2256 by Hannah Phelps RN  Outcome: Progressing

## 2024-07-10 NOTE — CARE COORDINATION
CM update note.  Discharge order noted.  Plan is [facility].  Facility liaison set up transport via wheelchair transport with [facility].  Per Lynn with [facility]  they will  cost of wheelchair transport if he insurance does not cover it.   time is 1500.  Nurse will need to call report [phone].  Patient, patient son and RN notified of  time.  Dima Mehta RN -[phone].

## 2024-07-10 NOTE — DISCHARGE SUMMARY
Hospital Medicine Discharge Summary    Patient ID: Raudel Herndon      Patient's PCP: Villa Lemus PA    Admit Date: 6/24/2024     Discharge Date:  7/10/2024    Admitting Physician: Sindhu Knight DO     Discharge Physician: Goldy Cardona MD    Discharge Diagnoses:       Active Hospital Problems    Diagnosis Date Noted    Edema [R60.9] 07/04/2024    Acute osteomyelitis of lumbar spine (HCC) [M46.26] 07/04/2024    Severe protein-calorie malnutrition (HCC) [E43] 06/26/2024    Enterococcal bacteremia [R78.81, B95.2] 06/26/2024    Cholangitis [K83.09] 06/26/2024    Failure to thrive in adult [R62.7] 06/25/2024    UTI (urinary tract infection) [N39.0] 06/25/2024    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    Hospital Course:   HPI from chart :   \"  Raudel Herndon is a 73 y.o. male with a past medical history that includes cholangiocarcinoma, chronic pancreatitis, diabetes who presents to [facility] ER complaining of fatigue, body aches. He was diagnosed with  cholangiocarcinoma around 6 months ago and follows with Dr. Reynoso.  He is on chemotherapy.  Last chemotherapy was last week. Upon presentation to the ER, routine labwork was performed which revealed lactic acid 4.5, glucose 125, troponin 47, ALT [place] 82, ALT 41, AST 78, bili 4.3, WBC 21.3, hemoglobin 8.6, platelets 88. CXR was negative. Patient was found to have a UTI and was started on Rocephin. He was also noted to have acute on chronic anemia with Hgb of 6.9 and given one unit of blood. Lactic acidosis resolved with fluids. Blood pressures noted to be soft on admission as well. Blood cultures returned positive for E. Faecalis. ID was consulted. Patient on Rocephin,

## 2024-07-21 ENCOUNTER — HOSPITAL ENCOUNTER (INPATIENT)
Age: 74
LOS: 9 days | Discharge: OTHER INSTITUTION | DRG: 435 | End: 2024-07-30
Attending: INTERNAL MEDICINE | Admitting: INTERNAL MEDICINE
Payer: MEDICARE

## 2024-07-21 PROCEDURE — 2140000000 HC CCU INTERMEDIATE R&B

## 2024-07-21 PROCEDURE — 99223 1ST HOSP IP/OBS HIGH 75: CPT | Performed by: INTERNAL MEDICINE

## 2024-07-21 PROCEDURE — 6360000002 HC RX W HCPCS: Performed by: INTERNAL MEDICINE

## 2024-07-21 PROCEDURE — 6370000000 HC RX 637 (ALT 250 FOR IP): Performed by: INTERNAL MEDICINE

## 2024-07-21 PROCEDURE — 2580000003 HC RX 258: Performed by: INTERNAL MEDICINE

## 2024-07-21 PROCEDURE — 82378 CARCINOEMBRYONIC ANTIGEN: CPT

## 2024-07-21 PROCEDURE — 86301 IMMUNOASSAY TUMOR CA 19-9: CPT

## 2024-07-21 RX ORDER — POTASSIUM CHLORIDE 20 MEQ/1
40 TABLET, EXTENDED RELEASE ORAL PRN
Status: DISCONTINUED | OUTPATIENT
Start: 2024-07-21 | End: 2024-07-31 | Stop reason: HOSPADM

## 2024-07-21 RX ORDER — SODIUM CHLORIDE 0.9 % (FLUSH) 0.9 %
5-40 SYRINGE (ML) INJECTION PRN
Status: DISCONTINUED | OUTPATIENT
Start: 2024-07-21 | End: 2024-07-31 | Stop reason: HOSPADM

## 2024-07-21 RX ORDER — SODIUM CHLORIDE 0.9 % (FLUSH) 0.9 %
5-40 SYRINGE (ML) INJECTION EVERY 12 HOURS SCHEDULED
Status: DISCONTINUED | OUTPATIENT
Start: 2024-07-21 | End: 2024-07-31 | Stop reason: HOSPADM

## 2024-07-21 RX ORDER — ACETAMINOPHEN 325 MG/1
650 TABLET ORAL EVERY 6 HOURS PRN
Status: DISCONTINUED | OUTPATIENT
Start: 2024-07-21 | End: 2024-07-31 | Stop reason: HOSPADM

## 2024-07-21 RX ORDER — MAGNESIUM SULFATE IN WATER 40 MG/ML
2000 INJECTION, SOLUTION INTRAVENOUS PRN
Status: DISCONTINUED | OUTPATIENT
Start: 2024-07-21 | End: 2024-07-31 | Stop reason: HOSPADM

## 2024-07-21 RX ORDER — ONDANSETRON 4 MG/1
4 TABLET, ORALLY DISINTEGRATING ORAL EVERY 8 HOURS PRN
Status: DISCONTINUED | OUTPATIENT
Start: 2024-07-21 | End: 2024-07-31 | Stop reason: HOSPADM

## 2024-07-21 RX ORDER — ACETAMINOPHEN 650 MG/1
650 SUPPOSITORY RECTAL EVERY 6 HOURS PRN
Status: DISCONTINUED | OUTPATIENT
Start: 2024-07-21 | End: 2024-07-31 | Stop reason: HOSPADM

## 2024-07-21 RX ORDER — ONDANSETRON 2 MG/ML
4 INJECTION INTRAMUSCULAR; INTRAVENOUS EVERY 6 HOURS PRN
Status: DISCONTINUED | OUTPATIENT
Start: 2024-07-21 | End: 2024-07-31 | Stop reason: HOSPADM

## 2024-07-21 RX ORDER — FUROSEMIDE 20 MG/1
20 TABLET ORAL 2 TIMES DAILY
Status: DISCONTINUED | OUTPATIENT
Start: 2024-07-21 | End: 2024-07-31 | Stop reason: HOSPADM

## 2024-07-21 RX ORDER — POTASSIUM CHLORIDE 7.45 MG/ML
10 INJECTION INTRAVENOUS PRN
Status: DISCONTINUED | OUTPATIENT
Start: 2024-07-21 | End: 2024-07-31 | Stop reason: HOSPADM

## 2024-07-21 RX ORDER — ENOXAPARIN SODIUM 100 MG/ML
40 INJECTION SUBCUTANEOUS DAILY
Status: DISCONTINUED | OUTPATIENT
Start: 2024-07-21 | End: 2024-07-31 | Stop reason: HOSPADM

## 2024-07-21 RX ORDER — POLYETHYLENE GLYCOL 3350 17 G/17G
17 POWDER, FOR SOLUTION ORAL DAILY PRN
Status: DISCONTINUED | OUTPATIENT
Start: 2024-07-21 | End: 2024-07-31 | Stop reason: HOSPADM

## 2024-07-21 RX ORDER — PANTOPRAZOLE SODIUM 40 MG/1
40 TABLET, DELAYED RELEASE ORAL
Status: DISCONTINUED | OUTPATIENT
Start: 2024-07-21 | End: 2024-07-31 | Stop reason: HOSPADM

## 2024-07-21 RX ORDER — MORPHINE SULFATE 2 MG/ML
2 INJECTION, SOLUTION INTRAMUSCULAR; INTRAVENOUS EVERY 4 HOURS PRN
Status: DISCONTINUED | OUTPATIENT
Start: 2024-07-21 | End: 2024-07-28

## 2024-07-21 RX ORDER — SODIUM CHLORIDE 9 MG/ML
INJECTION, SOLUTION INTRAVENOUS PRN
Status: DISCONTINUED | OUTPATIENT
Start: 2024-07-21 | End: 2024-07-31 | Stop reason: HOSPADM

## 2024-07-21 RX ADMIN — FUROSEMIDE 20 MG: 20 TABLET ORAL at 20:10

## 2024-07-21 RX ADMIN — SODIUM CHLORIDE, PRESERVATIVE FREE 10 ML: 5 INJECTION INTRAVENOUS at 20:10

## 2024-07-21 RX ADMIN — ENOXAPARIN SODIUM 40 MG: 100 INJECTION SUBCUTANEOUS at 16:55

## 2024-07-21 RX ADMIN — MORPHINE SULFATE 2 MG: 2 INJECTION, SOLUTION INTRAMUSCULAR; INTRAVENOUS at 16:47

## 2024-07-21 RX ADMIN — PANTOPRAZOLE SODIUM 40 MG: 40 TABLET, DELAYED RELEASE ORAL at 16:55

## 2024-07-21 ASSESSMENT — PAIN SCALES - WONG BAKER
WONGBAKER_NUMERICALRESPONSE: NO HURT
WONGBAKER_NUMERICALRESPONSE: HURTS LITTLE MORE

## 2024-07-21 ASSESSMENT — PAIN SCALES - GENERAL
PAINLEVEL_OUTOF10: 5
PAINLEVEL_OUTOF10: 4
PAINLEVEL_OUTOF10: 10
PAINLEVEL_OUTOF10: 0

## 2024-07-21 ASSESSMENT — PAIN DESCRIPTION - LOCATION: LOCATION: BACK

## 2024-07-21 ASSESSMENT — PAIN DESCRIPTION - ORIENTATION: ORIENTATION: LOWER;MID

## 2024-07-21 NOTE — PROGRESS NOTES
4 Eyes Skin Assessment     NAME:  Raudel Herndon  YOB: 1950  MEDICAL RECORD NUMBER:  [id number]    The patient is being assessed for  Admission    I agree that at least one RN has performed a thorough Head to Toe Skin Assessment on the patient. ALL assessment sites listed below have been assessed.      Areas assessed by both nurses:    Head, Face, Ears, Shoulders, Back, Chest, Arms, Elbows, Hands, Sacrum. Buttock, Coccyx, Ischium, Legs. Feet and Heels, and Under Medical Devices         Does the Patient have a Wound? Yes wound(s) were present on assessment. LDA wound assessment was Initiated and completed by RN       Chris Prevention initiated by RN: Yes  Wound Care Orders initiated by RN: No    Pressure Injury (Stage 3,4, Unstageable, DTI, NWPT, and Complex wounds) if present, place Wound referral order by RN under : Yes    New Ostomies, if present place, Ostomy referral order under : No     Nurse 1 eSignature: Electronically signed by Jayne Rothman RN on 7/21/24 at 6:05 PM EDT    **SHARE this note so that the co-signing nurse can place an eSignature**    Nurse 2 eSignature: {Esignature:[id number]}

## 2024-07-21 NOTE — H&P
[facility] History and Physical      CHIEF COMPLAINT: Severe back pain and concern for an infection    History of Present Illness: This is 74-year-old male with past medical history of cholangiocarcinoma status post stent, diabetes mellitus transferred from [facility] due to severe back pain.  Patient is here and discharged on July 10 of this year.  He mentioned he went to home then assisted living facility and then to rehab, and he was feeling severe back pain.  He decided to come here because he likes to be here and neurosurgery already informed..  During my encounter he was having pain but denies any chest pain or shortness of breath.    Informant(s) for H&P: Patient    REVIEW OF SYSTEMS:  A comprehensive review of systems was negative except for: what is in the HPI      PMH:  Past Medical History:   Diagnosis Date    Cancer (HCC) 02/2024    cholangiocarcinoma    Diabetes mellitus (HCC)     Prostate cancer (HCC)     treated with seed implants       Surgical History:  Past Surgical History:   Procedure Laterality Date    ANKLE FUSION Right 2022    COLONOSCOPY      ERCP N/A 02/29/2024    ENDOSCOPIC RETROGRADE CHOLANGIOPANCREATOGRAPHY performed by Raza Lane DO at [facility] ENDOSCOPY    ERCP N/A 02/29/2024    ENDOSCOPIC RETROGRADE CHOLANGIOPANCREATOGRAPHY DILATION BALLOON performed by Raza Lane DO at [facility] ENDOSCOPY    ERCP N/A 02/29/2024    ENDOSCOPIC RETROGRADE CHOLANGIOPANCREATOGRAPHY STENT REMOVAL performed by Raza Lane DO at [facility] ENDOSCOPY    ERCP N/A 02/29/2024    ENDOSCOPIC RETROGRADE CHOLANGIOPANCREATOGRAPHY STENT INSERTION performed by Raza Lane DO at [facility] ENDOSCOPY    ERCP N/A 6/28/2024    ENDOSCOPIC RETROGRADE CHOLANGIOPANCREATOGRAPHY/ POSSIBLE STENT performed by Rzaa Lane DO at [facility] ENDOSCOPY    EYE SURGERY Bilateral 2022    IR BIOPSY LIVER PERCUTANEOUS  3/18/2024    IR BIOPSY LIVER PERCUTANEOUS 3/18/2024 John Mcpherson MD [facility]  kg/m²     General Appearance: alert and oriented to person, place and time and in no acute distress  Skin: warm and dry  Head: normocephalic and atraumatic  Eyes: pupils equal, round, and reactive to light, extraocular eye movements intact, conjunctivae yellow.  Neck: neck supple and non tender without mass   Pulmonary/Chest: clear to auscultation bilaterally- no wheezes, rales or rhonchi, normal air movement, no respiratory distress  Cardiovascular: normal rate, normal S1 and S2 and no carotid bruits  Abdomen: Distended  Extremities: no cyanosis, no clubbing and no edema  Neurologic: no cranial nerve deficit and speech normal        LABS:  No results for input(s): \"NA\", \"K\", \"CL\", \"CO2\", \"BUN\", \"CREATININE\", \"GLUCOSE\", \"CALCIUM\" in the last 72 hours.    No results for input(s): \"WBC\", \"RBC\", \"HGB\", \"HCT\", \"MCV\", \"MCH\", \"MCHC\", \"RDW\", \"PLT\", \"MPV\" in the last 72 hours.    No results for input(s): \"POCGLU\" in the last 72 hours.        Radiology:   MRI LUMBAR SPINE W WO CONTRAST    (Results Pending)           ASSESSMENT:      Principal Problem:    Cholangiocarcinoma (HCC)  Resolved Problems:    * No resolved hospital problems. *      PLAN:    1.  Cholangiocarcinoma with status post stent: Consulted Dr. Luna, continue prednisone  2.  Back pain: Neurosurgery consulted and a stat MRI ordered and neurosurgery consulted.  3.  Bilateral leg swelling: Continue furosemide 20 mg p.o. daily.    Code Status: Full  DVT prophylaxis: Venaxx subcu      NOTE: This report was transcribed using voice recognition software. Every effort was made to ensure accuracy; however, inadvertent computerized transcription errors may be present.  Electronically signed by ANGELO RAGSDALE MD on 7/21/2024 at 7:38 PM

## 2024-07-22 ENCOUNTER — APPOINTMENT (OUTPATIENT)
Dept: NUCLEAR MEDICINE | Age: 74
DRG: 435 | End: 2024-07-22
Attending: INTERNAL MEDICINE
Payer: MEDICARE

## 2024-07-22 ENCOUNTER — APPOINTMENT (OUTPATIENT)
Dept: MRI IMAGING | Age: 74
DRG: 435 | End: 2024-07-22
Attending: INTERNAL MEDICINE
Payer: MEDICARE

## 2024-07-22 PROBLEM — R26.2 INABILITY TO WALK: Status: ACTIVE | Noted: 2024-07-22

## 2024-07-22 PROBLEM — M54.9 INTRACTABLE BACK PAIN: Status: ACTIVE | Noted: 2024-07-22

## 2024-07-22 PROBLEM — E43 SEVERE PROTEIN-CALORIE MALNUTRITION (HCC): Chronic | Status: ACTIVE | Noted: 2024-07-22

## 2024-07-22 LAB
ALBUMIN SERPL-MCNC: 1.7 G/DL (ref 3.5–5.2)
ALP SERPL-CCNC: 1464 U/L (ref 40–129)
ALT SERPL-CCNC: 45 U/L (ref 0–40)
ANION GAP SERPL CALCULATED.3IONS-SCNC: 8 MMOL/L (ref 7–16)
AST SERPL-CCNC: 94 U/L (ref 0–39)
BASOPHILS # BLD: 0.04 K/UL (ref 0–0.2)
BASOPHILS NFR BLD: 1 % (ref 0–2)
BILIRUB DIRECT SERPL-MCNC: 2.7 MG/DL (ref 0–0.3)
BILIRUB INDIRECT SERPL-MCNC: 1.1 MG/DL (ref 0–1)
BILIRUB SERPL-MCNC: 3.8 MG/DL (ref 0–1.2)
BUN SERPL-MCNC: 15 MG/DL (ref 6–23)
CALCIUM SERPL-MCNC: 7.5 MG/DL (ref 8.6–10.2)
CEA SERPL-MCNC: 39.9 NG/ML (ref 0–5.2)
CHLORIDE SERPL-SCNC: 105 MMOL/L (ref 98–107)
CO2 SERPL-SCNC: 25 MMOL/L (ref 22–29)
CREAT SERPL-MCNC: 0.7 MG/DL (ref 0.7–1.2)
EOSINOPHIL # BLD: 0 K/UL (ref 0.05–0.5)
EOSINOPHILS RELATIVE PERCENT: 0 % (ref 0–6)
ERYTHROCYTE [DISTWIDTH] IN BLOOD BY AUTOMATED COUNT: 16.1 % (ref 11.5–15)
GFR, ESTIMATED: >90 ML/MIN/1.73M2
GLUCOSE SERPL-MCNC: 89 MG/DL (ref 74–99)
HCT VFR BLD AUTO: 24.4 % (ref 37–54)
HGB BLD-MCNC: 7.5 G/DL (ref 12.5–16.5)
LYMPHOCYTES NFR BLD: 0.45 K/UL (ref 1.5–4)
LYMPHOCYTES RELATIVE PERCENT: 10 % (ref 20–42)
MCH RBC QN AUTO: 32.3 PG (ref 26–35)
MCHC RBC AUTO-ENTMCNC: 30.7 G/DL (ref 32–34.5)
MCV RBC AUTO: 105.2 FL (ref 80–99.9)
MONOCYTES NFR BLD: 0.25 K/UL (ref 0.1–0.95)
MONOCYTES NFR BLD: 5 % (ref 2–12)
MYELOCYTES ABSOLUTE COUNT: 0.04 K/UL
MYELOCYTES: 1 %
NEUTROPHILS NFR BLD: 84 % (ref 43–80)
NEUTS SEG NFR BLD: 3.92 K/UL (ref 1.8–7.3)
PHOSPHATE SERPL-MCNC: 2.7 MG/DL (ref 2.5–4.5)
PLATELET # BLD AUTO: 85 K/UL (ref 130–450)
PLATELET CONFIRMATION: NORMAL
PMV BLD AUTO: 9.9 FL (ref 7–12)
POTASSIUM SERPL-SCNC: 3.7 MMOL/L (ref 3.5–5)
PROT SERPL-MCNC: 6.2 G/DL (ref 6.4–8.3)
RBC # BLD AUTO: 2.32 M/UL (ref 3.8–5.8)
RBC # BLD: ABNORMAL 10*6/UL
SODIUM SERPL-SCNC: 138 MMOL/L (ref 132–146)
WBC OTHER # BLD: 4.7 K/UL (ref 4.5–11.5)

## 2024-07-22 PROCEDURE — 2140000000 HC CCU INTERMEDIATE R&B

## 2024-07-22 PROCEDURE — 6360000002 HC RX W HCPCS: Performed by: INTERNAL MEDICINE

## 2024-07-22 PROCEDURE — 72146 MRI CHEST SPINE W/O DYE: CPT

## 2024-07-22 PROCEDURE — 2580000003 HC RX 258

## 2024-07-22 PROCEDURE — 2580000003 HC RX 258: Performed by: INTERNAL MEDICINE

## 2024-07-22 PROCEDURE — 2500000003 HC RX 250 WO HCPCS

## 2024-07-22 PROCEDURE — 85025 COMPLETE CBC W/AUTO DIFF WBC: CPT

## 2024-07-22 PROCEDURE — 6370000000 HC RX 637 (ALT 250 FOR IP)

## 2024-07-22 PROCEDURE — 72148 MRI LUMBAR SPINE W/O DYE: CPT

## 2024-07-22 PROCEDURE — 99232 SBSQ HOSP IP/OBS MODERATE 35: CPT | Performed by: TRANSPLANT SURGERY

## 2024-07-22 PROCEDURE — 72141 MRI NECK SPINE W/O DYE: CPT

## 2024-07-22 PROCEDURE — 82248 BILIRUBIN DIRECT: CPT

## 2024-07-22 PROCEDURE — 80053 COMPREHEN METABOLIC PANEL: CPT

## 2024-07-22 PROCEDURE — 84100 ASSAY OF PHOSPHORUS: CPT

## 2024-07-22 PROCEDURE — 3430000000 HC RX DIAGNOSTIC RADIOPHARMACEUTICAL: Performed by: RADIOLOGY

## 2024-07-22 PROCEDURE — 83735 ASSAY OF MAGNESIUM: CPT

## 2024-07-22 PROCEDURE — A9503 TC99M MEDRONATE: HCPCS | Performed by: RADIOLOGY

## 2024-07-22 PROCEDURE — 6370000000 HC RX 637 (ALT 250 FOR IP): Performed by: INTERNAL MEDICINE

## 2024-07-22 PROCEDURE — 78306 BONE IMAGING WHOLE BODY: CPT | Performed by: STUDENT IN AN ORGANIZED HEALTH CARE EDUCATION/TRAINING PROGRAM

## 2024-07-22 RX ORDER — TC 99M MEDRONATE 20 MG/10ML
27 INJECTION, POWDER, LYOPHILIZED, FOR SOLUTION INTRAVENOUS
Status: COMPLETED | OUTPATIENT
Start: 2024-07-22 | End: 2024-07-22

## 2024-07-22 RX ORDER — OXYCODONE HYDROCHLORIDE AND ACETAMINOPHEN 5; 325 MG/1; MG/1
1 TABLET ORAL EVERY 6 HOURS PRN
Status: DISCONTINUED | OUTPATIENT
Start: 2024-07-22 | End: 2024-07-31 | Stop reason: HOSPADM

## 2024-07-22 RX ADMIN — VANCOMYCIN HYDROCHLORIDE 2000 MG: 10 INJECTION, POWDER, LYOPHILIZED, FOR SOLUTION INTRAVENOUS at 20:07

## 2024-07-22 RX ADMIN — POTASSIUM BICARBONATE 40 MEQ: 782 TABLET, EFFERVESCENT ORAL at 10:51

## 2024-07-22 RX ADMIN — SODIUM CHLORIDE, PRESERVATIVE FREE 10 ML: 5 INJECTION INTRAVENOUS at 20:05

## 2024-07-22 RX ADMIN — SODIUM CHLORIDE, PRESERVATIVE FREE 10 ML: 5 INJECTION INTRAVENOUS at 10:51

## 2024-07-22 RX ADMIN — ENOXAPARIN SODIUM 40 MG: 100 INJECTION SUBCUTANEOUS at 10:50

## 2024-07-22 RX ADMIN — FUROSEMIDE 20 MG: 20 TABLET ORAL at 10:50

## 2024-07-22 RX ADMIN — FUROSEMIDE 20 MG: 20 TABLET ORAL at 20:05

## 2024-07-22 RX ADMIN — TC 99M MEDRONATE 27 MILLICURIE: 20 INJECTION, POWDER, LYOPHILIZED, FOR SOLUTION INTRAVENOUS at 09:00

## 2024-07-22 RX ADMIN — CEFEPIME 1000 MG: 1 INJECTION, POWDER, FOR SOLUTION INTRAMUSCULAR; INTRAVENOUS at 20:10

## 2024-07-22 RX ADMIN — OXYCODONE HYDROCHLORIDE AND ACETAMINOPHEN 1 TABLET: 5; 325 TABLET ORAL at 15:10

## 2024-07-22 RX ADMIN — PANTOPRAZOLE SODIUM 40 MG: 40 TABLET, DELAYED RELEASE ORAL at 05:55

## 2024-07-22 RX ADMIN — SODIUM PHOSPHATE, MONOBASIC, MONOHYDRATE AND SODIUM PHOSPHATE, DIBASIC, ANHYDROUS 30 MMOL: 142; 276 INJECTION, SOLUTION INTRAVENOUS at 10:53

## 2024-07-22 ASSESSMENT — PAIN SCALES - GENERAL
PAINLEVEL_OUTOF10: 3
PAINLEVEL_OUTOF10: 2
PAINLEVEL_OUTOF10: 6

## 2024-07-22 ASSESSMENT — PAIN DESCRIPTION - LOCATION: LOCATION: BACK

## 2024-07-22 ASSESSMENT — PAIN DESCRIPTION - ORIENTATION: ORIENTATION: MID

## 2024-07-22 ASSESSMENT — PAIN DESCRIPTION - DESCRIPTORS: DESCRIPTORS: ACHING;DISCOMFORT;SORE

## 2024-07-22 NOTE — PROGRESS NOTES
Date: 2024       Patient Name: Raudel Herndon  : 1950      MRN: [id number]    PT order received. Chart has been reviewed. PT evaluation will be on hold pending MRI and neurosurgery consult for mobility recommendations (recent hx TLSO). Will continue to follow and complete evaluation at later time.     Alexandria Tapia, PT

## 2024-07-22 NOTE — PROGRESS NOTES
Rn messaged Dr. Campa of Neurosurgery regarding consult for cholangiocarcinoma. Awaiting response.

## 2024-07-22 NOTE — CONSULTS
HEPATOBILIARY AND PANCREATIC SURGERY  CONSULT NOTE    Patient's Name/Date of Birth: Raudel Herndon / 1950    Date: July 21, 2024     PCP: Villa Lemus PA     Chief Complaint: No chief complaint on file.      Physician Consulted: Dr. Reynoso   Reason for Consult: Cholangiocarcinoma   Referring Physician: Dr. Kin RAMIREZ  Raudel Herndon is a 74 y.o. male who presents for evaluation of back pain. Patient in known to the HPB service and follows for cholangiocarcinoma.  Patient has a right IJ port inserted and is currently undergoing chemotherapy.  The patient was discharged on 7/10 and has been doing well from a HPB POV and has an appointment with Dr. Reynoso  in office on the 30th.  Patient developed back pain and was transferred from [facility] for further evaluation.  Patient states that he is tolerating protein shakes.  Denies any nausea or vomiting.  Denies any fevers or chills.    Patient had back pain during previous admission and was evaluated by neurosurgery and was found to have Osteomyelitis and diskitis at T12-L1 and L2-L3 and underwent MRI T and L and was recommended for a TLSO brace.        Past Medical History:   Diagnosis Date    Cancer (HCC) 02/2024    cholangiocarcinoma    Diabetes mellitus (HCC)     Prostate cancer (HCC)     treated with seed implants       Past Surgical History:   Procedure Laterality Date    ANKLE FUSION Right 2022    COLONOSCOPY      ERCP N/A 02/29/2024    ENDOSCOPIC RETROGRADE CHOLANGIOPANCREATOGRAPHY performed by Raza Lane DO at [facility] ENDOSCOPY    ERCP N/A 02/29/2024    ENDOSCOPIC RETROGRADE CHOLANGIOPANCREATOGRAPHY DILATION BALLOON performed by Raza Lane DO at [facility] ENDOSCOPY    ERCP N/A 02/29/2024    ENDOSCOPIC RETROGRADE CHOLANGIOPANCREATOGRAPHY STENT REMOVAL performed by Raza Lane DO at [facility] ENDOSCOPY    ERCP N/A 02/29/2024    ENDOSCOPIC RETROGRADE CHOLANGIOPANCREATOGRAPHY STENT INSERTION performed by Raza Lane DO at

## 2024-07-22 NOTE — PROGRESS NOTES
Pharmacy Consultation Note  (Antibiotic Dosing and Monitoring)    Initial consult date: 7-  Consulting physician/provider: Dr. Graham  Drug: Vancomycin  Indication: OM/discitis (L-spine)    Age/  Gender Height Weight IBW  Allergy Information   74 y.o./male 185.4 cm (6' 1\") 92.5 kg (204 lb)     Ideal body weight: 79.9 kg (176 lb 2.4 oz)  Adjusted ideal body weight: 85 kg (187 lb 4.6 oz)   Adhesive tape      Renal Function:  Recent Labs     07/22/24  0600   BUN 15   CREATININE 0.7       Intake/Output Summary (Last 24 hours) at 7/22/2024 1657  Last data filed at 7/22/2024 1434  Gross per 24 hour   Intake 210 ml   Output 1125 ml   Net -915 ml       Vancomycin Monitoring:  Trough:  No results for input(s): \"VANCOTROUGH\" in the last 72 hours.  Random:  No results for input(s): \"VANCORANDOM\" in the last 72 hours.    Vancomycin Administration Times:  Recent vancomycin administrations        No vancomycin IV orders with administrations found.            Orders not given:            vancomycin (VANCOCIN) 2,000 mg in sodium chloride 0.9 % 500 mL IVPB    vancomycin (VANCOCIN) 1,250 mg in sodium chloride 0.9 % 250 mL IVPB ([id number])                    Assessment:  75 yo/M, transferred from OSH for low back pain.    PMH: cholangiocarcinoma.    MRI revealed suspected L-spine OM/discitis.    ID consulted for ATBs and consulted Pharmacy to dose vanco.    Recent [facility] admission (6/24-7/10) on vanco 1000 mg q12h for +Enterococcus bacteremia.    Estimated Creatinine Clearance: 105 mL/min (based on SCr of 0.7 mg/dL).  To dose vancomycin, pharmacy will be utilizing viseto calculation software for goal AUC/MARTÍN 400-600 mg/L-hr (predicted AUC/MARTÍN = 502, Tr = 13.8 mcg/mL)    Plan:  Give vancomycin 2000 mg x1 today.  Start vancomycin 1250 mg q12h on 7/23.  Will check vancomycin level when appropriate.  Will continue to monitor renal function.   Pharmacy to follow.    Otf Shoemaker, LouD  7/22/2024  4:59 PM  [phone]

## 2024-07-22 NOTE — CONSULTS
NEUROSURGERY        No chief complaint on file.        History of Present Illness:   This is a 74-year-old gentleman with biliary carcinoma and chronic pancreatitis admitted previously to this hospital for increasing back pain and some weakness of the legs and found to have osteomyelitis and discitis at T12-L3 for which neurosurgery was consulted and recommended TLSO brace and IV antibiotics.  Patient was readmitted for back pain to [facility] and he was then transferred here for further management.  Patient reports that the last time he walked was almost a month ago.  He can control his bladder and urinates freely but has difficulty moving his legs that he attributes to pain pain in the back is significant    Past Medical History:   Diagnosis Date    Cancer (HCC) 02/2024    cholangiocarcinoma    Diabetes mellitus (HCC)     Prostate cancer (HCC)     treated with seed implants     Past Surgical History:   Procedure Laterality Date    ANKLE FUSION Right 2022    COLONOSCOPY      ERCP N/A 02/29/2024    ENDOSCOPIC RETROGRADE CHOLANGIOPANCREATOGRAPHY performed by Raza Lane DO at [facility] ENDOSCOPY    ERCP N/A 02/29/2024    ENDOSCOPIC RETROGRADE CHOLANGIOPANCREATOGRAPHY DILATION BALLOON performed by Raza Lane DO at [facility] ENDOSCOPY    ERCP N/A 02/29/2024    ENDOSCOPIC RETROGRADE CHOLANGIOPANCREATOGRAPHY STENT REMOVAL performed by Raza Lane DO at [facility] ENDOSCOPY    ERCP N/A 02/29/2024    ENDOSCOPIC RETROGRADE CHOLANGIOPANCREATOGRAPHY STENT INSERTION performed by Raza Lane DO at [facility] ENDOSCOPY    ERCP N/A 6/28/2024    ENDOSCOPIC RETROGRADE CHOLANGIOPANCREATOGRAPHY/ POSSIBLE STENT performed by Raza Lane DO at [facility] ENDOSCOPY    EYE SURGERY Bilateral 2022    IR BIOPSY LIVER PERCUTANEOUS  3/18/2024    IR BIOPSY LIVER PERCUTANEOUS 3/18/2024 John Mcpherson MD [facility] SPECIAL PROCEDURES    JOINT REPLACEMENT Right 2022    hip    PORT SURGERY Right 3/8/2024    PORT INSERTION performed by  Susie Barrera MD at [facility]    TOTAL KNEE ARTHROPLASTY Left      No outpatient medications have been marked as taking for the 24 encounter (Hospital Encounter).     Social History     Tobacco Use    Smoking status: Former     Types: Cigarettes     Start date:      Quit date: 1960     Years since quittin.5    Smokeless tobacco: Never   Substance Use Topics    Alcohol use: Not Currently     Family History   Problem Relation Age of Onset    Breast Cancer Mother     Cancer Mother        Review of Systems    Examination:    BP Readings from Last 3 Encounters:   24 138/72   07/10/24 (!) 122/59   24 125/61     Wt Readings from Last 3 Encounters:   24 92.5 kg (204 lb)   24 69.1 kg (152 lb 7 oz)   24 83.9 kg (185 lb)     /72   Pulse 90   Temp (!) 96.6 °F (35.9 °C) (Temporal)   Resp 18   Ht 1.854 m (6' 1\")   Wt 92.5 kg (204 lb)   SpO2 100%   BMI 26.91 kg/m²     Neurologic Exam  Patient is resting in bed not wearing a brace.  He had difficulty moving his legs the left 1 dorsiflexion plantarflexion is slightly better than the right.  The strength was 3/5 in the legs distal and proximal    Test Results: I reviewed the MRI of  demonstrating the following:  Findings concerning for osteo discitis at L2-L3 with associated phlegmon  in the ventral epidural space and paravertebral regions. No discrete fluid  collection or abscess.  2. Findings concerning for osteo discitis at T12-L1.  Thoracic MRI showed those findings.      ASSESSMENT/PLAN:  Patient presents with back pain and examination demonstrates weakness in both legs.  I do not see that the patient is currently on antibiotics.  The previous MRI does not explain that the current weakness.  The patient is unsure about how long his legs have been weak but he believes that they are the same for the past month  PLAN:  Will order MRI thoracic and lumbar and cervical with and without contrast  Please re-consult

## 2024-07-22 NOTE — CARE COORDINATION
7/22/24  Spoke with patient and his son regarding transition of care.  Patient is a transfer from [facility] for severe back pain and concern for infection.  Patient has a history of cholangiocarcinoma status post stent and diabetes mellitus.  Neurosurgery was consulted and MRI ordered. Patient also planned for a bone scan.  Oncology also following and continuing prednisone.  PT/OT evals requested to assess functional level.  Patient lives at home with his son but was at [facility] where he was discharged on 7/10/24 for a skilled rehab stay.  Patient does not want to return to [place] and referrals were requested for 1. [facility], 2. [facility] and 3. [facility] as patient and family are anticipating the need for rehab post discharge.  Referrals were made to [facility] as well as [facility] and pending.  SW/Cm to follow for discharge planning pending therapy evaluations and accepting facility.    Electronically signed by RAMYA Hanna on 7/22/2024 at 1:58 PM     Case Management Assessment  Initial Evaluation    Date/Time of Evaluation: 7/22/2024 1:58 PM  Assessment Completed by: RAMYA Hanna    If patient is discharged prior to next notation, then this note serves as note for discharge by case management.    Patient Name: Raudel Herndon                   YOB: 1950  Diagnosis: Cholangiocarcinoma (HCC) [C22.1]                   Date / Time: 7/21/2024  3:06 PM    Patient Admission Status: Inpatient   Readmission Risk (Low < 19, Mod (19-27), High > 27): Readmission Risk Score: 23.1    Current PCP: Villa Lemus PA  PCP verified by CM? Yes    Chart Reviewed: Yes      History Provided by: Patient  Patient Orientation: Alert and Oriented, Person, Place, Situation    Patient Cognition: Alert    Hospitalization in the last 30 days (Readmission):  Yes    If yes, Readmission Assessment in  Navigator will be  completed.    Advance Directives:      Code Status: Full Code   Patient's Primary Decision Maker is:      Primary Decision Maker: Otf Herndon - Child - [phone]    Discharge Planning:    Patient lives with: Other (Comment) (MAG) Type of Home: Skilled Nursing Facility  Primary Care Giver: Self  Patient Support Systems include: Children   Current Financial resources:    Current community resources:    Current services prior to admission: None            Current DME:              Type of Home Care services:  None    ADLS  Prior functional level: Assistance with the following:, Cooking, Housework, Shopping, Mobility  Current functional level: Assistance with the following:, Cooking, Housework, Shopping, Mobility    PT AM-PAC:   /24  OT AM-PAC:   /24    Family can provide assistance at DC: Yes  Would you like Case Management to discuss the discharge plan with any other family members/significant others, and if so, who? Yes (my son)  Plans to Return to Present Housing: No  Other Identified Issues/Barriers to RETURNING to current housing: none request to go to a [facility]  Potential Assistance needed at discharge: N/A            Potential DME:    Patient expects to discharge to: Skilled nursing facility  Plan for transportation at discharge:      Financial    Payor: [employer] MEDICARE / Plan: [employer] CHOICE-PPO MEDICARE / Product Type: *No Product type* /     Does insurance require precert for SNF: Yes    Potential assistance Purchasing Medications:    Meds-to-Beds request:        [facility] [address] [place] [address] -  [phone] - F [phone]  [address] [place], [place] [id number]  Phone: [phone] Fax: [phone]      Notes:    Factors facilitating achievement of predicted outcomes: Family support, Motivated, Cooperative, Pleasant, and Has needed Durable Medical Equipment at home    Barriers to discharge: none discharge is to a [facility]    Additional Case Management Notes: See above    The Plan for  Transition of Care is related to the following treatment goals of Cholangiocarcinoma (HCC) [C22.1]    IF APPLICABLE: The Patient and/or patient representative Raudel and his family were provided with a choice of provider and agrees with the discharge plan. Freedom of choice list with basic dialogue that supports the patient's individualized plan of care/goals and shares the quality data associated with the providers was provided to:     Patient Representative Name:  Jules Herndon     The Patient and/or Patient Representative Agree with the Discharge Plan?  yes    RAMYA Hanna  Case Management Department  Ph: [phone] Fax:

## 2024-07-22 NOTE — PROGRESS NOTES
[facility] Hospitalist Progress Note      Synopsis: Patient with a history of cholangiocarcinoma status post stent, type 2 diabetes admitted on 7/21/2024 as a transfer from  due to severe back pain.  Was recently at assisted living then to rehab and was feeling severe back pain.  Neurosurgery consulted MRI of the thoracic and lumbar and cervical with and without contrast    Subjective  Reports continued back pain per nursing     Exam:  /69   Pulse 89   Temp 97.4 °F (36.3 °C) (Oral)   Resp 16   Ht 1.854 m (6' 1\")   Wt 92.5 kg (204 lb)   SpO2 98%   BMI 26.91 kg/m²     Pt undergoing bone scan at time of rounds    Medications:  Reviewed    Infusion Medications    sodium chloride       Scheduled Medications    potassium bicarb-citric acid  40 mEq Oral Once    sodium phosphate IVPB (PERIPHERAL line)  30 mmol IntraVENous Once    furosemide  20 mg Oral BID    pantoprazole  40 mg Oral BID AC    sodium chloride flush  5-40 mL IntraVENous 2 times per day    enoxaparin  40 mg SubCUTAneous Daily     PRN Meds: sodium chloride flush, sodium chloride, potassium chloride **OR** potassium alternative oral replacement **OR** potassium chloride, magnesium sulfate, ondansetron **OR** ondansetron, polyethylene glycol, acetaminophen **OR** acetaminophen, morphine    I/O    Intake/Output Summary (Last 24 hours) at 7/22/2024 1051  Last data filed at 7/22/2024 0701  Gross per 24 hour   Intake 90 ml   Output 1125 ml   Net -1035 ml       Labs:   Recent Labs     07/22/24  0600   WBC 4.7   HGB 7.5*   HCT 24.4*   PLT 85*       Recent Labs     07/22/24  0600      K 3.7      CO2 25   BUN 15   CREATININE 0.7   CALCIUM 7.5*   PHOS 2.7       Recent Labs     07/22/24  0600   ALKPHOS 1,464*   ALT 45*   AST 94*   BILITOT 3.8*       No results for input(s): \"INR\" in the last 72 hours.    No results for input(s): \"CKTOTAL\", \"TROPONINI\" in the last 72 hours.    Chronic labs:  Lab Results   Component Value Date    CHOL  108 06/25/2024    TRIG 120 06/25/2024    HDL 19 (L) 06/25/2024    TSH 1.47 07/05/2024    INR 1.4 06/24/2024    LABA1C 4.3 06/25/2024       Radiology:  Imaging studies reviewed today.    ASSESSMENT:  Cholangiocarcinoma  Osteomyelitis and discitis  Macrocytic anemia  Thrombocytopenia     PLAN:  Neurosurgery consulted, MRI of the spine has been ordered and pending  Hepatobiliary service following, recommends bone scan to evaluate for metastatic disease  ID has been reconsulted  Continue home medications as ordered  Pain control  PT and OT consulted  Strict intake and output, monitor renal function  Will check B12 and folic acid    Family updated at bedside     Diet: ADULT ORAL NUTRITION SUPPLEMENT; Breakfast, Lunch, Dinner; Standard High Calorie/High Protein Oral Supplement  ADULT DIET; Regular; 4 carb choices (60 gm/meal)  Code Status: Full Code  PT/OT Eval Status:   Ordered  DVT Prophylaxis:   Lovenox  Recommended disposition at discharge: Pending PT and OT eval    +++++++++++++++++++++++++++++++++++++++++++++++++  Sarah Miller MD   [facility] Hospitalist  [facility].  +++++++++++++++++++++++++++++++++++++++++++++++++  NOTE: This report was transcribed using voice recognition software. Every effort was made to ensure accuracy; however, inadvertent computerized transcription errors may be present.

## 2024-07-22 NOTE — PROGRESS NOTES
RN messaged Dr. Watts of general surgery regarding consult for cholangiocarcinoma. Awaiting response.

## 2024-07-22 NOTE — PATIENT CARE CONFERENCE
P Quality Flow/Interdisciplinary Rounds Progress Note        Quality Flow Rounds held on July 22, 2024    Disciplines Attending:  Bedside Nurse, , , and Nursing Unit Leadership    Raudel Herndon was admitted on 7/21/2024  3:06 PM    Anticipated Discharge Date:       Disposition:    Chris Score:  Chris Scale Score: 17    Readmission Risk              Risk of Unplanned Readmission:  21           Discussed patient goal for the day, patient clinical progression, and barriers to discharge.  The following Goal(s) of the Day/Commitment(s) have been identified:  report labs/diagnostics       Miranda Marie RN  July 22, 2024

## 2024-07-22 NOTE — PROGRESS NOTES
Occupational Therapy  OT SESSION ATTEMPT     Date:2024  Patient Name: Raudel Herndon  MRN: [id number]  : 1950  Room: [facility]-A     Attempted OT session this date:    [] unavailable due to other medical staff currently with pt   [x] on hold - await MRI of cervical, lumbar and thoracic spine as well as neurosurgery POC/recommendation. SW notified.   [] on hold per nursing staff secondary to lab / radiology results    [] declined treatment  this date due to ____.  Benefits of participation in therapy reviewed with pt.    [] off unit   [] Other:     Will reattempt OT eval at a later time.    Lisa Everett, OTR/L #[id number]

## 2024-07-22 NOTE — PROGRESS NOTES
Comprehensive Nutrition Assessment    Type and Reason for Visit:  Initial, Consult, Wound    Nutrition Recommendations/Plan:   Recommend and start Glucerna supplement TID and Gen wound healing supplement BID to help meet increased nutritional needs from wound healing and d/t decreased po intake of meals.           Malnutrition Assessment:  Malnutrition Status:  Severe malnutrition (07/22/24 1311)    Context:  Chronic Illness     Findings of the 6 clinical characteristics of malnutrition:  Energy Intake:  75% or less estimated energy requirements for 1 month or longer  Weight Loss:  Unable to assess (d/t possible fluid shifts ; and d/t no actual weights available since admission)     Body Fat Loss:  Severe body fat loss Buccal region, Triceps   Muscle Mass Loss:  Severe muscle mass loss Temples (temporalis), Clavicles (pectoralis & deltoids)  Fluid Accumulation:  No significant fluid accumulation     Strength:  Not Performed    Nutrition Assessment:    Patient adm w/ severe back pain ; also adm w/ leg swelling and weakness ; noted osteomyelitis and discitis at T12-L3 ; hx of cholangiocarcinoma/cholangiocellular carcinoma (hx of chemotherapy ; not currently on) ; possible metastatic disease to his bones ;recent hospital admissions ; s/p ERCP w/ stent on 6/28 ; hx of DM/prostate CA/chronic pancreatitis ; hx of FTT and severe malnutrition ; pt does meet criteria for severe malnutrition ; hx of obstructive jaundice due to malignant neoplasm ;  s/p cholecystectomy for gangrenous cholecystitis in 2017 ;  hx of decreased po intake/appetite and subjective weight loss ; recent sepsis and UTI ; will provide recommendations    Nutrition Related Findings:    I&Os WNL, 2+ edema, jaundiced, A&O x 4, missing teeth, active BS, elevated LFT's, muscle/fat wasting ; Wound Type: Multiple, Open Wounds, Skin Tears, Wound Consult Pending (wounds x 2 ; skin tears)       Current Nutrition Intake & Therapies:    Average Meal Intake:

## 2024-07-22 NOTE — PROGRESS NOTES
[facility] Surgery   Daily Progress Note      Patient's Name/Date of Birth: Raudel Herndon / 1950    Date: July 22, 2024     Chief Complaint: back pain     Patient Active Problem List   Diagnosis    Cholangiocarcinoma (HCC)    Cholangiocellular carcinoma (HCC)    Obstructive jaundice due to malignant neoplasm (HCC)    NANCY (acute kidney injury) (HCC)    Severe protein-calorie malnutrition (HCC)    Obstructive jaundice    Obstructive jaundice due to cancer (HCC)    Intrahepatic cholestatic liver disease    Failure to thrive in adult    UTI (urinary tract infection)    Enterococcal bacteremia    Cholangitis    Edema    Acute osteomyelitis of lumbar spine (HCC)       Subjective: Pt is complaining from some back pain. He is otherwise doing well.     Objective:  /66   Pulse 83   Temp 98.1 °F (36.7 °C) (Temporal)   Resp 18   Ht 1.854 m (6' 1\")   Wt 92.5 kg (204 lb)   SpO2 96%   BMI 26.91 kg/m²   Labs:  No results for input(s): \"WBC\", \"HGB\", \"HCT\" in the last 72 hours.    Invalid input(s): \"PLR\"  No results for input(s): \"NA\", \"K\", \"CL\", \"CO2\", \"BUN\", \"CREATININE\" in the last 72 hours.  No results for input(s): \"ALKPHOS\", \"ALT\", \"AST\", \"BILITOT\", \"BILIDIR\", \"LABALBU\", \"LIPASE\" in the last 72 hours.      General appearance: NAD  Head: NCAT, PERRL, EOMI  Neck: supple, no masses  Lungs: symmetric chest rise, no audible wheezes  Heart: warm throughout  Abdomen: soft, non-distended, non-tender to palpation throughout  Skin: no visible lesions  Extremities: extremities normal, atraumatic, no cyanosis or edema      Assessment/Plan:  Raudel Herndon is a 74 y.o. male Hx of cholangiocarcinoma, osteomyelitis and diskitis who is presenting for back pain     Plan:  - CEA 39.9, CA 19-9 pending   - ok for diet from surgery POV. Added nutritional supplements with each meal this AM  - appreciate NSGY recs   - Monitor LFTs  - Replete electrolytes, Phos > 4, K>4, Mg >2   - f/u on CTL spine MRI   - NM bone scan       Maricruz Toledo,

## 2024-07-23 PROBLEM — M46.49 DISCITIS OF MULTIPLE SITES OF SPINE: Status: ACTIVE | Noted: 2024-07-23

## 2024-07-23 PROBLEM — M46.20 OSTEOMYELITIS OF SPINE (HCC): Status: ACTIVE | Noted: 2024-07-23

## 2024-07-23 LAB
ALBUMIN SERPL-MCNC: 1.7 G/DL (ref 3.5–5.2)
ALP SERPL-CCNC: 1461 U/L (ref 40–129)
ALT SERPL-CCNC: 50 U/L (ref 0–40)
ANION GAP SERPL CALCULATED.3IONS-SCNC: 8 MMOL/L (ref 7–16)
AST SERPL-CCNC: 102 U/L (ref 0–39)
BILIRUB DIRECT SERPL-MCNC: 2.5 MG/DL (ref 0–0.3)
BILIRUB INDIRECT SERPL-MCNC: 0.9 MG/DL (ref 0–1)
BILIRUB SERPL-MCNC: 3.4 MG/DL (ref 0–1.2)
BUN SERPL-MCNC: 14 MG/DL (ref 6–23)
CALCIUM SERPL-MCNC: 7.4 MG/DL (ref 8.6–10.2)
CANCER AG19-9 SERPL IA-ACNC: 4236 U/ML (ref 0–35)
CHLORIDE SERPL-SCNC: 104 MMOL/L (ref 98–107)
CO2 SERPL-SCNC: 24 MMOL/L (ref 22–29)
CREAT SERPL-MCNC: 0.7 MG/DL (ref 0.7–1.2)
CRP SERPL HS-MCNC: 81 MG/L (ref 0–5)
ERYTHROCYTE [SEDIMENTATION RATE] IN BLOOD BY WESTERGREN METHOD: 137 MM/HR (ref 0–15)
FOLATE SERPL-MCNC: 12.5 NG/ML (ref 4.8–24.2)
GFR, ESTIMATED: >90 ML/MIN/1.73M2
GLUCOSE SERPL-MCNC: 112 MG/DL (ref 74–99)
MAGNESIUM SERPL-MCNC: 1.8 MG/DL (ref 1.6–2.6)
MAGNESIUM SERPL-MCNC: 2 MG/DL (ref 1.6–2.6)
PHOSPHATE SERPL-MCNC: 3 MG/DL (ref 2.5–4.5)
POTASSIUM SERPL-SCNC: 4 MMOL/L (ref 3.5–5)
PROT SERPL-MCNC: 6.1 G/DL (ref 6.4–8.3)
SODIUM SERPL-SCNC: 136 MMOL/L (ref 132–146)
VIT B12 SERPL-MCNC: 1653 PG/ML (ref 211–946)

## 2024-07-23 PROCEDURE — 99232 SBSQ HOSP IP/OBS MODERATE 35: CPT | Performed by: TRANSPLANT SURGERY

## 2024-07-23 PROCEDURE — 82746 ASSAY OF FOLIC ACID SERUM: CPT

## 2024-07-23 PROCEDURE — 82607 VITAMIN B-12: CPT

## 2024-07-23 PROCEDURE — 6370000000 HC RX 637 (ALT 250 FOR IP): Performed by: INTERNAL MEDICINE

## 2024-07-23 PROCEDURE — 83735 ASSAY OF MAGNESIUM: CPT

## 2024-07-23 PROCEDURE — 6360000002 HC RX W HCPCS: Performed by: INTERNAL MEDICINE

## 2024-07-23 PROCEDURE — 84100 ASSAY OF PHOSPHORUS: CPT

## 2024-07-23 PROCEDURE — 80053 COMPREHEN METABOLIC PANEL: CPT

## 2024-07-23 PROCEDURE — 85652 RBC SED RATE AUTOMATED: CPT

## 2024-07-23 PROCEDURE — 2140000000 HC CCU INTERMEDIATE R&B

## 2024-07-23 PROCEDURE — 2580000003 HC RX 258: Performed by: INTERNAL MEDICINE

## 2024-07-23 PROCEDURE — 99232 SBSQ HOSP IP/OBS MODERATE 35: CPT | Performed by: NEUROLOGICAL SURGERY

## 2024-07-23 PROCEDURE — 36592 COLLECT BLOOD FROM PICC: CPT

## 2024-07-23 PROCEDURE — 99232 SBSQ HOSP IP/OBS MODERATE 35: CPT | Performed by: INTERNAL MEDICINE

## 2024-07-23 PROCEDURE — 82248 BILIRUBIN DIRECT: CPT

## 2024-07-23 PROCEDURE — 86140 C-REACTIVE PROTEIN: CPT

## 2024-07-23 RX ADMIN — PANTOPRAZOLE SODIUM 40 MG: 40 TABLET, DELAYED RELEASE ORAL at 16:11

## 2024-07-23 RX ADMIN — SODIUM CHLORIDE 1250 MG: 9 INJECTION, SOLUTION INTRAVENOUS at 08:41

## 2024-07-23 RX ADMIN — SODIUM CHLORIDE, PRESERVATIVE FREE 10 ML: 5 INJECTION INTRAVENOUS at 20:08

## 2024-07-23 RX ADMIN — CEFEPIME 1000 MG: 1 INJECTION, POWDER, FOR SOLUTION INTRAMUSCULAR; INTRAVENOUS at 05:26

## 2024-07-23 RX ADMIN — FUROSEMIDE 20 MG: 20 TABLET ORAL at 20:08

## 2024-07-23 RX ADMIN — ENOXAPARIN SODIUM 40 MG: 100 INJECTION SUBCUTANEOUS at 08:29

## 2024-07-23 RX ADMIN — PANTOPRAZOLE SODIUM 40 MG: 40 TABLET, DELAYED RELEASE ORAL at 05:27

## 2024-07-23 RX ADMIN — CEFEPIME 1000 MG: 1 INJECTION, POWDER, FOR SOLUTION INTRAMUSCULAR; INTRAVENOUS at 22:27

## 2024-07-23 RX ADMIN — SODIUM CHLORIDE 1250 MG: 9 INJECTION, SOLUTION INTRAVENOUS at 20:18

## 2024-07-23 RX ADMIN — SODIUM CHLORIDE, PRESERVATIVE FREE 10 ML: 5 INJECTION INTRAVENOUS at 08:29

## 2024-07-23 RX ADMIN — FUROSEMIDE 20 MG: 20 TABLET ORAL at 08:29

## 2024-07-23 RX ADMIN — OXYCODONE HYDROCHLORIDE AND ACETAMINOPHEN 1 TABLET: 5; 325 TABLET ORAL at 18:20

## 2024-07-23 RX ADMIN — CEFEPIME 1000 MG: 1 INJECTION, POWDER, FOR SOLUTION INTRAMUSCULAR; INTRAVENOUS at 13:27

## 2024-07-23 ASSESSMENT — PAIN SCALES - GENERAL
PAINLEVEL_OUTOF10: 3
PAINLEVEL_OUTOF10: 8
PAINLEVEL_OUTOF10: 3

## 2024-07-23 ASSESSMENT — PAIN DESCRIPTION - ORIENTATION: ORIENTATION: LOWER;MID

## 2024-07-23 ASSESSMENT — PAIN DESCRIPTION - LOCATION: LOCATION: BACK;RIB CAGE

## 2024-07-23 ASSESSMENT — PAIN DESCRIPTION - DESCRIPTORS: DESCRIPTORS: ACHING;PRESSURE;SORE

## 2024-07-23 NOTE — PROGRESS NOTES
Department of Neurosurgery  Progress Note    CHIEF COMPLAINT: known osteomyelitis/discitis     SUBJECTIVE:  Continued back pain. Brace not in the room    REVIEW OF SYSTEMS :  Constitutional: Negative for chills and fever.    Neurological: Negative for dizziness, tremors and speech change.   CVS: negative for chest pain  Resp: negative for SOB    OBJECTIVE:   VITALS:  /65   Pulse 95   Temp 98 °F (36.7 °C) (Temporal)   Resp 18   Ht 1.854 m (6' 1\")   Wt 92.5 kg (204 lb)   SpO2 100%   BMI 26.91 kg/m²     PHYSICAL:  Neurologic: Alert and oriented x3; PERRL  Motor Exam:  BLE 4/5, BUE 5/5  Sensory:  Sensory intact  Skin is warm  Abdomen is soft    DATA:  CBC:   Lab Results   Component Value Date/Time    WBC 4.7 07/22/2024 06:00 AM    RBC 2.32 07/22/2024 06:00 AM    HGB 7.5 07/22/2024 06:00 AM    HCT 24.4 07/22/2024 06:00 AM    .2 07/22/2024 06:00 AM    MCH 32.3 07/22/2024 06:00 AM    MCHC 30.7 07/22/2024 06:00 AM    RDW 16.1 07/22/2024 06:00 AM    PLT 85 07/22/2024 06:00 AM    MPV 9.9 07/22/2024 06:00 AM     BMP:    Lab Results   Component Value Date/Time     07/23/2024 07:45 AM    K 4.0 07/23/2024 07:45 AM     07/23/2024 07:45 AM    CO2 24 07/23/2024 07:45 AM    BUN 14 07/23/2024 07:45 AM    CREATININE 0.7 07/23/2024 07:45 AM    CALCIUM 7.4 07/23/2024 07:45 AM    LABGLOM >90 07/23/2024 07:45 AM    LABGLOM >60 03/19/2024 06:01 AM    GLUCOSE 112 07/23/2024 07:45 AM     PT/INR:    Lab Results   Component Value Date/Time    PROTIME 15.3 06/24/2024 06:12 PM    INR 1.4 06/24/2024 06:12 PM     PTT:  No results found for: \"APTT\"[APTT}    Current Inpatient Medications  Current Facility-Administered Medications: oxyCODONE-acetaminophen (PERCOCET) 5-325 MG per tablet 1 tablet, 1 tablet, Oral, Q6H PRN  vancomycin (VANCOCIN) 1,250 mg in sodium chloride 0.9 % 250 mL IVPB ([id number]), 1,250 mg, IntraVENous, Q12H  [COMPLETED] cefepime (MAXIPIME) 1,000 mg in sodium chloride 0.9 % 50 mL IVPB ([id number]), 1,000

## 2024-07-23 NOTE — ACP (ADVANCE CARE PLANNING)
Advance Care Planning     Advance Care Planning Inpatient Note  [facility] Department    Today's Date: 7/23/2024  Unit: [facility] P[facility]U 1    Received request from HealthCare Provider and IDT Member.  Upon review of chart and communication with care team, patient's decision making abilities are not in question.. Patient was/were present in the room during visit.    Goals of ACP Conversation:  Discuss advance care planning documents  Facilitate a discussion related to patient's goals of care as they align with the patient's values and beliefs.    Health Care Decision Makers:       Primary Decision Maker: Otf Herndon - Child - [phone]  Summary:  Completed New Documents  Verified Healthcare Decision Maker  Updated Healthcare Decision Maker    Advance Care Planning Documents (Patient Wishes):  Healthcare Power of /Advance Directive Appointment of Health Care Agent  Living Will/Advance Directive     Assessment:  Completed new POA and Living Will documents as they relate to Mr. Herndon's personal healthcare wishes.     Interventions:  Provided education on documents for clarity and greater understanding  Assisted in the completion of documents according to patient's wishes at this time  Encouraged ongoing ACP conversation with future decision makers and loved ones    Care Preferences Communicated:   No    Outcomes/Plan:  ACP Discussion: Completed  New advance directive completed.  Returned original document(s) to patient, as well as copies for distribution to appointed agents  Copy of advance directive given to staff to scan into medical record.    Electronically signed by JUANITA Tubbs on 7/23/2024 at 2:35 PM

## 2024-07-23 NOTE — CARE COORDINATION
7/23/24  Transition of Care Update. Patient is on  IV Maxipime and Vanc with ID following. Patients bone scan complete and  negative.  MRI of lumbar,cervical and  thoracic spine complete with neurosurgery to review.  Therapy is on hold pending Neuorsurgery's plan.  Discharge goal is a MAG stay.  Patient was at [facility] and does not want to return with update to liaison.  Referrals were called [facility] as well as [facility] who is unable to accept.  A referral was also made today to [facility] who is reviewing per family request. Patient will need a pre-cert prior to discharge.  SW/CM to follow for discharge planning once MAG acceptance is confirmed.     Electronically signed by RAMYA Hanna on 7/23/2024 at 11:18 AM

## 2024-07-23 NOTE — PROGRESS NOTES
Occupational Therapy  OT SESSION ATTEMPT     Date:2024  Patient Name: Raudel Herndon  MRN: [id number]  : 1950  Room: [facility]5/6505-A     Attempted OT session this date:    [] unavailable due to other medical staff currently with pt   [x] on hold; awaiting NS POC   [] on hold per nursing staff secondary to lab / radiology results    [] declined Occupational Therapy  this date due to ___.  Benefits of participation in therapy reviewed with pt.    [] off unit   [] Other:     Will reattempt OT evaluation at a later time.    Kelton Everett OTR/L #[id number]

## 2024-07-23 NOTE — PATIENT CARE CONFERENCE
P Quality Flow/Interdisciplinary Rounds Progress Note        Quality Flow Rounds held on July 23, 2024    Disciplines Attending:  Bedside Nurse, , , and Nursing Unit Leadership    Raudel Herndon was admitted on 7/21/2024  3:06 PM    Anticipated Discharge Date:       Disposition:    Chris Score:  Chris Scale Score: 17    Readmission Risk              Risk of Unplanned Readmission:  27           Discussed patient goal for the day, patient clinical progression, and barriers to discharge.  The following Goal(s) of the Day/Commitment(s) have been identified:  report labs/diagnostics       Miranda Marie RN  July 23, 2024

## 2024-07-23 NOTE — CONSULTS
[facility]  [facility]    Consultation Note     Admit Date: 7/21/2024  3:06 PM    Reason for Consult:   Thoracolumbar discitis    Attending Physician:  Toño Shah MD     Chief Complaint: Intractable back pain    HISTORY OF PRESENT ILLNESS:   74-year-old male with past medical history of cholangiocarcinoma, status post biliary stenting, currently under chemotherapy with right IJ port, DM presented from [facility] due to intractable back pain.  He was admitted and seen by ID service on 06/24 and found to have equal faecalis bacteremia and UTI.He was discharged with vancomycin.  MRI of cervical thoracic and lumbar spine showed progressive discitis from T12-L3.  ID consulted for thoracolumbar discitis.    Past Medical History:        Diagnosis Date    Cancer (HCC) 02/2024    cholangiocarcinoma    Diabetes mellitus (HCC)     Prostate cancer (HCC)     treated with seed implants     Past Surgical History:        Procedure Laterality Date    ANKLE FUSION Right 2022    COLONOSCOPY      ERCP N/A 02/29/2024    ENDOSCOPIC RETROGRADE CHOLANGIOPANCREATOGRAPHY performed by Raza Lane DO at [facility]    ERCP N/A 02/29/2024    ENDOSCOPIC RETROGRADE CHOLANGIOPANCREATOGRAPHY DILATION BALLOON performed by Raza Lane DO at [facility]    ERCP N/A 02/29/2024    ENDOSCOPIC RETROGRADE CHOLANGIOPANCREATOGRAPHY STENT REMOVAL performed by Raza Lane DO at [facility]    ERCP N/A 02/29/2024    ENDOSCOPIC RETROGRADE CHOLANGIOPANCREATOGRAPHY STENT INSERTION performed by Raza Lane DO at [facility]    ERCP N/A 6/28/2024    ENDOSCOPIC RETROGRADE CHOLANGIOPANCREATOGRAPHY/ POSSIBLE STENT performed by Raza Lane DO at [facility]OSCOPY    EYE SURGERY Bilateral 2022    IR BIOPSY LIVER PERCUTANEOUS  3/18/2024    IR BIOPSY LIVER PERCUTANEOUS 3/18/2024 John Mcpherson MD [facility] SPECIAL PROCEDURES    JOINT REPLACEMENT Right 2022    hip    PORT SURGERY Right  complaint.    REVIEW OF SYSTEMS:    CONSTITUTIONAL:  No chills, fevers or night sweats. No loss of weight.  EYES:  No double vision or drainage from eyes, ears or throat.  HEENT:  No neck stiffness. No dysphagia. No drainage from eyes, ears or throat  RESPIRATORY:  No cough, productive sputum or hemoptysis.   CARDIOVASCULAR:  No chest pain, palpitations, orthopnea or dyspnea on exertion.  GASTROINTESTINAL:  No nausea, vomiting, diarrhea or constipation or hematochezia   GENITOURINARY:  No frequency burning dysuria or hematuria.  INTEGUMENT/BREAST:  No rash or breast masses.  HEMATOLOGIC/LYMPHATIC:  No lymphadenopathy or blood dyscrasics.   ALLERGIC/IMMUNOLOGIC:  No anaphylaxis.   ENDOCRINE:  No polyuria or polydipsia or temperature intolerance.    MUSCULOSKELETAL:  No myalgia or arthralgia.  Full ROM.  NEUROLOGICAL:  No focal motor sensory deficit.  BEHAVIOR/PSYCH:  No psychosis.     PHYSICAL EXAM:    Vitals:    /65   Pulse 95   Temp 98 °F (36.7 °C) (Temporal)   Resp 18   Ht 1.854 m (6' 1\")   Wt 92.5 kg (204 lb)   SpO2 100%   BMI 26.91 kg/m²   Constitutional: The patient is awake, alert, and oriented.   Skin: Warm and dry. No rashes were noted. No jaundice.  HEENT: Eyes show round, and reactive pupils. Moist mucous membranes, no ulcerations, no thrush.   Neck: Supple to movements. No lymphadenopathy.   Chest: No use of accessory muscles to breathe. Symmetrical expansion. Auscultation reveals no wheezing, crackles, or rhonchi.   Cardiovascular: S1 and S2 are rhythmic and regular. No murmurs appreciated.   Abdomen: Positive bowel sounds to auscultation. Benign to palpation. No masses felt. No hepatosplenomegaly.  Genitourinary: No pain in the lower abdomen  Extremities: No clubbing, no cyanosis, no edema.  Musculoskeletal: Thoracolumbar back pain noted with tenderness  Neurological: Following commands, no focal neurodeficit  Lines: peripheral      CBC+dif:  Recent Labs     07/22/24  0600   WBC 4.7   HGB  results for input(s): \"BLOODCULT2\" in the last 72 hours.  No results for input(s): \"STREPNEUMAGU\" in the last 72 hours.  No results for input(s): \"LP1UAG\" in the last 72 hours.  No results for input(s): \"ASO\" in the last 72 hours.  No results for input(s): \"CULTRESP\" in the last 72 hours.    Assessment:  Progressive discitis of T12-L3, MRI of thoracolumbar spine report noted  Cholangiocarcinoma with biliary stenting, currently on active chemotherapy via right IJ Mediport  Previous Enterococcus faecalis bacteremia 06/24 discharged with vancomycin    Plan:    Neurosurgery and hepatobiliary service evaluation appreciated  Patient may benefit from biopsy of the thoracolumbar spine by IR for targeted antibiotic therapy  Though previous bacteremia noted but considering worsening back pain on vancomycin, biopsy of the thoracolumbar spine is recommended  Continue cefepime and vancomycin  Send CRP and ESR  ID will continue to follow    Thank you for having us see this patient in consultation. I will be discussing this case with the treating physicians.      Electronically signed by Manish Graham MD on 7/23/2024 at 12:44 PM

## 2024-07-23 NOTE — PROGRESS NOTES
Date: 2024       Patient Name: Raudel Herndon  : 1950      MRN: [id number]    PT order received. Chart has been reviewed. PT evaluation will be on hold awaiting NS POC. Will continue to follow and complete evaluation at later time.     Alexandria Tapia, PT

## 2024-07-23 NOTE — PROGRESS NOTES
[facility] Surgery   Daily Progress Note      Patient's Name/Date of Birth: Raudel Herndon / 1950    Date: July 23, 2024     Chief Complaint: back pain     Patient Active Problem List   Diagnosis    Cholangiocarcinoma (HCC)    Cholangiocellular carcinoma (HCC)    Obstructive jaundice due to malignant neoplasm (HCC)    NANCY (acute kidney injury) (HCC)    Severe protein-calorie malnutrition (HCC)    Obstructive jaundice    Obstructive jaundice due to cancer (HCC)    Intrahepatic cholestatic liver disease    Failure to thrive in adult    UTI (urinary tract infection)    Enterococcal bacteremia    Cholangitis    Edema    Acute osteomyelitis of lumbar spine (HCC)    Intractable back pain    Inability to walk       Subjective: Doing well overall. Tolerating diet. No issues overnight    Objective:  /69   Pulse 90   Temp 97.3 °F (36.3 °C) (Infrared)   Resp 18   Ht 1.854 m (6' 1\")   Wt 92.5 kg (204 lb)   SpO2 100%   BMI 26.91 kg/m²   Labs:  Recent Labs     07/22/24  0600   WBC 4.7   HGB 7.5*   HCT 24.4*     Recent Labs     07/22/24  0600      K 3.7      CO2 25   BUN 15   CREATININE 0.7     Recent Labs     07/22/24  0600 07/23/24  0528   ALKPHOS 1,464* PENDING   ALT 45* 50*   AST 94* 102*   BILITOT 3.8* 3.4*   BILIDIR 2.7* 2.5*         General appearance: NAD  Head: NCAT, PERRL, EOMI  Neck: supple, no masses  Lungs: symmetric chest rise, no audible wheezes  Heart: warm throughout  Abdomen: soft, non-distended, non-tender to palpation throughout  Skin: no visible lesions  Extremities: extremities normal, atraumatic, no cyanosis or edema      Assessment/Plan:  Raudel Herndon is a 74 y.o. male Hx of cholangiocarcinoma, osteomyelitis and diskitis who is presenting for back pain     Plan:  - CEA 39.9, CA 19-9  4263 from 3940 on 6/4   - ok for diet from surgery POV. Added nutritional supplements with each meal this AM  - appreciate NSGY recs - will defer management of MRI findings to them   - Monitor LFTs  -  Replete electrolytes, Phos > 4, K>4, Mg >2   - NM bone scan negative for mets     Gloria Curiel DO  PGY-2 General Surgery Resident     Attending Physician Statement:    Chief Complaint: Obstructive jaundice due to malignant neoplasm    I have examined the patient and performed the key aspects of physical exam, reviewed the record (including all pertinent and new radiology images and laboratory findings), and discussed the case with the surgical team.  I agree with the assessment and plan with the following additions, corrections, and changes. 14pt review of symptoms completed and negative except as mentioned.    Cholangiocarcinoma persistent obstructive jaundice.  Worsening CEA  CA 19-9 is pending  On MRI the patient is noted to have acute osteomyelitis of the lumbar spine.  Bone scan was negative systemically only for that concern area of the spine.   Continue diet  Continue PT OT    Raza Reynoso MD  07/23/24  10:58 AM

## 2024-07-23 NOTE — PROGRESS NOTES
Pharmacy Consultation Note  (Antibiotic Dosing and Monitoring)    Initial consult date: 7-  Consulting physician/provider: Dr. Graham  Drug: Vancomycin  Indication: OM/discitis (L-spine)    Age/  Gender Height Weight IBW  Allergy Information   74 y.o./male 185.4 cm (6' 1\") 92.5 kg (204 lb)     Ideal body weight: 79.9 kg (176 lb 2.4 oz)  Adjusted ideal body weight: 85 kg (187 lb 4.6 oz)   Adhesive tape      Renal Function:  Recent Labs     07/22/24  0600 07/23/24  0745   BUN 15 14   CREATININE 0.7 0.7         Intake/Output Summary (Last 24 hours) at 7/23/2024 1230  Last data filed at 7/23/2024 1105  Gross per 24 hour   Intake 510 ml   Output 1950 ml   Net -1440 ml         Vancomycin Monitoring:  Trough:  No results for input(s): \"VANCOTROUGH\" in the last 72 hours.  Random:  No results for input(s): \"VANCORANDOM\" in the last 72 hours.    Vancomycin Administration Times:  Recent vancomycin administrations                     vancomycin (VANCOCIN) 1,250 mg in sodium chloride 0.9 % 250 mL IVPB ([id number]) (mg) 1,250 mg New Bag 07/23/24 0841    vancomycin (VANCOCIN) 2,000 mg in sodium chloride 0.9 % 500 mL IVPB (mg) 2,000 mg New Bag 07/22/24 2007               Assessment:  73 yo/M, transferred from OSH for low back pain.    PMH: cholangiocarcinoma.    MRI revealed suspected L-spine OM/discitis (T12-L1, L2-L3).    ID consulted for ATBs and consulted Pharmacy to dose vanco.    Recent [facility] admission (6/24-7/10) on vanco 1000 mg q12h for +Enterococcus bacteremia.    Estimated Creatinine Clearance: 105 mL/min (based on SCr of 0.7 mg/dL).  To dose vancomycin, pharmacy will be utilizing [employer] calculation software for goal AUC/MARTÍN 400-600 mg/L-hr (predicted AUC/MARTÍN = 502, Tr = 13.8 mcg/mL).  7/23: day #2 vanco & cefepime..Cr stable (0.7), UO = 0.9 mL/kg/hr, afebrile.    Plan:  Continue vancomycin 1250 mg q12h.   Will check vancomycin level on 7/24 AM.  Will continue to monitor renal function.   Pharmacy to

## 2024-07-23 NOTE — PROGRESS NOTES
Visited the patient and he stated that he is getting better and hope to get well soon. Was appreciative of the visit.    IOANA Aden.PH,B.TH,[id number]

## 2024-07-23 NOTE — PROGRESS NOTES
NS made aware that patient does not have TLSO brace here with him, nor does he know where it is or remember ever having it. Will need new order for brace.    Isabelle Hinton RN

## 2024-07-23 NOTE — PLAN OF CARE
Problem: Chronic Conditions and Co-morbidities  Goal: Patient's chronic conditions and co-morbidity symptoms are monitored and maintained or improved  7/23/2024 0241 by Yaya Madera RN  Outcome: Progressing     Problem: Discharge Planning  Goal: Discharge to home or other facility with appropriate resources  7/23/2024 0241 by Yaya Madera RN  Outcome: Progressing     Problem: Skin/Tissue Integrity  Goal: Absence of new skin breakdown  Description: 1.  Monitor for areas of redness and/or skin breakdown  2.  Assess vascular access sites hourly  3.  Every 4-6 hours minimum:  Change oxygen saturation probe site  4.  Every 4-6 hours:  If on nasal continuous positive airway pressure, respiratory therapy assess nares and determine need for appliance change or resting period.  7/23/2024 0241 by Yaya Madera RN  Outcome: Progressing     Problem: ABCDS Injury Assessment  Goal: Absence of physical injury  7/23/2024 0241 by Yaya Madera RN  Outcome: Progressing     Problem: Safety - Adult  Goal: Free from fall injury  7/23/2024 0241 by Yaya Madera RN  Outcome: Progressing     Problem: Pain  Goal: Verbalizes/displays adequate comfort level or baseline comfort level  7/23/2024 0241 by Yaya Madera RN  Outcome: Progressing

## 2024-07-23 NOTE — PROGRESS NOTES
ID recommending IR biopsy of patient's spine for worsening infection. Per NS, patient okay for biopsy. Attending notified.    Isabelle Hinton RN

## 2024-07-23 NOTE — PROGRESS NOTES
acetaminophen **OR** acetaminophen, morphine    I/O    Intake/Output Summary (Last 24 hours) at 7/23/2024 1238  Last data filed at 7/23/2024 1105  Gross per 24 hour   Intake 510 ml   Output 1950 ml   Net -1440 ml         Labs:   Recent Labs     07/22/24  0600   WBC 4.7   HGB 7.5*   HCT 24.4*   PLT 85*         Recent Labs     07/22/24  0600 07/23/24  0528 07/23/24  0745     --  136   K 3.7  --  4.0     --  104   CO2 25  --  24   BUN 15  --  14   CREATININE 0.7  --  0.7   CALCIUM 7.5*  --  7.4*   PHOS 2.7 3.0  --          Recent Labs     07/22/24  0600 07/23/24  0528   ALKPHOS 1,464* 1,461*   ALT 45* 50*   AST 94* 102*   BILITOT 3.8* 3.4*         No results for input(s): \"INR\" in the last 72 hours.    No results for input(s): \"CKTOTAL\", \"TROPONINI\" in the last 72 hours.    Chronic labs:  Lab Results   Component Value Date    CHOL 108 06/25/2024    TRIG 120 06/25/2024    HDL 19 (L) 06/25/2024    TSH 1.47 07/05/2024    INR 1.4 06/24/2024    LABA1C 4.3 06/25/2024       Radiology:  Imaging studies reviewed today.    ASSESSMENT:  Cholangiocarcinoma  Osteomyelitis and discitis of lumbar and thoracic spine  Macrocytic anemia  Thrombocytopenia     PLAN:  Neurosurgery consulted, MRI of the spine showing progressive discitis and osteo of lumbar and thoracic spine. No neurosurgical intervention recommended at this time  Continue cefepime and vanco  ID consulted  Hepatobiliary service following, recommends bone scan to evaluate for metastatic disease  Continue home medications as ordered  Pain control  PT and OT consulted  Strict intake and output, monitor renal function  Will check B12 and folic acid    Diet: ADULT DIET; Regular; 4 carb choices (60 gm/meal)  ADULT ORAL NUTRITION SUPPLEMENT; Breakfast, Lunch, Dinner; Diabetic Oral Supplement  ADULT ORAL NUTRITION SUPPLEMENT; Lunch, Dinner; Wound Healing Oral Supplement  Code Status: Full Code  PT/OT Eval Status:   Ordered  DVT Prophylaxis:   Lovenox  Recommended

## 2024-07-24 LAB
ALBUMIN SERPL-MCNC: 1.7 G/DL (ref 3.5–5.2)
ALP SERPL-CCNC: 1194 U/L (ref 40–129)
ALT SERPL-CCNC: 47 U/L (ref 0–40)
ANION GAP SERPL CALCULATED.3IONS-SCNC: 3 MMOL/L (ref 7–16)
AST SERPL-CCNC: 91 U/L (ref 0–39)
BILIRUB DIRECT SERPL-MCNC: 2.1 MG/DL (ref 0–0.3)
BILIRUB INDIRECT SERPL-MCNC: 0.9 MG/DL (ref 0–1)
BILIRUB SERPL-MCNC: 3 MG/DL (ref 0–1.2)
BUN SERPL-MCNC: 21 MG/DL (ref 6–23)
CALCIUM SERPL-MCNC: 7.5 MG/DL (ref 8.6–10.2)
CHLORIDE SERPL-SCNC: 104 MMOL/L (ref 98–107)
CO2 SERPL-SCNC: 25 MMOL/L (ref 22–29)
CREAT SERPL-MCNC: 0.8 MG/DL (ref 0.7–1.2)
DATE LAST DOSE: NORMAL
GFR, ESTIMATED: >90 ML/MIN/1.73M2
GLUCOSE SERPL-MCNC: 81 MG/DL (ref 74–99)
INR PPP: 1.3
MAGNESIUM SERPL-MCNC: 1.7 MG/DL (ref 1.6–2.6)
PHOSPHATE SERPL-MCNC: 2.6 MG/DL (ref 2.5–4.5)
POTASSIUM SERPL-SCNC: 3.8 MMOL/L (ref 3.5–5)
PROT SERPL-MCNC: 5.9 G/DL (ref 6.4–8.3)
PROTHROMBIN TIME: 13.8 SEC (ref 9.3–12.4)
SODIUM SERPL-SCNC: 132 MMOL/L (ref 132–146)
TME LAST DOSE: NORMAL H
VANCOMYCIN DOSE: NORMAL MG
VANCOMYCIN SERPL-MCNC: 24.6 UG/ML (ref 5–40)

## 2024-07-24 PROCEDURE — 2580000003 HC RX 258: Performed by: INTERNAL MEDICINE

## 2024-07-24 PROCEDURE — 6360000002 HC RX W HCPCS: Performed by: INTERNAL MEDICINE

## 2024-07-24 PROCEDURE — 6370000000 HC RX 637 (ALT 250 FOR IP): Performed by: INTERNAL MEDICINE

## 2024-07-24 PROCEDURE — 80053 COMPREHEN METABOLIC PANEL: CPT

## 2024-07-24 PROCEDURE — 85610 PROTHROMBIN TIME: CPT

## 2024-07-24 PROCEDURE — 82248 BILIRUBIN DIRECT: CPT

## 2024-07-24 PROCEDURE — 2140000000 HC CCU INTERMEDIATE R&B

## 2024-07-24 PROCEDURE — 99232 SBSQ HOSP IP/OBS MODERATE 35: CPT | Performed by: TRANSPLANT SURGERY

## 2024-07-24 PROCEDURE — 84100 ASSAY OF PHOSPHORUS: CPT

## 2024-07-24 PROCEDURE — 83735 ASSAY OF MAGNESIUM: CPT

## 2024-07-24 PROCEDURE — 80202 ASSAY OF VANCOMYCIN: CPT

## 2024-07-24 PROCEDURE — 99232 SBSQ HOSP IP/OBS MODERATE 35: CPT | Performed by: INTERNAL MEDICINE

## 2024-07-24 RX ADMIN — MORPHINE SULFATE 2 MG: 2 INJECTION, SOLUTION INTRAMUSCULAR; INTRAVENOUS at 13:38

## 2024-07-24 RX ADMIN — SODIUM CHLORIDE, PRESERVATIVE FREE 10 ML: 5 INJECTION INTRAVENOUS at 20:26

## 2024-07-24 RX ADMIN — SODIUM CHLORIDE 1250 MG: 9 INJECTION, SOLUTION INTRAVENOUS at 08:03

## 2024-07-24 RX ADMIN — OXYCODONE HYDROCHLORIDE AND ACETAMINOPHEN 1 TABLET: 5; 325 TABLET ORAL at 12:09

## 2024-07-24 RX ADMIN — SODIUM CHLORIDE, PRESERVATIVE FREE 10 ML: 5 INJECTION INTRAVENOUS at 07:51

## 2024-07-24 RX ADMIN — FUROSEMIDE 20 MG: 20 TABLET ORAL at 20:25

## 2024-07-24 RX ADMIN — CEFEPIME 1000 MG: 1 INJECTION, POWDER, FOR SOLUTION INTRAMUSCULAR; INTRAVENOUS at 21:56

## 2024-07-24 RX ADMIN — PANTOPRAZOLE SODIUM 40 MG: 40 TABLET, DELAYED RELEASE ORAL at 05:33

## 2024-07-24 RX ADMIN — PANTOPRAZOLE SODIUM 40 MG: 40 TABLET, DELAYED RELEASE ORAL at 16:03

## 2024-07-24 RX ADMIN — CEFEPIME 1000 MG: 1 INJECTION, POWDER, FOR SOLUTION INTRAMUSCULAR; INTRAVENOUS at 05:36

## 2024-07-24 RX ADMIN — FUROSEMIDE 20 MG: 20 TABLET ORAL at 07:50

## 2024-07-24 RX ADMIN — CEFEPIME 1000 MG: 1 INJECTION, POWDER, FOR SOLUTION INTRAMUSCULAR; INTRAVENOUS at 13:42

## 2024-07-24 ASSESSMENT — PAIN DESCRIPTION - LOCATION
LOCATION: FOOT
LOCATION: BACK

## 2024-07-24 ASSESSMENT — PAIN DESCRIPTION - ORIENTATION
ORIENTATION: LOWER
ORIENTATION: RIGHT;LEFT

## 2024-07-24 ASSESSMENT — PAIN DESCRIPTION - DESCRIPTORS
DESCRIPTORS: ACHING;DULL;STABBING
DESCRIPTORS: ACHING;DISCOMFORT;STABBING
DESCRIPTORS: TINGLING

## 2024-07-24 ASSESSMENT — PAIN - FUNCTIONAL ASSESSMENT
PAIN_FUNCTIONAL_ASSESSMENT: PREVENTS OR INTERFERES SOME ACTIVE ACTIVITIES AND ADLS
PAIN_FUNCTIONAL_ASSESSMENT: PREVENTS OR INTERFERES WITH MANY ACTIVE NOT PASSIVE ACTIVITIES
PAIN_FUNCTIONAL_ASSESSMENT: PREVENTS OR INTERFERES SOME ACTIVE ACTIVITIES AND ADLS

## 2024-07-24 ASSESSMENT — PAIN SCALES - GENERAL
PAINLEVEL_OUTOF10: 10
PAINLEVEL_OUTOF10: 0
PAINLEVEL_OUTOF10: 10

## 2024-07-24 NOTE — PROGRESS NOTES
[facility] Hospitalist Progress Note      Synopsis: Patient with a history of cholangiocarcinoma status post stent, type 2 diabetes admitted on 7/21/2024 as a transfer from [facility] due to severe back pain.  Was recently at assisted living then to rehab and was feeling severe back pain.  Neurosurgery consulted MRI of the thoracic and lumbar and cervical with and without contrast showed progressive discitis and osteomyelitis of the lumbar and thoracic spine which is known. Currently in cefepime and vancomycin. ID consulted. IR consulted for spinal biopsy for culture.     Subjective  No acute events over night.    Exam:  /65   Pulse 90   Temp 97.6 °F (36.4 °C) (Temporal)   Resp 18   Ht 1.854 m (6' 1\")   Wt 92.5 kg (204 lb)   SpO2 100%   BMI 26.91 kg/m²   General Appearance: alert and oriented to person, place and time and in no acute distress  Skin: warm and dry  Head: normocephalic and atraumatic  Eyes: pupils equal, round, and reactive to light, extraocular eye movements intact, conjunctivae yellow.  Neck: neck supple and non tender without mass   Pulmonary/Chest: clear to auscultation bilaterally- no wheezes, rales or rhonchi, normal air movement, no respiratory distress  Cardiovascular: normal rate, normal S1 and S2 and no carotid bruits  Abdomen: Distended  Extremities: no cyanosis, no clubbing and no edema  Neurologic: no cranial nerve deficit and speech normal    Medications:  Reviewed    Infusion Medications    sodium chloride       Scheduled Medications    [START ON 7/25/2024] vancomycin  1,500 mg IntraVENous Q24H    cefepime  1,000 mg IntraVENous Q8H    furosemide  20 mg Oral BID    pantoprazole  40 mg Oral BID AC    sodium chloride flush  5-40 mL IntraVENous 2 times per day    enoxaparin  40 mg SubCUTAneous Daily     PRN Meds: oxyCODONE-acetaminophen, sodium chloride flush, sodium chloride, potassium chloride **OR** potassium alternative oral replacement **OR** potassium chloride,  magnesium sulfate, ondansetron **OR** ondansetron, polyethylene glycol, acetaminophen **OR** acetaminophen, morphine    I/O    Intake/Output Summary (Last 24 hours) at 7/24/2024 1200  Last data filed at 7/24/2024 1114  Gross per 24 hour   Intake 867.09 ml   Output 1200 ml   Net -332.91 ml         Labs:   Recent Labs     07/22/24  0600   WBC 4.7   HGB 7.5*   HCT 24.4*   PLT 85*         Recent Labs     07/22/24  0600 07/23/24  0528 07/23/24  0745 07/24/24  0630     --  136 132   K 3.7  --  4.0 3.8     --  104 104   CO2 25  --  24 25   BUN 15  --  14 21   CREATININE 0.7  --  0.7 0.8   CALCIUM 7.5*  --  7.4* 7.5*   PHOS 2.7 3.0  --  2.6         Recent Labs     07/22/24  0600 07/23/24  0528 07/24/24  0630   ALKPHOS 1,464* 1,461* 1,194*   ALT 45* 50* 47*   AST 94* 102* 91*   BILITOT 3.8* 3.4* 3.0*         Recent Labs     07/24/24  0630   INR 1.3       No results for input(s): \"CKTOTAL\", \"TROPONINI\" in the last 72 hours.    Chronic labs:  Lab Results   Component Value Date    CHOL 108 06/25/2024    TRIG 120 06/25/2024    HDL 19 (L) 06/25/2024    TSH 1.47 07/05/2024    INR 1.3 07/24/2024    LABA1C 4.3 06/25/2024       Radiology:  Imaging studies reviewed today.    ASSESSMENT:  Cholangiocarcinoma  Osteomyelitis and discitis of lumbar and thoracic spine  Macrocytic anemia  Thrombocytopenia     PLAN:  Neurosurgery consulted, MRI of the spine showing progressive discitis and osteo of lumbar and thoracic spine. No neurosurgical intervention recommended at this time  Continue cefepime and vanco  ID consulted  IR consulted for spinal biopsy for culture.   Hepatobiliary service following, recommends bone scan to evaluate for metastatic disease  Continue home medications as ordered  Pain control  PT and OT consulted  Strict intake and output, monitor renal function    Diet: Diet NPO  Code Status: Full Code  PT/OT Eval Status:   Ordered  DVT Prophylaxis:   Lovenox  Recommended disposition at discharge: Pending IR guided  spinal culture     +++++++++++++++++++++++++++++++++++++++++++++++++  Toño Shah MD   [facility] Hospitalist  [facility].  +++++++++++++++++++++++++++++++++++++++++++++++++  NOTE: This report was transcribed using voice recognition software. Every effort was made to ensure accuracy; however, inadvertent computerized transcription errors may be present.

## 2024-07-24 NOTE — PROGRESS NOTES
Physical Therapy    PT order received and medical chart reviewed 7/24. Awaiting TLSO brace. Will re-attempt at a later time/date. Thank you.    Christian Mckay, PT, DPT  [id number]

## 2024-07-24 NOTE — PROGRESS NOTES
Occupational Therapy  OT SESSION ATTEMPT     Date:2024  Patient Name: Raudel Herndon  MRN: [id number]  : 1950  Room: [facility]5/[facility]5-A     Attempted OT session this date:    [] unavailable due to other medical staff currently with pt   [x] on hold ; awaiting TLSO prior to OOB activity   [] on hold per nursing staff secondary to lab / radiology results    [] declined Occupational Therapy  this date due to ___.  Benefits of participation in therapy reviewed with pt.    [] off unit   [] Other:     Will reattempt OT evaluation at a later time.    Kelton Everett OTR/L #[id number]

## 2024-07-24 NOTE — CARE COORDINATION
7/24/24  Transition of Care update. 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 Patient is planned for IR procedure today for spinal biopsy and culture. Patient remains on IV cefapime and vanc with ID consulted.  Patient is pending TLSO brace delivery so therapy can evaluate functional status.  Discharge goal is a MAG stay. A referral was made to  [facility] who is following.  A pre-cert will need started once brace is obtained and therapy evaluations complete. PASRR complete.  ALFA and destination complete.  Will stat transport on therapy evaluation is complete.  SW/CM to follow.    Electronically signed by RAMYA Hanna on 7/24/2024 at 1:11 PM

## 2024-07-24 NOTE — PLAN OF CARE
Problem: Chronic Conditions and Co-morbidities  Goal: Patient's chronic conditions and co-morbidity symptoms are monitored and maintained or improved  7/23/2024 2006 by Malaika Mott RN  Outcome: Progressing  7/23/2024 0953 by Isabelle Hinton RN  Outcome: Progressing     Problem: Discharge Planning  Goal: Discharge to home or other facility with appropriate resources  7/23/2024 2006 by Malaika Mott, RN  Outcome: Progressing  7/23/2024 0953 by Isabelle Hinton RN  Outcome: Progressing     Problem: Skin/Tissue Integrity  Goal: Absence of new skin breakdown  Description: 1.  Monitor for areas of redness and/or skin breakdown  2.  Assess vascular access sites hourly  3.  Every 4-6 hours minimum:  Change oxygen saturation probe site  4.  Every 4-6 hours:  If on nasal continuous positive airway pressure, respiratory therapy assess nares and determine need for appliance change or resting period.  7/23/2024 0953 by Isabelle Hinton RN  Outcome: Progressing     Problem: ABCDS Injury Assessment  Goal: Absence of physical injury  7/23/2024 0953 by Isabelle Hinton RN  Outcome: Progressing     Problem: Safety - Adult  Goal: Free from fall injury  7/23/2024 0953 by Isabelle Hinton RN  Outcome: Progressing     Problem: Pain  Goal: Verbalizes/displays adequate comfort level or baseline comfort level  7/23/2024 0953 by Isabelle Hinton RN  Outcome: Progressing     Problem: Nutrition Deficit:  Goal: Optimize nutritional status  7/23/2024 0953 by Isabelle Hinton RN  Outcome: Progressing

## 2024-07-24 NOTE — PROGRESS NOTES
[facility] Surgery   Daily Progress Note      Patient's Name/Date of Birth: Raudel Herndon / 1950    Date: July 24, 2024     Chief Complaint: back pain     Patient Active Problem List   Diagnosis    Cholangiocarcinoma (HCC)    Cholangiocellular carcinoma (HCC)    Obstructive jaundice due to malignant neoplasm (HCC)    NANCY (acute kidney injury) (HCC)    Severe protein-calorie malnutrition (HCC)    Obstructive jaundice    Obstructive jaundice due to cancer (HCC)    Intrahepatic cholestatic liver disease    Failure to thrive in adult    UTI (urinary tract infection)    Enterococcal bacteremia    Cholangitis    Edema    Acute osteomyelitis of lumbar spine (HCC)    Intractable back pain    Inability to walk    Discitis of multiple sites of spine    Osteomyelitis of spine (HCC)       Subjective: Doing well overall. Tolerating diet. No issues overnight. Pt had 1 BM overnight.     Objective:  /63   Pulse 86   Temp 97.4 °F (36.3 °C) (Temporal)   Resp 18   Ht 1.854 m (6' 1\")   Wt 92.5 kg (204 lb)   SpO2 98%   BMI 26.91 kg/m²   Labs:  Recent Labs     07/22/24  0600   WBC 4.7   HGB 7.5*   HCT 24.4*       Recent Labs     07/22/24  0600 07/23/24  0745    136   K 3.7 4.0    104   CO2 25 24   BUN 15 14   CREATININE 0.7 0.7       Recent Labs     07/22/24  0600 07/23/24  0528   ALKPHOS 1,464* 1,461*   ALT 45* 50*   AST 94* 102*   BILITOT 3.8* 3.4*   BILIDIR 2.7* 2.5*           General appearance: NAD  Head: NCAT, PERRL, EOMI  Neck: supple, no masses  Lungs: symmetric chest rise, no audible wheezes  Heart: warm throughout  Abdomen: soft, non-distended, non-tender to palpation throughout  Skin: no visible lesions  Extremities: extremities normal, atraumatic      Assessment/Plan:  Raudel Herndon is a 74 y.o. male Hx of cholangiocarcinoma, osteomyelitis and diskitis who is presenting for back pain     Plan:  - CEA 39.9, CA 19-9  4263 from 3940 on 6/4   - NPO today, ok for diet from surgery POV after IR procedure.  Added nutritional supplements with each meal this AM  - appreciate NSGY recs - will defer management of MRI findings to them   - Monitor LFTs  - Replete electrolytes, Phos > 4, K>4, Mg >2   - NM bone scan negative for mets   - continue PT/OT  - ID scheduled pt for IR biopsy of T-L spine for targeted Abx therapy.   - Follow up outpatient.   - Surgery will be available as needed. Please reach out for any questions or concerns.     Maricruz Toledo MD  PGY-1 General Surgery Resident   Attending Physician Statement:    Chief Complaint: Inability to ambulate, cholangiocarcinoma    I have examined the patient and performed the key aspects of physical exam, reviewed the record (including all pertinent and new radiology images and laboratory findings), and discussed the case with the surgical team.  I agree with the assessment and plan with the following additions, corrections, and changes. 14pt review of symptoms completed and negative except as mentioned.    Intractable pain  Bacteremia  Possible osteomyelitis versus spinal metastasis.  Agree with biopsy and culture  CA 19-9 is 4236, CEA = 40    Raza Reynoso MD  07/24/24  7:50 AM

## 2024-07-24 NOTE — PLAN OF CARE
Problem: Chronic Conditions and Co-morbidities  Goal: Patient's chronic conditions and co-morbidity symptoms are monitored and maintained or improved  7/24/2024 1002 by Brittanie Coppola RN  Outcome: Progressing  7/23/2024 2006 by Malaika Mott, RN  Outcome: Progressing     Problem: Discharge Planning  Goal: Discharge to home or other facility with appropriate resources  7/24/2024 1002 by Brittanie Coppola RN  Outcome: Progressing  7/23/2024 2006 by Malaika Mott, RN  Outcome: Progressing

## 2024-07-24 NOTE — PROGRESS NOTES
commands, no focal neurodeficit  Lines: peripheral    Laboratory and Tests Review:  Lab Results   Component Value Date    WBC 4.7 07/22/2024    WBC 4.6 07/10/2024    WBC 4.8 07/09/2024    HGB 7.5 (L) 07/22/2024    HCT 24.4 (L) 07/22/2024    .2 (H) 07/22/2024    PLT 85 (L) 07/22/2024     Lab Results   Component Value Date    NEUTROABS 3.92 07/22/2024    NEUTROABS 2.47 07/10/2024    NEUTROABS 2.69 07/09/2024     No results found for: \"CRPHS\"  Lab Results   Component Value Date    ALT 47 (H) 07/24/2024    AST 91 (H) 07/24/2024    ALKPHOS 1,194 (H) 07/24/2024    BILITOT 3.0 (H) 07/24/2024     Lab Results   Component Value Date/Time     07/24/2024 06:30 AM    K 3.8 07/24/2024 06:30 AM     07/24/2024 06:30 AM    CO2 25 07/24/2024 06:30 AM    BUN 21 07/24/2024 06:30 AM    CREATININE 0.8 07/24/2024 06:30 AM    CREATININE 0.7 07/23/2024 07:45 AM    CREATININE 0.7 07/22/2024 06:00 AM    LABGLOM >90 07/24/2024 06:30 AM    LABGLOM >60 03/19/2024 06:01 AM    GLUCOSE 81 07/24/2024 06:30 AM    CALCIUM 7.5 07/24/2024 06:30 AM    BILITOT 3.0 07/24/2024 06:30 AM    ALKPHOS 1,194 07/24/2024 06:30 AM    AST 91 07/24/2024 06:30 AM    ALT 47 07/24/2024 06:30 AM     Lab Results   Component Value Date    CRP 81.0 (H) 07/23/2024    CRP 69.0 (H) 07/03/2024     Lab Results   Component Value Date    SEDRATE 137 (H) 07/23/2024    SEDRATE 60 (H) 07/03/2024     Radiology:      Microbiology:   No results found for: \"BC\", \"ORG\"  No results found for: \"BLOODCULT2\", \"ORG\"  No results found for: \"WNDABS\"  No results found for: \"RESPSMEAR\"  No results found for: \"MPNEUMO\", \"CLAMYDCU\", \"LABLEGI\", \"AFBCX\", \"FUNGSM\", \"LABFUNG\"  No results found for: \"CULTRESP\"  No results found for: \"CXCATHTIP\"  No results found for: \"BFCS\"  No results found for: \"CXSURG\"  No results found for: \"LABURIN\"  No results found for: \"MRSAC\"    ASSESSMENT:  Progressive discitis of T12-L3, MRI of thoracolumbar spine report noted  Cholangiocarcinoma with biliary  stenting, currently on active chemotherapy via right IJ Mediport  Previous Enterococcus faecalis bacteremia 06/24 discharged with vancomycin     Plan:    Neurosurgery and hepatobiliary service evaluation appreciated  Patient may benefit from biopsy of the thoracolumbar spine by IR for targeted antibiotic therapy  Though previous bacteremia noted but considering worsening back pain on vancomycin, biopsy of the thoracolumbar spine is recommended  Continue cefepime and vancomycin  CRP 81 and   ID will continue to follow    Manish Graham MD  2:09 PM  7/24/2024

## 2024-07-24 NOTE — DISCHARGE INSTR - COC
Continuity of Care Form    Patient Name: Raudel Herndon   :  1950  MRN:  [id number]    Admit date:  2024  Discharge date:      Code Status Order: Full Code   Advance Directives:     Admitting Physician:  Yenifer Sanderson MD  PCP: Villa Lemus PA    Discharging Nurse: BUNNY Walton  Discharging Hospital Unit/Room#: 6505/6505-A  Discharging Unit Phone Number: [phone]    Emergency Contact:   Extended Emergency Contact Information  Primary Emergency Contact: Otf Herndon  Address: [address] [place] [address]  Mobile Phone: [phone]  Relation: Child   needed? No    Past Surgical History:  Past Surgical History:   Procedure Laterality Date    ANKLE FUSION Right     COLONOSCOPY      ERCP N/A 2024    ENDOSCOPIC RETROGRADE CHOLANGIOPANCREATOGRAPHY performed by Raza Lane DO at [facility] ENDOSCOPY    ERCP N/A 2024    ENDOSCOPIC RETROGRADE CHOLANGIOPANCREATOGRAPHY DILATION BALLOON performed by Raza Lane DO at [facility] ENDOSCOPY    ERCP N/A 2024    ENDOSCOPIC RETROGRADE CHOLANGIOPANCREATOGRAPHY STENT REMOVAL performed by Raza Lane DO at [facility] ENDOSCOPY    ERCP N/A 2024    ENDOSCOPIC RETROGRADE CHOLANGIOPANCREATOGRAPHY STENT INSERTION performed by Raza Lane DO at [facility] ENDOSCOPY    ERCP N/A 2024    ENDOSCOPIC RETROGRADE CHOLANGIOPANCREATOGRAPHY/ POSSIBLE STENT performed by Raza Lane DO at [facility] ENDOSCOPY    EYE SURGERY Bilateral     IR BIOPSY LIVER PERCUTANEOUS  3/18/2024    IR BIOPSY LIVER PERCUTANEOUS 3/18/2024 John Mcpherson MD [facility] SPECIAL PROCEDURES    JOINT REPLACEMENT Right     hip    PORT SURGERY Right 3/8/2024    PORT INSERTION performed by Susie Barrera MD at [facility] OR    TOTAL KNEE ARTHROPLASTY Left        Immunization History:   Immunization History   Administered Date(s) Administered    COVID-19, PFIZER Bivalent, DO NOT Dilute, (age 12y+), IM, 30 mcg/0.3 mL

## 2024-07-24 NOTE — PROGRESS NOTES
Pharmacy Consultation Note  (Antibiotic Dosing and Monitoring)    Initial consult date: 7-  Consulting physician/provider: Dr. Graham  Drug: Vancomycin  Indication: OM/discitis (L-spine)    Age/  Gender Height Weight IBW  Allergy Information   74 y.o./male 185.4 cm (6' 1\") 92.5 kg (204 lb)     Ideal body weight: 79.9 kg (176 lb 2.4 oz)  Adjusted ideal body weight: 85 kg (187 lb 4.6 oz)   Adhesive tape      Renal Function:  Recent Labs     07/22/24  0600 07/23/24  0745 07/24/24  0630   BUN 15 14 21   CREATININE 0.7 0.7 0.8         Intake/Output Summary (Last 24 hours) at 7/24/2024 1244  Last data filed at 7/24/2024 1114  Gross per 24 hour   Intake 867.09 ml   Output 1200 ml   Net -332.91 ml         Vancomycin Monitoring:  Trough:  No results for input(s): \"VANCOTROUGH\" in the last 72 hours.  Random:    Recent Labs     07/24/24  0630   VANCORANDOM 24.6       Vancomycin Administration Times:  Recent vancomycin administrations                     vancomycin (VANCOCIN) 1,250 mg in sodium chloride 0.9 % 250 mL IVPB ([id number]) (mg) 1,250 mg New Bag 07/24/24 0803     1,250 mg New Bag 07/23/24 2018     1,250 mg New Bag  0841    vancomycin (VANCOCIN) 2,000 mg in sodium chloride 0.9 % 500 mL IVPB (mg) 2,000 mg New Bag 07/22/24 2007             Assessment:  75 yo/M, transferred from OSH for low back pain.    PMH: cholangiocarcinoma.    MRI revealed suspected L-spine OM/discitis (T12-L1, L2-L3).    ID consulted for ATBs and consulted Pharmacy to dose vanco.    Recent [facility] admission (6/24-7/10) on vanco 1000 mg q12h for +Enterococcus bacteremia.    Estimated Creatinine Clearance: 92 mL/min (based on SCr of 0.8 mg/dL).  To dose vancomycin, pharmacy will be utilizing [employer] calculation software for goal AUC/MARTÍN 400-600 mg/L-hr (predicted AUC/MARTÍN = 483, Tr = 11.9 mcg/mL).  7/23: day #2 vanco & cefepime..Cr stable (0.7), UO = 0.9 mL/kg/hr, afebrile.  7/24: day #3 ATBs.  Plan for IR-guided T & L-spine Bx.  Vanc level high on

## 2024-07-24 NOTE — PROGRESS NOTES
[facility] Quality Flow/Interdisciplinary Rounds Progress Note        Quality Flow Rounds held on July 24, 2024    Disciplines Attending:  Bedside Nurse, , , and Nursing Unit Leadership    Rauedl Herndon was admitted on 7/21/2024  3:06 PM    Anticipated Discharge Date:       Disposition:    Chris Score:  Chris Scale Score: 17    Readmission Risk              Risk of Unplanned Readmission:  27           Discussed patient goal for the day, patient clinical progression, and barriers to discharge.  The following Goal(s) of the Day/Commitment(s) have been identified:  Diagnostics - Report Results and Labs - Report Results      Malathi Valenzuela RN  July 24, 2024

## 2024-07-25 ENCOUNTER — APPOINTMENT (OUTPATIENT)
Dept: INTERVENTIONAL RADIOLOGY/VASCULAR | Age: 74
DRG: 435 | End: 2024-07-25
Attending: INTERNAL MEDICINE
Payer: MEDICARE

## 2024-07-25 PROBLEM — N39.0 UTI (URINARY TRACT INFECTION): Status: RESOLVED | Noted: 2024-06-25 | Resolved: 2024-07-25

## 2024-07-25 LAB
ALBUMIN SERPL-MCNC: 1.6 G/DL (ref 3.5–5.2)
ALP SERPL-CCNC: 1185 U/L (ref 40–129)
ALT SERPL-CCNC: 43 U/L (ref 0–40)
ANION GAP SERPL CALCULATED.3IONS-SCNC: 10 MMOL/L (ref 7–16)
AST SERPL-CCNC: 81 U/L (ref 0–39)
BILIRUB DIRECT SERPL-MCNC: 1.9 MG/DL (ref 0–0.3)
BILIRUB INDIRECT SERPL-MCNC: 1 MG/DL (ref 0–1)
BILIRUB SERPL-MCNC: 2.9 MG/DL (ref 0–1.2)
BUN SERPL-MCNC: 23 MG/DL (ref 6–23)
CALCIUM SERPL-MCNC: 7.5 MG/DL (ref 8.6–10.2)
CHLORIDE SERPL-SCNC: 104 MMOL/L (ref 98–107)
CO2 SERPL-SCNC: 24 MMOL/L (ref 22–29)
CREAT SERPL-MCNC: 0.9 MG/DL (ref 0.7–1.2)
GFR, ESTIMATED: >90 ML/MIN/1.73M2
GLUCOSE SERPL-MCNC: 75 MG/DL (ref 74–99)
INR PPP: 1.2
MAGNESIUM SERPL-MCNC: 1.7 MG/DL (ref 1.6–2.6)
PHOSPHATE SERPL-MCNC: 2.9 MG/DL (ref 2.5–4.5)
POTASSIUM SERPL-SCNC: 3.6 MMOL/L (ref 3.5–5)
PROT SERPL-MCNC: 5.6 G/DL (ref 6.4–8.3)
PROTHROMBIN TIME: 13.1 SEC (ref 9.3–12.4)
SODIUM SERPL-SCNC: 138 MMOL/L (ref 132–146)

## 2024-07-25 PROCEDURE — 87070 CULTURE OTHR SPECIMN AEROBIC: CPT

## 2024-07-25 PROCEDURE — 2580000003 HC RX 258: Performed by: INTERNAL MEDICINE

## 2024-07-25 PROCEDURE — 6360000002 HC RX W HCPCS: Performed by: INTERNAL MEDICINE

## 2024-07-25 PROCEDURE — 2709999900 IR GUIDED FNA

## 2024-07-25 PROCEDURE — 2580000003 HC RX 258

## 2024-07-25 PROCEDURE — 99232 SBSQ HOSP IP/OBS MODERATE 35: CPT | Performed by: INTERNAL MEDICINE

## 2024-07-25 PROCEDURE — 87205 SMEAR GRAM STAIN: CPT

## 2024-07-25 PROCEDURE — 97161 PT EVAL LOW COMPLEX 20 MIN: CPT

## 2024-07-25 PROCEDURE — 6360000002 HC RX W HCPCS: Performed by: RADIOLOGY

## 2024-07-25 PROCEDURE — 82248 BILIRUBIN DIRECT: CPT

## 2024-07-25 PROCEDURE — 97535 SELF CARE MNGMENT TRAINING: CPT

## 2024-07-25 PROCEDURE — 97530 THERAPEUTIC ACTIVITIES: CPT

## 2024-07-25 PROCEDURE — 84100 ASSAY OF PHOSPHORUS: CPT

## 2024-07-25 PROCEDURE — 83735 ASSAY OF MAGNESIUM: CPT

## 2024-07-25 PROCEDURE — 80053 COMPREHEN METABOLIC PANEL: CPT

## 2024-07-25 PROCEDURE — 6370000000 HC RX 637 (ALT 250 FOR IP): Performed by: INTERNAL MEDICINE

## 2024-07-25 PROCEDURE — 85610 PROTHROMBIN TIME: CPT

## 2024-07-25 PROCEDURE — 6360000002 HC RX W HCPCS

## 2024-07-25 PROCEDURE — 2140000000 HC CCU INTERMEDIATE R&B

## 2024-07-25 PROCEDURE — 10007 FNA BX W/FLUOR GDN 1ST LES: CPT

## 2024-07-25 PROCEDURE — 97165 OT EVAL LOW COMPLEX 30 MIN: CPT

## 2024-07-25 PROCEDURE — 0SB23ZX EXCISION OF LUMBAR VERTEBRAL DISC, PERCUTANEOUS APPROACH, DIAGNOSTIC: ICD-10-PCS | Performed by: RADIOLOGY

## 2024-07-25 RX ORDER — FENTANYL CITRATE 50 UG/ML
INJECTION, SOLUTION INTRAMUSCULAR; INTRAVENOUS PRN
Status: COMPLETED | OUTPATIENT
Start: 2024-07-25 | End: 2024-07-25

## 2024-07-25 RX ORDER — MIDAZOLAM HYDROCHLORIDE 2 MG/2ML
INJECTION, SOLUTION INTRAMUSCULAR; INTRAVENOUS PRN
Status: COMPLETED | OUTPATIENT
Start: 2024-07-25 | End: 2024-07-25

## 2024-07-25 RX ADMIN — FUROSEMIDE 20 MG: 20 TABLET ORAL at 20:16

## 2024-07-25 RX ADMIN — SODIUM CHLORIDE, PRESERVATIVE FREE 10 ML: 5 INJECTION INTRAVENOUS at 08:53

## 2024-07-25 RX ADMIN — CEFEPIME 1000 MG: 1 INJECTION, POWDER, FOR SOLUTION INTRAMUSCULAR; INTRAVENOUS at 14:09

## 2024-07-25 RX ADMIN — PANTOPRAZOLE SODIUM 40 MG: 40 TABLET, DELAYED RELEASE ORAL at 16:31

## 2024-07-25 RX ADMIN — MORPHINE SULFATE 2 MG: 2 INJECTION, SOLUTION INTRAMUSCULAR; INTRAVENOUS at 13:38

## 2024-07-25 RX ADMIN — OXYCODONE HYDROCHLORIDE AND ACETAMINOPHEN 1 TABLET: 5; 325 TABLET ORAL at 11:41

## 2024-07-25 RX ADMIN — MIDAZOLAM HYDROCHLORIDE 1 MG: 1 INJECTION, SOLUTION INTRAMUSCULAR; INTRAVENOUS at 10:32

## 2024-07-25 RX ADMIN — CEFEPIME 1000 MG: 1 INJECTION, POWDER, FOR SOLUTION INTRAMUSCULAR; INTRAVENOUS at 05:24

## 2024-07-25 RX ADMIN — FENTANYL CITRATE 50 MCG: 50 INJECTION, SOLUTION INTRAMUSCULAR; INTRAVENOUS at 10:33

## 2024-07-25 RX ADMIN — SODIUM CHLORIDE, PRESERVATIVE FREE 10 ML: 5 INJECTION INTRAVENOUS at 20:18

## 2024-07-25 RX ADMIN — SODIUM CHLORIDE 1500 MG: 9 INJECTION, SOLUTION INTRAVENOUS at 08:57

## 2024-07-25 RX ADMIN — OXYCODONE HYDROCHLORIDE AND ACETAMINOPHEN 1 TABLET: 5; 325 TABLET ORAL at 18:07

## 2024-07-25 RX ADMIN — CEFEPIME 1000 MG: 1 INJECTION, POWDER, FOR SOLUTION INTRAMUSCULAR; INTRAVENOUS at 22:28

## 2024-07-25 RX ADMIN — FUROSEMIDE 20 MG: 20 TABLET ORAL at 08:53

## 2024-07-25 ASSESSMENT — PAIN DESCRIPTION - ORIENTATION
ORIENTATION: LOWER
ORIENTATION: RIGHT;LEFT;LOWER
ORIENTATION: RIGHT;LEFT;LOWER

## 2024-07-25 ASSESSMENT — PAIN DESCRIPTION - ONSET
ONSET: ON-GOING
ONSET: ON-GOING

## 2024-07-25 ASSESSMENT — PAIN SCALES - GENERAL
PAINLEVEL_OUTOF10: 8
PAINLEVEL_OUTOF10: 0
PAINLEVEL_OUTOF10: 0
PAINLEVEL_OUTOF10: 8
PAINLEVEL_OUTOF10: 10
PAINLEVEL_OUTOF10: 0

## 2024-07-25 ASSESSMENT — PAIN DESCRIPTION - DESCRIPTORS
DESCRIPTORS: ACHING;DISCOMFORT;TENDER;THROBBING
DESCRIPTORS: ACHING;DISCOMFORT;THROBBING;TENDER;SORE

## 2024-07-25 ASSESSMENT — PAIN DESCRIPTION - LOCATION
LOCATION: BACK

## 2024-07-25 ASSESSMENT — PAIN - FUNCTIONAL ASSESSMENT
PAIN_FUNCTIONAL_ASSESSMENT: PREVENTS OR INTERFERES SOME ACTIVE ACTIVITIES AND ADLS

## 2024-07-25 ASSESSMENT — PAIN DESCRIPTION - PAIN TYPE
TYPE: CHRONIC PAIN
TYPE: CHRONIC PAIN

## 2024-07-25 ASSESSMENT — PAIN DESCRIPTION - FREQUENCY
FREQUENCY: CONTINUOUS
FREQUENCY: CONTINUOUS

## 2024-07-25 NOTE — PROGRESS NOTES
Occupational Therapy  OCCUPATIONAL THERAPY INITIAL EVALUATION    [facility]  [address] [place], [place]      Date:2024                                                Patient Name: Raudel Herndon  MRN: [id number]  : 1950  Room: [address] [place] [address]    Evaluating OT:Lisa Everett OTR/L #[id number]    Referring Provider: Sarah Miller MD   Specific Provider Orders/Date: OT eval and treat 24     Diagnosis: Cholangiocarcinoma (HCC) [C22.1]   Pt admitted to hospital on 24 for severe back pain, concern for infection    Surgery / Procedure: T12/L3 Biopsy on      Pertinent Medical History:  has a past medical history of Cancer (HCC), Diabetes mellitus (HCC), and Prostate cancer (HCC).       Precautions:  Fall Risk, spinal, TLSO, skin integrity, B LE edema  Cholangiocarcinoma - active chemo    Assessment of current deficits    [x] Functional mobility  [x]ADLs  [x] Strength               []Cognition    [x] Functional transfers   [x] IADLs         [x] Safety Awareness   [x]Endurance    [] Fine Coordination              [x] Balance      [] Vision/perception   []Sensation     []Gross Motor Coordination  [] ROM  [] Delirium                   [] Motor Control     OT PLAN OF CARE   OT POC based on physician orders, patient diagnosis and results of clinical assessment    Frequency/Duration 2-3 days/wk for 2 weeks PRN   Specific OT Treatment Interventions to include:   * Instruction/training on adapted ADL techniques and AE recommendations to increase functional independence within precautions       * Training on energy conservation strategies, correct breathing pattern and techniques to improve independence/tolerance for self-care routine  * Functional transfer/mobility training/DME recommendations for increased independence, safety, and fall prevention  * Patient/Family education to increase follow through with safety techniques and functional independence  *  safety. Skilled monitoring of HR, O2 sats and pts response to treatment.         Rehab Potential: Good for established goals     Patient / Family Goal: not stated      Patient and/or family were instructed on functional diagnosis, prognosis/goals and OT plan of care. Demonstrated fair+ understanding.     Eval Complexity: Low    Time In: 14:00  Time Out: 14:40  Total Treatment Time: 24 minutes    Min Units   OT Eval Low 97165  X  1   OT Eval Medium 14696      OT Eval High 87914      OT Re-Eval 87854       Therapeutic Ex 09896       Therapeutic Activities 85522  13  1   ADL/Self Care 90607  11  1   Orthotic Management 74439       Manual 88629     Neuro Re-Ed 19026       Non-Billable Time          Evaluation Time additionally includes thorough review of current medical information, gathering information on past medical history/social history and prior level of function, interpretation of standardized testing/informal observation of tasks, assessment of data and development of plan of care and goals.        Lisa Everett, OTR/L #[id number]

## 2024-07-25 NOTE — PRE SEDATION
Sedation Pre-Procedure Note    Patient Name: Raudel Herndon   YOB: 1950  Room/Bed: 6505/6505-A  Medical Record Number: [id number]  Date: 7/25/2024   Time: 2:31 PM       Indication:  L2/L3 discitis    Consent: I have discussed with the patient and/or the patient representative the indication, alternatives, and the possible risks and/or complications of the planned procedure and the anesthesia methods. The patient and/or patient representative appear to understand and agree to proceed.    Vital Signs:   Vitals:    07/25/24 1338   BP:    Pulse:    Resp: 20   Temp:    SpO2:        Past Medical History:   has a past medical history of Cancer (HCC), Diabetes mellitus (HCC), and Prostate cancer (HCC).    Past Surgical History:   has a past surgical history that includes ERCP (N/A, 02/29/2024); ERCP (N/A, 02/29/2024); ERCP (N/A, 02/29/2024); ERCP (N/A, 02/29/2024); joint replacement (Right, 2022); Total knee arthroplasty (Left); Ankle Fusion (Right, 2022); Colonoscopy; eye surgery (Bilateral, 2022); Port Surgery (Right, 3/8/2024); IR BIOPSY LIVER PERCUTANEOUS (3/18/2024); and ERCP (N/A, 6/28/2024).    Medications:   Scheduled Meds:    vancomycin  1,500 mg IntraVENous Q24H    cefepime  1,000 mg IntraVENous Q8H    furosemide  20 mg Oral BID    pantoprazole  40 mg Oral BID AC    sodium chloride flush  5-40 mL IntraVENous 2 times per day    enoxaparin  40 mg SubCUTAneous Daily     Continuous Infusions:    sodium chloride       PRN Meds: oxyCODONE-acetaminophen, sodium chloride flush, sodium chloride, potassium chloride **OR** potassium alternative oral replacement **OR** potassium chloride, magnesium sulfate, ondansetron **OR** ondansetron, polyethylene glycol, acetaminophen **OR** acetaminophen, morphine  Home Meds:   Prior to Admission medications    Medication Sig Start Date End Date Taking? Authorizing Provider   vancomycin (VANCOCIN) infusion Infuse 1,000 mg intravenously in the morning and 1,000 mg in the

## 2024-07-25 NOTE — PROGRESS NOTES
RN notified [facility] for Dr. Reynoso via perfect serve that patient has returned from IR. Inquiring about patients diet order.     Daphne Abdullahi RN

## 2024-07-25 NOTE — PLAN OF CARE
Problem: Chronic Conditions and Co-morbidities  Goal: Patient's chronic conditions and co-morbidity symptoms are monitored and maintained or improved  7/25/2024 0745 by Daphne Abdullahi RN  Outcome: Progressing     Problem: Discharge Planning  Goal: Discharge to home or other facility with appropriate resources  7/25/2024 0745 by Daphne Abdullahi RN  Outcome: Progressing     Problem: Skin/Tissue Integrity  Goal: Absence of new skin breakdown  Description: 1.  Monitor for areas of redness and/or skin breakdown  2.  Assess vascular access sites hourly  3.  Every 4-6 hours minimum:  Change oxygen saturation probe site  4.  Every 4-6 hours:  If on nasal continuous positive airway pressure, respiratory therapy assess nares and determine need for appliance change or resting period.  Outcome: Progressing     Problem: ABCDS Injury Assessment  Goal: Absence of physical injury  Outcome: Progressing     Problem: Safety - Adult  Goal: Free from fall injury  Outcome: Progressing     Problem: Pain  Goal: Verbalizes/displays adequate comfort level or baseline comfort level  Outcome: Progressing     Problem: Nutrition Deficit:  Goal: Optimize nutritional status  Outcome: Progressing

## 2024-07-25 NOTE — PATIENT CARE CONFERENCE
P Quality Flow/Interdisciplinary Rounds Progress Note        Quality Flow Rounds held on July 25, 2024    Disciplines Attending:  Bedside Nurse, , , and Nursing Unit Leadership    Raudel Herndon was admitted on 7/21/2024  3:06 PM    Anticipated Discharge Date:       Disposition:    Chris Score:  Chris Scale Score: 17    Readmission Risk              Risk of Unplanned Readmission:  25           Discussed patient goal for the day, patient clinical progression, and barriers to discharge.  The following Goal(s) of the Day/Commitment(s) have been identified:  downgrade/discharge planning      Miranda Marie RN  July 25, 2024

## 2024-07-25 NOTE — PROCEDURES
PROCEDURE NOTE  Date: 7/25/2024   Name: Raudel Herndon  YOB: 1950    Procedures: T12/L3 Biopsy  Discussed patient and IR procedure with bedside RN, all questions answered. Will call when able to send for patient.

## 2024-07-25 NOTE — PROGRESS NOTES
Pharmacy Consultation Note  (Antibiotic Dosing and Monitoring)    Initial consult date: 7-  Consulting physician/provider: Dr. Graham  Drug: Vancomycin  Indication: OM/discitis (L-spine)    Age/  Gender Height Weight IBW  Allergy Information   74 y.o./male 185.4 cm (6' 1\") 92.5 kg (204 lb)     Ideal body weight: 79.9 kg (176 lb 2.4 oz)  Adjusted ideal body weight: 85 kg (187 lb 4.6 oz)   Adhesive tape      Renal Function:  Recent Labs     07/23/24  0745 07/24/24  0630 07/25/24  0520   BUN 14 21 23   CREATININE 0.7 0.8 0.9         Intake/Output Summary (Last 24 hours) at 7/25/2024 1012  Last data filed at 7/25/2024 0941  Gross per 24 hour   Intake 867.09 ml   Output 1012 ml   Net -144.91 ml         Vancomycin Monitoring:  Trough:  No results for input(s): \"VANCOTROUGH\" in the last 72 hours.  Random:    Recent Labs     07/24/24  0630   VANCORANDOM 24.6         Vancomycin Administration Times:  Recent vancomycin administrations                     vancomycin (VANCOCIN) 1,500 mg in sodium chloride 0.9 % 250 mL IVPB ([id number]) (mg) 1,500 mg New Bag 07/25/24 0857    vancomycin (VANCOCIN) 1,250 mg in sodium chloride 0.9 % 250 mL IVPB ([id number]) (mg) 1,250 mg New Bag 07/24/24 0803     1,250 mg New Bag 07/23/24 2018     1,250 mg New Bag  0841    vancomycin (VANCOCIN) 2,000 mg in sodium chloride 0.9 % 500 mL IVPB (mg) 2,000 mg New Bag 07/22/24 2007             Assessment:  73 yo/M, transferred from OSH for low back pain.    PMH: cholangiocarcinoma.    MRI revealed suspected L-spine OM/discitis (T12-L1, L2-L3).    ID consulted for ATBs and consulted Pharmacy to dose vanco.    Recent [facility] admission (6/24-7/10) on vanco 1000 mg q12h for +Enterococcus bacteremia.    Estimated Creatinine Clearance: 81 mL/min (based on SCr of 0.9 mg/dL).  To dose vancomycin, pharmacy will be utilizing [employer] calculation software for goal AUC/MARTÍN 400-600 mg/L-hr (predicted AUC/MARTÍN = 542, Tr = 14.2 mcg/mL).  7/23: day #2 vanco &  cefepime..Cr stable (0.7), UO = 0.9 mL/kg/hr, afebrile.  7/24: day #3 ATBs.  Plan for IR-guided T & L-spine Bx.  Vanc level high on 1250 mg q12h. Cr stable (0.8), but low UO documented (0.5 mL/kg/hr), afebrile.  7/25: day #4 ATBs. Vanc dose increased starting today. Cr stable, UO = 0.5 mL/kg/hr.  To have Bone Bx of T/L-spine.    Plan:  Continue vancomycin 1500 mg q24h (started today).   Will re-check vancomycin level when needed.   Will continue to monitor renal function.   Pharmacy to follow.    Otf Shoemaker, PharmD  7/25/2024  10:12 AM  [phone]

## 2024-07-25 NOTE — PROGRESS NOTES
[facility] Surgery    Biopsy of his T12/L1 was performed.  Await biopsy results to determine if this is osteomyelitis versus metastatic disease    Electronically signed by Raza Reynoso MD on 7/25/2024 at 4:48 PM

## 2024-07-25 NOTE — PROGRESS NOTES
[facility] Hospitalist Progress Note      Synopsis: Patient with a history of cholangiocarcinoma status post stent, type 2 diabetes admitted on 7/21/2024 as a transfer from [facility] due to severe back pain.  Was recently at assisted living then to rehab and was feeling severe back pain.  Neurosurgery consulted MRI of the thoracic and lumbar and cervical with and without contrast showed progressive discitis and osteomyelitis of the lumbar and thoracic spine which is known. Currently in cefepime and vancomycin. ID consulted. IR consulted for spinal biopsy for culture which was completed this AM.     Subjective  No acute events over night.    Exam:  BP (!) 124/59   Pulse 81   Temp 97.4 °F (36.3 °C) (Oral)   Resp 20   Ht 1.854 m (6' 1\")   Wt 92.5 kg (204 lb)   SpO2 98%   BMI 26.91 kg/m²   General Appearance: alert and oriented to person, place and time and in no acute distress  Skin: warm and dry  Head: normocephalic and atraumatic  Eyes: pupils equal, round, and reactive to light, extraocular eye movements intact, conjunctivae yellow.  Neck: neck supple and non tender without mass   Pulmonary/Chest: clear to auscultation bilaterally- no wheezes, rales or rhonchi, normal air movement, no respiratory distress  Cardiovascular: normal rate, normal S1 and S2 and no carotid bruits  Abdomen: Distended  Extremities: no cyanosis, no clubbing and no edema  Neurologic: no cranial nerve deficit and speech normal    Medications:  Reviewed    Infusion Medications    sodium chloride       Scheduled Medications    vancomycin  1,500 mg IntraVENous Q24H    cefepime  1,000 mg IntraVENous Q8H    furosemide  20 mg Oral BID    pantoprazole  40 mg Oral BID AC    sodium chloride flush  5-40 mL IntraVENous 2 times per day    enoxaparin  40 mg SubCUTAneous Daily     PRN Meds: oxyCODONE-acetaminophen, sodium chloride flush, sodium chloride, potassium chloride **OR** potassium alternative oral replacement **OR** potassium chloride,  magnesium sulfate, ondansetron **OR** ondansetron, polyethylene glycol, acetaminophen **OR** acetaminophen, morphine    I/O    Intake/Output Summary (Last 24 hours) at 7/25/2024 1309  Last data filed at 7/25/2024 1140  Gross per 24 hour   Intake 0 ml   Output 1412 ml   Net -1412 ml         Labs:   No results for input(s): \"WBC\", \"HGB\", \"HCT\", \"PLT\" in the last 72 hours.      Recent Labs     07/23/24  0528 07/23/24  0745 07/24/24  0630 07/25/24  0520   NA  --  136 132 138   K  --  4.0 3.8 3.6   CL  --  104 104 104   CO2  --  24 25 24   BUN  --  14 21 23   CREATININE  --  0.7 0.8 0.9   CALCIUM  --  7.4* 7.5* 7.5*   PHOS 3.0  --  2.6 2.9         Recent Labs     07/23/24  0528 07/24/24  0630 07/25/24  0520   ALKPHOS 1,461* 1,194* 1,185*   ALT 50* 47* 43*   * 91* 81*   BILITOT 3.4* 3.0* 2.9*         Recent Labs     07/24/24  0630 07/25/24  0520   INR 1.3 1.2         No results for input(s): \"CKTOTAL\", \"TROPONINI\" in the last 72 hours.    Chronic labs:  Lab Results   Component Value Date    CHOL 108 06/25/2024    TRIG 120 06/25/2024    HDL 19 (L) 06/25/2024    TSH 1.47 07/05/2024    INR 1.2 07/25/2024    LABA1C 4.3 06/25/2024       Radiology:  Imaging studies reviewed today.    ASSESSMENT:  Cholangiocarcinoma  Osteomyelitis and discitis of lumbar and thoracic spine  Macrocytic anemia  Thrombocytopenia     PLAN:  Neurosurgery consulted, MRI of the spine showing progressive discitis and osteo of lumbar and thoracic spine. No neurosurgical intervention recommended at this time  Continue cefepime and vanco  ID consulted  IR consulted for spinal biopsy for culture, was completed this AM.   Hepatobiliary service following, recommends bone scan to evaluate for metastatic disease  Continue home medications as ordered  Pain control  PT and OT consulted  Strict intake and output, monitor renal function    Diet: ADULT DIET; Regular; 4 carb choices (60 gm/meal)  ADULT ORAL NUTRITION SUPPLEMENT; Breakfast, Lunch, Dinner; Wound

## 2024-07-25 NOTE — BRIEF OP NOTE
Brief Postoperative Note      Patient: Raudel Herndon  YOB: 1950  MRN: [id number]    Date of Procedure: 7/25/2024    * No Diagnosis Codes entered *    Post-Op Diagnosis: Same       L2/3 disk FNA    Surgeon(s):  Esperanza Fraser MD    Assistant:  * No surgical staff found *    Anesthesia: * No anesthesia type entered *    Estimated Blood Loss (mL): Minimal    Complications: None    Specimens:   FNA    Implants:  * No implants in log *      Drains:   [REMOVED] External Urinary Catheter (Removed)   Site Assessment Clean,dry & intact 07/10/24 0800   Placement Replaced 07/10/24 0648   Securement Method Tape 07/06/24 1548   Catheter Care Catheter/Wick replaced 07/07/24 0538   Perineal Care Yes 07/07/24 0538   Suction 40 mmgHg continuous 07/10/24 0648   Urine Color Marion 07/10/24 1411   Urine Appearance Clear 07/10/24 1411   Output (mL) 1000 mL 07/10/24 1411       Findings:  Infection Present At Time Of Surgery (PATOS) (choose all levels that have infection present):  No infection present  Other Findings:     Electronically signed by Esperanza Fraser MD on 7/25/2024 at 2:32 PM

## 2024-07-25 NOTE — PROGRESS NOTES
[facility]  [facility]  Progress Note    SUBJECTIVE:  No chief complaint on file.    Patient resting in bed. Patient is tolerating medications. No reported adverse drug reactions.  No nausea, vomiting, diarrhea.    Review of systems:  As stated above in the chief complaint, otherwise negative.    Medications:  Scheduled Meds:   vancomycin  1,500 mg IntraVENous Q24H    cefepime  1,000 mg IntraVENous Q8H    furosemide  20 mg Oral BID    pantoprazole  40 mg Oral BID AC    sodium chloride flush  5-40 mL IntraVENous 2 times per day    enoxaparin  40 mg SubCUTAneous Daily     Continuous Infusions:   sodium chloride       PRN Meds:oxyCODONE-acetaminophen, sodium chloride flush, sodium chloride, potassium chloride **OR** potassium alternative oral replacement **OR** potassium chloride, magnesium sulfate, ondansetron **OR** ondansetron, polyethylene glycol, acetaminophen **OR** acetaminophen, morphine    OBJECTIVE:  BP (!) 124/59   Pulse 81   Temp 97.4 °F (36.3 °C) (Oral)   Resp 18   Ht 1.854 m (6' 1\")   Wt 92.5 kg (204 lb)   SpO2 98%   BMI 26.91 kg/m²   Temp  Av.8 °F (36.6 °C)  Min: 97.2 °F (36.2 °C)  Max: 98.2 °F (36.8 °C)  Constitutional: NAD  Skin: Warm and dry. No rashes were noted.   Neuro: Alert and oriented  HEENT: Round and reactive pupils.  Moist mucous membranes.  No ulcerations or thrush.  Chest: .Respirations unlabored. Breath sounds clear.   Cardiovascular:  RRR  Abdomen: Soft. Bowel sounds present.   Extremities: LE edema    Lines: PICC Left basilic 24      Laboratory and Tests:  Lab Results   Component Value Date    WBC 4.7 2024    WBC 4.6 07/10/2024    WBC 4.8 2024    HGB 7.5 (L) 2024    HCT 24.4 (L) 2024    .2 (H) 2024    PLT 85 (L) 2024     Lab Results   Component Value Date    CRP 81.0 (H) 2024    CRP 69.0 (H) 2024     Lab Results   Component Value Date    SEDRATE 137 (H) 2024    SEDRATE 60 (H)

## 2024-07-25 NOTE — PLAN OF CARE
Problem: Chronic Conditions and Co-morbidities  Goal: Patient's chronic conditions and co-morbidity symptoms are monitored and maintained or improved  7/24/2024 2027 by Malaika Mott, RN  Outcome: Progressing  7/24/2024 1002 by Brittanie Coppola RN  Outcome: Progressing     Problem: Discharge Planning  Goal: Discharge to home or other facility with appropriate resources  7/24/2024 2027 by Malaika Mott, RN  Outcome: Progressing  7/24/2024 1002 by Brittanie Coppola RN  Outcome: Progressing

## 2024-07-25 NOTE — PROGRESS NOTES
RN notified Dr. Shah regarding patient code status via perfect serve.  Stated she will go see patient.     Daphne Abdullahi RN

## 2024-07-25 NOTE — CARE COORDINATION
7/25/24  Transition of Care update.  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 Patient is planned for IR procedure today that was held yesterday for spinal biopsy and culture. Patient remains on IV cefapime and vanc with ID following. TLSO brace has been obtained.  Therapy orders in place to see.  Discharge goal is a MAG stay at [facility] .A pre-cert will need started once  therapy evaluations are in. PASRR complete. ALFA and destination complete. Will start transport once therapy evals. NASREEN/BRIONNA to follow.     Electronically signed by RAMYA Hanna on 7/25/2024 at 3:18 PM

## 2024-07-26 PROBLEM — M86.9 OSTEOMYELITIS (HCC): Status: ACTIVE | Noted: 2024-07-26

## 2024-07-26 LAB
ALBUMIN SERPL-MCNC: 1.6 G/DL (ref 3.5–5.2)
ALP SERPL-CCNC: 1173 U/L (ref 40–129)
ALT SERPL-CCNC: 41 U/L (ref 0–40)
ANION GAP SERPL CALCULATED.3IONS-SCNC: 7 MMOL/L (ref 7–16)
AST SERPL-CCNC: 75 U/L (ref 0–39)
BILIRUB DIRECT SERPL-MCNC: 1.8 MG/DL (ref 0–0.3)
BILIRUB INDIRECT SERPL-MCNC: 0.9 MG/DL (ref 0–1)
BILIRUB SERPL-MCNC: 2.7 MG/DL (ref 0–1.2)
BUN SERPL-MCNC: 22 MG/DL (ref 6–23)
CALCIUM SERPL-MCNC: 7.7 MG/DL (ref 8.6–10.2)
CHLORIDE SERPL-SCNC: 104 MMOL/L (ref 98–107)
CO2 SERPL-SCNC: 26 MMOL/L (ref 22–29)
CREAT SERPL-MCNC: 0.8 MG/DL (ref 0.7–1.2)
GFR, ESTIMATED: >90 ML/MIN/1.73M2
GLUCOSE SERPL-MCNC: 92 MG/DL (ref 74–99)
MAGNESIUM SERPL-MCNC: 1.7 MG/DL (ref 1.6–2.6)
MAGNESIUM SERPL-MCNC: 1.9 MG/DL (ref 1.6–2.6)
PHOSPHATE SERPL-MCNC: 2.6 MG/DL (ref 2.5–4.5)
POTASSIUM SERPL-SCNC: 3.3 MMOL/L (ref 3.5–5)
PROT SERPL-MCNC: 5.6 G/DL (ref 6.4–8.3)
SODIUM SERPL-SCNC: 137 MMOL/L (ref 132–146)

## 2024-07-26 PROCEDURE — 99232 SBSQ HOSP IP/OBS MODERATE 35: CPT | Performed by: INTERNAL MEDICINE

## 2024-07-26 PROCEDURE — 2580000003 HC RX 258: Performed by: INTERNAL MEDICINE

## 2024-07-26 PROCEDURE — 6360000002 HC RX W HCPCS: Performed by: INTERNAL MEDICINE

## 2024-07-26 PROCEDURE — 83735 ASSAY OF MAGNESIUM: CPT

## 2024-07-26 PROCEDURE — 2580000003 HC RX 258

## 2024-07-26 PROCEDURE — 6360000002 HC RX W HCPCS

## 2024-07-26 PROCEDURE — 80053 COMPREHEN METABOLIC PANEL: CPT

## 2024-07-26 PROCEDURE — 82248 BILIRUBIN DIRECT: CPT

## 2024-07-26 PROCEDURE — 6370000000 HC RX 637 (ALT 250 FOR IP): Performed by: INTERNAL MEDICINE

## 2024-07-26 PROCEDURE — 2140000000 HC CCU INTERMEDIATE R&B

## 2024-07-26 PROCEDURE — 84100 ASSAY OF PHOSPHORUS: CPT

## 2024-07-26 PROCEDURE — 36592 COLLECT BLOOD FROM PICC: CPT

## 2024-07-26 RX ADMIN — SODIUM CHLORIDE 1500 MG: 9 INJECTION, SOLUTION INTRAVENOUS at 08:41

## 2024-07-26 RX ADMIN — OXYCODONE HYDROCHLORIDE AND ACETAMINOPHEN 1 TABLET: 5; 325 TABLET ORAL at 05:45

## 2024-07-26 RX ADMIN — PANTOPRAZOLE SODIUM 40 MG: 40 TABLET, DELAYED RELEASE ORAL at 16:37

## 2024-07-26 RX ADMIN — FUROSEMIDE 20 MG: 20 TABLET ORAL at 21:04

## 2024-07-26 RX ADMIN — CEFEPIME 1000 MG: 1 INJECTION, POWDER, FOR SOLUTION INTRAMUSCULAR; INTRAVENOUS at 14:52

## 2024-07-26 RX ADMIN — CEFEPIME 1000 MG: 1 INJECTION, POWDER, FOR SOLUTION INTRAMUSCULAR; INTRAVENOUS at 05:41

## 2024-07-26 RX ADMIN — CEFEPIME 1000 MG: 1 INJECTION, POWDER, FOR SOLUTION INTRAMUSCULAR; INTRAVENOUS at 22:41

## 2024-07-26 RX ADMIN — MORPHINE SULFATE 2 MG: 2 INJECTION, SOLUTION INTRAMUSCULAR; INTRAVENOUS at 22:47

## 2024-07-26 RX ADMIN — ENOXAPARIN SODIUM 40 MG: 100 INJECTION SUBCUTANEOUS at 08:37

## 2024-07-26 RX ADMIN — PANTOPRAZOLE SODIUM 40 MG: 40 TABLET, DELAYED RELEASE ORAL at 05:38

## 2024-07-26 RX ADMIN — POTASSIUM CHLORIDE 40 MEQ: 1500 TABLET, EXTENDED RELEASE ORAL at 10:43

## 2024-07-26 RX ADMIN — SODIUM CHLORIDE, PRESERVATIVE FREE 10 ML: 5 INJECTION INTRAVENOUS at 08:37

## 2024-07-26 RX ADMIN — OXYCODONE HYDROCHLORIDE AND ACETAMINOPHEN 1 TABLET: 5; 325 TABLET ORAL at 14:52

## 2024-07-26 RX ADMIN — FUROSEMIDE 20 MG: 20 TABLET ORAL at 08:37

## 2024-07-26 RX ADMIN — OXYCODONE HYDROCHLORIDE AND ACETAMINOPHEN 1 TABLET: 5; 325 TABLET ORAL at 21:03

## 2024-07-26 RX ADMIN — MORPHINE SULFATE 2 MG: 2 INJECTION, SOLUTION INTRAMUSCULAR; INTRAVENOUS at 08:40

## 2024-07-26 RX ADMIN — SODIUM CHLORIDE, PRESERVATIVE FREE 10 ML: 5 INJECTION INTRAVENOUS at 21:04

## 2024-07-26 ASSESSMENT — PAIN DESCRIPTION - DESCRIPTORS
DESCRIPTORS: DISCOMFORT;SORE
DESCRIPTORS: ACHING;SORE;STABBING
DESCRIPTORS: ACHING;DISCOMFORT;SHARP

## 2024-07-26 ASSESSMENT — PAIN DESCRIPTION - ORIENTATION
ORIENTATION: MID
ORIENTATION: LOWER
ORIENTATION: RIGHT;LEFT

## 2024-07-26 ASSESSMENT — PAIN DESCRIPTION - LOCATION
LOCATION: BACK
LOCATION: BACK
LOCATION: RIB CAGE

## 2024-07-26 ASSESSMENT — PAIN SCALES - GENERAL
PAINLEVEL_OUTOF10: 9
PAINLEVEL_OUTOF10: 8
PAINLEVEL_OUTOF10: 9
PAINLEVEL_OUTOF10: 10
PAINLEVEL_OUTOF10: 9
PAINLEVEL_OUTOF10: 0
PAINLEVEL_OUTOF10: 8
PAINLEVEL_OUTOF10: 9
PAINLEVEL_OUTOF10: 0

## 2024-07-26 ASSESSMENT — PAIN - FUNCTIONAL ASSESSMENT: PAIN_FUNCTIONAL_ASSESSMENT: PREVENTS OR INTERFERES SOME ACTIVE ACTIVITIES AND ADLS

## 2024-07-26 NOTE — PROGRESS NOTES
[facility] Surgery  Will follow up on biopsy results of T12/L1 spine.     - No surgical intervention at this point.   - Surgery will be available if needed. Please reach out for any questions or concerns.

## 2024-07-26 NOTE — PROGRESS NOTES
[facility]  [facility]  Progress Note    SUBJECTIVE:  No chief complaint on file.    Patient resting in bed. Patient is tolerating medications. No reported adverse drug reactions.  No nausea, vomiting, diarrhea.    Review of systems:  As stated above in the chief complaint, otherwise negative.    Medications:  Scheduled Meds:   vancomycin  1,500 mg IntraVENous Q24H    cefepime  1,000 mg IntraVENous Q8H    furosemide  20 mg Oral BID    pantoprazole  40 mg Oral BID AC    sodium chloride flush  5-40 mL IntraVENous 2 times per day    enoxaparin  40 mg SubCUTAneous Daily     Continuous Infusions:   sodium chloride       PRN Meds:oxyCODONE-acetaminophen, sodium chloride flush, sodium chloride, potassium chloride **OR** potassium alternative oral replacement **OR** potassium chloride, magnesium sulfate, ondansetron **OR** ondansetron, polyethylene glycol, acetaminophen **OR** acetaminophen, morphine    OBJECTIVE:  BP (!) 119/56   Pulse 91   Temp 97.4 °F (36.3 °C) (Temporal)   Resp 18   Ht 1.854 m (6' 1\")   Wt 92.5 kg (204 lb)   SpO2 100%   BMI 26.91 kg/m²   Temp  Av.8 °F (36.6 °C)  Min: 97.2 °F (36.2 °C)  Max: 98.4 °F (36.9 °C)  Constitutional: NAD  Skin: Warm and dry. No rashes were noted.   Neuro: Alert and oriented  HEENT: Round and reactive pupils.  Moist mucous membranes.  No ulcerations or thrush.  Chest: .Respirations unlabored. Breath sounds clear.   Cardiovascular:  RRR  Abdomen: Soft. Bowel sounds present.   Extremities: LE edema    Lines: PICC Left basilic 24      Laboratory and Tests:  Lab Results   Component Value Date    WBC 4.7 2024    WBC 4.6 07/10/2024    WBC 4.8 2024    HGB 7.5 (L) 2024    HCT 24.4 (L) 2024    .2 (H) 2024    PLT 85 (L) 2024     Lab Results   Component Value Date    CRP 81.0 (H) 2024    CRP 69.0 (H) 2024     Lab Results   Component Value Date    SEDRATE 137 (H) 2024    SEDRATE 60  (H) 07/03/2024     Lab Results   Component Value Date    PROCAL 0.58 (H) 07/03/2024     Radiology:  Reviewed.     Microbiology:   pending    ASSESSMENT:  Progressive discitis of T12-L3, MRI of thoracolumbar spine report noted  Cholangiocarcinoma with biliary stenting, currently on active chemotherapy via right IJ Mediport  Previous Enterococcus faecalis bacteremia 06/24 discharged with vancomycin  S/P IR Biopsy of Lumbar spine 07/25     Plan:    Neurosurgery and hepatobiliary service evaluation appreciated  Follow body fluid culture from 07/25  Though previous bacteremia noted but considering worsening back pain on vancomycin, biopsy of the thoracolumbar spine is recommended  Continue cefepime and vancomycin  CRP 81 and   ID will continue to follow      Manish Graham MD  1:54 PM  7/26/2024

## 2024-07-26 NOTE — PATIENT CARE CONFERENCE
P Quality Flow/Interdisciplinary Rounds Progress Note        Quality Flow Rounds held on July 26, 2024    Disciplines Attending:  Bedside Nurse, , , and Nursing Unit Leadership    Raudel Herndon was admitted on 7/21/2024  3:06 PM    Anticipated Discharge Date:       Disposition:    Chris Score:  Chris Scale Score: 17    Readmission Risk              Risk of Unplanned Readmission:  25           Discussed patient goal for the day, patient clinical progression, and barriers to discharge.  The following Goal(s) of the Day/Commitment(s) have been identified:  Diagnostics - Report Results and Labs - Report Results      Maite Walton RN  July 26, 2024

## 2024-07-26 NOTE — PROGRESS NOTES
[facility] Hospitalist Progress Note      Synopsis: Patient with a history of cholangiocarcinoma status post stent, type 2 diabetes admitted on 7/21/2024 as a transfer from [facility] due to severe back pain.  Was recently at assisted living then to rehab and was feeling severe back pain.  Neurosurgery consulted MRI of the thoracic and lumbar and cervical with and without contrast showed progressive discitis and osteomyelitis of the lumbar and thoracic spine which is known. Currently in cefepime and vancomycin. ID consulted. IR consulted for spinal biopsy for culture which was completed 7/25.    Subjective  No acute events over night.    Exam:  BP (!) 119/56   Pulse 91   Temp 97.4 °F (36.3 °C) (Temporal)   Resp 18   Ht 1.854 m (6' 1\")   Wt 92.5 kg (204 lb)   SpO2 100%   BMI 26.91 kg/m²   General Appearance: alert and oriented to person, place and time and in no acute distress  Skin: warm and dry  Head: normocephalic and atraumatic  Eyes: pupils equal, round, and reactive to light, extraocular eye movements intact, conjunctivae yellow.  Neck: neck supple and non tender without mass   Pulmonary/Chest: clear to auscultation bilaterally- no wheezes, rales or rhonchi, normal air movement, no respiratory distress  Cardiovascular: normal rate, normal S1 and S2 and no carotid bruits  Abdomen: Distended  Extremities: no cyanosis, no clubbing and no edema  Neurologic: no cranial nerve deficit and speech normal    Medications:  Reviewed    Infusion Medications    sodium chloride       Scheduled Medications    vancomycin  1,500 mg IntraVENous Q24H    cefepime  1,000 mg IntraVENous Q8H    furosemide  20 mg Oral BID    pantoprazole  40 mg Oral BID AC    sodium chloride flush  5-40 mL IntraVENous 2 times per day    enoxaparin  40 mg SubCUTAneous Daily     PRN Meds: oxyCODONE-acetaminophen, sodium chloride flush, sodium chloride, potassium chloride **OR** potassium alternative oral replacement **OR** potassium chloride,

## 2024-07-26 NOTE — PLAN OF CARE
Problem: Chronic Conditions and Co-morbidities  Goal: Patient's chronic conditions and co-morbidity symptoms are monitored and maintained or improved  7/25/2024 2001 by Malaika Mott RN  Outcome: Progressing  7/25/2024 0745 by Daphne Abdullahi RN  Outcome: Progressing     Problem: Discharge Planning  Goal: Discharge to home or other facility with appropriate resources  7/25/2024 2001 by Malaika Mott RN  Outcome: Progressing  7/25/2024 0745 by Daphne Abdullahi RN  Outcome: Progressing     Problem: Skin/Tissue Integrity  Goal: Absence of new skin breakdown  Description: 1.  Monitor for areas of redness and/or skin breakdown  2.  Assess vascular access sites hourly  3.  Every 4-6 hours minimum:  Change oxygen saturation probe site  4.  Every 4-6 hours:  If on nasal continuous positive airway pressure, respiratory therapy assess nares and determine need for appliance change or resting period.  7/25/2024 0745 by Daphne Abdullahi RN  Outcome: Progressing     Problem: ABCDS Injury Assessment  Goal: Absence of physical injury  7/25/2024 0745 by Daphne Abdullahi RN  Outcome: Progressing     Problem: Safety - Adult  Goal: Free from fall injury  7/25/2024 0745 by Daphne Abdullahi RN  Outcome: Progressing     Problem: Pain  Goal: Verbalizes/displays adequate comfort level or baseline comfort level  7/25/2024 0745 by Daphne Abdullahi RN  Outcome: Progressing     Problem: Nutrition Deficit:  Goal: Optimize nutritional status  7/25/2024 0745 by Daphne Abdullahi RN  Outcome: Progressing

## 2024-07-26 NOTE — CARE COORDINATION
7/26/24  Transition of Care update.  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 No surgical intervention planned at this time. Patient is pending results of his spinal biopsy/culture.  Patient remains on IV cefapime and vanc with ID following and Picc line is in place. TLSO brace has been obtained.  Therapy updates in with AM-PAC of 8/24 for PT and 12/24 for OT. Discharge goal is a MAG stay at [facility]. Pre-cert requested to be started today.  PASRR complete. ALFA and destination complete. Transport started and on the soft chart. SW/CM to follow.    Electronically signed by RAMYA Hanna on 7/26/2024 at 9:18 AM

## 2024-07-26 NOTE — PROGRESS NOTES
Pharmacy Consultation Note  (Antibiotic Dosing and Monitoring)    Initial consult date: 7-  Consulting physician/provider: Dr. Graham  Drug: Vancomycin  Indication: OM/discitis (L-spine)    Age/  Gender Height Weight IBW  Allergy Information   74 y.o./male 185.4 cm (6' 1\") 92.5 kg (204 lb)     Ideal body weight: 79.9 kg (176 lb 2.4 oz)  Adjusted ideal body weight: 85 kg (187 lb 4.6 oz)   Adhesive tape      Renal Function:  Recent Labs     07/24/24  0630 07/25/24  0520 07/26/24  0850   BUN 21 23 22   CREATININE 0.8 0.9 0.8         Intake/Output Summary (Last 24 hours) at 7/26/2024 1511  Last data filed at 7/26/2024 0945  Gross per 24 hour   Intake 360 ml   Output 1300 ml   Net -940 ml         Vancomycin Monitoring:  Trough:  No results for input(s): \"VANCOTROUGH\" in the last 72 hours.  Random:    Recent Labs     07/24/24  0630   VANCORANDOM 24.6         Vancomycin Administration Times:  Recent vancomycin administrations                     vancomycin (VANCOCIN) 1,500 mg in sodium chloride 0.9 % 250 mL IVPB ([id number]) (mg) 1,500 mg New Bag 07/26/24 0841     1,500 mg New Bag 07/25/24 0857    vancomycin (VANCOCIN) 1,250 mg in sodium chloride 0.9 % 250 mL IVPB ([id number]) (mg) 1,250 mg New Bag 07/24/24 0803     1,250 mg New Bag 07/23/24 2018             Assessment:  73 yo/M, transferred from OSH for low back pain.    PMH: cholangiocarcinoma.    MRI revealed suspected L-spine OM/discitis (T12-L1, L2-L3).    ID consulted for ATBs and consulted Pharmacy to dose vanco.    Recent [facility] admission (6/24-7/10) on vanco 1000 mg q12h for +Enterococcus bacteremia.    Estimated Creatinine Clearance: 92 mL/min (based on SCr of 0.8 mg/dL).  To dose vancomycin, pharmacy will be utilizing [employer] calculation software for goal AUC/MARTÍN 400-600 mg/L-hr (predicted AUC/MARTÍN = 542, Tr = 14.2 mcg/mL).  7/23: day #2 vanco & cefepime..Cr stable (0.7), UO = 0.9 mL/kg/hr, afebrile.  7/24: day #3 ATBs.  Plan for IR-guided T & L-spine Bx.   Vanc level high on 1250 mg q12h. Cr stable (0.8), but low UO documented (0.5 mL/kg/hr), afebrile.  7/25: day #4 ATBs. Vanc dose increased starting today. Cr stable, UO = 0.5 mL/kg/hr.  To have Bone Bx of T/L-spine.  7/26: day #5 vanco & cefepime.  Had T/L-spine Bx on 7/25  -- Cx's pending.  Afebrile, Cr stable, UO = 0.8 mL/kg/hr.    Plan:  Continue vancomycin 1500 mg q24h.   Will re-check vancomycin level when needed.   Will continue to monitor renal function.   Pharmacy to follow.    Otf Shoemaker, PharmD  7/26/2024  3:11 PM  [phone]

## 2024-07-26 NOTE — FLOWSHEET NOTE
Inpatient Wound Care(initial evaluation) 6505    Admit Date: 7/21/2024  3:06 PM    Reason for consult:  arms, coccyx    Significant history:  per H & P  CHIEF COMPLAINT: Severe back pain and concern for an infection   Past medical history of cholangiocarcinoma status post stent, diabetes mellitus transferred from [facility] due to severe back pain.  Patient is here and discharged on July 10 of this year.  He mentioned he went to home then assisted living facility and then to rehab, and he was feeling severe back pain.  He decided to come here because he likes to be here and neurosurgery already informed.    Findings:     07/26/24 1010   Skin Integumentary    Skin Integrity Redness;Ecchymosis  (poor turgor, dried scabs)   Location BUE   Skin Integrity Site 3   Skin Integrity Location 3 Erosion/denuded  (chronic discoloration)    Location 3 coccyx, buttocks   Skin Integrity Site 4   Skin Integrity Location 4   (dry thick skin)   Location 4 bilateral feet & heels   Wound 07/21/24 Arm Right;Upper   Date First Assessed/Time First Assessed: 07/21/24 1732   Present on Original Admission: Yes  Primary Wound Type: Skin Tear  Location: Arm  Wound Location Orientation: Right;Upper   Wound Image    Wound Etiology Skin Tear   Dressing Status New dressing applied   Wound Cleansed Cleansed with saline   Dressing/Treatment Roll gauze;Non adherent;Dry dressing   Wound Length (cm) 3 cm   Wound Width (cm) 3 cm   Wound Depth (cm) 0.1 cm   Wound Surface Area (cm^2) 9 cm^2   Change in Wound Size % (l*w) -2042.86   Wound Volume (cm^3) 0.9 cm^3   Wound Healing % -2043   Wound Assessment Pink/red   Drainage Amount Scant (moist but unmeasurable)   Drainage Description Serosanguinous   Odor None   Krystal-wound Assessment Ecchymosis   Wound Thickness Description not for Pressure Injury Partial thickness   Wound 07/22/24 Left antecubital   Date First Assessed/Time First Assessed: 07/22/24 1830   Present on Original Admission: No  Primary

## 2024-07-26 NOTE — PLAN OF CARE
Problem: Chronic Conditions and Co-morbidities  Goal: Patient's chronic conditions and co-morbidity symptoms are monitored and maintained or improved  7/26/2024 1937 by Regina Vee RN  Outcome: Progressing  7/26/2024 1023 by Isabelle Hinton RN  Outcome: Progressing     Problem: Discharge Planning  Goal: Discharge to home or other facility with appropriate resources  7/26/2024 1937 by Rgeina Vee RN  Outcome: Progressing  7/26/2024 1023 by Isabelle Hinton RN  Outcome: Progressing     Problem: Skin/Tissue Integrity  Goal: Absence of new skin breakdown  Description: 1.  Monitor for areas of redness and/or skin breakdown  2.  Assess vascular access sites hourly  3.  Every 4-6 hours minimum:  Change oxygen saturation probe site  4.  Every 4-6 hours:  If on nasal continuous positive airway pressure, respiratory therapy assess nares and determine need for appliance change or resting period.  7/26/2024 1937 by Regina Vee RN  Outcome: Progressing  7/26/2024 1023 by Isabelle Hinton RN  Outcome: Progressing     Problem: ABCDS Injury Assessment  Goal: Absence of physical injury  7/26/2024 1937 by Regina Vee RN  Outcome: Progressing  7/26/2024 1023 by Isabelle Hinton RN  Outcome: Progressing     Problem: Safety - Adult  Goal: Free from fall injury  7/26/2024 1937 by Regina Vee RN  Outcome: Progressing  7/26/2024 1023 by Isabelle Hinton RN  Outcome: Progressing     Problem: Pain  Goal: Verbalizes/displays adequate comfort level or baseline comfort level  7/26/2024 1937 by Regina Vee, RN  Outcome: Progressing  7/26/2024 1023 by Isabelle Hinton RN  Outcome: Progressing

## 2024-07-27 LAB
ALBUMIN SERPL-MCNC: 1.7 G/DL (ref 3.5–5.2)
ALP SERPL-CCNC: 1116 U/L (ref 40–129)
ALT SERPL-CCNC: 38 U/L (ref 0–40)
ANION GAP SERPL CALCULATED.3IONS-SCNC: 9 MMOL/L (ref 7–16)
AST SERPL-CCNC: 68 U/L (ref 0–39)
BILIRUB DIRECT SERPL-MCNC: 1.6 MG/DL (ref 0–0.3)
BILIRUB INDIRECT SERPL-MCNC: 0.8 MG/DL (ref 0–1)
BILIRUB SERPL-MCNC: 2.4 MG/DL (ref 0–1.2)
BUN SERPL-MCNC: 26 MG/DL (ref 6–23)
CALCIUM SERPL-MCNC: 7.5 MG/DL (ref 8.6–10.2)
CHLORIDE SERPL-SCNC: 105 MMOL/L (ref 98–107)
CO2 SERPL-SCNC: 23 MMOL/L (ref 22–29)
CREAT SERPL-MCNC: 1 MG/DL (ref 0.7–1.2)
ERYTHROCYTE [DISTWIDTH] IN BLOOD BY AUTOMATED COUNT: 15.9 % (ref 11.5–15)
GFR, ESTIMATED: 77 ML/MIN/1.73M2
GLUCOSE SERPL-MCNC: 93 MG/DL (ref 74–99)
HCT VFR BLD AUTO: 21.7 % (ref 37–54)
HCT VFR BLD AUTO: 23.3 % (ref 37–54)
HGB BLD-MCNC: 6.5 G/DL (ref 12.5–16.5)
HGB BLD-MCNC: 7.2 G/DL (ref 12.5–16.5)
MAGNESIUM SERPL-MCNC: 1.8 MG/DL (ref 1.6–2.6)
MCH RBC QN AUTO: 31.6 PG (ref 26–35)
MCHC RBC AUTO-ENTMCNC: 30 G/DL (ref 32–34.5)
MCV RBC AUTO: 105.3 FL (ref 80–99.9)
MICROORGANISM SPEC CULT: NO GROWTH
MICROORGANISM/AGENT SPEC: NORMAL
PHOSPHATE SERPL-MCNC: 2.5 MG/DL (ref 2.5–4.5)
PLATELET # BLD AUTO: 90 K/UL (ref 130–450)
PLATELET CONFIRMATION: NORMAL
PMV BLD AUTO: 9.3 FL (ref 7–12)
POTASSIUM SERPL-SCNC: 3.7 MMOL/L (ref 3.5–5)
PROT SERPL-MCNC: 5.6 G/DL (ref 6.4–8.3)
RBC # BLD AUTO: 2.06 M/UL (ref 3.8–5.8)
SERVICE CMNT-IMP: NORMAL
SODIUM SERPL-SCNC: 137 MMOL/L (ref 132–146)
SPECIMEN DESCRIPTION: NORMAL
WBC OTHER # BLD: 3.4 K/UL (ref 4.5–11.5)

## 2024-07-27 PROCEDURE — 85018 HEMOGLOBIN: CPT

## 2024-07-27 PROCEDURE — 2140000000 HC CCU INTERMEDIATE R&B

## 2024-07-27 PROCEDURE — 2580000003 HC RX 258

## 2024-07-27 PROCEDURE — 82248 BILIRUBIN DIRECT: CPT

## 2024-07-27 PROCEDURE — 99232 SBSQ HOSP IP/OBS MODERATE 35: CPT | Performed by: INTERNAL MEDICINE

## 2024-07-27 PROCEDURE — 6360000002 HC RX W HCPCS: Performed by: INTERNAL MEDICINE

## 2024-07-27 PROCEDURE — 6370000000 HC RX 637 (ALT 250 FOR IP): Performed by: INTERNAL MEDICINE

## 2024-07-27 PROCEDURE — 86850 RBC ANTIBODY SCREEN: CPT

## 2024-07-27 PROCEDURE — 85014 HEMATOCRIT: CPT

## 2024-07-27 PROCEDURE — 85027 COMPLETE CBC AUTOMATED: CPT

## 2024-07-27 PROCEDURE — 86901 BLOOD TYPING SEROLOGIC RH(D): CPT

## 2024-07-27 PROCEDURE — 84100 ASSAY OF PHOSPHORUS: CPT

## 2024-07-27 PROCEDURE — 83735 ASSAY OF MAGNESIUM: CPT

## 2024-07-27 PROCEDURE — 2580000003 HC RX 258: Performed by: INTERNAL MEDICINE

## 2024-07-27 PROCEDURE — 86923 COMPATIBILITY TEST ELECTRIC: CPT

## 2024-07-27 PROCEDURE — 86900 BLOOD TYPING SEROLOGIC ABO: CPT

## 2024-07-27 PROCEDURE — P9016 RBC LEUKOCYTES REDUCED: HCPCS

## 2024-07-27 PROCEDURE — 80053 COMPREHEN METABOLIC PANEL: CPT

## 2024-07-27 PROCEDURE — 36430 TRANSFUSION BLD/BLD COMPNT: CPT

## 2024-07-27 PROCEDURE — 6360000002 HC RX W HCPCS

## 2024-07-27 RX ORDER — SODIUM CHLORIDE 9 MG/ML
INJECTION, SOLUTION INTRAVENOUS PRN
Status: DISCONTINUED | OUTPATIENT
Start: 2024-07-27 | End: 2024-07-31 | Stop reason: HOSPADM

## 2024-07-27 RX ADMIN — SODIUM CHLORIDE, PRESERVATIVE FREE 10 ML: 5 INJECTION INTRAVENOUS at 08:29

## 2024-07-27 RX ADMIN — CEFEPIME 2000 MG: 2 INJECTION, POWDER, FOR SOLUTION INTRAVENOUS at 21:12

## 2024-07-27 RX ADMIN — FUROSEMIDE 20 MG: 20 TABLET ORAL at 21:06

## 2024-07-27 RX ADMIN — MORPHINE SULFATE 2 MG: 2 INJECTION, SOLUTION INTRAMUSCULAR; INTRAVENOUS at 04:10

## 2024-07-27 RX ADMIN — MORPHINE SULFATE 2 MG: 2 INJECTION, SOLUTION INTRAMUSCULAR; INTRAVENOUS at 22:46

## 2024-07-27 RX ADMIN — CEFEPIME 2000 MG: 2 INJECTION, POWDER, FOR SOLUTION INTRAVENOUS at 13:42

## 2024-07-27 RX ADMIN — OXYCODONE HYDROCHLORIDE AND ACETAMINOPHEN 1 TABLET: 5; 325 TABLET ORAL at 21:15

## 2024-07-27 RX ADMIN — SODIUM CHLORIDE, PRESERVATIVE FREE 10 ML: 5 INJECTION INTRAVENOUS at 21:06

## 2024-07-27 RX ADMIN — OXYCODONE HYDROCHLORIDE AND ACETAMINOPHEN 1 TABLET: 5; 325 TABLET ORAL at 03:10

## 2024-07-27 RX ADMIN — PANTOPRAZOLE SODIUM 40 MG: 40 TABLET, DELAYED RELEASE ORAL at 06:09

## 2024-07-27 RX ADMIN — ENOXAPARIN SODIUM 40 MG: 100 INJECTION SUBCUTANEOUS at 08:29

## 2024-07-27 RX ADMIN — FUROSEMIDE 20 MG: 20 TABLET ORAL at 08:29

## 2024-07-27 RX ADMIN — PANTOPRAZOLE SODIUM 40 MG: 40 TABLET, DELAYED RELEASE ORAL at 16:47

## 2024-07-27 RX ADMIN — CEFEPIME 1000 MG: 1 INJECTION, POWDER, FOR SOLUTION INTRAMUSCULAR; INTRAVENOUS at 06:11

## 2024-07-27 RX ADMIN — MORPHINE SULFATE 2 MG: 2 INJECTION, SOLUTION INTRAMUSCULAR; INTRAVENOUS at 11:53

## 2024-07-27 RX ADMIN — MORPHINE SULFATE 2 MG: 2 INJECTION, SOLUTION INTRAMUSCULAR; INTRAVENOUS at 07:34

## 2024-07-27 RX ADMIN — OXYCODONE HYDROCHLORIDE AND ACETAMINOPHEN 1 TABLET: 5; 325 TABLET ORAL at 15:15

## 2024-07-27 RX ADMIN — OXYCODONE HYDROCHLORIDE AND ACETAMINOPHEN 1 TABLET: 5; 325 TABLET ORAL at 09:15

## 2024-07-27 RX ADMIN — SODIUM CHLORIDE 1500 MG: 9 INJECTION, SOLUTION INTRAVENOUS at 10:33

## 2024-07-27 ASSESSMENT — PAIN SCALES - GENERAL
PAINLEVEL_OUTOF10: 10
PAINLEVEL_OUTOF10: 9
PAINLEVEL_OUTOF10: 8
PAINLEVEL_OUTOF10: 8
PAINLEVEL_OUTOF10: 9
PAINLEVEL_OUTOF10: 0
PAINLEVEL_OUTOF10: 8
PAINLEVEL_OUTOF10: 10
PAINLEVEL_OUTOF10: 3
PAINLEVEL_OUTOF10: 8

## 2024-07-27 ASSESSMENT — PAIN DESCRIPTION - DESCRIPTORS
DESCRIPTORS: SHARP;SHOOTING;DISCOMFORT
DESCRIPTORS: ACHING;DISCOMFORT;SORE

## 2024-07-27 ASSESSMENT — PAIN DESCRIPTION - PAIN TYPE
TYPE: CHRONIC PAIN
TYPE: CHRONIC PAIN

## 2024-07-27 ASSESSMENT — PAIN DESCRIPTION - ONSET
ONSET: ON-GOING
ONSET: ON-GOING

## 2024-07-27 ASSESSMENT — PAIN DESCRIPTION - LOCATION
LOCATION: BACK
LOCATION: BACK
LOCATION: BACK;NECK
LOCATION: GENERALIZED
LOCATION: BACK;RIB CAGE

## 2024-07-27 ASSESSMENT — PAIN - FUNCTIONAL ASSESSMENT
PAIN_FUNCTIONAL_ASSESSMENT: ACTIVITIES ARE NOT PREVENTED
PAIN_FUNCTIONAL_ASSESSMENT: ACTIVITIES ARE NOT PREVENTED

## 2024-07-27 ASSESSMENT — PAIN DESCRIPTION - ORIENTATION
ORIENTATION: LOWER;MID
ORIENTATION: LOWER
ORIENTATION: LOWER

## 2024-07-27 ASSESSMENT — PAIN DESCRIPTION - FREQUENCY
FREQUENCY: CONTINUOUS
FREQUENCY: CONTINUOUS

## 2024-07-27 NOTE — PROGRESS NOTES
Renal Dose Adjustment Policy (Generic)     This patient is on medication that requires renal, weight, and/or indication dose adjustment.      Date Body Weight IBW  Adjusted BW SCr  CrCl Dialysis status   7/27/2024 92.5 kg (204 lb) Ideal body weight: 79.9 kg (176 lb 2.4 oz)  Adjusted ideal body weight: 85 kg (187 lb 4.6 oz) Serum creatinine: 1 mg/dL 07/27/24 0415  Estimated creatinine clearance: 73 mL/min N/a       Pharmacy has dose-adjusted the following medication(s):    Date Previous Order Adjusted Order   7/27/2024 Cefepime 1g q8h Cefepime 2g q8h       These changes were made per protocol according to the [facility]   Automatic Renal Dose Adjustment Policy.     *Please note this dose may need readjusted if patient's condition changes.    Please contact pharmacy with any questions regarding these changes.    Mark Forbes RPH  7/27/2024  12:10 PM

## 2024-07-27 NOTE — PROGRESS NOTES
[facility] Hospitalist Progress Note      Synopsis: Patient with a history of cholangiocarcinoma status post stent, type 2 diabetes admitted on 7/21/2024 as a transfer from [facility] due to severe back pain.  Was recently at assisted living then to rehab and was feeling severe back pain.  Neurosurgery consulted MRI of the thoracic and lumbar and cervical with and without contrast showed progressive discitis and osteomyelitis of the lumbar and thoracic spine which is known. Currently in cefepime and vancomycin. ID consulted. IR consulted for spinal biopsy for culture which was completed 7/25.    Subjective  No acute events over night.    Exam:  /66   Pulse 85   Temp 98.2 °F (36.8 °C) (Oral)   Resp 16   Ht 1.854 m (6' 1\")   Wt 92.5 kg (204 lb)   SpO2 100%   BMI 26.91 kg/m²   General Appearance: alert and oriented to person, place and time and in no acute distress  Skin: warm and dry  Head: normocephalic and atraumatic  Eyes: pupils equal, round, and reactive to light, extraocular eye movements intact, conjunctivae yellow.  Neck: neck supple and non tender without mass   Pulmonary/Chest: clear to auscultation bilaterally- no wheezes, rales or rhonchi, normal air movement, no respiratory distress  Cardiovascular: normal rate, normal S1 and S2 and no carotid bruits  Abdomen: Distended  Extremities: no cyanosis, no clubbing and no edema  Neurologic: no cranial nerve deficit and speech normal    Medications:  Reviewed    Infusion Medications    sodium chloride      sodium chloride       Scheduled Medications    vancomycin  1,500 mg IntraVENous Q24H    cefepime  1,000 mg IntraVENous Q8H    furosemide  20 mg Oral BID    pantoprazole  40 mg Oral BID AC    sodium chloride flush  5-40 mL IntraVENous 2 times per day    enoxaparin  40 mg SubCUTAneous Daily     PRN Meds: sodium chloride, oxyCODONE-acetaminophen, sodium chloride flush, sodium chloride, potassium chloride **OR** potassium alternative oral

## 2024-07-27 NOTE — PATIENT CARE CONFERENCE
P Quality Flow/Interdisciplinary Rounds Progress Note        Quality Flow Rounds held on July 27, 2024    Disciplines Attending:  Bedside Nurse and Nursing Unit Leadership    Raudel Herndon was admitted on 7/21/2024  3:06 PM    Anticipated Discharge Date:       Disposition:    Chris Score:  Chris Scale Score: 17    Readmission Risk              Risk of Unplanned Readmission:  31           Discussed patient goal for the day, patient clinical progression, and barriers to discharge.  The following Goal(s) of the Day/Commitment(s) have been identified:  Patient Satisfaction         Jayne Rothman RN  July 27, 2024

## 2024-07-27 NOTE — PROGRESS NOTES
Pharmacy Consultation Note  (Antibiotic Dosing and Monitoring)    Initial consult date: 7-  Consulting physician/provider: Dr. Graham  Drug: Vancomycin  Indication: OM/discitis (L-spine)    Age/  Gender Height Weight IBW  Allergy Information   74 y.o./male 185.4 cm (6' 1\") 92.5 kg (204 lb)     Ideal body weight: 79.9 kg (176 lb 2.4 oz)  Adjusted ideal body weight: 85 kg (187 lb 4.6 oz)   Adhesive tape      Renal Function:  Recent Labs     07/25/24  0520 07/26/24  0850 07/27/24  0415   BUN 23 22 26*   CREATININE 0.9 0.8 1.0         Intake/Output Summary (Last 24 hours) at 7/27/2024 1050  Last data filed at 7/27/2024 1034  Gross per 24 hour   Intake 646.25 ml   Output 1700 ml   Net -1053.75 ml         Vancomycin Monitoring:  Trough:  No results for input(s): \"VANCOTROUGH\" in the last 72 hours.  Random:  No results for input(s): \"VANCORANDOM\" in the last 72 hours.      Vancomycin Administration Times:  Recent vancomycin administrations                     vancomycin (VANCOCIN) 1,500 mg in sodium chloride 0.9 % 250 mL IVPB ([id number]) (mg) 1,500 mg New Bag 07/27/24 1033     1,500 mg New Bag 07/26/24 0841     1,500 mg New Bag 07/25/24 0857             Assessment:  73 yo/M, transferred from OSH for low back pain.    PMH: cholangiocarcinoma.    MRI revealed suspected L-spine OM/discitis (T12-L1, L2-L3).    ID consulted for ATBs and consulted Pharmacy to dose vanco.    Recent [facility] admission (6/24-7/10) on vanco 1000 mg q12h for +Enterococcus bacteremia.    Estimated Creatinine Clearance: 73 mL/min (based on SCr of 1 mg/dL).  To dose vancomycin, pharmacy will be utilizing [employer] calculation software for goal AUC/MARTÍN 400-600 mg/L-hr (predicted AUC/MARTÍN = 600, Tr = 16.5 mcg/mL).  7/23: day #2 vanco & cefepime..Cr stable (0.7), UO = 0.9 mL/kg/hr, afebrile.  7/24: day #3 ATBs.  Plan for IR-guided T & L-spine Bx.  Vanc level high on 1250 mg q12h. Cr stable (0.8), but low UO documented (0.5 mL/kg/hr), afebrile.  7/25: day

## 2024-07-27 NOTE — PROGRESS NOTES
Patient tolerated first 15 minutes of blood transfusion with no adverse reactions. Patient resting comfortably and vitals stable.Regina Vee RN

## 2024-07-27 NOTE — PROGRESS NOTES
[facility]  [facility]  Progress Note    SUBJECTIVE:  No chief complaint on file.    Patient resting in bed. Patient is tolerating medications. No reported adverse drug reactions.  No nausea, vomiting, diarrhea.    Review of systems:  As stated above in the chief complaint, otherwise negative.    Medications:  Scheduled Meds:   cefepime  2,000 mg IntraVENous Q8H    vancomycin  1,500 mg IntraVENous Q24H    furosemide  20 mg Oral BID    pantoprazole  40 mg Oral BID AC    sodium chloride flush  5-40 mL IntraVENous 2 times per day    enoxaparin  40 mg SubCUTAneous Daily     Continuous Infusions:   sodium chloride      sodium chloride       PRN Meds:sodium chloride, oxyCODONE-acetaminophen, sodium chloride flush, sodium chloride, potassium chloride **OR** potassium alternative oral replacement **OR** potassium chloride, magnesium sulfate, ondansetron **OR** ondansetron, polyethylene glycol, acetaminophen **OR** acetaminophen, morphine    OBJECTIVE:  /66   Pulse 85   Temp 98.2 °F (36.8 °C) (Oral)   Resp 16   Ht 1.854 m (6' 1\")   Wt 92.5 kg (204 lb)   SpO2 100%   BMI 26.91 kg/m²   Temp  Av °F (36.7 °C)  Min: 97.4 °F (36.3 °C)  Max: 98.7 °F (37.1 °C)  Constitutional: NAD  Skin: Warm and dry. No rashes were noted.   Neuro: Alert and oriented  HEENT: Round and reactive pupils.  Moist mucous membranes.  No ulcerations or thrush.  Chest: .Respirations unlabored. Breath sounds clear.   Cardiovascular:  RRR  Abdomen: Soft. Bowel sounds present.   Extremities: LE edema    Lines: PICC Left basilic 24      Laboratory and Tests:  Lab Results   Component Value Date    WBC 3.4 (L) 2024    WBC 4.7 2024    WBC 4.6 07/10/2024    HGB 7.2 (L) 2024    HCT 23.3 (L) 2024    .3 (H) 2024    PLT 90 (L) 2024     Lab Results   Component Value Date    CRP 81.0 (H) 2024    CRP 69.0 (H) 2024     Lab Results   Component Value Date    SEDRATE 137

## 2024-07-28 LAB
ABO/RH: NORMAL
ALBUMIN SERPL-MCNC: 1.7 G/DL (ref 3.5–5.2)
ALP SERPL-CCNC: 1143 U/L (ref 40–129)
ALT SERPL-CCNC: 35 U/L (ref 0–40)
ANION GAP SERPL CALCULATED.3IONS-SCNC: 9 MMOL/L (ref 7–16)
ANTIBODY SCREEN: NEGATIVE
ARM BAND NUMBER: NORMAL
AST SERPL-CCNC: 69 U/L (ref 0–39)
BILIRUB DIRECT SERPL-MCNC: 1.7 MG/DL (ref 0–0.3)
BILIRUB INDIRECT SERPL-MCNC: 1 MG/DL (ref 0–1)
BILIRUB SERPL-MCNC: 2.7 MG/DL (ref 0–1.2)
BLOOD BANK BLOOD PRODUCT EXPIRATION DATE: NORMAL
BLOOD BANK DISPENSE STATUS: NORMAL
BLOOD BANK ISBT PRODUCT BLOOD TYPE: 9500
BLOOD BANK PRODUCT CODE: NORMAL
BLOOD BANK SAMPLE EXPIRATION: NORMAL
BLOOD BANK UNIT TYPE AND RH: NORMAL
BPU ID: NORMAL
BUN SERPL-MCNC: 24 MG/DL (ref 6–23)
CALCIUM SERPL-MCNC: 7.5 MG/DL (ref 8.6–10.2)
CHLORIDE SERPL-SCNC: 107 MMOL/L (ref 98–107)
CO2 SERPL-SCNC: 22 MMOL/L (ref 22–29)
COMPONENT: NORMAL
CREAT SERPL-MCNC: 1 MG/DL (ref 0.7–1.2)
CROSSMATCH RESULT: NORMAL
ERYTHROCYTE [DISTWIDTH] IN BLOOD BY AUTOMATED COUNT: 17.7 % (ref 11.5–15)
GFR, ESTIMATED: 84 ML/MIN/1.73M2
GLUCOSE SERPL-MCNC: 86 MG/DL (ref 74–99)
HCT VFR BLD AUTO: 26.5 % (ref 37–54)
HGB BLD-MCNC: 8.3 G/DL (ref 12.5–16.5)
MAGNESIUM SERPL-MCNC: 1.7 MG/DL (ref 1.6–2.6)
MCH RBC QN AUTO: 31.9 PG (ref 26–35)
MCHC RBC AUTO-ENTMCNC: 31.3 G/DL (ref 32–34.5)
MCV RBC AUTO: 101.9 FL (ref 80–99.9)
PHOSPHATE SERPL-MCNC: 2.2 MG/DL (ref 2.5–4.5)
PLATELET # BLD AUTO: 92 K/UL (ref 130–450)
PLATELET CONFIRMATION: NORMAL
PMV BLD AUTO: 9.4 FL (ref 7–12)
POTASSIUM SERPL-SCNC: 3.6 MMOL/L (ref 3.5–5)
PROT SERPL-MCNC: 5.9 G/DL (ref 6.4–8.3)
RBC # BLD AUTO: 2.6 M/UL (ref 3.8–5.8)
SODIUM SERPL-SCNC: 138 MMOL/L (ref 132–146)
TRANSFUSION STATUS: NORMAL
UNIT DIVISION: 0
UNIT ISSUE DATE/TIME: NORMAL
WBC OTHER # BLD: 3.7 K/UL (ref 4.5–11.5)

## 2024-07-28 PROCEDURE — 6360000002 HC RX W HCPCS: Performed by: INTERNAL MEDICINE

## 2024-07-28 PROCEDURE — 2580000003 HC RX 258

## 2024-07-28 PROCEDURE — 6370000000 HC RX 637 (ALT 250 FOR IP): Performed by: INTERNAL MEDICINE

## 2024-07-28 PROCEDURE — 85027 COMPLETE CBC AUTOMATED: CPT

## 2024-07-28 PROCEDURE — 80053 COMPREHEN METABOLIC PANEL: CPT

## 2024-07-28 PROCEDURE — 2580000003 HC RX 258: Performed by: INTERNAL MEDICINE

## 2024-07-28 PROCEDURE — 99232 SBSQ HOSP IP/OBS MODERATE 35: CPT | Performed by: INTERNAL MEDICINE

## 2024-07-28 PROCEDURE — 82248 BILIRUBIN DIRECT: CPT

## 2024-07-28 PROCEDURE — 6360000002 HC RX W HCPCS

## 2024-07-28 PROCEDURE — 36415 COLL VENOUS BLD VENIPUNCTURE: CPT

## 2024-07-28 PROCEDURE — 83735 ASSAY OF MAGNESIUM: CPT

## 2024-07-28 PROCEDURE — 84100 ASSAY OF PHOSPHORUS: CPT

## 2024-07-28 PROCEDURE — 2140000000 HC CCU INTERMEDIATE R&B

## 2024-07-28 RX ORDER — HEPARIN 100 UNIT/ML
300 SYRINGE INTRAVENOUS PRN
Status: DISCONTINUED | OUTPATIENT
Start: 2024-07-28 | End: 2024-07-31 | Stop reason: HOSPADM

## 2024-07-28 RX ORDER — MORPHINE SULFATE 2 MG/ML
1 INJECTION, SOLUTION INTRAMUSCULAR; INTRAVENOUS EVERY 6 HOURS PRN
Status: DISCONTINUED | OUTPATIENT
Start: 2024-07-28 | End: 2024-07-29

## 2024-07-28 RX ADMIN — CEFEPIME 2000 MG: 2 INJECTION, POWDER, FOR SOLUTION INTRAVENOUS at 21:37

## 2024-07-28 RX ADMIN — SODIUM CHLORIDE, PRESERVATIVE FREE 10 ML: 5 INJECTION INTRAVENOUS at 21:34

## 2024-07-28 RX ADMIN — OXYCODONE HYDROCHLORIDE AND ACETAMINOPHEN 1 TABLET: 5; 325 TABLET ORAL at 16:40

## 2024-07-28 RX ADMIN — CEFEPIME 2000 MG: 2 INJECTION, POWDER, FOR SOLUTION INTRAVENOUS at 05:53

## 2024-07-28 RX ADMIN — CEFEPIME 2000 MG: 2 INJECTION, POWDER, FOR SOLUTION INTRAVENOUS at 14:30

## 2024-07-28 RX ADMIN — POLYETHYLENE GLYCOL 3350 17 G: 17 POWDER, FOR SOLUTION ORAL at 07:58

## 2024-07-28 RX ADMIN — SODIUM CHLORIDE, PRESERVATIVE FREE 10 ML: 5 INJECTION INTRAVENOUS at 07:58

## 2024-07-28 RX ADMIN — MORPHINE SULFATE 2 MG: 2 INJECTION, SOLUTION INTRAMUSCULAR; INTRAVENOUS at 05:50

## 2024-07-28 RX ADMIN — OXYCODONE HYDROCHLORIDE AND ACETAMINOPHEN 1 TABLET: 5; 325 TABLET ORAL at 23:06

## 2024-07-28 RX ADMIN — PANTOPRAZOLE SODIUM 40 MG: 40 TABLET, DELAYED RELEASE ORAL at 07:58

## 2024-07-28 RX ADMIN — PANTOPRAZOLE SODIUM 40 MG: 40 TABLET, DELAYED RELEASE ORAL at 16:40

## 2024-07-28 RX ADMIN — FUROSEMIDE 20 MG: 20 TABLET ORAL at 07:58

## 2024-07-28 RX ADMIN — OXYCODONE HYDROCHLORIDE AND ACETAMINOPHEN 1 TABLET: 5; 325 TABLET ORAL at 09:32

## 2024-07-28 RX ADMIN — FUROSEMIDE 20 MG: 20 TABLET ORAL at 21:34

## 2024-07-28 RX ADMIN — SODIUM CHLORIDE 1500 MG: 9 INJECTION, SOLUTION INTRAVENOUS at 10:34

## 2024-07-28 RX ADMIN — HEPARIN 300 UNITS: 100 SYRINGE at 06:40

## 2024-07-28 RX ADMIN — OXYCODONE HYDROCHLORIDE AND ACETAMINOPHEN 1 TABLET: 5; 325 TABLET ORAL at 03:17

## 2024-07-28 RX ADMIN — ENOXAPARIN SODIUM 40 MG: 100 INJECTION SUBCUTANEOUS at 07:58

## 2024-07-28 ASSESSMENT — PAIN SCALES - GENERAL
PAINLEVEL_OUTOF10: 8
PAINLEVEL_OUTOF10: 7
PAINLEVEL_OUTOF10: 4
PAINLEVEL_OUTOF10: 2
PAINLEVEL_OUTOF10: 7
PAINLEVEL_OUTOF10: 9
PAINLEVEL_OUTOF10: 5
PAINLEVEL_OUTOF10: 8

## 2024-07-28 ASSESSMENT — PAIN - FUNCTIONAL ASSESSMENT
PAIN_FUNCTIONAL_ASSESSMENT: ACTIVITIES ARE NOT PREVENTED

## 2024-07-28 ASSESSMENT — PAIN DESCRIPTION - DESCRIPTORS
DESCRIPTORS: ACHING;DISCOMFORT;SORE

## 2024-07-28 ASSESSMENT — PAIN DESCRIPTION - LOCATION
LOCATION: GENERALIZED
LOCATION: GENERALIZED;BACK
LOCATION: BACK
LOCATION: GENERALIZED
LOCATION: BACK

## 2024-07-28 ASSESSMENT — PAIN DESCRIPTION - FREQUENCY
FREQUENCY: CONTINUOUS
FREQUENCY: CONTINUOUS

## 2024-07-28 ASSESSMENT — PAIN DESCRIPTION - PAIN TYPE
TYPE: CHRONIC PAIN
TYPE: CHRONIC PAIN

## 2024-07-28 ASSESSMENT — PAIN DESCRIPTION - ORIENTATION
ORIENTATION: LOWER
ORIENTATION: LOWER

## 2024-07-28 ASSESSMENT — PAIN DESCRIPTION - ONSET
ONSET: ON-GOING
ONSET: ON-GOING

## 2024-07-28 NOTE — PLAN OF CARE
Problem: Chronic Conditions and Co-morbidities  Goal: Patient's chronic conditions and co-morbidity symptoms are monitored and maintained or improved  7/27/2024 2138 by Yaya Madera RN  Outcome: Progressing     Problem: Discharge Planning  Goal: Discharge to home or other facility with appropriate resources  7/27/2024 2138 by Yaya Madera RN  Outcome: Progressing     Problem: Skin/Tissue Integrity  Goal: Absence of new skin breakdown  Description: 1.  Monitor for areas of redness and/or skin breakdown  2.  Assess vascular access sites hourly  3.  Every 4-6 hours minimum:  Change oxygen saturation probe site  4.  Every 4-6 hours:  If on nasal continuous positive airway pressure, respiratory therapy assess nares and determine need for appliance change or resting period.  7/27/2024 2138 by Yaya Madera RN  Outcome: Progressing     Problem: ABCDS Injury Assessment  Goal: Absence of physical injury  7/27/2024 2138 by Yaya Madera RN  Outcome: Progressing     Problem: Safety - Adult  Goal: Free from fall injury  7/27/2024 2138 by Yaya Madera RN  Outcome: Progressing     Problem: Pain  Goal: Verbalizes/displays adequate comfort level or baseline comfort level  7/27/2024 2138 by Yaya Madera RN  Outcome: Progressing

## 2024-07-28 NOTE — PROGRESS NOTES
[facility]  [facility]  Progress Note    SUBJECTIVE:  No chief complaint on file.    Patient resting in bed. Patient is tolerating medications. No reported adverse drug reactions.  No nausea, vomiting, diarrhea.    Review of systems:  As stated above in the chief complaint, otherwise negative.    Medications:  Scheduled Meds:   cefepime  2,000 mg IntraVENous Q8H    vancomycin  1,500 mg IntraVENous Q24H    furosemide  20 mg Oral BID    pantoprazole  40 mg Oral BID AC    sodium chloride flush  5-40 mL IntraVENous 2 times per day    enoxaparin  40 mg SubCUTAneous Daily     Continuous Infusions:   sodium chloride      sodium chloride       PRN Meds:heparin (PF), morphine, sodium chloride, oxyCODONE-acetaminophen, sodium chloride flush, sodium chloride, potassium chloride **OR** potassium alternative oral replacement **OR** potassium chloride, magnesium sulfate, ondansetron **OR** ondansetron, polyethylene glycol, acetaminophen **OR** acetaminophen    OBJECTIVE:  /65   Pulse 71   Temp 97.8 °F (36.6 °C) (Temporal)   Resp 18   Ht 1.854 m (6' 1\")   Wt 92.5 kg (204 lb)   SpO2 100%   BMI 26.91 kg/m²   Temp  Av.5 °F (36.4 °C)  Min: 97 °F (36.1 °C)  Max: 98 °F (36.7 °C)  Constitutional: NAD  Skin: Warm and dry. No rashes were noted.   Neuro: Alert and oriented  HEENT: Round and reactive pupils.  Moist mucous membranes.  No ulcerations or thrush.  Chest: .Respirations unlabored. Breath sounds clear.   Cardiovascular:  RRR  Abdomen: Soft. Bowel sounds present.   Extremities: LE edema    Lines: PICC Left basilic 24      Laboratory and Tests:  Lab Results   Component Value Date    WBC 3.4 (L) 2024    WBC 4.7 2024    WBC 4.6 07/10/2024    HGB 7.2 (L) 2024    HCT 23.3 (L) 2024    .3 (H) 2024    PLT 90 (L) 2024     Lab Results   Component Value Date    CRP 81.0 (H) 2024    CRP 69.0 (H) 2024     Lab Results   Component Value

## 2024-07-28 NOTE — PROGRESS NOTES
Pharmacy Consultation Note  (Antibiotic Dosing and Monitoring)    Initial consult date: 7-  Consulting physician/provider: Dr. Graham  Drug: Vancomycin  Indication: OM/discitis (L-spine)    Age/  Gender Height Weight IBW  Allergy Information   74 y.o./male 185.4 cm (6' 1\") 92.5 kg (204 lb)     Ideal body weight: 79.9 kg (176 lb 2.4 oz)  Adjusted ideal body weight: 85 kg (187 lb 4.6 oz)   Adhesive tape      Renal Function:  Recent Labs     07/26/24  0850 07/27/24  0415 07/28/24  0610   BUN 22 26* 24*   CREATININE 0.8 1.0 1.0         Intake/Output Summary (Last 24 hours) at 7/28/2024 1244  Last data filed at 7/28/2024 1214  Gross per 24 hour   Intake 630 ml   Output 2325 ml   Net -1695 ml         Vancomycin Monitoring:  Trough:  No results for input(s): \"VANCOTROUGH\" in the last 72 hours.  Random:  No results for input(s): \"VANCORANDOM\" in the last 72 hours.      Vancomycin Administration Times:  Recent vancomycin administrations                     vancomycin (VANCOCIN) 1,500 mg in sodium chloride 0.9 % 250 mL IVPB ([id number]) (mg) 1,500 mg New Bag 07/28/24 1034     1,500 mg New Bag 07/27/24 1033     1,500 mg New Bag 07/26/24 0841             Assessment:  73 yo/M, transferred from OSH for low back pain.    PMH: cholangiocarcinoma.    MRI revealed suspected L-spine OM/discitis (T12-L1, L2-L3).    ID consulted for ATBs and consulted Pharmacy to dose vanco.    Recent [facility] admission (6/24-7/10) on vanco 1000 mg q12h for +Enterococcus bacteremia.    Estimated Creatinine Clearance: 73 mL/min (based on SCr of 1 mg/dL).  To dose vancomycin, pharmacy will be utilizing [employer] calculation software for goal AUC/MARTÍN 400-600 mg/L-hr (predicted AUC/MARTÍN = 600, Tr = 16.5 mcg/mL).  7/23: day #2 vanco & cefepime..Cr stable (0.7), UO = 0.9 mL/kg/hr, afebrile.  7/24: day #3 ATBs.  Plan for IR-guided T & L-spine Bx.  Vanc level high on 1250 mg q12h. Cr stable (0.8), but low UO documented (0.5 mL/kg/hr), afebrile.  7/25: day #4  ATBs. Vanc dose increased starting today. Cr stable, UO = 0.5 mL/kg/hr.  To have Bone Bx of T/L-spine.  7/26: day #5 vanco & cefepime.  Had T/L-spine Bx on 7/25  -- Cx's pending.  Afebrile, Cr stable, UO = 0.8 mL/kg/hr.  7/27: day #6 ATBs. WBC low today (pan-cytopenic), receiving PRBC transfusion.  Afebrile, Cx's NGTD.  7/28: day #7 ATBs. Cr = 1 and UO = 1.1 mL/kg/hr.    Plan:  Continue vancomycin 1500 mg q24h.   Will re-check vancomycin level on 7/29.    Will continue to monitor renal function.   Pharmacy to follow.    Otf Shoemaker, PharmD  7/28/2024  12:44 PM  [phone]

## 2024-07-28 NOTE — PATIENT CARE CONFERENCE
P Quality Flow/Interdisciplinary Rounds Progress Note        Quality Flow Rounds held on July 28, 2024    Disciplines Attending:  Bedside Nurse, , , and Nursing Unit Leadership    Raudel Herndon was admitted on 7/21/2024  3:06 PM    Anticipated Discharge Date:       Disposition:    Chris Score:  Chris Scale Score: 17    Readmission Risk              Risk of Unplanned Readmission:  32           Discussed patient goal for the day, patient clinical progression, and barriers to discharge.  The following Goal(s) of the Day/Commitment(s) have been identified:  downgrade/discharge planning       Miranda Marie RN  July 28, 2024

## 2024-07-28 NOTE — PROGRESS NOTES
[facility] Hospitalist Progress Note      Synopsis: Patient with a history of cholangiocarcinoma status post stent, type 2 diabetes admitted on 7/21/2024 as a transfer from [facility] due to severe back pain.  Was recently at assisted living then to rehab and was feeling severe back pain.  Neurosurgery consulted MRI of the thoracic and lumbar and cervical with and without contrast showed progressive discitis and osteomyelitis of the lumbar and thoracic spine which is known. Currently in cefepime and vancomycin. ID consulted. IR consulted for spinal biopsy for culture which was completed 7/25.    Subjective  No acute events over night.  Awaiting cultures from biopsy.    Exam:  /65   Pulse 71   Temp 97.8 °F (36.6 °C) (Temporal)   Resp 18   Ht 1.854 m (6' 1\")   Wt 92.5 kg (204 lb)   SpO2 100%   BMI 26.91 kg/m²   General Appearance: alert and oriented to person, place and time and in no acute distress  Skin: warm and dry  Head: normocephalic and atraumatic  Eyes: pupils equal, round, and reactive to light, extraocular eye movements intact, conjunctivae yellow.  Neck: neck supple and non tender without mass   Pulmonary/Chest: clear to auscultation bilaterally- no wheezes, rales or rhonchi, normal air movement, no respiratory distress  Cardiovascular: normal rate, normal S1 and S2 and no carotid bruits  Abdomen: Distended  Extremities: no cyanosis, no clubbing and no edema  Neurologic: no cranial nerve deficit and speech normal    Medications:  Reviewed    Infusion Medications    sodium chloride      sodium chloride       Scheduled Medications    cefepime  2,000 mg IntraVENous Q8H    vancomycin  1,500 mg IntraVENous Q24H    furosemide  20 mg Oral BID    pantoprazole  40 mg Oral BID AC    sodium chloride flush  5-40 mL IntraVENous 2 times per day    enoxaparin  40 mg SubCUTAneous Daily     PRN Meds: heparin (PF), sodium chloride, oxyCODONE-acetaminophen, sodium chloride flush, sodium chloride, potassium  within next 24-48 hours  PT and OT consulted  Strict intake and output, monitor renal function  Hgb 6.5 thi AM. No s/s of bleeding. One unit transfused. Repeat CBC after hgb 7.2. CBC pending for today    Diet: ADULT DIET; Regular; 4 carb choices (60 gm/meal)  ADULT ORAL NUTRITION SUPPLEMENT; Breakfast, Lunch, Dinner; Wound Healing Oral Supplement  Code Status: DNR-CCA  PT/OT Eval Status:   Ordered  DVT Prophylaxis:   Lovenox  Recommended disposition at discharge: Pending IR biopsy culture results and further ID antibiotic plans.     +++++++++++++++++++++++++++++++++++++++++++++++++  Toño Shah MD   [facility] Hospitalist  [facility]  +++++++++++++++++++++++++++++++++++++++++++++++++  NOTE: This report was transcribed using voice recognition software. Every effort was made to ensure accuracy; however, inadvertent computerized transcription errors may be present.

## 2024-07-29 LAB
ALBUMIN SERPL-MCNC: 1.7 G/DL (ref 3.5–5.2)
ALP SERPL-CCNC: 1047 U/L (ref 40–129)
ALT SERPL-CCNC: 29 U/L (ref 0–40)
AST SERPL-CCNC: 60 U/L (ref 0–39)
BILIRUB DIRECT SERPL-MCNC: 1.6 MG/DL (ref 0–0.3)
BILIRUB INDIRECT SERPL-MCNC: 1 MG/DL (ref 0–1)
BILIRUB SERPL-MCNC: 2.6 MG/DL (ref 0–1.2)
DATE LAST DOSE: NORMAL
MAGNESIUM SERPL-MCNC: 1.9 MG/DL (ref 1.6–2.6)
PHOSPHATE SERPL-MCNC: 2.2 MG/DL (ref 2.5–4.5)
PROT SERPL-MCNC: 5.7 G/DL (ref 6.4–8.3)
TME LAST DOSE: NORMAL H
VANCOMYCIN DOSE: NORMAL MG
VANCOMYCIN SERPL-MCNC: 26.9 UG/ML (ref 5–40)

## 2024-07-29 PROCEDURE — 2580000003 HC RX 258

## 2024-07-29 PROCEDURE — 36415 COLL VENOUS BLD VENIPUNCTURE: CPT

## 2024-07-29 PROCEDURE — 2140000000 HC CCU INTERMEDIATE R&B

## 2024-07-29 PROCEDURE — 6370000000 HC RX 637 (ALT 250 FOR IP): Performed by: INTERNAL MEDICINE

## 2024-07-29 PROCEDURE — 6360000002 HC RX W HCPCS

## 2024-07-29 PROCEDURE — 84100 ASSAY OF PHOSPHORUS: CPT

## 2024-07-29 PROCEDURE — 80076 HEPATIC FUNCTION PANEL: CPT

## 2024-07-29 PROCEDURE — 80202 ASSAY OF VANCOMYCIN: CPT

## 2024-07-29 PROCEDURE — 2580000003 HC RX 258: Performed by: INTERNAL MEDICINE

## 2024-07-29 PROCEDURE — 99232 SBSQ HOSP IP/OBS MODERATE 35: CPT | Performed by: INTERNAL MEDICINE

## 2024-07-29 PROCEDURE — 6360000002 HC RX W HCPCS: Performed by: INTERNAL MEDICINE

## 2024-07-29 PROCEDURE — 83735 ASSAY OF MAGNESIUM: CPT

## 2024-07-29 RX ORDER — MORPHINE SULFATE 2 MG/ML
1 INJECTION, SOLUTION INTRAMUSCULAR; INTRAVENOUS EVERY 8 HOURS PRN
Status: DISCONTINUED | OUTPATIENT
Start: 2024-07-29 | End: 2024-07-31 | Stop reason: HOSPADM

## 2024-07-29 RX ADMIN — CEFEPIME 2000 MG: 2 INJECTION, POWDER, FOR SOLUTION INTRAVENOUS at 13:31

## 2024-07-29 RX ADMIN — OXYCODONE HYDROCHLORIDE AND ACETAMINOPHEN 1 TABLET: 5; 325 TABLET ORAL at 11:34

## 2024-07-29 RX ADMIN — PANTOPRAZOLE SODIUM 40 MG: 40 TABLET, DELAYED RELEASE ORAL at 05:12

## 2024-07-29 RX ADMIN — CEFEPIME 2000 MG: 2 INJECTION, POWDER, FOR SOLUTION INTRAVENOUS at 20:39

## 2024-07-29 RX ADMIN — FUROSEMIDE 20 MG: 20 TABLET ORAL at 07:47

## 2024-07-29 RX ADMIN — PANTOPRAZOLE SODIUM 40 MG: 40 TABLET, DELAYED RELEASE ORAL at 15:40

## 2024-07-29 RX ADMIN — ENOXAPARIN SODIUM 40 MG: 100 INJECTION SUBCUTANEOUS at 07:47

## 2024-07-29 RX ADMIN — Medication 250 MG: at 11:34

## 2024-07-29 RX ADMIN — SODIUM CHLORIDE 1500 MG: 9 INJECTION, SOLUTION INTRAVENOUS at 10:39

## 2024-07-29 RX ADMIN — OXYCODONE HYDROCHLORIDE AND ACETAMINOPHEN 1 TABLET: 5; 325 TABLET ORAL at 17:57

## 2024-07-29 RX ADMIN — OXYCODONE HYDROCHLORIDE AND ACETAMINOPHEN 1 TABLET: 5; 325 TABLET ORAL at 23:58

## 2024-07-29 RX ADMIN — OXYCODONE HYDROCHLORIDE AND ACETAMINOPHEN 1 TABLET: 5; 325 TABLET ORAL at 05:10

## 2024-07-29 RX ADMIN — SODIUM CHLORIDE, PRESERVATIVE FREE 10 ML: 5 INJECTION INTRAVENOUS at 07:47

## 2024-07-29 RX ADMIN — SODIUM CHLORIDE, PRESERVATIVE FREE 10 ML: 5 INJECTION INTRAVENOUS at 20:36

## 2024-07-29 RX ADMIN — FUROSEMIDE 20 MG: 20 TABLET ORAL at 20:36

## 2024-07-29 RX ADMIN — CEFEPIME 2000 MG: 2 INJECTION, POWDER, FOR SOLUTION INTRAVENOUS at 05:32

## 2024-07-29 ASSESSMENT — PAIN - FUNCTIONAL ASSESSMENT
PAIN_FUNCTIONAL_ASSESSMENT: ACTIVITIES ARE NOT PREVENTED

## 2024-07-29 ASSESSMENT — PAIN SCALES - GENERAL
PAINLEVEL_OUTOF10: 3
PAINLEVEL_OUTOF10: 3
PAINLEVEL_OUTOF10: 5
PAINLEVEL_OUTOF10: 7
PAINLEVEL_OUTOF10: 3
PAINLEVEL_OUTOF10: 6
PAINLEVEL_OUTOF10: 10
PAINLEVEL_OUTOF10: 4
PAINLEVEL_OUTOF10: 7
PAINLEVEL_OUTOF10: 10
PAINLEVEL_OUTOF10: 6

## 2024-07-29 ASSESSMENT — PAIN DESCRIPTION - PAIN TYPE
TYPE: CHRONIC PAIN
TYPE: CHRONIC PAIN

## 2024-07-29 ASSESSMENT — PAIN DESCRIPTION - DESCRIPTORS
DESCRIPTORS: ACHING;SORE;DISCOMFORT
DESCRIPTORS: ACHING;DISCOMFORT;SORE

## 2024-07-29 ASSESSMENT — PAIN DESCRIPTION - FREQUENCY
FREQUENCY: CONTINUOUS
FREQUENCY: CONTINUOUS

## 2024-07-29 ASSESSMENT — PAIN DESCRIPTION - LOCATION
LOCATION: GENERALIZED
LOCATION: BACK
LOCATION: GENERALIZED
LOCATION: BACK

## 2024-07-29 ASSESSMENT — PAIN DESCRIPTION - ORIENTATION
ORIENTATION: MID
ORIENTATION: MID

## 2024-07-29 ASSESSMENT — PAIN DESCRIPTION - ONSET
ONSET: ON-GOING
ONSET: ON-GOING

## 2024-07-29 NOTE — CARE COORDINATION
Transition of care update. Per ID today, to continue IV cefepime and IV vancomycin. If Body Fluid Cx stays negative in AM,  plan to dc with IV Vancomycin only for 6 weeks. Discharge plan is [facility]. Authorization per facility and Amanda in CM, stated is good through 8/2/24. He has PICC in place in left basilic. Possible discharge discharge tomorrow. PASRR and transport form is in envelope on soft chart. ALFA and destination updated. CM will follow.  Electronically signed by Rickey Sultana RN on 7/29/2024 at 3:10 PM

## 2024-07-29 NOTE — PROGRESS NOTES
Pharmacy Consultation Note  (Antibiotic Dosing and Monitoring)    Initial consult date: 7-  Consulting physician/provider: Dr. Graham  Drug: Vancomycin  Indication: OM/discitis (L-spine)    Age/  Gender Height Weight IBW  Allergy Information   74 y.o./male 185.4 cm (6' 1\") 92.5 kg (204 lb)     Ideal body weight: 79.9 kg (176 lb 2.4 oz)  Adjusted ideal body weight: 85 kg (187 lb 4.6 oz)   Adhesive tape      Renal Function:  Recent Labs     07/27/24  0415 07/28/24  0610   BUN 26* 24*   CREATININE 1.0 1.0         Intake/Output Summary (Last 24 hours) at 7/29/2024 1248  Last data filed at 7/29/2024 0921  Gross per 24 hour   Intake 540 ml   Output 1675 ml   Net -1135 ml         Vancomycin Monitoring:  Trough:  No results for input(s): \"VANCOTROUGH\" in the last 72 hours.  Random:    Recent Labs     07/29/24  0459   VANCORANDOM 26.9     Vancomycin Administration Times:  Recent vancomycin administrations                     vancomycin (VANCOCIN) 1,500 mg in sodium chloride 0.9 % 250 mL IVPB (Biab1Erg) (mg) 1,500 mg New Bag 07/29/24 1039     1,500 mg New Bag 07/28/24 1034     1,500 mg New Bag 07/27/24 1033             Assessment:  73 yo/M, transferred from OSH for low back pain.    PMH: cholangiocarcinoma.    MRI revealed suspected L-spine OM/discitis (T12-L1, L2-L3).    ID consulted for ATBs and consulted Pharmacy to dose vanco.    Recent [facility] admission (6/24-7/10) on vanco 1000 mg q12h for +Enterococcus bacteremia.    Estimated Creatinine Clearance: 73 mL/min (based on SCr of 1 mg/dL).  To dose vancomycin, pharmacy will be utilizing tabulate calculation software for goal AUC/MARTÍN 400-600 mg/L-hr (predicted AUC/MARTÍN = 600, Tr = 16.5 mcg/mL).  7/23: day #2 vanco & cefepime..Cr stable (0.7), UO = 0.9 mL/kg/hr, afebrile.  7/24: day #3 ATBs.  Plan for IR-guided T & L-spine Bx.  Vanc level high on 1250 mg q12h. Cr stable (0.8), but low UO documented (0.5 mL/kg/hr), afebrile.  7/25: day #4 ATBs. Vanc dose increased starting

## 2024-07-29 NOTE — PATIENT CARE CONFERENCE
P Quality Flow/Interdisciplinary Rounds Progress Note        Quality Flow Rounds held on July 29, 2024    Disciplines Attending:  Bedside Nurse, , , and Nursing Unit Leadership    Raudel Herndon was admitted on 7/21/2024  3:06 PM    Anticipated Discharge Date:       Disposition:    Chris Score:  Chris Scale Score: 17    Readmission Risk              Risk of Unplanned Readmission:  32           Discussed patient goal for the day, patient clinical progression, and barriers to discharge.  The following Goal(s) of the Day/Commitment(s) have been identified:  discharge planning/downgrade      Miranda Marie RN  July 29, 2024

## 2024-07-29 NOTE — PROGRESS NOTES
[facility]  [facility]  Progress Note    SUBJECTIVE:  No chief complaint on file.    Patient resting in bed. Patient is tolerating medications. No reported adverse drug reactions.  No nausea, vomiting, diarrhea.    Review of systems:  As stated above in the chief complaint, otherwise negative.    Medications:  Scheduled Meds:   [START ON 2024] vancomycin  1,000 mg IntraVENous Q24H    cefepime  2,000 mg IntraVENous Q8H    furosemide  20 mg Oral BID    pantoprazole  40 mg Oral BID AC    sodium chloride flush  5-40 mL IntraVENous 2 times per day    enoxaparin  40 mg SubCUTAneous Daily     Continuous Infusions:   sodium chloride      sodium chloride       PRN Meds:morphine, heparin (PF), sodium chloride, oxyCODONE-acetaminophen, sodium chloride flush, sodium chloride, potassium chloride **OR** potassium alternative oral replacement **OR** potassium chloride, magnesium sulfate, ondansetron **OR** ondansetron, polyethylene glycol, acetaminophen **OR** acetaminophen    OBJECTIVE:  BP (!) 114/57   Pulse 83   Temp 97.3 °F (36.3 °C) (Temporal)   Resp 14   Ht 1.854 m (6' 1\")   Wt 92.5 kg (204 lb)   SpO2 98%   BMI 26.91 kg/m²   Temp  Av.6 °F (36.4 °C)  Min: 97.3 °F (36.3 °C)  Max: 98 °F (36.7 °C)  Constitutional: NAD  Skin: Warm and dry. No rashes were noted.   Neuro: Alert and oriented  HEENT: Round and reactive pupils.  Moist mucous membranes.  No ulcerations or thrush.  Chest: .Respirations unlabored. Breath sounds clear.   Cardiovascular:  RRR  Abdomen: Soft. Bowel sounds present.   Extremities: LE edema    Lines: PICC Left basilic 24      Laboratory and Tests:  Lab Results   Component Value Date    WBC 3.7 (L) 2024    WBC 3.4 (L) 2024    WBC 4.7 2024    HGB 8.3 (L) 2024    HCT 26.5 (L) 2024    .9 (H) 2024    PLT 92 (L) 2024     Lab Results   Component Value Date    CRP 81.0 (H) 2024    CRP 69.0 (H) 2024     Lab

## 2024-07-29 NOTE — PROGRESS NOTES
[facility] Hospitalist Progress Note      Synopsis: Patient with a history of cholangiocarcinoma status post stent, type 2 diabetes admitted on 7/21/2024 as a transfer from [facility] due to severe back pain.  Was recently at assisted living then to rehab and was feeling severe back pain.  Neurosurgery consulted MRI of the thoracic and lumbar and cervical with and without contrast showed progressive discitis and osteomyelitis of the lumbar and thoracic spine which is known. Currently in cefepime and vancomycin. ID consulted. IR consulted for spinal biopsy for culture which was completed 7/25.    Subjective  No acute events over night.  Patient endorses back pain, no other changes.     Exam:  /75   Pulse 89   Temp 98 °F (36.7 °C) (Temporal)   Resp 16   Ht 1.854 m (6' 1\")   Wt 92.5 kg (204 lb)   SpO2 96%   BMI 26.91 kg/m²   General Appearance: alert and oriented to person, place and time and in no acute distress  Skin: warm and dry  Head: normocephalic and atraumatic  Eyes: pupils equal, round, and reactive to light, extraocular eye movements intact, conjunctivae yellow.  Neck: neck supple and non tender without mass   Pulmonary/Chest: clear to auscultation bilaterally- no wheezes, rales or rhonchi, normal air movement, no respiratory distress  Cardiovascular: normal rate, normal S1 and S2 and no carotid bruits  Abdomen: Distended  Extremities: no cyanosis, no clubbing and no edema  Neurologic: no cranial nerve deficit and speech normal    Medications:  Reviewed    Infusion Medications    sodium chloride      sodium chloride       Scheduled Medications    potassium & sodium phosphates  1 packet Oral Once    cefepime  2,000 mg IntraVENous Q8H    vancomycin  1,500 mg IntraVENous Q24H    furosemide  20 mg Oral BID    pantoprazole  40 mg Oral BID AC    sodium chloride flush  5-40 mL IntraVENous 2 times per day    enoxaparin  40 mg SubCUTAneous Daily     PRN Meds: heparin (PF), morphine, sodium chloride,  plans to stop tomorrow  PT and OT consulted  Strict intake and output, monitor renal function  Hgb 6.5 7/27. No s/s of bleeding. One unit transfused. Repeat H/H stable since    Diet: ADULT DIET; Regular; 4 carb choices (60 gm/meal)  ADULT ORAL NUTRITION SUPPLEMENT; Breakfast, Dinner; Diabetic Oral Supplement  ADULT ORAL NUTRITION SUPPLEMENT; Lunch, Dinner; Fortified Gelatin Oral Supplement  Code Status: DNR-CCA  PT/OT Eval Status:   Ordered  DVT Prophylaxis:   Lovenox  Recommended disposition at discharge: Pending IR biopsy culture results and further ID antibiotic plans.     +++++++++++++++++++++++++++++++++++++++++++++++++  Toño Shah MD   [facility] Hospitalist  [facility] [place].  +++++++++++++++++++++++++++++++++++++++++++++++++  NOTE: This report was transcribed using voice recognition software. Every effort was made to ensure accuracy; however, inadvertent computerized transcription errors may be present.

## 2024-07-29 NOTE — PROGRESS NOTES
Comprehensive Nutrition Assessment    Type and Reason for Visit:  Reassess    Nutrition Recommendations/Plan:   Recommend and start Glucerna supplement BID and Gelatein high protein supplement BID to help meet increased nutritional needs.           Malnutrition Assessment:  Malnutrition Status:  Severe malnutrition (07/22/24 1311)    Context:  Chronic Illness     Findings of the 6 clinical characteristics of malnutrition:  Energy Intake:  75% or less estimated energy requirements for 1 month or longer  Weight Loss:  Unable to assess (d/t possible fluid shifts ; and d/t no actual weights available since admission)     Body Fat Loss:  Severe body fat loss Buccal region, Triceps   Muscle Mass Loss:  Severe muscle mass loss Temples (temporalis), Clavicles (pectoralis & deltoids)  Fluid Accumulation:  No significant fluid accumulation     Strength:  Not Performed    Nutrition Assessment:    Patients po intake has been a little sporadic, averaging 50-75% of meals served since admission ; s/p IR biopsy of lumbar spine on 7/25 ; adm w/ severe back pain ; also adm w/ leg swelling and weakness ; noted osteomyelitis and discitis at T12-L3 ; hx of cholangiocarcinoma/cholangiocellular carcinoma (hx of chemotherapy) ; possible metastatic disease to his bones ;recent hospital admissions ; s/p ERCP w/ stent on 6/28 ; hx of DM/prostate CA/chronic pancreatitis ; hx of FTT and severe malnutrition ; pt does meet criteria for severe malnutrition ; hx of obstructive jaundice due to malignant neoplasm ; s/p cholecystectomy for gangrenous cholecystitis in 2017 ; hx of decreased po intake/appetite and subjective weight loss ; recent sepsis and UTI ; will provide updated recommendations    Nutrition Related Findings:    -I&Os (-9.2 L), 2-3+ edema, poor skin turgor, jaundice, A&O x 4, active BS, elevated LFT's, muscle/fat wasting ; Wound Type: Multiple, Skin Tears, Partial Thickness (skin tears x 2 ; erosion to coccyx)       Current

## 2024-07-29 NOTE — PLAN OF CARE
Problem: Chronic Conditions and Co-morbidities  Goal: Patient's chronic conditions and co-morbidity symptoms are monitored and maintained or improved  7/29/2024 0731 by Maite Garcia RN  Outcome: Progressing  7/29/2024 0447 by Yaya Madera RN  Outcome: Progressing     Problem: Discharge Planning  Goal: Discharge to home or other facility with appropriate resources  7/29/2024 0731 by Maite Garcia RN  Outcome: Progressing  7/29/2024 0447 by Yaya Madera RN  Outcome: Progressing     Problem: Skin/Tissue Integrity  Goal: Absence of new skin breakdown  Description: 1.  Monitor for areas of redness and/or skin breakdown  2.  Assess vascular access sites hourly  3.  Every 4-6 hours minimum:  Change oxygen saturation probe site  4.  Every 4-6 hours:  If on nasal continuous positive airway pressure, respiratory therapy assess nares and determine need for appliance change or resting period.  7/29/2024 0731 by Maite Garcia RN  Outcome: Progressing  7/29/2024 0447 by Yaya Madera RN  Outcome: Progressing     Problem: ABCDS Injury Assessment  Goal: Absence of physical injury  7/29/2024 0731 by Maite Garcia RN  Outcome: Progressing  7/29/2024 0447 by Yaya Madera RN  Outcome: Progressing     Problem: Safety - Adult  Goal: Free from fall injury  7/29/2024 0447 by Yaya Madera RN  Outcome: Progressing     Problem: Pain  Goal: Verbalizes/displays adequate comfort level or baseline comfort level  7/29/2024 0731 by Maite Garcia RN  Outcome: Progressing  7/29/2024 0447 by Yaya Madera RN  Outcome: Progressing     Problem: Nutrition Deficit:  Goal: Optimize nutritional status  Outcome: Progressing

## 2024-07-30 VITALS
DIASTOLIC BLOOD PRESSURE: 68 MMHG | OXYGEN SATURATION: 97 % | RESPIRATION RATE: 18 BRPM | WEIGHT: 204 LBS | TEMPERATURE: 97.2 F | SYSTOLIC BLOOD PRESSURE: 116 MMHG | HEIGHT: 73 IN | HEART RATE: 86 BPM | BODY MASS INDEX: 27.04 KG/M2

## 2024-07-30 LAB
ALBUMIN SERPL-MCNC: 1.7 G/DL (ref 3.5–5.2)
ALP SERPL-CCNC: 1105 U/L (ref 40–129)
ALT SERPL-CCNC: 32 U/L (ref 0–40)
ANION GAP SERPL CALCULATED.3IONS-SCNC: 6 MMOL/L (ref 7–16)
AST SERPL-CCNC: 63 U/L (ref 0–39)
BILIRUB SERPL-MCNC: 2.4 MG/DL (ref 0–1.2)
BUN SERPL-MCNC: 26 MG/DL (ref 6–23)
CALCIUM SERPL-MCNC: 8.1 MG/DL (ref 8.6–10.2)
CHLORIDE SERPL-SCNC: 108 MMOL/L (ref 98–107)
CO2 SERPL-SCNC: 26 MMOL/L (ref 22–29)
CREAT SERPL-MCNC: 0.9 MG/DL (ref 0.7–1.2)
GFR, ESTIMATED: 90 ML/MIN/1.73M2
GLUCOSE SERPL-MCNC: 89 MG/DL (ref 74–99)
MAGNESIUM SERPL-MCNC: 1.8 MG/DL (ref 1.6–2.6)
PHOSPHATE SERPL-MCNC: 2.2 MG/DL (ref 2.5–4.5)
POTASSIUM SERPL-SCNC: 3.5 MMOL/L (ref 3.5–5)
PROT SERPL-MCNC: 6 G/DL (ref 6.4–8.3)
SODIUM SERPL-SCNC: 140 MMOL/L (ref 132–146)

## 2024-07-30 PROCEDURE — 99239 HOSP IP/OBS DSCHRG MGMT >30: CPT | Performed by: STUDENT IN AN ORGANIZED HEALTH CARE EDUCATION/TRAINING PROGRAM

## 2024-07-30 PROCEDURE — 97535 SELF CARE MNGMENT TRAINING: CPT

## 2024-07-30 PROCEDURE — 2580000003 HC RX 258: Performed by: INTERNAL MEDICINE

## 2024-07-30 PROCEDURE — 6370000000 HC RX 637 (ALT 250 FOR IP): Performed by: INTERNAL MEDICINE

## 2024-07-30 PROCEDURE — 84100 ASSAY OF PHOSPHORUS: CPT

## 2024-07-30 PROCEDURE — 2140000000 HC CCU INTERMEDIATE R&B

## 2024-07-30 PROCEDURE — 97530 THERAPEUTIC ACTIVITIES: CPT

## 2024-07-30 PROCEDURE — 2580000003 HC RX 258

## 2024-07-30 PROCEDURE — 6360000002 HC RX W HCPCS

## 2024-07-30 PROCEDURE — 83735 ASSAY OF MAGNESIUM: CPT

## 2024-07-30 PROCEDURE — 6360000002 HC RX W HCPCS: Performed by: INTERNAL MEDICINE

## 2024-07-30 PROCEDURE — 80053 COMPREHEN METABOLIC PANEL: CPT

## 2024-07-30 RX ADMIN — ENOXAPARIN SODIUM 40 MG: 100 INJECTION SUBCUTANEOUS at 07:57

## 2024-07-30 RX ADMIN — VANCOMYCIN HYDROCHLORIDE 1000 MG: 1 INJECTION, POWDER, LYOPHILIZED, FOR SOLUTION INTRAVENOUS at 13:37

## 2024-07-30 RX ADMIN — FUROSEMIDE 20 MG: 20 TABLET ORAL at 07:57

## 2024-07-30 RX ADMIN — OXYCODONE HYDROCHLORIDE AND ACETAMINOPHEN 1 TABLET: 5; 325 TABLET ORAL at 19:36

## 2024-07-30 RX ADMIN — POTASSIUM CHLORIDE 40 MEQ: 1500 TABLET, EXTENDED RELEASE ORAL at 07:57

## 2024-07-30 RX ADMIN — PANTOPRAZOLE SODIUM 40 MG: 40 TABLET, DELAYED RELEASE ORAL at 06:00

## 2024-07-30 RX ADMIN — SODIUM CHLORIDE, PRESERVATIVE FREE 10 ML: 5 INJECTION INTRAVENOUS at 07:57

## 2024-07-30 RX ADMIN — CEFEPIME 2000 MG: 2 INJECTION, POWDER, FOR SOLUTION INTRAVENOUS at 06:03

## 2024-07-30 RX ADMIN — OXYCODONE HYDROCHLORIDE AND ACETAMINOPHEN 1 TABLET: 5; 325 TABLET ORAL at 13:32

## 2024-07-30 RX ADMIN — SODIUM CHLORIDE, PRESERVATIVE FREE 10 ML: 5 INJECTION INTRAVENOUS at 19:36

## 2024-07-30 RX ADMIN — PANTOPRAZOLE SODIUM 40 MG: 40 TABLET, DELAYED RELEASE ORAL at 15:44

## 2024-07-30 RX ADMIN — OXYCODONE HYDROCHLORIDE AND ACETAMINOPHEN 1 TABLET: 5; 325 TABLET ORAL at 06:00

## 2024-07-30 RX ADMIN — CEFEPIME 2000 MG: 2 INJECTION, POWDER, FOR SOLUTION INTRAVENOUS at 13:35

## 2024-07-30 RX ADMIN — FUROSEMIDE 20 MG: 20 TABLET ORAL at 22:18

## 2024-07-30 RX ADMIN — POLYETHYLENE GLYCOL 3350 17 G: 17 POWDER, FOR SOLUTION ORAL at 05:59

## 2024-07-30 ASSESSMENT — PAIN SCALES - GENERAL
PAINLEVEL_OUTOF10: 10
PAINLEVEL_OUTOF10: 8
PAINLEVEL_OUTOF10: 7
PAINLEVEL_OUTOF10: 5
PAINLEVEL_OUTOF10: 5
PAINLEVEL_OUTOF10: 4

## 2024-07-30 ASSESSMENT — PAIN DESCRIPTION - LOCATION
LOCATION: BACK;RIB CAGE
LOCATION: GENERALIZED
LOCATION: BACK

## 2024-07-30 ASSESSMENT — PAIN DESCRIPTION - DESCRIPTORS
DESCRIPTORS: ACHING;DISCOMFORT;SORE;TENDER
DESCRIPTORS: ACHING;DISCOMFORT;SORE
DESCRIPTORS: SHARP;NAGGING;DISCOMFORT

## 2024-07-30 ASSESSMENT — PAIN DESCRIPTION - ORIENTATION
ORIENTATION: LOWER
ORIENTATION: LOWER

## 2024-07-30 ASSESSMENT — PAIN DESCRIPTION - FREQUENCY: FREQUENCY: CONTINUOUS

## 2024-07-30 ASSESSMENT — PAIN DESCRIPTION - ONSET: ONSET: ON-GOING

## 2024-07-30 NOTE — PROGRESS NOTES
through with safety techniques and functional independence  * Recommendation of environmental modifications for increased safety with functional transfers/mobility and ADLs  * Therapeutic exercise to improve motor endurance, ROM, and functional strength for ADLs/functional transfers  * Therapeutic activities to facilitate/challenge dynamic balance, stand tolerance for increased safety and independence with ADLs  * Therapeutic activities to facilitate gross/fine motor skills for increased independence with ADLs  * Positioning to improve skin integrity, interaction with environment and functional independence     Recommended Adaptive Equipment: AE for LB self-care tasks, TBD for additional AE     Home Living: Pt admitted from [facility]  Pt lives with son in 1 floor home (+basement).  Pt enters home through basement - 0 RACH    Equipment owned: SPC, w/w, 3:1 BSC     Prior Level of Function (prior to recent MAG/hospitalizations): independent/mod I with ADLs , shares IADLs; ambulated w/ SPC PRN     Pain Level: Pt c/o 8-10/10 back pain this session ; reinforced pain management strategies and spinal precautions     Cognition: A&O: 4/4; Follows 1-2 step directions           Memory:  good            Sequencing:  good            Problem solving:  fair            Judgement/safety:  fair              Functional Assessment:  AM-PAC Daily Activity Raw Score: 12/24    Initial Eval Status  Date: 7/25/24 Treatment Status  Date: 7/30/24 STGs = LTGs  Time frame: 10-14 days   Feeding Supervision   Supervision Modified [place]    Grooming Minimal Assist   Seated EOB for various tasks Min A  Including oral hygiene, washed face and hands    Modified [place]    UB Dressing Moderate Assist   Gown     Dependent  To don/doff TLSO  Mod A  Donned/doffed gown Stand by Assist    LB Dressing Dependent   Dependent  Socks Moderate Assist    Bathing Maximal Assist  Max a  Bathing task Minimal Assist    Toileting Dependent   Dependent  Hygiene task  Moderate Assist    Bed Mobility  Rolling: Mod A  Supine to sit: Moderate Assist x2  Sit to supine: Moderate Assist x2 Rolling: Min A  Supine to sit: Mod A   Sit to supine: Mod A  Supine to sit: Minimal Assist   Sit to supine: Minimal Assist    Functional Transfers NT  Pt declined d/t noted fatigue and pain as EOB activity progressed Mod A x2  Sit><Stand elevated EOB  2x  Seated rest break between  Moderate Assist    Functional Mobility NT  NT Moderate Assist    Balance Sitting:     Static: Min>SBA    Dynamic: Min A  Standing: NT Sitting:     Static: Min>SBA    Dynamic: Min A  Standing: Mod A x1-2 w/ w/w      Activity Tolerance Fair-  Fair  Frequent rest breaks required Fair+   Visual/  Perceptual Glasses: yes              Comments: Upon arrival pt lying in bed. At end of session pt lying in bed all lines and tubes intact, call light within reach.   Treatment: Therapist educated pt regarding role of OT, spinal precautions and TLSO.  Therapist facilitated donning of TLSO (Rolled L/R w/ education/cues for precautions, safety and initiation of rest breaks), bed mobility (supine><sit EOB utilizing log roll technique. Cues for precautions and safety), unsupported sitting balance (addressing posture, weightshifting impacting ADLs and light dynamic reaching task), functional transfers (sit><stand elevated EOB w/ education/cues for safety and hand placement), standing balance tasks (2 trials w/ seated rest break between. Pt tolerated 30 second to 1 minute intervals standing with w/w and assist. Cues for posture, forward gaze and safety provided).  Therapist facilitated self-care retraining: UB/LB self-care tasks and seated grooming task while educating pt on modified techniques within precautions, posture, safety and energy conservation techniques. Skilled monitoring of HR, O2 sats and pts response to treatment.      Patient demonstrated decreased independence and safety during completion of ADL/functional transfer/mobility

## 2024-07-30 NOTE — DISCHARGE SUMMARY
[facility]   IN-PATIENT SERVICE       Discharge Summary     Patient ID: Raudel Herndon  :  1950   MRN: [id number]     ACCOUNT:  [id number]   Patient's PCP: Villa Lemus PA  Admit Date: 2024   Discharge Date: 2024     Length of Stay: 9  Code Status:  DNR-CCA  Admitting Physician: Karina Kaiser DO  Discharge Physician: Spenser Tan MD     Active Discharge Diagnoses:     Hospital Problem Lists:  Principal Problem:    Cholangiocarcinoma (HCC)  Active Problems:    Severe protein-calorie malnutrition (HCC)    Intractable back pain    Inability to walk    Discitis of multiple sites of spine    Osteomyelitis of spine (HCC)    Osteomyelitis (HCC)  Resolved Problems:    * No resolved hospital problems. *      Admission Condition:  fair     Discharged Condition: fair    Hospital Stay:     Hospital Course:  Raudel Herndon is a 74 y.o. male who was admitted for the management of   Cholangiocarcinoma (HCC) , presented to ER with No chief complaint on file.     Patient with a history of cholangiocarcinoma status post stent, type 2 diabetes admitted on 2024 as a transfer from [facility] due to severe back pain.  Was recently at assisted living then to rehab and was feeling severe back pain.  Neurosurgery consulted MRI of the thoracic and lumbar and cervical with and without contrast showed progressive discitis and osteomyelitis of the lumbar and thoracic spine which is known. Currently in cefepime and vancomycin. ID consulted. IR consulted for spinal biopsy for culture which was completed .      dc with IV Vancomycin only for 6 weeks   Significant therapeutic interventions: S/P IR Biopsy of Lumbar spine      Significant Diagnostic Studies:   Labs / Micro:  CBC:   Lab Results   Component Value Date/Time    WBC 3.7 2024 03:30 PM    RBC 2.60 2024 03:30 PM    HGB 8.3 2024 03:30 PM    HCT 26.5 2024 03:30 PM    .9 2024  03:30 PM    MCH 31.9 07/28/2024 03:30 PM    MCHC 31.3 07/28/2024 03:30 PM    RDW 17.7 07/28/2024 03:30 PM    PLT 92 07/28/2024 03:30 PM     BMP:    Lab Results   Component Value Date/Time    GLUCOSE 89 07/30/2024 05:10 AM     07/30/2024 05:10 AM    K 3.5 07/30/2024 05:10 AM     07/30/2024 05:10 AM    CO2 26 07/30/2024 05:10 AM    ANIONGAP 6 07/30/2024 05:10 AM    BUN 26 07/30/2024 05:10 AM    CREATININE 0.9 07/30/2024 05:10 AM    CALCIUM 8.1 07/30/2024 05:10 AM    LABGLOM 90 07/30/2024 05:10 AM    LABGLOM >60 03/19/2024 06:01 AM        Radiology:  IR GUIDED FNA    Result Date: 7/26/2024  Technically successful fine needle aspiration of the L2-L3 intervertebral disc space       Consultations:    Consults:     Final Specialist Recommendations/Findings:   IP CONSULT TO DIETITIAN  IP CONSULT TO NEUROSURGERY  IP CONSULT TO GENERAL SURGERY  IP CONSULT TO INFECTIOUS DISEASES  IP CONSULT TO PHARMACY  INPATIENT CONSULT TO ORTHOTIST/PROSTHETIST      The patient was seen and examined on day of discharge and this discharge summary is in conjunction with any daily progress note from day of discharge.    Discharge plan:     Disposition: To a non-[facility] facility    Physician Follow Up:   ID and neuroSX in 2 weeks   Raza Reynoso III, MD  [address]  [place] [id number] [phone]    Follow up in 2 week(s)      [place] [id number]  [phone]        Manish Graham MD  [address]  [place] [id number] [phone]    Follow up         Requiring Further Evaluation/Follow Up POST HOSPITALIZATION/Incidental Findings:     Diet: regular diet    Activity: As tolerated    Instructions to Patient: S/P IR Biopsy of Lumbar spine 07/25     Discharge Medications:      Medication List        CONTINUE taking these medications      magnesium oxide 400 MG tablet  Commonly known as: MAG-OX  Take 1 tablet by mouth daily     melatonin 3 MG Tabs tablet  Take 1

## 2024-07-30 NOTE — PROGRESS NOTES
[facility]  [facility]  Progress Note    SUBJECTIVE:  No chief complaint on file.    Patient resting in bed. Patient is tolerating medications. No reported adverse drug reactions.  No nausea, vomiting, diarrhea.    Review of systems:  As stated above in the chief complaint, otherwise negative.    Medications:  Scheduled Meds:   vancomycin  1,000 mg IntraVENous Q24H    cefepime  2,000 mg IntraVENous Q8H    furosemide  20 mg Oral BID    pantoprazole  40 mg Oral BID AC    sodium chloride flush  5-40 mL IntraVENous 2 times per day    enoxaparin  40 mg SubCUTAneous Daily     Continuous Infusions:   sodium chloride      sodium chloride       PRN Meds:morphine, heparin (PF), sodium chloride, oxyCODONE-acetaminophen, sodium chloride flush, sodium chloride, potassium chloride **OR** potassium alternative oral replacement **OR** potassium chloride, magnesium sulfate, ondansetron **OR** ondansetron, polyethylene glycol, acetaminophen **OR** acetaminophen    OBJECTIVE:  /68   Pulse 85   Temp 97.1 °F (36.2 °C) (Temporal)   Resp 16   Ht 1.854 m (6' 1\")   Wt 92.5 kg (204 lb)   SpO2 99%   BMI 26.91 kg/m²   Temp  Av.5 °F (36.4 °C)  Min: 97.1 °F (36.2 °C)  Max: 97.9 °F (36.6 °C)  Constitutional: NAD  Skin: Warm and dry. No rashes were noted.   Neuro: Alert and oriented  HEENT: Round and reactive pupils.  Moist mucous membranes.  No ulcerations or thrush.  Chest: .Respirations unlabored. Breath sounds clear.   Cardiovascular:  RRR  Abdomen: Soft. Bowel sounds present.   Extremities: LE edema    Lines: PICC Left basilic 24      Laboratory and Tests:  Lab Results   Component Value Date    WBC 3.7 (L) 2024    WBC 3.4 (L) 2024    WBC 4.7 2024    HGB 8.3 (L) 2024    HCT 26.5 (L) 2024    .9 (H) 2024    PLT 92 (L) 2024     Lab Results   Component Value Date    CRP 81.0 (H) 2024    CRP 69.0 (H) 2024     Lab Results   Component  Value Date    SEDRATE 137 (H) 07/23/2024    SEDRATE 60 (H) 07/03/2024     Lab Results   Component Value Date    PROCAL 0.58 (H) 07/03/2024     Radiology:  Reviewed.     Microbiology:   pending    ASSESSMENT:  Progressive discitis of T12-L3, MRI of thoracolumbar spine report noted  Cholangiocarcinoma with biliary stenting, currently on active chemotherapy via right IJ Mediport  Previous Enterococcus faecalis bacteremia 06/24 discharged with vancomycin  S/P IR Biopsy of Lumbar spine 07/25  Body fluid/disc space fluid 07/25 no growth so far     Plan:    Neurosurgery and hepatobiliary service evaluation appreciated  DC cefepime  Vancomycin 1 g daily for 6 weeks  CRP 81 and   Follow-up with ID clinic within 1 week  Patient can be discharged from ID standpoint      Manish Graham MD  2:37 PM  7/30/2024

## 2024-07-30 NOTE — PROGRESS NOTES
Pharmacy Consultation Note  (Antibiotic Dosing and Monitoring)    Initial consult date: 7-  Consulting physician/provider: Dr. Graham  Drug: Vancomycin  Indication: OM/discitis (L-spine)    Age/  Gender Height Weight IBW  Allergy Information   74 y.o./male 185.4 cm (6' 1\") 92.5 kg (204 lb)     Ideal body weight: 79.9 kg (176 lb 2.4 oz)  Adjusted ideal body weight: 85 kg (187 lb 4.6 oz)   Adhesive tape      Renal Function:  Recent Labs     07/28/24  0610 07/30/24  0510   BUN 24* 26*   CREATININE 1.0 0.9         Intake/Output Summary (Last 24 hours) at 7/30/2024 1432  Last data filed at 7/30/2024 1337  Gross per 24 hour   Intake 240 ml   Output 2625 ml   Net -2385 ml         Vancomycin Monitoring:  Trough:  No results for input(s): \"VANCOTROUGH\" in the last 72 hours.  Random:    Recent Labs     07/29/24  0459   VANCORANDOM 26.9     Vancomycin Administration Times:  Recent vancomycin administrations                     vancomycin (VANCOCIN) 1,000 mg in sodium chloride 0.9 % 250 mL IVPB (Eyum1Mvy) (mg) 1,000 mg New Bag 07/30/24 1337    vancomycin (VANCOCIN) 1,500 mg in sodium chloride 0.9 % 250 mL IVPB (Ubnu6Ile) (mg) 1,500 mg New Bag 07/29/24 1039     1,500 mg New Bag 07/28/24 1034                  Assessment:  73 yo/M, transferred from OSH for low back pain.    PMH: cholangiocarcinoma.    MRI revealed suspected L-spine OM/discitis (T12-L1, L2-L3).    ID consulted for ATBs and consulted Pharmacy to dose vanco.    Recent [facility] admission (6/24-7/10) on vanco 1000 mg q12h for +Enterococcus bacteremia.    Estimated Creatinine Clearance: 81 mL/min (based on SCr of 0.9 mg/dL).  To dose vancomycin, pharmacy will be utilizing [employer] calculation software for goal AUC/MARTÍN 400-600 mg/L-hr (predicted AUC/MARTÍN = 600, Tr = 16.5 mcg/mL).  7/23: day #2 vanco & cefepime..Cr stable (0.7), UO = 0.9 mL/kg/hr, afebrile.  7/24: day #3 ATBs.  Plan for IR-guided T & L-spine Bx.  Vanc level high on 1250 mg q12h. Cr stable (0.8), but

## 2024-07-30 NOTE — DISCHARGE INSTRUCTIONS
[facility] IV ANTIBIOTIC PROTOCOL FOR VANCOMYCIN    Consult Clinical Pharmacist to dose Vancomycin unless otherwise directed by the ordering physician    VASCULAR ACCESS DEVICES OR VADs (TLC, PICC, Midline, etc.):   1.  VADs will be replaced as necessary.  2.  Draw all blood work from VADs, except for drug levels.  3.  If unable to access a VAD, insert a peripheral catheter temporarily. Contact the Primary Care Physician or [facility] office for surgical referral.  4.  VAD Dressing Change Protocol    - Cleanse insertion site with Shur-Clens® or equivalent three times.    - Secure catheter with Steri-Strips®, Bone®, or equivalent securing device.    - Apply Opsite® 3000 or equivalent transparent dressing.    - Change dressing twice weekly, maintaining sterile technique. If there is a BioPatch®, SilverSite® or equivalent, change once weekly only or as needed.  5. For occluded VAD: instill alteplase (Cathflo®) 2 mg into occluded catheter but do not force solution into catheter. Let dwell x 30 minutes then check for patency, if not patent, let dwell for another 90 minutes then check patency.  If still not patent, instill another 2 mg and repeat above.  If still catheter still not patent, contact physician.  If not using premixed 1 mg syringe, dilute each vial with 2.2 mL sterile water to final concentration of 1 mg/mL. Mix by gently swirling until completely dissolved. Do not shake.    6.  Remove VAD upon completion of IV antibiotics, unless otherwise specified by the ordering physician.  When PICC or VAD is to be removed, documentation of length of inserted PICC. PICC or VAD length is to correlate with inserted length and sent to physician at the time of removal.  If VAD cannot be removed, schedule appointment at office for removal.    ROUTINE LABS TO BE ORDERED AND DRAWN:   1.  Infusion Center to call all abnormal lab values to the physician  2.  Fax all labs to the office in a timely manner, during office hours.  3.  Twice

## 2024-07-30 NOTE — PROGRESS NOTES
Physical Therapy  Facility/Department: [facility]  Physical Therapy Initial Assessment    Name: Raudel Herndon  : 1950  MRN: [id number]  Date of Service: 2024  Evaluating PT:  Alexandria Tapia PT, DPT [id number]    Room #:  6505/6505-A  Diagnosis:  Cholangiocarcinoma (HCC) [C22.1]  Osteomyelitis (HCC) [M86.9]  PMHx/PSHx:    Past Medical History:   Diagnosis Date    Cancer (HCC) 2024    cholangiocarcinoma    Diabetes mellitus (HCC)     Prostate cancer (HCC)     treated with seed implants     Procedure/Surgery:  none this admission   Reason for admission: Cholangiocarcinoma (HCC) [C22.1]  Osteomyelitis (HCC) [M86.9]  Precautions:  fall risk, spinal precautions, TLSO  Equipment Needs:  TBD    SUBJECTIVE:  Pt presents from [facility]. Prior to this, pt lived in a single story home ([facility]) with level entry. Pt notes he has not ambulated in weeks and has not been out of bed in ~2 weeks.     OBJECTIVE:   Initial Evaluation  Date: 24 Treatment  24 Short Term/ Long Term   Goals   AM-PAC 6 Clicks 8/24 10/24    Was pt agreeable to Eval/treatment? Yes yes    Does pt have pain? Back pain  Back pain     Bed Mobility  Rolling: min a  Supine to sit: mod A x2  Sit to supine: mod A x2  Scooting: mod A Rolling: min a  Supine to sit: max a  Sit to supine: max a  Scooting: mod A Rolling: SBA  Supine to sit: SBA  Sit to supine: SBA  Scooting: SBA   Transfers Sit to stand: NT  Stand to sit: NT  Stand pivot: NT Sit to stand: mod A x2  Stand to sit: NT  Stand pivot: NT Sit to stand: mod A  Stand to sit: mod A  Stand pivot: mod A AAD   Ambulation   NT  25 feet with AAD mod A   Stair negotiation: ascended and descended NT  NA   ROM BUE:  Refer to OT eval   BLE:  WNL     Strength BUE:  Refer to OT eval   BLE:  3+/5 grossly  >4/5 grossly    Balance Sitting EOB:  Karli<>SBA  Dynamic Standing:  NT  Sitting EOB:  ind  Dynamic Standing:  mod A FWW     Pt is A & O x 4  Sensation:  Pt denies numbness and tingling to

## 2024-07-30 NOTE — CARE COORDINATION
Transition of care update. Discharge order noted. Per ID, If Body Fluid Cx stays negative in AM,  plan to dc with IV Vancomycin only for 6 weeks.  Pt has a right basilic PICC line. Charge RN checking for d/c. Transport set up with [facility] for 5 PM. [facility] and ambulance form is signed, and is in the envelope on the soft chart. ALFA and destination updated. RN, liaison and pt updated. Pt stated he will call son Otf  who is working. Liaison verified ok with IV Vancomycin. CM will follow.  Electronically signed by Rickey Sulatna RN on 7/30/2024 at 1:12 PM

## 2024-07-30 NOTE — PROGRESS NOTES
RN messaged Dr. Graham via [employer] regarding pt needing his script printed and signed for antibiotics.

## 2024-08-01 ENCOUNTER — OUTSIDE SERVICES (OUTPATIENT)
Dept: PRIMARY CARE CLINIC | Age: 74
End: 2024-08-01

## 2024-08-01 DIAGNOSIS — R26.2 INABILITY TO WALK: ICD-10-CM

## 2024-08-01 DIAGNOSIS — D64.9 ANEMIA, UNSPECIFIED TYPE: ICD-10-CM

## 2024-08-01 DIAGNOSIS — R53.1 GENERALIZED WEAKNESS: ICD-10-CM

## 2024-08-01 DIAGNOSIS — C22.1 CHOLANGIOCARCINOMA (HCC): ICD-10-CM

## 2024-08-01 DIAGNOSIS — M46.20 OSTEOMYELITIS OF SPINE (HCC): Primary | ICD-10-CM

## 2024-08-01 DIAGNOSIS — M54.9 INTRACTABLE BACK PAIN: ICD-10-CM

## 2024-08-01 DIAGNOSIS — R53.81 PHYSICAL DECONDITIONING: ICD-10-CM

## 2024-08-01 DIAGNOSIS — Z91.81 AT MAXIMUM RISK FOR FALL: ICD-10-CM

## 2024-08-01 DIAGNOSIS — M46.49 DISCITIS OF MULTIPLE SITES OF SPINE: ICD-10-CM

## 2024-08-01 PROBLEM — M46.24 ACUTE OSTEOMYELITIS OF THORACIC SPINE (HCC): Status: ACTIVE | Noted: 2024-08-01

## 2024-08-01 NOTE — PROGRESS NOTES
Raudel Herndon (:  1950) is a 74 y.o. male.    Subjective   SUBJECTIVE/OBJECTIVE:  Past Medical History:   Diagnosis Date    Cancer (HCC) 2024    cholangiocarcinoma    Diabetes mellitus (HCC)     Prostate cancer (HCC)     treated with seed implants      Past Surgical History:   Procedure Laterality Date    ANKLE FUSION Right     COLONOSCOPY      ERCP N/A 2024    ENDOSCOPIC RETROGRADE CHOLANGIOPANCREATOGRAPHY performed by Raza Lane DO at [facility] ENDOSCOPY    ERCP N/A 2024    ENDOSCOPIC RETROGRADE CHOLANGIOPANCREATOGRAPHY DILATION BALLOON performed by Raza Lane DO at [facility] ENDOSCOPY    ERCP N/A 2024    ENDOSCOPIC RETROGRADE CHOLANGIOPANCREATOGRAPHY STENT REMOVAL performed by Raza Lane DO at [facility] ENDOSCOPY    ERCP N/A 2024    ENDOSCOPIC RETROGRADE CHOLANGIOPANCREATOGRAPHY STENT INSERTION performed by Raza Lane DO at [facility] ENDOSCOPY    ERCP N/A 2024    ENDOSCOPIC RETROGRADE CHOLANGIOPANCREATOGRAPHY/ POSSIBLE STENT performed by Raza Lane DO at [facility] ENDOSCOPY    EYE SURGERY Bilateral     IR BIOPSY LIVER PERCUTANEOUS  3/18/2024    IR BIOPSY LIVER PERCUTANEOUS 3/18/2024 John Mcpherson MD [facility] SPECIAL PROCEDURES    JOINT REPLACEMENT Right     hip    PORT SURGERY Right 3/8/2024    PORT INSERTION performed by Susie Barrera MD at [facility] OR    TOTAL KNEE ARTHROPLASTY Left       Family History   Problem Relation Age of Onset    Breast Cancer Mother     Cancer Mother       Social History     Socioeconomic History    Marital status:    Tobacco Use    Smoking status: Former     Types: Cigarettes     Start date:      Quit date: 1960     Years since quittin.6    Smokeless tobacco: Never   Vaping Use    Vaping Use: Never used   Substance and Sexual Activity    Alcohol use: Not Currently    Drug use: Never     Social Determinants of Health     Food Insecurity: No Food Insecurity (2024)    Hunger Vital Sign     Worried

## 2024-08-05 ENCOUNTER — OUTSIDE SERVICES (OUTPATIENT)
Dept: PRIMARY CARE CLINIC | Age: 74
End: 2024-08-05
Payer: MEDICARE

## 2024-08-05 DIAGNOSIS — D64.9 ANEMIA, UNSPECIFIED TYPE: ICD-10-CM

## 2024-08-05 DIAGNOSIS — C22.1 CHOLANGIOCARCINOMA (HCC): ICD-10-CM

## 2024-08-05 DIAGNOSIS — M46.20 OSTEOMYELITIS OF SPINE (HCC): Primary | ICD-10-CM

## 2024-08-05 DIAGNOSIS — E87.6 HYPOKALEMIA: ICD-10-CM

## 2024-08-05 DIAGNOSIS — M46.49 DISCITIS OF MULTIPLE SITES OF SPINE: ICD-10-CM

## 2024-08-05 PROCEDURE — 99308 SBSQ NF CARE LOW MDM 20: CPT | Performed by: STUDENT IN AN ORGANIZED HEALTH CARE EDUCATION/TRAINING PROGRAM

## 2024-08-05 NOTE — PROGRESS NOTES
Raudel Herndon (:  1950) is a 74 y.o. male.    Subjective     Patient with no specific concerns.  States his pain is about the same.  He denies any fever or chills.    Location: [facility]  Progress notes reviewed.    Past Medical History:   Diagnosis Date    Cancer (HCC) 2024    cholangiocarcinoma    Diabetes mellitus (HCC)     Prostate cancer (HCC)     treated with seed implants       Allergies   Allergen Reactions    Adhesive Tape Rash             Review of Systems - as above        Objective   Physical Exam  Constitutional:       Appearance: He is well-developed.   HENT:      Head: Normocephalic and atraumatic.   Eyes:      General:         Right eye: No discharge.         Left eye: No discharge.      Conjunctiva/sclera: Conjunctivae normal.   Neck:      Trachea: No tracheal deviation.   Cardiovascular:      Rate and Rhythm: Normal rate and regular rhythm.      Heart sounds: Normal heart sounds.   Pulmonary:      Effort: Pulmonary effort is normal. No respiratory distress.      Breath sounds: Normal breath sounds. No wheezing.   Abdominal:      General: Bowel sounds are normal. There is no distension.      Palpations: Abdomen is soft.      Tenderness: There is no abdominal tenderness.   Musculoskeletal:         General: No tenderness.      Cervical back: Normal range of motion and neck supple.   Skin:     General: Skin is warm and dry.   Neurological:      Mental Status: He is alert.   Psychiatric:         Behavior: Behavior normal.         Recent Labs     24  1530 24  1200 24  0415 24  0600   WBC 3.7*  --  3.4* 4.7   HGB 8.3* 7.2* 6.5* 7.5*   HCT 26.5* 23.3* 21.7* 24.4*   .9*  --  105.3* 105.2*   PLT 92*  --  90* 85*      Lab Results   Component Value Date     2024    K 3.5 2024     (H) 2024    CO2 26 2024    BUN 26 (H) 2024    CREATININE 0.9 2024    GLUCOSE 89 2024    CALCIUM 8.1 (L) 2024

## 2024-08-06 DIAGNOSIS — M46.24 ACUTE OSTEOMYELITIS OF THORACIC SPINE (HCC): Primary | ICD-10-CM

## 2024-08-06 DIAGNOSIS — M46.26 ACUTE OSTEOMYELITIS OF LUMBAR SPINE (HCC): ICD-10-CM

## 2024-08-07 ENCOUNTER — OFFICE VISIT (OUTPATIENT)
Dept: NEUROSURGERY | Age: 74
End: 2024-08-07
Payer: MEDICARE

## 2024-08-07 ENCOUNTER — HOSPITAL ENCOUNTER (OUTPATIENT)
Dept: GENERAL RADIOLOGY | Age: 74
Discharge: HOME | End: 2024-08-09
Payer: MEDICARE

## 2024-08-07 ENCOUNTER — HOSPITAL ENCOUNTER (OUTPATIENT)
Age: 74
Discharge: HOME | End: 2024-08-09
Payer: MEDICARE

## 2024-08-07 DIAGNOSIS — M46.26 ACUTE OSTEOMYELITIS OF LUMBAR SPINE (HCC): Primary | ICD-10-CM

## 2024-08-07 DIAGNOSIS — M46.26 ACUTE OSTEOMYELITIS OF LUMBAR SPINE (HCC): ICD-10-CM

## 2024-08-07 DIAGNOSIS — M46.24 ACUTE OSTEOMYELITIS OF THORACIC SPINE (HCC): ICD-10-CM

## 2024-08-07 PROCEDURE — 1111F DSCHRG MED/CURRENT MED MERGE: CPT | Performed by: PHYSICIAN ASSISTANT

## 2024-08-07 PROCEDURE — 72100 X-RAY EXAM L-S SPINE 2/3 VWS: CPT

## 2024-08-07 PROCEDURE — 1123F ACP DISCUSS/DSCN MKR DOCD: CPT | Performed by: PHYSICIAN ASSISTANT

## 2024-08-07 PROCEDURE — 1036F TOBACCO NON-USER: CPT | Performed by: PHYSICIAN ASSISTANT

## 2024-08-07 PROCEDURE — 3017F COLORECTAL CA SCREEN DOC REV: CPT | Performed by: PHYSICIAN ASSISTANT

## 2024-08-07 PROCEDURE — G8419 CALC BMI OUT NRM PARAM NOF/U: HCPCS | Performed by: PHYSICIAN ASSISTANT

## 2024-08-07 PROCEDURE — 72072 X-RAY EXAM THORAC SPINE 3VWS: CPT

## 2024-08-07 PROCEDURE — G8427 DOCREV CUR MEDS BY ELIG CLIN: HCPCS | Performed by: PHYSICIAN ASSISTANT

## 2024-08-07 PROCEDURE — 99213 OFFICE O/P EST LOW 20 MIN: CPT | Performed by: PHYSICIAN ASSISTANT

## 2024-08-07 NOTE — PROGRESS NOTES
Patient is here for follow up from a hospital consult for: L2-3 discitis.  The pain is still severe.  He is unable to ambulate due to the pain.    Physical exam  Alert and Oriented X3  PERRLA, EOMI  LOVELACE 4-/5, pain noted  Sensation intact to LT and PP  Reflexes are 2+ and symmetric    A/P: patient is here for follow up for: L2-3 discitis.  Lumbar x-rays stable.  Continue with TLSO and restrictions. F/u in 2 months with x-rays.  AB per ID

## 2024-08-17 NOTE — PROGRESS NOTES
Physician Progress Note      PATIENT:               AKIL KIM  CSN #:                  [id number]  :                       1950  ADMIT DATE:       2024 3:06 PM  DISCH DATE:        2024 11:35 AM  RESPONDING  PROVIDER #:        Spenser Tan MD          QUERY TEXT:    Patient admitted with severe back pain and concern for infection in the   setting of adult failure to thrive, acute osteomyelitis of the lumbar spine.   Documentation of severe malnutrition by dietary on 24 and 24. If   possible, please document in progress notes and discharge summary if you are   evaluating and /or treating any of the following:    The medical record reflects the following:  Risk Factors: DM, Adult Failure to thrive, Infection,  Clinical Indicators: Documentation as noted above by dietary. Energy: 75% less   of estimate requirements, severe muscle mass loss temples, clavicles.  Treatment: Adult ONS, Regular Diet, 4 carb choices.    ASPEN Criteria:    https://aspenjournals.onlinelibrary.tristan.com/doi/full/10.1177/182467047447193  5    Ina Sinclair RN-BSN, CRCR  Clinical   [place], [place]  [email or web address]  Options provided:  -- Protein calorie malnutrition severe  -- Other - I will add my own diagnosis  -- Disagree - Not applicable / Not valid  -- Disagree - Clinically unable to determine / Unknown  -- Refer to Clinical Documentation Reviewer    PROVIDER RESPONSE TEXT:    This patient has severe protein calorie malnutrition.    Query created by: Taylor Sinclair on 2024 3:08 PM      Electronically signed by:  Spenser Tan MD 2024 9:48 AM

## 2024-08-19 ENCOUNTER — OUTSIDE SERVICES (OUTPATIENT)
Dept: FAMILY MEDICINE CLINIC | Age: 74
End: 2024-08-19
Payer: MEDICARE

## 2024-08-19 DIAGNOSIS — R60.0 PERIPHERAL EDEMA: Primary | ICD-10-CM

## 2024-08-19 DIAGNOSIS — M86.9 OSTEOMYELITIS, UNSPECIFIED SITE, UNSPECIFIED TYPE (HCC): ICD-10-CM

## 2024-08-19 DIAGNOSIS — E11.9 TYPE 2 DIABETES MELLITUS WITHOUT COMPLICATION, WITHOUT LONG-TERM CURRENT USE OF INSULIN (HCC): ICD-10-CM

## 2024-08-19 DIAGNOSIS — I10 PRIMARY HYPERTENSION: ICD-10-CM

## 2024-08-19 DIAGNOSIS — K21.9 GASTROESOPHAGEAL REFLUX DISEASE WITHOUT ESOPHAGITIS: ICD-10-CM

## 2024-08-19 PROCEDURE — 99309 SBSQ NF CARE MODERATE MDM 30: CPT | Performed by: NURSE PRACTITIONER

## 2024-08-19 NOTE — PROGRESS NOTES
8/19/2024    aRudel Herndon  1950    This resident is being seen today for a skilled evaluation visit.  He is a resident who has long-term medical conditions including cholangiocarcinoma status post stent, diabetes mellitus, severe back pain with osteomyelitis of the lumbar vertebrae, muscle weakness, moderate protein calorie malnutrition, obstruction of the bile duct, failure to thrive, dorsalgia, discitis, spinal stenosis, GERD prostate cancer treated with seed implants with a surgical history of ankle fusion, colonoscopy, ERCP, eye surgery bilaterally, liver biopsy, right hip replacement, left total knee arthroplasty and port implant.  He is a 74 y.o. male resident who is being seen today for skilled therapy with which this resident has the benefit of PT/OT.  It is admitted to mention that he was recently sent to the emergency room setting 8/15/2024 due to underlying peripheral edema and was treated accordingly in this regard and has already had resolution.  He states that he does have ongoing back pain and they had recommended a consult with neurosurgery during his most recent workup and he had completion of an MRI.  He denies headaches or dizziness, sore throat, chest pain, nausea or vomiting, constipation or diarrhea, fever or chills, falls or syncopal events.  It is of note to mention that this is a gentleman who had been at his assisted living and had a short-term rehabilitation.          Medications:  Tylenol 650 mg every 4 hours as needed  Polyethylene glycol 17 g daily  Vancomycin 1000 mg daily until 9/13/2024  Magnesium 400 mg daily  Melatonin 3 mg at bedtime as needed  Normal saline intravenous flush  Omeprazole 20 mg twice daily  Trazodone 25 mg at bedtime  Metolazone 5 mg daily  Lasix 40 mg daily  Potassium chloride 20 mill equivalents daily  Lasix 20 mg daily            Objective     Vital Signs: /74 pulse 89 respirations 15 temperature 97.3 O2 96% weight 198 pounds        Physical

## 2024-08-26 ENCOUNTER — TELEPHONE (OUTPATIENT)
Dept: HEMATOLOGY | Age: 74
End: 2024-08-26

## 2024-08-26 NOTE — TELEPHONE ENCOUNTER
I called [facility] at [phone] and was transferred to the transportation department where I had to leave a detailed message for some to call our office and schedule the patient for a follow up. Office information and phone number were left  Electronically signed by Erika Escalante MA on 8/26/2024 at 9:32 AM

## 2024-08-27 ENCOUNTER — TELEPHONE (OUTPATIENT)
Dept: HEMATOLOGY | Age: 74
End: 2024-08-27

## 2024-08-27 NOTE — TELEPHONE ENCOUNTER
I called [facility] at [phone] and was transferred to the transportation department where I had to leave a detailed message for some to call our office and schedule the patient for a follow up. Office information and phone number were left   Electronically signed by Erika Escalante MA on 8/27/2024 at 8:13 AM

## 2024-09-02 ENCOUNTER — OUTSIDE SERVICES (OUTPATIENT)
Dept: PRIMARY CARE CLINIC | Age: 74
End: 2024-09-02

## 2024-09-02 DIAGNOSIS — M46.49 DISCITIS OF MULTIPLE SITES OF SPINE: ICD-10-CM

## 2024-09-02 DIAGNOSIS — C22.1 CHOLANGIOCARCINOMA (HCC): ICD-10-CM

## 2024-09-02 DIAGNOSIS — R62.7 FAILURE TO THRIVE IN ADULT: ICD-10-CM

## 2024-09-02 DIAGNOSIS — M46.20 OSTEOMYELITIS OF SPINE (HCC): Primary | ICD-10-CM

## 2024-09-02 NOTE — PROGRESS NOTES
Raudel Herndon (:  1950) is a 74 y.o. male.    Subjective     Patient has no complaints or concerns today.  He denies any fever or chills.  Pain is baseline for and seems to be under control.    Location: [facility]  Progress notes reviewed.    Past Medical History:   Diagnosis Date    Cancer (HCC) 2024    cholangiocarcinoma    Diabetes mellitus (HCC)     Failure to thrive in adult 2024    Prostate cancer (HCC)     treated with seed implants       Allergies   Allergen Reactions    Adhesive Tape Rash             Review of Systems - as above        Objective   Physical Exam  Constitutional:       Appearance: He is well-developed.   HENT:      Head: Normocephalic and atraumatic.   Eyes:      General:         Right eye: No discharge.         Left eye: No discharge.      Conjunctiva/sclera: Conjunctivae normal.   Neck:      Trachea: No tracheal deviation.   Cardiovascular:      Rate and Rhythm: Normal rate and regular rhythm.      Heart sounds: Normal heart sounds.   Pulmonary:      Effort: Pulmonary effort is normal. No respiratory distress.      Breath sounds: Normal breath sounds. No wheezing.   Abdominal:      General: Bowel sounds are normal. There is no distension.      Palpations: Abdomen is soft.      Tenderness: There is no abdominal tenderness.   Musculoskeletal:         General: No tenderness.      Cervical back: Normal range of motion and neck supple.   Skin:     General: Skin is warm and dry.   Neurological:      Mental Status: He is alert.   Psychiatric:         Behavior: Behavior normal.         No results for input(s): \"WBC\", \"HGB\", \"HCT\", \"MCV\", \"PLT\" in the last 720 hours.     Lab Results   Component Value Date     2024    K 3.5 2024     (H) 2024    CO2 26 2024    BUN 26 (H) 2024    CREATININE 0.9 2024    GLUCOSE 89 2024    CALCIUM 8.1 (L) 2024    BILITOT 2.4 (H) 2024    ALKPHOS 1,105 (H) 2024

## 2024-09-05 ENCOUNTER — OFFICE VISIT (OUTPATIENT)
Dept: HEMATOLOGY | Age: 74
End: 2024-09-05
Payer: MEDICARE

## 2024-09-05 VITALS
RESPIRATION RATE: 18 BRPM | OXYGEN SATURATION: 97 % | SYSTOLIC BLOOD PRESSURE: 116 MMHG | BODY MASS INDEX: 21.34 KG/M2 | HEIGHT: 73 IN | WEIGHT: 161 LBS | HEART RATE: 101 BPM | TEMPERATURE: 97.8 F | DIASTOLIC BLOOD PRESSURE: 59 MMHG

## 2024-09-05 DIAGNOSIS — C22.1 CHOLANGIOCELLULAR CARCINOMA (HCC): Primary | ICD-10-CM

## 2024-09-05 LAB
ALBUMIN: 2.9 G/DL (ref 3.5–5.2)
ALP BLD-CCNC: 2248 U/L (ref 40–129)
ALT SERPL-CCNC: 76 U/L (ref 0–40)
ANION GAP SERPL CALCULATED.3IONS-SCNC: 16 MMOL/L (ref 7–16)
AST SERPL-CCNC: 147 U/L (ref 0–39)
BILIRUB SERPL-MCNC: 5.9 MG/DL (ref 0–1.2)
BILIRUBIN DIRECT: 4.5 MG/DL (ref 0–0.3)
BILIRUBIN, INDIRECT: 1.4 MG/DL (ref 0–1)
BUN BLDV-MCNC: 30 MG/DL (ref 6–23)
CALCIUM SERPL-MCNC: 9.2 MG/DL (ref 8.6–10.2)
CHLORIDE BLD-SCNC: 98 MMOL/L (ref 98–107)
CO2: 26 MMOL/L (ref 22–29)
CREAT SERPL-MCNC: 1 MG/DL (ref 0.7–1.2)
GFR, ESTIMATED: 80 ML/MIN/1.73M2
GLUCOSE BLD-MCNC: 138 MG/DL (ref 74–99)
HCT VFR BLD CALC: 32.3 % (ref 37–54)
HEMOGLOBIN: 10.1 G/DL (ref 12.5–16.5)
INR BLD: 1.1
MCH RBC QN AUTO: 31.6 PG (ref 26–35)
MCHC RBC AUTO-ENTMCNC: 31.3 G/DL (ref 32–34.5)
MCV RBC AUTO: 100.9 FL (ref 80–99.9)
PDW BLD-RTO: 14.4 % (ref 11.5–15)
PLATELET # BLD: 252 K/UL (ref 130–450)
PMV BLD AUTO: 9.2 FL (ref 7–12)
POTASSIUM SERPL-SCNC: 3.6 MMOL/L (ref 3.5–5)
PROTHROMBIN TIME: 12.6 SEC (ref 9.3–12.4)
RBC # BLD: 3.2 M/UL (ref 3.8–5.8)
SODIUM BLD-SCNC: 140 MMOL/L (ref 132–146)
TOTAL PROTEIN: 8.3 G/DL (ref 6.4–8.3)
WBC # BLD: 6.2 K/UL (ref 4.5–11.5)

## 2024-09-05 PROCEDURE — 99213 OFFICE O/P EST LOW 20 MIN: CPT | Performed by: TRANSPLANT SURGERY

## 2024-09-05 PROCEDURE — 36415 COLL VENOUS BLD VENIPUNCTURE: CPT | Performed by: TRANSPLANT SURGERY

## 2024-09-05 PROCEDURE — 1123F ACP DISCUSS/DSCN MKR DOCD: CPT | Performed by: TRANSPLANT SURGERY

## 2024-09-05 PROCEDURE — 99212 OFFICE O/P EST SF 10 MIN: CPT | Performed by: TRANSPLANT SURGERY

## 2024-09-07 LAB — CA 19-9: 4429 U/ML (ref 0–35)

## 2024-09-09 ENCOUNTER — OUTSIDE SERVICES (OUTPATIENT)
Dept: PRIMARY CARE CLINIC | Age: 74
End: 2024-09-09
Payer: MEDICARE

## 2024-09-09 ENCOUNTER — TELEPHONE (OUTPATIENT)
Dept: HEMATOLOGY | Age: 74
End: 2024-09-09

## 2024-09-09 DIAGNOSIS — M46.49 DISCITIS OF MULTIPLE SITES OF SPINE: ICD-10-CM

## 2024-09-09 DIAGNOSIS — C22.1 CHOLANGIOCARCINOMA (HCC): ICD-10-CM

## 2024-09-09 DIAGNOSIS — M46.20 OSTEOMYELITIS OF SPINE (HCC): ICD-10-CM

## 2024-09-09 DIAGNOSIS — L85.3 DRY SKIN: Primary | ICD-10-CM

## 2024-09-09 PROCEDURE — 99309 SBSQ NF CARE MODERATE MDM 30: CPT | Performed by: STUDENT IN AN ORGANIZED HEALTH CARE EDUCATION/TRAINING PROGRAM

## 2024-09-18 ENCOUNTER — TELEPHONE (OUTPATIENT)
Dept: HEMATOLOGY | Age: 74
End: 2024-09-18

## 2024-09-20 ENCOUNTER — TELEPHONE (OUTPATIENT)
Dept: HEMATOLOGY | Age: 74
End: 2024-09-20

## 2024-09-26 DIAGNOSIS — M46.49 DISCITIS OF MULTIPLE SITES OF SPINE: Primary | ICD-10-CM

## 2024-10-03 ENCOUNTER — OFFICE VISIT (OUTPATIENT)
Dept: HEMATOLOGY | Age: 74
End: 2024-10-03
Payer: MEDICARE

## 2024-10-03 ENCOUNTER — HOSPITAL ENCOUNTER (OUTPATIENT)
Dept: CT IMAGING | Age: 74
Discharge: HOME | End: 2024-10-05
Attending: TRANSPLANT SURGERY
Payer: MEDICARE

## 2024-10-03 ENCOUNTER — HOSPITAL ENCOUNTER (OUTPATIENT)
Age: 74
Discharge: HOME | End: 2024-10-03
Attending: TRANSPLANT SURGERY
Payer: MEDICARE

## 2024-10-03 VITALS
SYSTOLIC BLOOD PRESSURE: 106 MMHG | BODY MASS INDEX: 21.31 KG/M2 | TEMPERATURE: 97.4 F | HEIGHT: 73 IN | WEIGHT: 160.8 LBS | RESPIRATION RATE: 16 BRPM | DIASTOLIC BLOOD PRESSURE: 46 MMHG | HEART RATE: 70 BPM

## 2024-10-03 DIAGNOSIS — C22.1 CHOLANGIOCARCINOMA (HCC): ICD-10-CM

## 2024-10-03 DIAGNOSIS — C22.1 CHOLANGIOCELLULAR CARCINOMA (HCC): ICD-10-CM

## 2024-10-03 DIAGNOSIS — M46.49 DISCITIS OF MULTIPLE SITES OF SPINE: ICD-10-CM

## 2024-10-03 DIAGNOSIS — K83.1 OBSTRUCTIVE JAUNDICE DUE TO MALIGNANT NEOPLASM (HCC): Primary | ICD-10-CM

## 2024-10-03 DIAGNOSIS — C80.1 OBSTRUCTIVE JAUNDICE DUE TO MALIGNANT NEOPLASM (HCC): Primary | ICD-10-CM

## 2024-10-03 DIAGNOSIS — C22.1 CHOLANGIOCARCINOMA (HCC): Primary | ICD-10-CM

## 2024-10-03 DIAGNOSIS — M46.20 OSTEOMYELITIS OF SPINE: ICD-10-CM

## 2024-10-03 DIAGNOSIS — K83.1 OBSTRUCTIVE JAUNDICE DUE TO MALIGNANT NEOPLASM (HCC): ICD-10-CM

## 2024-10-03 DIAGNOSIS — C80.1 OBSTRUCTIVE JAUNDICE DUE TO MALIGNANT NEOPLASM (HCC): ICD-10-CM

## 2024-10-03 LAB
ALBUMIN SERPL-MCNC: 3.1 G/DL (ref 3.5–5.2)
ALP SERPL-CCNC: 1558 U/L (ref 40–129)
ALT SERPL-CCNC: 53 U/L (ref 0–40)
ANION GAP SERPL CALCULATED.3IONS-SCNC: 16 MMOL/L (ref 7–16)
AST SERPL-CCNC: 93 U/L (ref 0–39)
BASOPHILS # BLD: 0.04 K/UL (ref 0–0.2)
BASOPHILS NFR BLD: 1 % (ref 0–2)
BILIRUB SERPL-MCNC: 8.1 MG/DL (ref 0–1.2)
BUN SERPL-MCNC: 16 MG/DL (ref 6–23)
CALCIUM SERPL-MCNC: 8.9 MG/DL (ref 8.6–10.2)
CHLORIDE SERPL-SCNC: 101 MMOL/L (ref 98–107)
CO2 SERPL-SCNC: 20 MMOL/L (ref 22–29)
CREAT SERPL-MCNC: 0.9 MG/DL (ref 0.7–1.2)
CRP SERPL HS-MCNC: 148 MG/L (ref 0–5)
EOSINOPHIL # BLD: 0.22 K/UL (ref 0.05–0.5)
EOSINOPHILS RELATIVE PERCENT: 3 % (ref 0–6)
ERYTHROCYTE [DISTWIDTH] IN BLOOD BY AUTOMATED COUNT: 15.9 % (ref 11.5–15)
ERYTHROCYTE [SEDIMENTATION RATE] IN BLOOD BY WESTERGREN METHOD: 147 MM/HR (ref 0–15)
GFR, ESTIMATED: 88 ML/MIN/1.73M2
GLUCOSE SERPL-MCNC: 77 MG/DL (ref 74–99)
HCT VFR BLD AUTO: 26.4 % (ref 37–54)
HGB BLD-MCNC: 8.3 G/DL (ref 12.5–16.5)
IMM GRANULOCYTES # BLD AUTO: 0.03 K/UL (ref 0–0.58)
IMM GRANULOCYTES NFR BLD: 0 % (ref 0–5)
LYMPHOCYTES NFR BLD: 1.18 K/UL (ref 1.5–4)
LYMPHOCYTES RELATIVE PERCENT: 18 % (ref 20–42)
MCH RBC QN AUTO: 33.3 PG (ref 26–35)
MCHC RBC AUTO-ENTMCNC: 31.4 G/DL (ref 32–34.5)
MCV RBC AUTO: 106 FL (ref 80–99.9)
MONOCYTES NFR BLD: 0.45 K/UL (ref 0.1–0.95)
MONOCYTES NFR BLD: 7 % (ref 2–12)
NEUTROPHILS NFR BLD: 71 % (ref 43–80)
NEUTS SEG NFR BLD: 4.75 K/UL (ref 1.8–7.3)
PLATELET # BLD AUTO: 227 K/UL (ref 130–450)
PMV BLD AUTO: 9.3 FL (ref 7–12)
POTASSIUM SERPL-SCNC: 3.3 MMOL/L (ref 3.5–5)
PROT SERPL-MCNC: 7.8 G/DL (ref 6.4–8.3)
RBC # BLD AUTO: 2.49 M/UL (ref 3.8–5.8)
SODIUM SERPL-SCNC: 137 MMOL/L (ref 132–146)
WBC OTHER # BLD: 6.7 K/UL (ref 4.5–11.5)

## 2024-10-03 PROCEDURE — 99212 OFFICE O/P EST SF 10 MIN: CPT | Performed by: TRANSPLANT SURGERY

## 2024-10-03 PROCEDURE — 74177 CT ABD & PELVIS W/CONTRAST: CPT

## 2024-10-03 PROCEDURE — 36415 COLL VENOUS BLD VENIPUNCTURE: CPT

## 2024-10-03 PROCEDURE — 87040 BLOOD CULTURE FOR BACTERIA: CPT

## 2024-10-03 PROCEDURE — 85652 RBC SED RATE AUTOMATED: CPT

## 2024-10-03 PROCEDURE — 86140 C-REACTIVE PROTEIN: CPT

## 2024-10-03 PROCEDURE — 85025 COMPLETE CBC W/AUTO DIFF WBC: CPT

## 2024-10-03 PROCEDURE — 71260 CT THORAX DX C+: CPT

## 2024-10-03 PROCEDURE — 6360000004 HC RX CONTRAST MEDICATION: Performed by: RADIOLOGY

## 2024-10-03 PROCEDURE — 80053 COMPREHEN METABOLIC PANEL: CPT

## 2024-10-03 RX ORDER — HYDROXYZINE HYDROCHLORIDE 25 MG/1
25 TABLET, FILM COATED ORAL EVERY 8 HOURS PRN
Qty: 60 TABLET | Refills: 2 | Status: SHIPPED | OUTPATIENT
Start: 2024-10-03 | End: 2024-12-02

## 2024-10-03 RX ORDER — IOPAMIDOL 755 MG/ML
75 INJECTION, SOLUTION INTRAVASCULAR
Status: COMPLETED | OUTPATIENT
Start: 2024-10-03 | End: 2024-10-03

## 2024-10-03 RX ADMIN — IOPAMIDOL 75 ML: 755 INJECTION, SOLUTION INTRAVENOUS at 10:43

## 2024-10-03 ASSESSMENT — ENCOUNTER SYMPTOMS
ABDOMINAL PAIN: 0
EYE PAIN: 0
BLOOD IN STOOL: 0
DIARRHEA: 0
BACK PAIN: 0
VOMITING: 0
NAUSEA: 0
SHORTNESS OF BREATH: 0
EYE DISCHARGE: 0
CONSTIPATION: 0
PHOTOPHOBIA: 0

## 2024-10-03 NOTE — PROGRESS NOTES
Financial Resource Strain: Not on file   Food Insecurity: No Food Insecurity (7/21/2024)    Hunger Vital Sign     Worried About Running Out of Food in the Last Year: Never true     Ran Out of Food in the Last Year: Never true   Transportation Needs: No Transportation Needs (7/21/2024)    PRAPARE - Transportation     Lack of Transportation (Medical): No     Lack of Transportation (Non-Medical): No   Physical Activity: Not on file   Stress: Not on file   Social Connections: Not on file   Intimate Partner Violence: Not on file   Housing Stability: Low Risk  (7/21/2024)    Housing Stability Vital Sign     Unable to Pay for Housing in the Last Year: No     Number of Places Lived in the Last Year: 1     Unstable Housing in the Last Year: No       ROS:   Review of Systems   Constitutional:  Positive for activity change, appetite change, fatigue and unexpected weight change. Negative for chills, diaphoresis and fever.   HENT:  Negative for congestion, ear discharge, ear pain, hearing loss, nosebleeds and tinnitus.    Eyes:  Negative for photophobia, pain and discharge.   Respiratory:  Negative for shortness of breath.    Cardiovascular:  Negative for palpitations and leg swelling.   Gastrointestinal:  Negative for abdominal pain, blood in stool, constipation, diarrhea, nausea and vomiting.   Endocrine: Negative for polydipsia.   Genitourinary:  Negative for frequency, hematuria and urgency.   Musculoskeletal:  Negative for back pain and neck pain.   Skin:  Negative for rash.   Allergic/Immunologic: Negative for environmental allergies.   Neurological:  Negative for tremors and seizures.   Psychiatric/Behavioral:  Negative for hallucinations and suicidal ideas. The patient is not nervous/anxious.        Physical Exam:  BP (!) 106/46   Pulse 70   Temp 97.4 °F (36.3 °C) (Temporal)   Resp 16   Ht 1.854 m (6' 1\")   Wt 72.9 kg (160 lb 12.8 oz)   BMI 21.22 kg/m²       PSYCH: mood and affect normal, alert and oriented x

## 2024-10-04 ENCOUNTER — TELEPHONE (OUTPATIENT)
Dept: SURGERY | Age: 74
End: 2024-10-04

## 2024-10-04 ENCOUNTER — TELEPHONE (OUTPATIENT)
Age: 74
End: 2024-10-04

## 2024-10-04 ENCOUNTER — PREP FOR PROCEDURE (OUTPATIENT)
Age: 74
End: 2024-10-04

## 2024-10-04 NOTE — TELEPHONE ENCOUNTER
Per the order of Dr. Goddard, patient has been scheduled for ERCP with possible stent removal and replacement on 10.7.2024.  patients son provided with procedure information over the phone.  Patient instructed to please contact our office with any questions.    Patient will follow up with Dr. Reynoso    Procedure scheduled through [facility].  Dr. Goddard to enter orders.         Prior Authorization Form:      DEMOGRAPHICS:                     Patient Name:  Akil Kim  Patient :  1950            Insurance:  Payor: BC MEDICARE / Plan: BCBS [employer] [facility] LOCAL / Product Type: *No Product type* /   Insurance ID Number:    Payer/Plan Subscr  Sex Relation Sub. Ins. ID Effective Group Num   1. [facility] MEDICARE* AKIL KIM 1950 Male Self [id number]* 20 [id number]                                    [id number]         DIAGNOSIS & PROCEDURE:                       Procedure/Operation: ERCP possible stent removal and replacement           CPT Code: 34107    Diagnosis:  Obstructive jaundice    ICD10 Code: K83.1    Location:  [facility]    Surgeon:  Rupesh    SCHEDULING INFORMATION:                          Date: 10.7.2024    Time: TBD              Anesthesia:  MAC/TIVA                                                       Status:  Outpatient        Special Comments:         Electronically signed by Mana Barahona on 10/4/2024 at 2:05 PM

## 2024-10-04 NOTE — TELEPHONE ENCOUNTER
Prior Authorization Form:      DEMOGRAPHICS:                     Patient Name:  Akil Kim  Patient :  1950            Insurance:  Payor: [facility] / Plan: [facility] LOCAL / Product Type: *No Product type* /   Insurance ID Number:    Payer/Plan Subscr  Sex Relation Sub. Ins. ID Effective Group Num   1. [facility]* AKIL KIM 1950 Male Self [id number] 20 [id number]                                   [address]         DIAGNOSIS & PROCEDURE:                       Procedure/Operation: eus           CPT Code: 52949    Diagnosis:  cholangiocellular carcinoma    ICD10 Code: C22.1    Location:  [facility]    Surgeon:  jesu    SCHEDULING INFORMATION:                          Date: 10-7-24    Time: tbd              Anesthesia:  MAC/TIVA                                                       Status:  Outpatient        Special Comments:  na       Electronically signed by TIARA GOLDBERG LPN on 10/4/2024 at 11:30 AM

## 2024-10-04 NOTE — PROGRESS NOTES
[facility]                                                                                                                    PRE OP INSTRUCTIONS FOR  Raudel Herndon        Date: 10/4/2024    Date of surgery: 10/7/24   Arrival Time: Hospital will call you between 5pm and 7pm with your final arrival time for surgery. Go to     front of hospital and check in at information desk.    Nothing by mouth (NPO) as instructed. May have clear liquids up to 2 hours prior to surgery. Nothing solid after midnight. Examples: water, apple juice, black coffee, plain tea    Take the following medications with a small sip of water on the morning of Surgery: protonix, if needed atarax     Diabetics may take half the evening dose of insulin but none after midnight.  If you feel symptomatic or have low blood sugar morning of surgery drink 1-2 ounces of apple juice only. If you take a weekly insulin injection _______________, stop 7 days prior to surgery. If you take _______________, stop 3-4 days prior to surgery.    Aspirin, Ibuprofen, Advil, Naproxen, other Anti-inflammatory products should be stopped before surgery as directed by your surgeon, cardiologist, or primary care Doctor. Herbal supplements and Vitamin E should be stopped five days prior.  May take Tylenol unless instructed otherwise by your surgeon.    Check with your Doctor regarding stopping Plavix, Coumadin, Lovenox, Eliquis, Effient, or other blood thinners such as, pradaxa, lixiana, xaralto and savaysa.    Do not smoke, vape, or use illicit drugs and do not drink any alcoholic beverages 24 hours prior to surgery.    You may brush your teeth the morning of surgery.      You MUST make arrangements for a responsible adult, 18 and over, to take you home after your surgery. You will not be allowed to leave alone or drive yourself home.  You will need someone stay with you the first 24 hrs. Your surgery will be cancelled if you do not have a ride

## 2024-10-06 LAB
MICROORGANISM SPEC CULT: NORMAL
MICROORGANISM SPEC CULT: NORMAL
SERVICE CMNT-IMP: NORMAL
SERVICE CMNT-IMP: NORMAL
SPECIMEN DESCRIPTION: NORMAL
SPECIMEN DESCRIPTION: NORMAL

## 2024-10-06 NOTE — H&P
General Surgery History and Physical  [facility]    Patient's Name/Date of Birth: Raudel Herndon / 1950    Date: October 7, 2024     Surgeon: Mervin Goddard MD    PCP: Villa Lemus PA     Chief Complaint: Obstructive jaundice    HPI:   Raudel Herndon is a 74 y.o. male who presents for evaluation of obstructive jaundice.  Earlier this year, he presented to the hospital in [place] with obstructive jaundice.  He underwent ERCP with brushings and stent placement that revealed adenocarcinoma.  In February 2014, he underwent repeat ERCP with placement of uncovered metal stent with traversing plastic stent.  He has had Mediport placed and has undergone several rounds of chemotherapy which had to be stopped due to the patient not tolerating treatment with his history of severe back pain and inability to ambulate.  He was recently reevaluated by  and was noted to be jaundiced.  Most recent bilirubin was noted to be 8.  CT of the abdomen pelvis performed last week revealed biliary stent in place however there is mild biliary dilation and concern for stent obstruction.  He was referred to me for repeat ERCP.    Patient Active Problem List   Diagnosis    Cholangiocarcinoma (HCC)    Cholangiocellular carcinoma (HCC)    Obstructive jaundice due to malignant neoplasm (HCC)    NANCY (acute kidney injury) (HCC)    Severe protein-calorie malnutrition (HCC)    Obstructive jaundice    Obstructive jaundice due to cancer (HCC)    Intrahepatic cholestatic liver disease    Failure to thrive in adult    Enterococcal bacteremia    Cholangitis    Edema    Acute osteomyelitis of lumbar spine    Intractable back pain    Inability to walk    Discitis of multiple sites of spine    Osteomyelitis of spine    Osteomyelitis    Acute osteomyelitis of thoracic spine       Past Medical History:   Diagnosis Date    Cancer (HCC) 02/2024    cholangiocarcinoma    Diabetes mellitus (HCC)     Failure to thrive in

## 2024-10-06 NOTE — OP NOTE
Operative Note      Patient: Raudel Herndon  YOB: 1950  MRN: [id number]    Date of Procedure: 10/7/2024    Pre-Op Diagnosis Codes:      * Obstructive jaundice [K83.1]    Post-Op Diagnosis: Same       Procedure(s):  ENDOSCOPIC RETROGRADE CHOLANGIOPANCREATOGRAPHY STENT INSERTION x 2  ENDOSCOPIC RETROGRADE CHOLANGIOPANCREATOGRAPHY STONE REMOVAL    Surgeon(s):  Mervin Goddard MD    Assistant:   Surgical Assistant: Kathryn Godoy RN    Anesthesia: Monitor Anesthesia Care    Estimated Blood Loss (mL): less than 50     Complications: None    Specimens:   * No specimens in log *    Implants:  Implant Name Type Inv. Item Serial No.  Lot No. LRB No. Used Action   STENT BILI 7FR L12CM PLAS DUODENAL BEND RAP EXCHG PRELD - [id number]  STENT BILI 7FR L12CM PLAS DUODENAL BEND RAP EXCHG PRELD  [employer] [id number] N/A 1 Implanted   STENT BILI L80MM [id number] CATH 8.5FR L194CM GWIRE 0.035IN - [id number]  STENT BILI L80MM [id number] CATH 8.5FR L194CM GWIRE 0.035IN  [employer] [id number] N/A 1 Implanted         Drains: * No LDAs found *    Findings:  Infection Present At Time Of Surgery (PATOS) (choose all levels that have infection present):  No infection present  Other Findings: see below    Detailed Description of Procedure:     The patient was identified and the procedure was confirmed.  He was taken to the operating room and laid in the left lateral decubitus position.  He underwent LMAC anesthesia by the anesthesia team and was monitored throughout the procedure.  A bite-block placed prior to induction of anesthesia.    A lubricated duodenoscope was passed through the mouth, esophagus, down to the level of the stomach and the duodenum.  Evaluation of the ampulla revealed an uncovered metal stent with evidence of obstruction.  I then used a sphincterotome with a Jagwire to cannulate the common bile duct.  Contrast injection revealed adequate cannulation of the common bile duct.

## 2024-10-06 NOTE — DISCHARGE INSTRUCTIONS
Your information:  Name: Raudel Herndon  : 1950    Your instructions:  Discharge home    Signs and symptoms to watch out for:     Call 911 anytime you think you may need emergency care. For example, call if:    You passed out (lost consciousness).     Your stools are maroon or very bloody.     You have trouble breathing.   Call your doctor now or go to the emergency room if:    You have new or worse belly pain.     You have pain that does not get better after you take pain medicine.     You have a fever.     You cannot pass stools or gas.     You are sick to your stomach or cannot hold down fluids.     You have blood in your stools.   Watch closely for changes in your health, and be sure to contact your doctor if:    Your throat still hurts after a day or two.     You do not get better as expected.     What to do after you leave the hospital:    Recommended diet:  Low fat    Recommended activity: activity as tolerated        The following personal items were collected during your admission and were returned to you:    Belongings  Dental Appliances: None  Vision - Corrective Lenses: None  Hearing Aid: None  Clothing: Footwear, Pants, Shirt, Socks, Undergarments, At bedside  Jewelry: None  Body Piercings Removed: N/A  Electronic Devices: Cell Phone  Weapons (Notify Protective Services/Security): None  Other Valuables: Wheelchair  Home Medications: None  Valuables Given To: Family (Comment)  Provide Name(s) of Who Valuable(s) Were Given To: anastasiia  Responsible person(s) in the waiting room: anastasiia lisa  Patient approves for provider to speak to responsible person post operatively: Yes    Information obtained by:  By signing below, I understand that if any problems occur once I leave the hospital I am to contact Dr. Goddard.  I understand and acknowledge receipt of the instructions indicated above.

## 2024-10-07 ENCOUNTER — HOSPITAL ENCOUNTER (OUTPATIENT)
Dept: GENERAL RADIOLOGY | Age: 74
Discharge: HOME | End: 2024-10-09
Attending: SURGERY
Payer: MEDICARE

## 2024-10-07 ENCOUNTER — ANESTHESIA EVENT (OUTPATIENT)
Dept: OPERATING ROOM | Age: 74
End: 2024-10-07
Payer: MEDICARE

## 2024-10-07 ENCOUNTER — ANESTHESIA (OUTPATIENT)
Dept: OPERATING ROOM | Age: 74
End: 2024-10-07
Payer: MEDICARE

## 2024-10-07 ENCOUNTER — HOSPITAL ENCOUNTER (OUTPATIENT)
Age: 74
Setting detail: OBSERVATION
Discharge: HOME | End: 2024-10-08
Attending: SURGERY | Admitting: SURGERY
Payer: MEDICARE

## 2024-10-07 DIAGNOSIS — Z46.89 ENCOUNTER FOR REMOVAL OF BILIARY STENT: ICD-10-CM

## 2024-10-07 PROBLEM — Z98.890 POSTOPERATIVE STATE: Status: ACTIVE | Noted: 2024-10-07

## 2024-10-07 PROBLEM — Z98.890 POST-OPERATIVE STATE: Status: ACTIVE | Noted: 2024-10-07

## 2024-10-07 LAB
ALBUMIN SERPL-MCNC: 2.1 G/DL (ref 3.5–5.2)
ALP SERPL-CCNC: 914 U/L (ref 40–129)
ALT SERPL-CCNC: 37 U/L (ref 0–40)
ANION GAP SERPL CALCULATED.3IONS-SCNC: 13 MMOL/L (ref 7–16)
AST SERPL-CCNC: 76 U/L (ref 0–39)
BILIRUB SERPL-MCNC: 7.3 MG/DL (ref 0–1.2)
BUN SERPL-MCNC: 13 MG/DL (ref 6–23)
CALCIUM SERPL-MCNC: 7.4 MG/DL (ref 8.6–10.2)
CHLORIDE SERPL-SCNC: 109 MMOL/L (ref 98–107)
CO2 SERPL-SCNC: 18 MMOL/L (ref 22–29)
CREAT SERPL-MCNC: 0.9 MG/DL (ref 0.7–1.2)
ERYTHROCYTE [DISTWIDTH] IN BLOOD BY AUTOMATED COUNT: 16.1 % (ref 11.5–15)
GFR, ESTIMATED: 85 ML/MIN/1.73M2
GLUCOSE BLD-MCNC: 78 MG/DL (ref 74–99)
GLUCOSE BLD-MCNC: 78 MG/DL (ref 74–99)
GLUCOSE SERPL-MCNC: 83 MG/DL (ref 74–99)
HCT VFR BLD AUTO: 21.2 % (ref 37–54)
HGB BLD-MCNC: 6.7 G/DL (ref 12.5–16.5)
MAGNESIUM SERPL-MCNC: 1.1 MG/DL (ref 1.6–2.6)
MCH RBC QN AUTO: 33.5 PG (ref 26–35)
MCHC RBC AUTO-ENTMCNC: 31.6 G/DL (ref 32–34.5)
MCV RBC AUTO: 106 FL (ref 80–99.9)
PHOSPHATE SERPL-MCNC: 3.1 MG/DL (ref 2.5–4.5)
PLATELET, FLUORESCENCE: 175 K/UL (ref 130–450)
PMV BLD AUTO: 9.1 FL (ref 7–12)
POTASSIUM SERPL-SCNC: 2.9 MMOL/L (ref 3.5–5)
POTASSIUM SERPL-SCNC: 3.5 MMOL/L (ref 3.5–5)
PROT SERPL-MCNC: 5.6 G/DL (ref 6.4–8.3)
RBC # BLD AUTO: 2 M/UL (ref 3.8–5.8)
SODIUM SERPL-SCNC: 140 MMOL/L (ref 132–146)
WBC OTHER # BLD: 7.9 K/UL (ref 4.5–11.5)

## 2024-10-07 PROCEDURE — C1874 STENT, COATED/COV W/DEL SYS: HCPCS | Performed by: SURGERY

## 2024-10-07 PROCEDURE — 86850 RBC ANTIBODY SCREEN: CPT

## 2024-10-07 PROCEDURE — 2580000003 HC RX 258: Performed by: ANESTHESIOLOGY

## 2024-10-07 PROCEDURE — 6360000002 HC RX W HCPCS

## 2024-10-07 PROCEDURE — 86901 BLOOD TYPING SEROLOGIC RH(D): CPT

## 2024-10-07 PROCEDURE — 36415 COLL VENOUS BLD VENIPUNCTURE: CPT

## 2024-10-07 PROCEDURE — 84132 ASSAY OF SERUM POTASSIUM: CPT

## 2024-10-07 PROCEDURE — 7100000000 HC PACU RECOVERY - FIRST 15 MIN: Performed by: SURGERY

## 2024-10-07 PROCEDURE — 80053 COMPREHEN METABOLIC PANEL: CPT

## 2024-10-07 PROCEDURE — 74330 X-RAY BILE/PANC ENDOSCOPY: CPT

## 2024-10-07 PROCEDURE — 82962 GLUCOSE BLOOD TEST: CPT

## 2024-10-07 PROCEDURE — 6370000000 HC RX 637 (ALT 250 FOR IP): Performed by: SURGERY

## 2024-10-07 PROCEDURE — 93005 ELECTROCARDIOGRAM TRACING: CPT | Performed by: INTERNAL MEDICINE

## 2024-10-07 PROCEDURE — 6360000004 HC RX CONTRAST MEDICATION: Performed by: SURGERY

## 2024-10-07 PROCEDURE — 3600007513: Performed by: SURGERY

## 2024-10-07 PROCEDURE — 2580000003 HC RX 258: Performed by: INTERNAL MEDICINE

## 2024-10-07 PROCEDURE — G0378 HOSPITAL OBSERVATION PER HR: HCPCS

## 2024-10-07 PROCEDURE — 7100000011 HC PHASE II RECOVERY - ADDTL 15 MIN: Performed by: SURGERY

## 2024-10-07 PROCEDURE — 6360000002 HC RX W HCPCS: Performed by: ANESTHESIOLOGY

## 2024-10-07 PROCEDURE — 86923 COMPATIBILITY TEST ELECTRIC: CPT

## 2024-10-07 PROCEDURE — 83735 ASSAY OF MAGNESIUM: CPT

## 2024-10-07 PROCEDURE — 6370000000 HC RX 637 (ALT 250 FOR IP): Performed by: STUDENT IN AN ORGANIZED HEALTH CARE EDUCATION/TRAINING PROGRAM

## 2024-10-07 PROCEDURE — 96372 THER/PROPH/DIAG INJ SC/IM: CPT

## 2024-10-07 PROCEDURE — 3700000001 HC ADD 15 MINUTES (ANESTHESIA): Performed by: SURGERY

## 2024-10-07 PROCEDURE — 6360000002 HC RX W HCPCS: Performed by: STUDENT IN AN ORGANIZED HEALTH CARE EDUCATION/TRAINING PROGRAM

## 2024-10-07 PROCEDURE — C1769 GUIDE WIRE: HCPCS | Performed by: SURGERY

## 2024-10-07 PROCEDURE — 6360000002 HC RX W HCPCS: Performed by: SURGERY

## 2024-10-07 PROCEDURE — 84100 ASSAY OF PHOSPHORUS: CPT

## 2024-10-07 PROCEDURE — 7100000010 HC PHASE II RECOVERY - FIRST 15 MIN: Performed by: SURGERY

## 2024-10-07 PROCEDURE — 86900 BLOOD TYPING SEROLOGIC ABO: CPT

## 2024-10-07 PROCEDURE — 2709999900 HC NON-CHARGEABLE SUPPLY: Performed by: SURGERY

## 2024-10-07 PROCEDURE — 2720000010 HC SURG SUPPLY STERILE: Performed by: SURGERY

## 2024-10-07 PROCEDURE — 85027 COMPLETE CBC AUTOMATED: CPT

## 2024-10-07 PROCEDURE — 3600007503: Performed by: SURGERY

## 2024-10-07 PROCEDURE — C2625 STENT, NON-COR, TEM W/DEL SY: HCPCS | Performed by: SURGERY

## 2024-10-07 PROCEDURE — 3700000000 HC ANESTHESIA ATTENDED CARE: Performed by: SURGERY

## 2024-10-07 PROCEDURE — 7100000001 HC PACU RECOVERY - ADDTL 15 MIN: Performed by: SURGERY

## 2024-10-07 DEVICE — STENT SYSTEM RMV
Type: IMPLANTABLE DEVICE | Site: BILE DUCT | Status: FUNCTIONAL
Brand: WALLFLEX BILIARY

## 2024-10-07 DEVICE — BILIARY STENT WITH NAVIFLEXTM RX DELIVERY SYSTEM
Type: IMPLANTABLE DEVICE | Site: BILE DUCT | Status: FUNCTIONAL
Brand: ADVANIX™ BILIARY

## 2024-10-07 RX ORDER — LANOLIN ALCOHOL/MO/W.PET/CERES
3 CREAM (GRAM) TOPICAL NIGHTLY PRN
Status: DISCONTINUED | OUTPATIENT
Start: 2024-10-07 | End: 2024-10-08 | Stop reason: HOSPADM

## 2024-10-07 RX ORDER — IPRATROPIUM BROMIDE AND ALBUTEROL SULFATE 2.5; .5 MG/3ML; MG/3ML
1 SOLUTION RESPIRATORY (INHALATION)
Status: CANCELLED | OUTPATIENT
Start: 2024-10-07 | End: 2024-10-08

## 2024-10-07 RX ORDER — OXYCODONE HYDROCHLORIDE 5 MG/1
5 TABLET ORAL EVERY 4 HOURS PRN
Status: DISCONTINUED | OUTPATIENT
Start: 2024-10-07 | End: 2024-10-08 | Stop reason: HOSPADM

## 2024-10-07 RX ORDER — MAGNESIUM SULFATE IN WATER 40 MG/ML
2000 INJECTION, SOLUTION INTRAVENOUS PRN
Status: DISCONTINUED | OUTPATIENT
Start: 2024-10-07 | End: 2024-10-08 | Stop reason: HOSPADM

## 2024-10-07 RX ORDER — SODIUM CHLORIDE 9 MG/ML
INJECTION, SOLUTION INTRAVENOUS CONTINUOUS
Status: DISCONTINUED | OUTPATIENT
Start: 2024-10-07 | End: 2024-10-08

## 2024-10-07 RX ORDER — DIPHENHYDRAMINE HYDROCHLORIDE 50 MG/ML
12.5 INJECTION INTRAMUSCULAR; INTRAVENOUS
Status: COMPLETED | OUTPATIENT
Start: 2024-10-07 | End: 2024-10-07

## 2024-10-07 RX ORDER — GLUCAGON 1 MG/ML
1 KIT INJECTION PRN
Status: DISCONTINUED | OUTPATIENT
Start: 2024-10-07 | End: 2024-10-08 | Stop reason: HOSPADM

## 2024-10-07 RX ORDER — MIDAZOLAM HYDROCHLORIDE 1 MG/ML
1 INJECTION INTRAMUSCULAR; INTRAVENOUS
Status: CANCELLED | OUTPATIENT
Start: 2024-10-07 | End: 2024-10-08

## 2024-10-07 RX ORDER — DEXTROSE MONOHYDRATE 100 MG/ML
INJECTION, SOLUTION INTRAVENOUS CONTINUOUS PRN
Status: DISCONTINUED | OUTPATIENT
Start: 2024-10-07 | End: 2024-10-08 | Stop reason: HOSPADM

## 2024-10-07 RX ORDER — SODIUM CHLORIDE 0.9 % (FLUSH) 0.9 %
5-40 SYRINGE (ML) INJECTION PRN
Status: DISCONTINUED | OUTPATIENT
Start: 2024-10-07 | End: 2024-10-08 | Stop reason: HOSPADM

## 2024-10-07 RX ORDER — SODIUM CHLORIDE 9 MG/ML
25 INJECTION, SOLUTION INTRAVENOUS PRN
Status: DISCONTINUED | OUTPATIENT
Start: 2024-10-07 | End: 2024-10-07 | Stop reason: HOSPADM

## 2024-10-07 RX ORDER — IOPAMIDOL 612 MG/ML
INJECTION, SOLUTION INTRAVASCULAR PRN
Status: DISCONTINUED | OUTPATIENT
Start: 2024-10-07 | End: 2024-10-07 | Stop reason: ALTCHOICE

## 2024-10-07 RX ORDER — ENOXAPARIN SODIUM 100 MG/ML
40 INJECTION SUBCUTANEOUS DAILY
Status: DISCONTINUED | OUTPATIENT
Start: 2024-10-07 | End: 2024-10-08 | Stop reason: HOSPADM

## 2024-10-07 RX ORDER — SODIUM CHLORIDE 9 MG/ML
INJECTION, SOLUTION INTRAVENOUS PRN
Status: DISCONTINUED | OUTPATIENT
Start: 2024-10-07 | End: 2024-10-08 | Stop reason: HOSPADM

## 2024-10-07 RX ORDER — HYDROXYZINE HYDROCHLORIDE 10 MG/1
10 TABLET, FILM COATED ORAL 3 TIMES DAILY PRN
Status: DISCONTINUED | OUTPATIENT
Start: 2024-10-07 | End: 2024-10-08 | Stop reason: HOSPADM

## 2024-10-07 RX ORDER — LORAZEPAM 2 MG/ML
0.5 INJECTION INTRAMUSCULAR EVERY 30 MIN PRN
Status: DISCONTINUED | OUTPATIENT
Start: 2024-10-07 | End: 2024-10-08 | Stop reason: HOSPADM

## 2024-10-07 RX ORDER — SODIUM CHLORIDE 0.9 % (FLUSH) 0.9 %
5-40 SYRINGE (ML) INJECTION PRN
Status: DISCONTINUED | OUTPATIENT
Start: 2024-10-07 | End: 2024-10-07 | Stop reason: HOSPADM

## 2024-10-07 RX ORDER — ONDANSETRON 2 MG/ML
4 INJECTION INTRAMUSCULAR; INTRAVENOUS
Status: CANCELLED | OUTPATIENT
Start: 2024-10-07 | End: 2024-10-08

## 2024-10-07 RX ORDER — 0.9 % SODIUM CHLORIDE 0.9 %
1000 INTRAVENOUS SOLUTION INTRAVENOUS ONCE
Status: COMPLETED | OUTPATIENT
Start: 2024-10-07 | End: 2024-10-07

## 2024-10-07 RX ORDER — CIPROFLOXACIN 2 MG/ML
400 INJECTION, SOLUTION INTRAVENOUS ONCE
Status: COMPLETED | OUTPATIENT
Start: 2024-10-07 | End: 2024-10-07

## 2024-10-07 RX ORDER — DIPHENHYDRAMINE HYDROCHLORIDE 50 MG/ML
INJECTION INTRAMUSCULAR; INTRAVENOUS
Status: COMPLETED
Start: 2024-10-07 | End: 2024-10-07

## 2024-10-07 RX ORDER — POTASSIUM CHLORIDE 1500 MG/1
40 TABLET, EXTENDED RELEASE ORAL PRN
Status: DISCONTINUED | OUTPATIENT
Start: 2024-10-07 | End: 2024-10-08 | Stop reason: HOSPADM

## 2024-10-07 RX ORDER — OXYCODONE HYDROCHLORIDE 5 MG/1
10 TABLET ORAL EVERY 4 HOURS PRN
Status: DISCONTINUED | OUTPATIENT
Start: 2024-10-07 | End: 2024-10-08 | Stop reason: HOSPADM

## 2024-10-07 RX ORDER — ONDANSETRON 4 MG/1
4 TABLET, ORALLY DISINTEGRATING ORAL EVERY 8 HOURS PRN
Status: DISCONTINUED | OUTPATIENT
Start: 2024-10-07 | End: 2024-10-08 | Stop reason: HOSPADM

## 2024-10-07 RX ORDER — SODIUM CHLORIDE 9 MG/ML
INJECTION, SOLUTION INTRAVENOUS PRN
Status: CANCELLED | OUTPATIENT
Start: 2024-10-07

## 2024-10-07 RX ORDER — NALOXONE HYDROCHLORIDE 0.4 MG/ML
INJECTION, SOLUTION INTRAMUSCULAR; INTRAVENOUS; SUBCUTANEOUS PRN
Status: CANCELLED | OUTPATIENT
Start: 2024-10-07

## 2024-10-07 RX ORDER — FENTANYL CITRATE 0.05 MG/ML
25 INJECTION, SOLUTION INTRAMUSCULAR; INTRAVENOUS EVERY 5 MIN PRN
Status: CANCELLED | OUTPATIENT
Start: 2024-10-07

## 2024-10-07 RX ORDER — ONDANSETRON 2 MG/ML
4 INJECTION INTRAMUSCULAR; INTRAVENOUS EVERY 6 HOURS PRN
Status: DISCONTINUED | OUTPATIENT
Start: 2024-10-07 | End: 2024-10-08 | Stop reason: HOSPADM

## 2024-10-07 RX ORDER — ACETAMINOPHEN 325 MG/1
650 TABLET ORAL EVERY 6 HOURS
Status: DISCONTINUED | OUTPATIENT
Start: 2024-10-07 | End: 2024-10-08 | Stop reason: HOSPADM

## 2024-10-07 RX ORDER — SODIUM CHLORIDE 0.9 % (FLUSH) 0.9 %
5-40 SYRINGE (ML) INJECTION EVERY 12 HOURS SCHEDULED
Status: DISCONTINUED | OUTPATIENT
Start: 2024-10-07 | End: 2024-10-08 | Stop reason: HOSPADM

## 2024-10-07 RX ORDER — PANTOPRAZOLE SODIUM 40 MG/1
40 TABLET, DELAYED RELEASE ORAL
Status: DISCONTINUED | OUTPATIENT
Start: 2024-10-08 | End: 2024-10-07

## 2024-10-07 RX ORDER — SODIUM CHLORIDE 0.9 % (FLUSH) 0.9 %
5-40 SYRINGE (ML) INJECTION PRN
Status: CANCELLED | OUTPATIENT
Start: 2024-10-07

## 2024-10-07 RX ORDER — MEPERIDINE HYDROCHLORIDE 25 MG/ML
12.5 INJECTION INTRAMUSCULAR; INTRAVENOUS; SUBCUTANEOUS EVERY 5 MIN PRN
Status: CANCELLED | OUTPATIENT
Start: 2024-10-07

## 2024-10-07 RX ORDER — PROPOFOL 10 MG/ML
INJECTION, EMULSION INTRAVENOUS
Status: DISCONTINUED | OUTPATIENT
Start: 2024-10-07 | End: 2024-10-07 | Stop reason: SDUPTHER

## 2024-10-07 RX ORDER — AMMONIUM LACTATE 12 G/100G
LOTION TOPICAL PRN
Status: DISCONTINUED | OUTPATIENT
Start: 2024-10-07 | End: 2024-10-08 | Stop reason: HOSPADM

## 2024-10-07 RX ORDER — SODIUM CHLORIDE 0.9 % (FLUSH) 0.9 %
5-40 SYRINGE (ML) INJECTION EVERY 12 HOURS SCHEDULED
Status: CANCELLED | OUTPATIENT
Start: 2024-10-07

## 2024-10-07 RX ORDER — SODIUM CHLORIDE 0.9 % (FLUSH) 0.9 %
5-40 SYRINGE (ML) INJECTION EVERY 12 HOURS SCHEDULED
Status: DISCONTINUED | OUTPATIENT
Start: 2024-10-07 | End: 2024-10-07 | Stop reason: HOSPADM

## 2024-10-07 RX ORDER — POTASSIUM CHLORIDE 7.45 MG/ML
10 INJECTION INTRAVENOUS PRN
Status: DISCONTINUED | OUTPATIENT
Start: 2024-10-07 | End: 2024-10-08 | Stop reason: HOSPADM

## 2024-10-07 RX ADMIN — DIPHENHYDRAMINE HYDROCHLORIDE 12.5 MG: 50 INJECTION INTRAMUSCULAR; INTRAVENOUS at 12:00

## 2024-10-07 RX ADMIN — SODIUM CHLORIDE: 9 INJECTION, SOLUTION INTRAVENOUS at 21:08

## 2024-10-07 RX ADMIN — PROPOFOL 80 MG: 10 INJECTION, EMULSION INTRAVENOUS at 10:25

## 2024-10-07 RX ADMIN — HYDROXYZINE HYDROCHLORIDE 10 MG: 10 TABLET, FILM COATED ORAL at 11:50

## 2024-10-07 RX ADMIN — SODIUM CHLORIDE: 9 INJECTION, SOLUTION INTRAVENOUS at 10:19

## 2024-10-07 RX ADMIN — ACETAMINOPHEN 650 MG: 325 TABLET ORAL at 23:39

## 2024-10-07 RX ADMIN — PROPOFOL 150 MCG/KG/MIN: 10 INJECTION, EMULSION INTRAVENOUS at 10:26

## 2024-10-07 RX ADMIN — DIPHENHYDRAMINE HYDROCHLORIDE 12.5 MG: 50 INJECTION INTRAMUSCULAR; INTRAVENOUS at 12:15

## 2024-10-07 RX ADMIN — SODIUM CHLORIDE: 9 INJECTION, SOLUTION INTRAVENOUS at 18:57

## 2024-10-07 RX ADMIN — SODIUM CHLORIDE 1000 ML: 9 INJECTION, SOLUTION INTRAVENOUS at 18:58

## 2024-10-07 RX ADMIN — ENOXAPARIN SODIUM 40 MG: 100 INJECTION SUBCUTANEOUS at 18:03

## 2024-10-07 RX ADMIN — CIPROFLOXACIN 400 MG: 2 INJECTION, SOLUTION INTRAVENOUS at 10:28

## 2024-10-07 RX ADMIN — ACETAMINOPHEN 650 MG: 325 TABLET ORAL at 18:03

## 2024-10-07 RX ADMIN — LORAZEPAM 0.5 MG: 2 INJECTION INTRAMUSCULAR; INTRAVENOUS at 12:09

## 2024-10-07 RX ADMIN — Medication: at 11:49

## 2024-10-07 ASSESSMENT — PAIN - FUNCTIONAL ASSESSMENT: PAIN_FUNCTIONAL_ASSESSMENT: 0-10

## 2024-10-07 NOTE — PROGRESS NOTES
Pt became tachycardic in ACC with HR up to 130's 140's, shivering, and per pts son he was \"acting off\"- irritable and telling his son to turn off his phone when he heard other pt monitors alarming. Otherwise pt A+O. Afebrile. Relayed this to Dr. Goddard, decision to admit.

## 2024-10-07 NOTE — ANESTHESIA POSTPROCEDURE EVALUATION
Department of Anesthesiology  Postprocedure Note    Patient: Raudel Herndon  MRN: [id number]  YOB: 1950  Date of evaluation: 10/7/2024    Procedure Summary       Date: 10/07/24 Room / Location: [address]    Anesthesia Start: 1019 Anesthesia Stop: 1051    Procedures:       ENDOSCOPIC RETROGRADE CHOLANGIOPANCREATOGRAPHY STENT INSERTION x 2      ENDOSCOPIC RETROGRADE CHOLANGIOPANCREATOGRAPHY STONE REMOVAL Diagnosis:       Obstructive jaundice      (Obstructive jaundice [K83.1])    Surgeons: Mervin Goddard MD Responsible Provider: Maximus Small MD    Anesthesia Type: MAC ASA Status: 3            Anesthesia Type: MAC    Andrews Phase I: Andrews Score: 10    Andrews Phase II:      Anesthesia Post Evaluation    Patient location during evaluation: PACU  Patient participation: waiting for patient participation  Pain score: 2  Airway patency: patent  Nausea & Vomiting: no nausea  Cardiovascular status: blood pressure returned to baseline  Respiratory status: acceptable  Hydration status: euvolemic  Pain management: adequate    No notable events documented.

## 2024-10-07 NOTE — PROGRESS NOTES
1046 squirming in bed with complaints of severe itching.  1100 silicone ointment applied to all intact skin. Mepilex applied to coccyx aprea to two open areas  1125 dr stanford notified of uncontrolled itching orders received  1150 atarax given PO lotion wiped off back states buring  1145 dr bauer here to evaluate patient order reived  1209 ativan 0.5 mg given. Taking ice chips sarah well

## 2024-10-07 NOTE — ANESTHESIA PRE PROCEDURE
Department of Anesthesiology  Preprocedure Note       Name:  Raudel Herndon   Age:  74 y.o.  :  1950                                          MRN:  [id number]         Date:  10/7/2024      Surgeon: Surgeon(s):  Mervin Goddard MD    Procedure: Procedure(s):  **MAKE LAST CASE** ENDOSCOPIC RETROGRADE CHOLANGIOPANCREATOGRAPHY WITH POSSIBLE STENT REMOVAL AND REPLACEMENT    Medications prior to admission:   Prior to Admission medications    Medication Sig Start Date End Date Taking? Authorizing Provider   METFORMIN HCL PO Take by mouth   Yes Provider, MD Kwadwo   hydrOXYzine HCl (ATARAX) 25 MG tablet Take 1 tablet by mouth every 8 hours as needed for Itching 10/3/24 12/2/24  Raza Reynoso III, MD   magnesium oxide (MAG-OX) 400 MG tablet Take 1 tablet by mouth daily  Patient not taking: Reported on 10/3/2024 7/10/24   Goldy Cardona MD   furosemide (LASIX) 20 MG tablet Take 1 tablet by mouth 2 times daily  Patient not taking: Reported on 10/3/2024 7/9/24   Goldy Cardona MD   melatonin 3 MG TABS tablet Take 1 tablet by mouth nightly as needed (insomnia) 24   Goldy Cardoan MD   pantoprazole (PROTONIX) 40 MG tablet Take 1 tablet by mouth 2 times daily (before meals) 3/19/24   Elle Mosher DO       Current medications:    Current Facility-Administered Medications   Medication Dose Route Frequency Provider Last Rate Last Admin    0.9 % sodium chloride infusion   IntraVENous Continuous Neidig, Maximus Starks MD        sodium chloride flush 0.9 % injection 5-40 mL  5-40 mL IntraVENous 2 times per day Mervin Goddard MD        sodium chloride flush 0.9 % injection 5-40 mL  5-40 mL IntraVENous PRN Mervin Goddard MD        0.9 % sodium chloride infusion  25 mL IntraVENous PRN Mervin Goddard MD        ciprofloxacin (CIPRO) IVPB 400 mg  400 mg IntraVENous Once Mervin Goddard MD           Allergies:    Allergies   Allergen Reactions    Adhesive Tape Rash       Problem List:

## 2024-10-07 NOTE — CONSULTS
Department of Internal Medicine  Internal Medicine Consultation Note    Primary Care Physician: Villa Lemus PA   Admitting Physician:  Mervin Goddard MD  Admission date and time: 10/7/2024  9:02 AM    Room:  03380338-02  Admitting diagnosis: Obstructive jaundice [K83.1]  Postoperative state [Z98.890]  Post-operative state [Z98.890]      Patient Name: Raudel Herndon  MRN: [id number]    Date of Service: 10/7/2024     Reason for consultation: Medical management    History of present illness:      Patient is a 74-year-old male who is status post ERCP.  Postoperatively patient's pain is much improved.  Initially on exam he was sleeping.  On awakening he did not complain of any acute issues.  Vital signs are stable.  However it appears he is a bit hypotensive.  Otherwise no complaints of chest pain or shortness of breath.  No complaints of fever or chills.           PAST MEDICAL Hx:  Past Medical History:   Diagnosis Date    Cancer (HCC) 02/2024    cholangiocarcinoma    Diabetes mellitus (HCC)     Failure to thrive in adult 06/25/2024    Prostate cancer (HCC)     treated with seed implants       PAST SURGICAL Hx:   Past Surgical History:   Procedure Laterality Date    ANKLE FUSION Right 2022    COLONOSCOPY      ERCP N/A 02/29/2024    ENDOSCOPIC RETROGRADE CHOLANGIOPANCREATOGRAPHY performed by Raza Lane DO at [facility]    ERCP N/A 02/29/2024    ENDOSCOPIC RETROGRADE CHOLANGIOPANCREATOGRAPHY DILATION BALLOON performed by Raza Lane DO at [facility]    ERCP N/A 02/29/2024    ENDOSCOPIC RETROGRADE CHOLANGIOPANCREATOGRAPHY STENT REMOVAL performed by Raza Lane DO at [facility]    ERCP N/A 02/29/2024    ENDOSCOPIC RETROGRADE CHOLANGIOPANCREATOGRAPHY STENT INSERTION performed by Raza Lane DO at [facility]    ERCP N/A 6/28/2024    ENDOSCOPIC RETROGRADE CHOLANGIOPANCREATOGRAPHY/ POSSIBLE STENT performed by Raza Lane DO at [facility]    EYE SURGERY Bilateral

## 2024-10-08 ENCOUNTER — PREP FOR PROCEDURE (OUTPATIENT)
Dept: HEMATOLOGY | Age: 74
End: 2024-10-08

## 2024-10-08 VITALS
HEART RATE: 77 BPM | BODY MASS INDEX: 21.2 KG/M2 | WEIGHT: 160 LBS | RESPIRATION RATE: 17 BRPM | HEIGHT: 73 IN | DIASTOLIC BLOOD PRESSURE: 59 MMHG | TEMPERATURE: 97.7 F | SYSTOLIC BLOOD PRESSURE: 97 MMHG | OXYGEN SATURATION: 100 %

## 2024-10-08 LAB
ALBUMIN SERPL-MCNC: 1.9 G/DL (ref 3.5–5.2)
ALP SERPL-CCNC: 844 U/L (ref 40–129)
ALT SERPL-CCNC: 35 U/L (ref 0–40)
ANION GAP SERPL CALCULATED.3IONS-SCNC: 13 MMOL/L (ref 7–16)
AST SERPL-CCNC: 73 U/L (ref 0–39)
BASOPHILS # BLD: 0.01 K/UL (ref 0–0.2)
BASOPHILS NFR BLD: 0 % (ref 0–2)
BILIRUB SERPL-MCNC: 6.8 MG/DL (ref 0–1.2)
BUN SERPL-MCNC: 15 MG/DL (ref 6–23)
CALCIUM SERPL-MCNC: 7.2 MG/DL (ref 8.6–10.2)
CHLORIDE SERPL-SCNC: 109 MMOL/L (ref 98–107)
CO2 SERPL-SCNC: 18 MMOL/L (ref 22–29)
CREAT SERPL-MCNC: 0.9 MG/DL (ref 0.7–1.2)
EKG ATRIAL RATE: 87 BPM
EKG P AXIS: 88 DEGREES
EKG P-R INTERVAL: 122 MS
EKG Q-T INTERVAL: 396 MS
EKG QRS DURATION: 80 MS
EKG QTC CALCULATION (BAZETT): 476 MS
EKG R AXIS: 42 DEGREES
EKG T AXIS: 43 DEGREES
EKG VENTRICULAR RATE: 87 BPM
EOSINOPHIL # BLD: 0.06 K/UL (ref 0.05–0.5)
EOSINOPHILS RELATIVE PERCENT: 1 % (ref 0–6)
ERYTHROCYTE [DISTWIDTH] IN BLOOD BY AUTOMATED COUNT: 17.2 % (ref 11.5–15)
FOLATE SERPL-MCNC: 7.3 NG/ML (ref 4.8–24.2)
GFR, ESTIMATED: 86 ML/MIN/1.73M2
GLUCOSE BLD-MCNC: 114 MG/DL (ref 74–99)
GLUCOSE BLD-MCNC: 80 MG/DL (ref 74–99)
GLUCOSE SERPL-MCNC: 86 MG/DL (ref 74–99)
HCT VFR BLD AUTO: 24.1 % (ref 37–54)
HGB BLD-MCNC: 7.7 G/DL (ref 12.5–16.5)
IMM GRANULOCYTES # BLD AUTO: 0.03 K/UL (ref 0–0.58)
IMM GRANULOCYTES NFR BLD: 1 % (ref 0–5)
IMM RETICS NFR: 19 % (ref 2.3–13.4)
LIPASE SERPL-CCNC: 14 U/L (ref 13–60)
LYMPHOCYTES NFR BLD: 0.99 K/UL (ref 1.5–4)
LYMPHOCYTES RELATIVE PERCENT: 16 % (ref 20–42)
MAGNESIUM SERPL-MCNC: 1.2 MG/DL (ref 1.6–2.6)
MCH RBC QN AUTO: 33.5 PG (ref 26–35)
MCHC RBC AUTO-ENTMCNC: 32 G/DL (ref 32–34.5)
MCV RBC AUTO: 104.8 FL (ref 80–99.9)
MICROORGANISM SPEC CULT: NORMAL
MICROORGANISM SPEC CULT: NORMAL
MONOCYTES NFR BLD: 0.41 K/UL (ref 0.1–0.95)
MONOCYTES NFR BLD: 6 % (ref 2–12)
NEUTROPHILS NFR BLD: 77 % (ref 43–80)
NEUTS SEG NFR BLD: 4.89 K/UL (ref 1.8–7.3)
PHOSPHATE SERPL-MCNC: 3.2 MG/DL (ref 2.5–4.5)
PLATELET # BLD AUTO: 144 K/UL (ref 130–450)
PMV BLD AUTO: 9.4 FL (ref 7–12)
POTASSIUM SERPL-SCNC: 2.9 MMOL/L (ref 3.5–5)
PROCALCITONIN SERPL-MCNC: 13.68 NG/ML (ref 0–0.08)
PROT SERPL-MCNC: 5.5 G/DL (ref 6.4–8.3)
RBC # BLD AUTO: 2.3 M/UL (ref 3.8–5.8)
RETIC HEMOGLOBIN: 34.6 PG (ref 28.2–36.6)
RETICS # AUTO: 0.07 M/UL
RETICS/RBC NFR AUTO: 3.1 % (ref 0.4–1.9)
SERVICE CMNT-IMP: NORMAL
SERVICE CMNT-IMP: NORMAL
SODIUM SERPL-SCNC: 140 MMOL/L (ref 132–146)
SPECIMEN DESCRIPTION: NORMAL
SPECIMEN DESCRIPTION: NORMAL
T4 FREE SERPL-MCNC: 1 NG/DL (ref 0.9–1.7)
TSH SERPL DL<=0.05 MIU/L-ACNC: 0.81 UIU/ML (ref 0.27–4.2)
VIT B12 SERPL-MCNC: 967 PG/ML (ref 211–946)
WBC OTHER # BLD: 6.4 K/UL (ref 4.5–11.5)

## 2024-10-08 PROCEDURE — 82607 VITAMIN B-12: CPT

## 2024-10-08 PROCEDURE — 6370000000 HC RX 637 (ALT 250 FOR IP): Performed by: INTERNAL MEDICINE

## 2024-10-08 PROCEDURE — 82962 GLUCOSE BLOOD TEST: CPT

## 2024-10-08 PROCEDURE — 6360000002 HC RX W HCPCS: Performed by: INTERNAL MEDICINE

## 2024-10-08 PROCEDURE — 84439 ASSAY OF FREE THYROXINE: CPT

## 2024-10-08 PROCEDURE — 83690 ASSAY OF LIPASE: CPT

## 2024-10-08 PROCEDURE — 84145 PROCALCITONIN (PCT): CPT

## 2024-10-08 PROCEDURE — 6370000000 HC RX 637 (ALT 250 FOR IP): Performed by: NURSE PRACTITIONER

## 2024-10-08 PROCEDURE — 84100 ASSAY OF PHOSPHORUS: CPT

## 2024-10-08 PROCEDURE — 96366 THER/PROPH/DIAG IV INF ADDON: CPT

## 2024-10-08 PROCEDURE — 84443 ASSAY THYROID STIM HORMONE: CPT

## 2024-10-08 PROCEDURE — 96376 TX/PRO/DX INJ SAME DRUG ADON: CPT

## 2024-10-08 PROCEDURE — 96365 THER/PROPH/DIAG IV INF INIT: CPT

## 2024-10-08 PROCEDURE — 85025 COMPLETE CBC W/AUTO DIFF WBC: CPT

## 2024-10-08 PROCEDURE — 6370000000 HC RX 637 (ALT 250 FOR IP): Performed by: STUDENT IN AN ORGANIZED HEALTH CARE EDUCATION/TRAINING PROGRAM

## 2024-10-08 PROCEDURE — 2580000003 HC RX 258: Performed by: INTERNAL MEDICINE

## 2024-10-08 PROCEDURE — 6360000002 HC RX W HCPCS: Performed by: STUDENT IN AN ORGANIZED HEALTH CARE EDUCATION/TRAINING PROGRAM

## 2024-10-08 PROCEDURE — 96368 THER/DIAG CONCURRENT INF: CPT

## 2024-10-08 PROCEDURE — 2580000003 HC RX 258: Performed by: STUDENT IN AN ORGANIZED HEALTH CARE EDUCATION/TRAINING PROGRAM

## 2024-10-08 PROCEDURE — 36415 COLL VENOUS BLD VENIPUNCTURE: CPT

## 2024-10-08 PROCEDURE — G0378 HOSPITAL OBSERVATION PER HR: HCPCS

## 2024-10-08 PROCEDURE — 82746 ASSAY OF FOLIC ACID SERUM: CPT

## 2024-10-08 PROCEDURE — 96375 TX/PRO/DX INJ NEW DRUG ADDON: CPT

## 2024-10-08 PROCEDURE — 2580000003 HC RX 258: Performed by: NURSE PRACTITIONER

## 2024-10-08 PROCEDURE — 36430 TRANSFUSION BLD/BLD COMPNT: CPT

## 2024-10-08 PROCEDURE — P9016 RBC LEUKOCYTES REDUCED: HCPCS

## 2024-10-08 PROCEDURE — 85045 AUTOMATED RETICULOCYTE COUNT: CPT

## 2024-10-08 PROCEDURE — 83735 ASSAY OF MAGNESIUM: CPT

## 2024-10-08 PROCEDURE — 96361 HYDRATE IV INFUSION ADD-ON: CPT

## 2024-10-08 PROCEDURE — 6370000000 HC RX 637 (ALT 250 FOR IP): Performed by: SURGERY

## 2024-10-08 PROCEDURE — 80053 COMPREHEN METABOLIC PANEL: CPT

## 2024-10-08 RX ORDER — POTASSIUM CHLORIDE 1500 MG/1
40 TABLET, EXTENDED RELEASE ORAL ONCE
Status: COMPLETED | OUTPATIENT
Start: 2024-10-08 | End: 2024-10-08

## 2024-10-08 RX ORDER — MAGNESIUM OXIDE 400 MG/1
400 TABLET ORAL 2 TIMES DAILY
Status: DISCONTINUED | OUTPATIENT
Start: 2024-10-08 | End: 2024-10-08 | Stop reason: HOSPADM

## 2024-10-08 RX ORDER — INSULIN LISPRO 100 [IU]/ML
0-4 INJECTION, SOLUTION INTRAVENOUS; SUBCUTANEOUS
Status: DISCONTINUED | OUTPATIENT
Start: 2024-10-08 | End: 2024-10-08 | Stop reason: HOSPADM

## 2024-10-08 RX ORDER — MAGNESIUM OXIDE 400 MG/1
400 TABLET ORAL DAILY
Status: DISCONTINUED | OUTPATIENT
Start: 2024-10-08 | End: 2024-10-08

## 2024-10-08 RX ORDER — SODIUM CHLORIDE, SODIUM LACTATE, POTASSIUM CHLORIDE, AND CALCIUM CHLORIDE .6; .31; .03; .02 G/100ML; G/100ML; G/100ML; G/100ML
1000 INJECTION, SOLUTION INTRAVENOUS ONCE
Status: COMPLETED | OUTPATIENT
Start: 2024-10-08 | End: 2024-10-08

## 2024-10-08 RX ORDER — METRONIDAZOLE 500 MG/100ML
500 INJECTION, SOLUTION INTRAVENOUS EVERY 8 HOURS
Status: DISCONTINUED | OUTPATIENT
Start: 2024-10-08 | End: 2024-10-08 | Stop reason: HOSPADM

## 2024-10-08 RX ORDER — CIPROFLOXACIN 2 MG/ML
400 INJECTION, SOLUTION INTRAVENOUS EVERY 12 HOURS
Status: DISCONTINUED | OUTPATIENT
Start: 2024-10-08 | End: 2024-10-08 | Stop reason: HOSPADM

## 2024-10-08 RX ORDER — CHOLESTYRAMINE 4 G/9G
1 POWDER, FOR SUSPENSION ORAL 2 TIMES DAILY
Qty: 90 PACKET | Refills: 3 | Status: SHIPPED | OUTPATIENT
Start: 2024-10-08

## 2024-10-08 RX ORDER — INSULIN LISPRO 100 [IU]/ML
0-4 INJECTION, SOLUTION INTRAVENOUS; SUBCUTANEOUS NIGHTLY
Status: DISCONTINUED | OUTPATIENT
Start: 2024-10-08 | End: 2024-10-08 | Stop reason: HOSPADM

## 2024-10-08 RX ORDER — LACTOBACILLUS RHAMNOSUS GG 10B CELL
1 CAPSULE ORAL DAILY
Status: DISCONTINUED | OUTPATIENT
Start: 2024-10-08 | End: 2024-10-08 | Stop reason: HOSPADM

## 2024-10-08 RX ORDER — 0.9 % SODIUM CHLORIDE 0.9 %
1000 INTRAVENOUS SOLUTION INTRAVENOUS ONCE
Status: COMPLETED | OUTPATIENT
Start: 2024-10-08 | End: 2024-10-08

## 2024-10-08 RX ORDER — SODIUM CHLORIDE, SODIUM LACTATE, POTASSIUM CHLORIDE, CALCIUM CHLORIDE 600; 310; 30; 20 MG/100ML; MG/100ML; MG/100ML; MG/100ML
INJECTION, SOLUTION INTRAVENOUS CONTINUOUS
Status: DISCONTINUED | OUTPATIENT
Start: 2024-10-08 | End: 2024-10-08 | Stop reason: HOSPADM

## 2024-10-08 RX ADMIN — ACETAMINOPHEN 650 MG: 325 TABLET ORAL at 05:59

## 2024-10-08 RX ADMIN — MAGNESIUM SULFATE HEPTAHYDRATE 2000 MG: 40 INJECTION, SOLUTION INTRAVENOUS at 05:36

## 2024-10-08 RX ADMIN — PANTOPRAZOLE SODIUM 40 MG: 40 INJECTION, POWDER, FOR SOLUTION INTRAVENOUS at 00:37

## 2024-10-08 RX ADMIN — HYDROXYZINE HYDROCHLORIDE 10 MG: 10 TABLET, FILM COATED ORAL at 00:37

## 2024-10-08 RX ADMIN — POTASSIUM CHLORIDE 10 MEQ: 7.46 INJECTION, SOLUTION INTRAVENOUS at 10:22

## 2024-10-08 RX ADMIN — SODIUM CHLORIDE 1000 ML: 9 INJECTION, SOLUTION INTRAVENOUS at 03:53

## 2024-10-08 RX ADMIN — POTASSIUM CHLORIDE 10 MEQ: 7.46 INJECTION, SOLUTION INTRAVENOUS at 05:29

## 2024-10-08 RX ADMIN — POTASSIUM CHLORIDE 10 MEQ: 7.46 INJECTION, SOLUTION INTRAVENOUS at 08:39

## 2024-10-08 RX ADMIN — SODIUM CHLORIDE, POTASSIUM CHLORIDE, SODIUM LACTATE AND CALCIUM CHLORIDE 1000 ML: 600; 310; 30; 20 INJECTION, SOLUTION INTRAVENOUS at 08:17

## 2024-10-08 RX ADMIN — SODIUM CHLORIDE, POTASSIUM CHLORIDE, SODIUM LACTATE AND CALCIUM CHLORIDE: 600; 310; 30; 20 INJECTION, SOLUTION INTRAVENOUS at 10:22

## 2024-10-08 RX ADMIN — METRONIDAZOLE 500 MG: 500 INJECTION, SOLUTION INTRAVENOUS at 04:33

## 2024-10-08 RX ADMIN — CIPROFLOXACIN 400 MG: 400 INJECTION, SOLUTION INTRAVENOUS at 04:32

## 2024-10-08 RX ADMIN — MAGNESIUM OXIDE 400 MG: 400 TABLET ORAL at 08:06

## 2024-10-08 RX ADMIN — POTASSIUM BICARBONATE 50 MEQ: 978 TABLET, EFFERVESCENT ORAL at 08:06

## 2024-10-08 RX ADMIN — MAGNESIUM SULFATE HEPTAHYDRATE 2000 MG: 40 INJECTION, SOLUTION INTRAVENOUS at 06:46

## 2024-10-08 RX ADMIN — HYDROXYZINE HYDROCHLORIDE 10 MG: 10 TABLET, FILM COATED ORAL at 08:06

## 2024-10-08 RX ADMIN — Medication 1 CAPSULE: at 04:09

## 2024-10-08 RX ADMIN — POTASSIUM CHLORIDE 40 MEQ: 1500 TABLET, EXTENDED RELEASE ORAL at 12:08

## 2024-10-08 NOTE — PLAN OF CARE
Problem: Discharge Planning  Goal: Discharge to home or other facility with appropriate resources  Outcome: Progressing     Problem: Pain  Goal: Verbalizes/displays adequate comfort level or baseline comfort level  Outcome: Progressing     Problem: Chronic Conditions and Co-morbidities  Goal: Patient's chronic conditions and co-morbidity symptoms are monitored and maintained or improved  Outcome: Progressing     Problem: Safety - Adult  Goal: Free from fall injury  Outcome: Progressing     Problem: ABCDS Injury Assessment  Goal: Absence of physical injury  Outcome: Progressing

## 2024-10-08 NOTE — PROGRESS NOTES
GENERAL SURGERY  DAILY PROGRESS NOTE  10/8/2024    No chief complaint on file.      Subjective:  Pt states he is feeling much better today. Denies any abdominal pain, nausea, or vomiting. Has some itching.    Objective:  BP (!) 97/59   Pulse 77   Temp 97.7 °F (36.5 °C) (Axillary)   Resp 17   Ht 1.854 m (6' 1\")   Wt 72.6 kg (160 lb)   SpO2 100%   BMI 21.11 kg/m²     GENERAL:  Laying in bed, awake, alert, cooperative, no apparent distress  HEAD: Normocephalic, atraumatic  EYES: Scleral icterus, pupils equal  LUNGS:  No increased work of breathing on room air  CARDIOVASCULAR:  RR and normotensive  ABDOMEN:  Soft, non-tender, non-distended  EXTREMITIES: No edema or swelling  SKIN: Jaundice    Assessment/Plan:  74 y.o. male with extrahepatic cholangiocarcinoma with biliary obstruction, history of prior ERCP with stent placement s/p ERCP with stent exchange 10/7    - Pain control PRN  - Diet as tolerated  - Trend LFTs/bili, downtrending  - Hgb responded appropriately to 1u PRBC  - DC home today on Questran, atarax and Augmentin    Electronically signed by Sourav Rodrigez MD on 10/8/2024 at 7:30 AM    Discharge home today

## 2024-10-08 NOTE — PROGRESS NOTES
Internal Medicine Progress Note     [facility]     Shane Hernandez D.O., CORBINORaphaelI.                         Prince Ahmadi D.O., CORBINORaphaelI.  Best Elliott D.O.     Tonie Johnston, MSN, APRN, NP-C  Michael Bush, MSN, APRN-CNP  Eleazar Gagnon, MSN, APRN, NP-C  Ina Clark, MSN, APRN-CNP  Carolyn Mittal, MSN, APRN, NP-C     Primary Care Physician: Villa Lemus PA   Admitting Physician:  Mervin Goddard MD  Admission date and time: 10/7/2024  9:02 AM    Room:  [facility]0338-02  Admitting diagnosis: Obstructive jaundice [K83.1]  Postoperative state [Z98.890]  Post-operative state [Z98.890]    Patient Name: Raudel Herndon  MRN: [id number]    Date of Service: 10/8/2024     Subjective:  Raudel is a 74 y.o. male who was seen and examined today,10/8/2024, at the bedside.  Feeling much better.  Essentially no abdominal pain.  Eager for discharge.  We have discussed plan for electrolyte replacement.  Further recommendations are forthcoming from surgery team.  No additional complaints or concerns.  No family present during my examination.    Review of systems:  Constitutional:   + fatigue and malaise , - fever/chills  HEENT:   Denies ear pain, sore throat, sinus or eye problems.  Cardiovascular:   - chest pain, irregular heartbeats, or palpitations.   Respiratory:   - dyspnea at rest, - dyspnea on exertion, - coughing, - sputum, - hemoptysis  Gastrointestinal:   - nausea, -vomiting. - bowel pattern changes. - hematochezia. - melena. - poor appetite and poor intake. - post-operative abdominal pain.  Genitourinary:    Denies any urgency, frequency, hematuria. Voiding  without difficulty.  Extremities:   Denies lower extremity swelling, edema or cyanosis.   Neurology:    Denies any headache or focal neurological deficits, positive for generalized weakness and fatigue without focal component  Psch:   Denies being anxious or depressed.  Musculoskeletal:    Denies  myalgias, joint complaints or back

## 2024-10-08 NOTE — PLAN OF CARE
Problem: Discharge Planning  Goal: Discharge to home or other facility with appropriate resources  10/8/2024 0937 by Jayne Laboy RN  Outcome: Progressing  10/8/2024 0123 by Amber Garcia RN  Outcome: Progressing     Problem: Pain  Goal: Verbalizes/displays adequate comfort level or baseline comfort level  10/8/2024 0937 by Jayne Laboy RN  Outcome: Progressing  10/8/2024 0123 by Amber Garcia RN  Outcome: Progressing     Problem: Chronic Conditions and Co-morbidities  Goal: Patient's chronic conditions and co-morbidity symptoms are monitored and maintained or improved  10/8/2024 0937 by Jayne Laboy RN  Outcome: Progressing  10/8/2024 0123 by Amber Garcia RN  Outcome: Progressing     Problem: Safety - Adult  Goal: Free from fall injury  10/8/2024 0937 by Jayne Laboy RN  Outcome: Progressing  10/8/2024 0123 by Amber Garcia RN  Outcome: Progressing     Problem: ABCDS Injury Assessment  Goal: Absence of physical injury  10/8/2024 0937 by Jayne Laboy RN  Outcome: Progressing  10/8/2024 0123 by Amber Garcia RN  Outcome: Progressing

## 2024-10-09 ENCOUNTER — TELEPHONE (OUTPATIENT)
Dept: HEMATOLOGY | Age: 74
End: 2024-10-09

## 2024-10-09 ENCOUNTER — PREP FOR PROCEDURE (OUTPATIENT)
Dept: HEMATOLOGY | Age: 74
End: 2024-10-09

## 2024-10-09 ENCOUNTER — HOSPITAL ENCOUNTER (OUTPATIENT)
Age: 74
Discharge: HOME | End: 2024-10-11
Payer: MEDICARE

## 2024-10-09 ENCOUNTER — HOSPITAL ENCOUNTER (OUTPATIENT)
Dept: GENERAL RADIOLOGY | Age: 74
Discharge: HOME | End: 2024-10-11
Payer: MEDICARE

## 2024-10-09 ENCOUNTER — OFFICE VISIT (OUTPATIENT)
Dept: NEUROSURGERY | Age: 74
End: 2024-10-09
Payer: MEDICARE

## 2024-10-09 DIAGNOSIS — M86.00 ACUTE HEMATOGENOUS OSTEOMYELITIS, UNSPECIFIED SITE: Primary | ICD-10-CM

## 2024-10-09 DIAGNOSIS — M46.49 DISCITIS OF MULTIPLE SITES OF SPINE: ICD-10-CM

## 2024-10-09 LAB
ABO/RH: NORMAL
ANTIBODY SCREEN: NEGATIVE
ARM BAND NUMBER: NORMAL
BLOOD BANK BLOOD PRODUCT EXPIRATION DATE: NORMAL
BLOOD BANK DISPENSE STATUS: NORMAL
BLOOD BANK ISBT PRODUCT BLOOD TYPE: 600
BLOOD BANK PRODUCT CODE: NORMAL
BLOOD BANK SAMPLE EXPIRATION: NORMAL
BLOOD BANK UNIT TYPE AND RH: NORMAL
BPU ID: NORMAL
COMPONENT: NORMAL
CROSSMATCH RESULT: NORMAL
TRANSFUSION STATUS: NORMAL
UNIT DIVISION: 0
UNIT ISSUE DATE/TIME: NORMAL

## 2024-10-09 PROCEDURE — 99213 OFFICE O/P EST LOW 20 MIN: CPT | Performed by: PHYSICIAN ASSISTANT

## 2024-10-09 PROCEDURE — 1123F ACP DISCUSS/DSCN MKR DOCD: CPT | Performed by: PHYSICIAN ASSISTANT

## 2024-10-09 PROCEDURE — 72100 X-RAY EXAM L-S SPINE 2/3 VWS: CPT

## 2024-10-09 NOTE — TELEPHONE ENCOUNTER
I scheduled patient for his surgery on 10/24/24 at [facility] at 10:45am.  I called son Otf and left a VM to call our office back to get the surgery details.  No auth was required for cpt code 94086 via predictal.      Electronically signed by Ina Henderson RN on 10/9/2024 at 8:32 AM      I spoke to patients salas Vanessa and I gave him the date, time and location for his surgery.  He verbalized understanding and he is aware that PAT will call with further instructions.    Electronically signed by Ina Henderson RN on 10/9/2024 at 10:58 AM

## 2024-10-09 NOTE — PROGRESS NOTES
Patient is here for follow up from a hospital consult for: L2-3 discitis.  The pain is much better.  He is walking with a walker.he has completed the AB    Physical exam  Alert and Oriented X3  PERRLA, EOMI  LOVELACE 4-/5, pain noted  Sensation intact to LT and PP  Reflexes are 2+ and symmetric    A/P: patient is here for follow up for: L2-3 discitis.  Lumbar x-rays stable. Discontinue  TLSO.  No restrictions. F/u PRN

## 2024-10-11 ENCOUNTER — TELEPHONE (OUTPATIENT)
Dept: HEMATOLOGY | Age: 74
End: 2024-10-11

## 2024-10-11 DIAGNOSIS — R60.0 EDEMA LEG: ICD-10-CM

## 2024-10-11 DIAGNOSIS — C22.1 CHOLANGIOCELLULAR CARCINOMA (HCC): Primary | ICD-10-CM

## 2024-10-11 DIAGNOSIS — C22.1 CHOLANGIOCARCINOMA (HCC): ICD-10-CM

## 2024-10-11 RX ORDER — FUROSEMIDE 20 MG
20 TABLET ORAL DAILY
Qty: 60 TABLET | Refills: 3 | Status: SHIPPED
Start: 2024-10-11 | End: 2024-10-11 | Stop reason: SDUPTHER

## 2024-10-11 RX ORDER — FUROSEMIDE 20 MG
20 TABLET ORAL DAILY
Qty: 5 TABLET | Refills: 0 | Status: SHIPPED | OUTPATIENT
Start: 2024-10-11

## 2024-10-11 NOTE — TELEPHONE ENCOUNTER
Patient called in c/o lower leg and feet swelling.  He is elevating his legs.  I spoke to Dr. Reynoso and he would like him to take Lasix 20mg daily for 5 days.  I sent over the script and I called patient back and I gave him the above instructions.      Lasix 20mg PO daily #5 with 0 Refills.    Electronically signed by Ina Henderson RN on 10/11/2024 at 1:06 PM

## 2024-10-14 RX ORDER — SODIUM CHLORIDE 0.9 % (FLUSH) 0.9 %
5-40 SYRINGE (ML) INJECTION PRN
Status: CANCELLED | OUTPATIENT
Start: 2024-10-14

## 2024-10-14 RX ORDER — SODIUM CHLORIDE 9 MG/ML
INJECTION, SOLUTION INTRAVENOUS PRN
Status: CANCELLED | OUTPATIENT
Start: 2024-10-14

## 2024-10-14 RX ORDER — SODIUM CHLORIDE 0.9 % (FLUSH) 0.9 %
5-40 SYRINGE (ML) INJECTION EVERY 12 HOURS SCHEDULED
Status: CANCELLED | OUTPATIENT
Start: 2024-10-14

## 2024-10-15 ENCOUNTER — TELEPHONE (OUTPATIENT)
Dept: HEMATOLOGY | Age: 74
End: 2024-10-15

## 2024-10-22 ENCOUNTER — ANESTHESIA EVENT (OUTPATIENT)
Dept: OPERATING ROOM | Age: 74
DRG: 640 | End: 2024-10-22
Payer: MEDICARE

## 2024-10-24 ENCOUNTER — HOSPITAL ENCOUNTER (INPATIENT)
Age: 74
LOS: 4 days | Discharge: HOSPICE | DRG: 640 | End: 2024-10-28
Attending: TRANSPLANT SURGERY | Admitting: TRANSPLANT SURGERY
Payer: MEDICARE

## 2024-10-24 ENCOUNTER — ANESTHESIA (OUTPATIENT)
Dept: OPERATING ROOM | Age: 74
DRG: 640 | End: 2024-10-24
Payer: MEDICARE

## 2024-10-24 PROBLEM — Z71.89 GOALS OF CARE, COUNSELING/DISCUSSION: Status: ACTIVE | Noted: 2024-10-24

## 2024-10-24 PROBLEM — D72.829 LEUKOCYTOSIS: Status: ACTIVE | Noted: 2024-10-24

## 2024-10-24 PROBLEM — Z51.5 PALLIATIVE CARE ENCOUNTER: Status: ACTIVE | Noted: 2024-10-24

## 2024-10-24 PROBLEM — E83.42 HYPOMAGNESEMIA: Status: ACTIVE | Noted: 2024-10-24

## 2024-10-24 PROBLEM — E87.6 HYPOKALEMIA: Status: ACTIVE | Noted: 2024-10-24

## 2024-10-24 LAB
ALBUMIN SERPL-MCNC: 2.4 G/DL (ref 3.5–5.2)
ALP SERPL-CCNC: 1073 U/L (ref 40–129)
ALT SERPL-CCNC: 48 U/L (ref 0–40)
ANION GAP SERPL CALCULATED.3IONS-SCNC: 13 MMOL/L (ref 7–16)
AST SERPL-CCNC: 141 U/L (ref 0–39)
BILIRUB DIRECT SERPL-MCNC: 12.5 MG/DL (ref 0–0.3)
BILIRUB INDIRECT SERPL-MCNC: 3.4 MG/DL (ref 0–1)
BILIRUB SERPL-MCNC: 15.9 MG/DL (ref 0–1.2)
BUN SERPL-MCNC: 22 MG/DL (ref 6–23)
CALCIUM SERPL-MCNC: 7.4 MG/DL (ref 8.6–10.2)
CHLORIDE SERPL-SCNC: 104 MMOL/L (ref 98–107)
CO2 SERPL-SCNC: 23 MMOL/L (ref 22–29)
CREAT SERPL-MCNC: 1.3 MG/DL (ref 0.7–1.2)
ERYTHROCYTE [DISTWIDTH] IN BLOOD BY AUTOMATED COUNT: 17.2 % (ref 11.5–15)
GFR, ESTIMATED: 58 ML/MIN/1.73M2
GLUCOSE SERPL-MCNC: 99 MG/DL (ref 74–99)
HCT VFR BLD AUTO: 23.6 % (ref 37–54)
HGB BLD-MCNC: 7.8 G/DL (ref 12.5–16.5)
MAGNESIUM SERPL-MCNC: 1 MG/DL (ref 1.6–2.6)
MCH RBC QN AUTO: 32.2 PG (ref 26–35)
MCHC RBC AUTO-ENTMCNC: 33.1 G/DL (ref 32–34.5)
MCV RBC AUTO: 97.5 FL (ref 80–99.9)
PHOSPHATE SERPL-MCNC: 2.7 MG/DL (ref 2.5–4.5)
PLATELET # BLD AUTO: 221 K/UL (ref 130–450)
PMV BLD AUTO: 9.5 FL (ref 7–12)
POTASSIUM SERPL-SCNC: 2.2 MMOL/L (ref 3.5–5)
PROT SERPL-MCNC: 6.3 G/DL (ref 6.4–8.3)
RBC # BLD AUTO: 2.42 M/UL (ref 3.8–5.8)
SODIUM SERPL-SCNC: 140 MMOL/L (ref 132–146)
WBC OTHER # BLD: 12.3 K/UL (ref 4.5–11.5)

## 2024-10-24 PROCEDURE — 80053 COMPREHEN METABOLIC PANEL: CPT

## 2024-10-24 PROCEDURE — 87040 BLOOD CULTURE FOR BACTERIA: CPT

## 2024-10-24 PROCEDURE — 99222 1ST HOSP IP/OBS MODERATE 55: CPT | Performed by: INTERNAL MEDICINE

## 2024-10-24 PROCEDURE — 36415 COLL VENOUS BLD VENIPUNCTURE: CPT

## 2024-10-24 PROCEDURE — 6360000002 HC RX W HCPCS: Performed by: TRANSPLANT SURGERY

## 2024-10-24 PROCEDURE — 84100 ASSAY OF PHOSPHORUS: CPT

## 2024-10-24 PROCEDURE — 6360000002 HC RX W HCPCS: Performed by: INTERNAL MEDICINE

## 2024-10-24 PROCEDURE — 85027 COMPLETE CBC AUTOMATED: CPT

## 2024-10-24 PROCEDURE — 1200000000 HC SEMI PRIVATE

## 2024-10-24 PROCEDURE — 2580000003 HC RX 258: Performed by: TRANSPLANT SURGERY

## 2024-10-24 PROCEDURE — 82248 BILIRUBIN DIRECT: CPT

## 2024-10-24 PROCEDURE — 83735 ASSAY OF MAGNESIUM: CPT

## 2024-10-24 PROCEDURE — 99222 1ST HOSP IP/OBS MODERATE 55: CPT | Performed by: NURSE PRACTITIONER

## 2024-10-24 RX ORDER — IPRATROPIUM BROMIDE AND ALBUTEROL SULFATE 2.5; .5 MG/3ML; MG/3ML
1 SOLUTION RESPIRATORY (INHALATION)
Status: DISCONTINUED | OUTPATIENT
Start: 2024-10-24 | End: 2024-10-24 | Stop reason: HOSPADM

## 2024-10-24 RX ORDER — SODIUM CHLORIDE 0.9 % (FLUSH) 0.9 %
5-40 SYRINGE (ML) INJECTION PRN
Status: DISCONTINUED | OUTPATIENT
Start: 2024-10-24 | End: 2024-10-28 | Stop reason: HOSPADM

## 2024-10-24 RX ORDER — SODIUM CHLORIDE 0.9 % (FLUSH) 0.9 %
5-40 SYRINGE (ML) INJECTION PRN
Status: DISCONTINUED | OUTPATIENT
Start: 2024-10-24 | End: 2024-10-24 | Stop reason: HOSPADM

## 2024-10-24 RX ORDER — SODIUM CHLORIDE 0.9 % (FLUSH) 0.9 %
5-40 SYRINGE (ML) INJECTION EVERY 12 HOURS SCHEDULED
Status: DISCONTINUED | OUTPATIENT
Start: 2024-10-24 | End: 2024-10-28 | Stop reason: HOSPADM

## 2024-10-24 RX ORDER — MAGNESIUM SULFATE IN WATER 40 MG/ML
2000 INJECTION, SOLUTION INTRAVENOUS ONCE
Status: COMPLETED | OUTPATIENT
Start: 2024-10-24 | End: 2024-10-24

## 2024-10-24 RX ORDER — MEPERIDINE HYDROCHLORIDE 25 MG/ML
12.5 INJECTION INTRAMUSCULAR; INTRAVENOUS; SUBCUTANEOUS EVERY 5 MIN PRN
Status: DISCONTINUED | OUTPATIENT
Start: 2024-10-24 | End: 2024-10-24 | Stop reason: HOSPADM

## 2024-10-24 RX ORDER — SODIUM CHLORIDE 0.9 % (FLUSH) 0.9 %
5-40 SYRINGE (ML) INJECTION EVERY 12 HOURS SCHEDULED
Status: DISCONTINUED | OUTPATIENT
Start: 2024-10-24 | End: 2024-10-24 | Stop reason: HOSPADM

## 2024-10-24 RX ORDER — SODIUM CHLORIDE 9 MG/ML
INJECTION, SOLUTION INTRAVENOUS CONTINUOUS
Status: DISCONTINUED | OUTPATIENT
Start: 2024-10-24 | End: 2024-10-28 | Stop reason: HOSPADM

## 2024-10-24 RX ORDER — ONDANSETRON 2 MG/ML
4 INJECTION INTRAMUSCULAR; INTRAVENOUS
Status: DISCONTINUED | OUTPATIENT
Start: 2024-10-24 | End: 2024-10-24 | Stop reason: HOSPADM

## 2024-10-24 RX ORDER — SODIUM CHLORIDE 9 MG/ML
INJECTION, SOLUTION INTRAVENOUS PRN
Status: DISCONTINUED | OUTPATIENT
Start: 2024-10-24 | End: 2024-10-28 | Stop reason: HOSPADM

## 2024-10-24 RX ORDER — POTASSIUM CHLORIDE 7.45 MG/ML
10 INJECTION INTRAVENOUS
Status: DISPENSED | OUTPATIENT
Start: 2024-10-24 | End: 2024-10-24

## 2024-10-24 RX ORDER — ONDANSETRON 2 MG/ML
4 INJECTION INTRAMUSCULAR; INTRAVENOUS EVERY 6 HOURS PRN
Status: DISCONTINUED | OUTPATIENT
Start: 2024-10-24 | End: 2024-10-28 | Stop reason: HOSPADM

## 2024-10-24 RX ORDER — MAGNESIUM SULFATE IN WATER 40 MG/ML
2000 INJECTION, SOLUTION INTRAVENOUS PRN
Status: DISCONTINUED | OUTPATIENT
Start: 2024-10-24 | End: 2024-10-24

## 2024-10-24 RX ORDER — NALOXONE HYDROCHLORIDE 0.4 MG/ML
INJECTION, SOLUTION INTRAMUSCULAR; INTRAVENOUS; SUBCUTANEOUS PRN
Status: DISCONTINUED | OUTPATIENT
Start: 2024-10-24 | End: 2024-10-24 | Stop reason: HOSPADM

## 2024-10-24 RX ORDER — SODIUM CHLORIDE 9 MG/ML
INJECTION, SOLUTION INTRAVENOUS PRN
Status: DISCONTINUED | OUTPATIENT
Start: 2024-10-24 | End: 2024-10-24 | Stop reason: HOSPADM

## 2024-10-24 RX ORDER — ENOXAPARIN SODIUM 100 MG/ML
40 INJECTION SUBCUTANEOUS DAILY
Status: DISCONTINUED | OUTPATIENT
Start: 2024-10-24 | End: 2024-10-28 | Stop reason: HOSPADM

## 2024-10-24 RX ORDER — LABETALOL HYDROCHLORIDE 5 MG/ML
10 INJECTION, SOLUTION INTRAVENOUS
Status: DISCONTINUED | OUTPATIENT
Start: 2024-10-24 | End: 2024-10-24 | Stop reason: HOSPADM

## 2024-10-24 RX ORDER — ONDANSETRON 4 MG/1
4 TABLET, ORALLY DISINTEGRATING ORAL EVERY 8 HOURS PRN
Status: DISCONTINUED | OUTPATIENT
Start: 2024-10-24 | End: 2024-10-28 | Stop reason: HOSPADM

## 2024-10-24 RX ORDER — MIDAZOLAM HYDROCHLORIDE 2 MG/2ML
2 INJECTION, SOLUTION INTRAMUSCULAR; INTRAVENOUS
Status: DISCONTINUED | OUTPATIENT
Start: 2024-10-24 | End: 2024-10-24 | Stop reason: HOSPADM

## 2024-10-24 RX ORDER — POTASSIUM CHLORIDE 7.45 MG/ML
10 INJECTION INTRAVENOUS PRN
Status: DISCONTINUED | OUTPATIENT
Start: 2024-10-24 | End: 2024-10-24

## 2024-10-24 RX ORDER — HYDRALAZINE HYDROCHLORIDE 20 MG/ML
10 INJECTION INTRAMUSCULAR; INTRAVENOUS
Status: DISCONTINUED | OUTPATIENT
Start: 2024-10-24 | End: 2024-10-24 | Stop reason: HOSPADM

## 2024-10-24 RX ORDER — POTASSIUM CHLORIDE 1500 MG/1
40 TABLET, EXTENDED RELEASE ORAL PRN
Status: DISCONTINUED | OUTPATIENT
Start: 2024-10-24 | End: 2024-10-24

## 2024-10-24 RX ADMIN — ENOXAPARIN SODIUM 40 MG: 100 INJECTION SUBCUTANEOUS at 16:25

## 2024-10-24 RX ADMIN — POTASSIUM CHLORIDE 10 MEQ: 7.46 INJECTION, SOLUTION INTRAVENOUS at 16:24

## 2024-10-24 RX ADMIN — POTASSIUM CHLORIDE 10 MEQ: 7.46 INJECTION, SOLUTION INTRAVENOUS at 12:12

## 2024-10-24 RX ADMIN — MAGNESIUM SULFATE HEPTAHYDRATE 2000 MG: 40 INJECTION, SOLUTION INTRAVENOUS at 12:14

## 2024-10-24 RX ADMIN — POTASSIUM CHLORIDE 10 MEQ: 7.46 INJECTION, SOLUTION INTRAVENOUS at 18:04

## 2024-10-24 RX ADMIN — MAGNESIUM SULFATE HEPTAHYDRATE 2000 MG: 40 INJECTION, SOLUTION INTRAVENOUS at 16:33

## 2024-10-24 RX ADMIN — POTASSIUM CHLORIDE 10 MEQ: 7.46 INJECTION, SOLUTION INTRAVENOUS at 23:31

## 2024-10-24 RX ADMIN — POTASSIUM CHLORIDE 10 MEQ: 7.46 INJECTION, SOLUTION INTRAVENOUS at 21:38

## 2024-10-24 RX ADMIN — SODIUM CHLORIDE: 9 INJECTION, SOLUTION INTRAVENOUS at 12:10

## 2024-10-24 RX ADMIN — SODIUM CHLORIDE, PRESERVATIVE FREE 10 ML: 5 INJECTION INTRAVENOUS at 22:13

## 2024-10-24 ASSESSMENT — PAIN - FUNCTIONAL ASSESSMENT
PAIN_FUNCTIONAL_ASSESSMENT: 0-10
PAIN_FUNCTIONAL_ASSESSMENT: 0-10

## 2024-10-24 NOTE — ANESTHESIA PRE PROCEDURE
Department of Anesthesiology  Preprocedure Note       Name:  Raudel Herndon   Age:  74 y.o.  :  1950                                          MRN:  [id number]         Date:  10/24/2024      Surgeon: Surgeon(s):  Raza Reynoso III, MD    Procedure: Procedure(s):  DIAGNOSTIC LAPAROSCOPY ROBOTIC XI ASSISTED    Medications prior to admission:   Prior to Admission medications    Medication Sig Start Date End Date Taking? Authorizing Provider   furosemide (LASIX) 20 MG tablet Take 1 tablet by mouth daily 10/11/24  Yes Raza Reynoso III, MD   hydrOXYzine HCl (ATARAX) 25 MG tablet Take 1 tablet by mouth every 8 hours as needed for Itching 10/3/24 12/2/24 Yes Raza Reynoso III, MD   amoxicillin (AMOXIL) 500 MG capsule Take 1 capsule by mouth 3 times daily  Patient not taking: Reported on 10/22/2024 10/10/24 12/9/24  Annemarie Rico APRN - CNP   cholestyramine (QUESTRAN) 4 g packet Take 1 packet by mouth 2 times daily  Patient not taking: Reported on 10/22/2024 10/8/24   Sourav Rodrigez MD   METFORMIN HCL PO Take by mouth  Patient not taking: Reported on 10/22/2024    Provider, MD Kwadwo   magnesium oxide (MAG-OX) 400 MG tablet Take 1 tablet by mouth daily  Patient not taking: Reported on 10/22/2024 7/10/24   Goldy Cardona MD   melatonin 3 MG TABS tablet Take 1 tablet by mouth nightly as needed (insomnia)  Patient not taking: Reported on 10/22/2024 7/5/24   Goldy Cardona MD   pantoprazole (PROTONIX) 40 MG tablet Take 1 tablet by mouth 2 times daily (before meals)  Patient not taking: Reported on 10/22/2024 3/19/24   Elle Mosher DO       Current medications:    Current Facility-Administered Medications   Medication Dose Route Frequency Provider Last Rate Last Admin    sodium chloride flush 0.9 % injection 5-40 mL  5-40 mL IntraVENous 2 times per day Miah Gong APRN - CNP        sodium chloride flush 0.9 % injection 5-40 mL  5-40 mL IntraVENous PRN Beltran

## 2024-10-24 NOTE — H&P
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  He has noted to have chronic pancreatitis with diabetes mellitus.  He has not smoked cigarettes in over 40 years and has not drank alcohol in over 30 years.  He presented to the hospital in [place] where he was noted to have obstructive jaundice.  He underwent an ERCP with brushings.  His bilirubin at that time was 14.  He did have placement of a plastic stent and was noted to have a tight common hepatic duct stricture.  His CA 19-9 at that time was 11,000.  On CT imaging he was noted to have bulky portal lymphadenopathy. His ERCP brushings were positive for adenocarcinoma.  CEA was 92.    He has had diabetes mellitus for the last 5 to 7 years and states that his hemoglobin A1c was 8 but he was able to get it down to 5.3.  He does have a past surgical history of having a cholecystectomy for gangrenous cholecystitis in 2017 and was also noted to have a fatty liver at that time which was found under biopsy.  He also in 2016 had a Ballantine 6 prostate cancer and underwent brachytherapy.    He had a PET CT which did not show any pet avidity.  He did have a repeat CT abdomen and chest which was negative for metastatic disease, but does have two pathologic lymph nodes.  He has had 6 cycles of chemotherapy which was put on hold secondary to him having admissions due to poor ambulation due to back pain.  He is currently on a TLSO brace with severe pain.  He did have Enterococcus faecalis bacteremia and was subsequently discharged on vancomycin for a total of 6 weeks.  His Ca 19-9 is 4200 7/24 and his CEA is 40.  He did have osteomyelitis.  His last chemotherapy was in June.    He states that he is

## 2024-10-25 ENCOUNTER — APPOINTMENT (OUTPATIENT)
Dept: CT IMAGING | Age: 74
DRG: 640 | End: 2024-10-25
Attending: TRANSPLANT SURGERY
Payer: MEDICARE

## 2024-10-25 LAB
ALBUMIN SERPL-MCNC: 1.9 G/DL (ref 3.5–5.2)
ALP SERPL-CCNC: 798 U/L (ref 40–129)
ALT SERPL-CCNC: 38 U/L (ref 0–40)
ANION GAP SERPL CALCULATED.3IONS-SCNC: 15 MMOL/L (ref 7–16)
ANION GAP SERPL CALCULATED.3IONS-SCNC: 15 MMOL/L (ref 7–16)
AST SERPL-CCNC: 129 U/L (ref 0–39)
BASOPHILS # BLD: 0.03 K/UL (ref 0–0.2)
BASOPHILS NFR BLD: 0 % (ref 0–2)
BILIRUB SERPL-MCNC: 13.1 MG/DL (ref 0–1.2)
BUN SERPL-MCNC: 23 MG/DL (ref 6–23)
BUN SERPL-MCNC: 28 MG/DL (ref 6–23)
CALCIUM SERPL-MCNC: 6.9 MG/DL (ref 8.6–10.2)
CALCIUM SERPL-MCNC: 7 MG/DL (ref 8.6–10.2)
CHLORIDE SERPL-SCNC: 104 MMOL/L (ref 98–107)
CHLORIDE SERPL-SCNC: 108 MMOL/L (ref 98–107)
CO2 SERPL-SCNC: 20 MMOL/L (ref 22–29)
CO2 SERPL-SCNC: 21 MMOL/L (ref 22–29)
CREAT SERPL-MCNC: 1.3 MG/DL (ref 0.7–1.2)
CREAT SERPL-MCNC: 1.6 MG/DL (ref 0.7–1.2)
EOSINOPHIL # BLD: 0.01 K/UL (ref 0.05–0.5)
EOSINOPHILS RELATIVE PERCENT: 0 % (ref 0–6)
ERYTHROCYTE [DISTWIDTH] IN BLOOD BY AUTOMATED COUNT: 17.4 % (ref 11.5–15)
GFR, ESTIMATED: 44 ML/MIN/1.73M2
GFR, ESTIMATED: 56 ML/MIN/1.73M2
GLUCOSE SERPL-MCNC: 103 MG/DL (ref 74–99)
GLUCOSE SERPL-MCNC: 104 MG/DL (ref 74–99)
HCT VFR BLD AUTO: 18.4 % (ref 37–54)
HCT VFR BLD AUTO: 22.2 % (ref 37–54)
HGB BLD-MCNC: 5.9 G/DL (ref 12.5–16.5)
HGB BLD-MCNC: 7.3 G/DL (ref 12.5–16.5)
IMM GRANULOCYTES # BLD AUTO: 0.13 K/UL (ref 0–0.58)
IMM GRANULOCYTES NFR BLD: 1 % (ref 0–5)
INR PPP: 1.9
LYMPHOCYTES NFR BLD: 0.77 K/UL (ref 1.5–4)
LYMPHOCYTES RELATIVE PERCENT: 6 % (ref 20–42)
MAGNESIUM SERPL-MCNC: 1.7 MG/DL (ref 1.6–2.6)
MAGNESIUM SERPL-MCNC: 1.7 MG/DL (ref 1.6–2.6)
MAGNESIUM SERPL-MCNC: 2 MG/DL (ref 1.6–2.6)
MCH RBC QN AUTO: 31.6 PG (ref 26–35)
MCHC RBC AUTO-ENTMCNC: 32.1 G/DL (ref 32–34.5)
MCV RBC AUTO: 98.4 FL (ref 80–99.9)
MONOCYTES NFR BLD: 0.61 K/UL (ref 0.1–0.95)
MONOCYTES NFR BLD: 4 % (ref 2–12)
NEUTROPHILS NFR BLD: 89 % (ref 43–80)
NEUTS SEG NFR BLD: 12.17 K/UL (ref 1.8–7.3)
PHOSPHATE SERPL-MCNC: 3.7 MG/DL (ref 2.5–4.5)
PLATELET # BLD AUTO: 214 K/UL (ref 130–450)
PMV BLD AUTO: 9.5 FL (ref 7–12)
POTASSIUM SERPL-SCNC: 2.6 MMOL/L (ref 3.5–5)
POTASSIUM SERPL-SCNC: 3 MMOL/L (ref 3.5–5)
POTASSIUM SERPL-SCNC: 3 MMOL/L (ref 3.5–5)
PROT SERPL-MCNC: 5.1 G/DL (ref 6.4–8.3)
PROTHROMBIN TIME: 20 SEC (ref 9.3–12.4)
RBC # BLD AUTO: 1.87 M/UL (ref 3.8–5.8)
SODIUM SERPL-SCNC: 140 MMOL/L (ref 132–146)
SODIUM SERPL-SCNC: 143 MMOL/L (ref 132–146)
WBC OTHER # BLD: 13.7 K/UL (ref 4.5–11.5)

## 2024-10-25 PROCEDURE — 6360000002 HC RX W HCPCS: Performed by: TRANSPLANT SURGERY

## 2024-10-25 PROCEDURE — 6360000004 HC RX CONTRAST MEDICATION: Performed by: RADIOLOGY

## 2024-10-25 PROCEDURE — 85610 PROTHROMBIN TIME: CPT

## 2024-10-25 PROCEDURE — 80048 BASIC METABOLIC PNL TOTAL CA: CPT

## 2024-10-25 PROCEDURE — 86923 COMPATIBILITY TEST ELECTRIC: CPT

## 2024-10-25 PROCEDURE — 2580000003 HC RX 258: Performed by: TRANSPLANT SURGERY

## 2024-10-25 PROCEDURE — 2060000000 HC ICU INTERMEDIATE R&B

## 2024-10-25 PROCEDURE — 85018 HEMOGLOBIN: CPT

## 2024-10-25 PROCEDURE — 86850 RBC ANTIBODY SCREEN: CPT

## 2024-10-25 PROCEDURE — 83735 ASSAY OF MAGNESIUM: CPT

## 2024-10-25 PROCEDURE — 6370000000 HC RX 637 (ALT 250 FOR IP): Performed by: TRANSPLANT SURGERY

## 2024-10-25 PROCEDURE — APPSS45 APP SPLIT SHARED TIME 31-45 MINUTES: Performed by: CLINICAL NURSE SPECIALIST

## 2024-10-25 PROCEDURE — 86900 BLOOD TYPING SEROLOGIC ABO: CPT

## 2024-10-25 PROCEDURE — 86901 BLOOD TYPING SEROLOGIC RH(D): CPT

## 2024-10-25 PROCEDURE — 2500000003 HC RX 250 WO HCPCS: Performed by: CLINICAL NURSE SPECIALIST

## 2024-10-25 PROCEDURE — 85014 HEMATOCRIT: CPT

## 2024-10-25 PROCEDURE — 74177 CT ABD & PELVIS W/CONTRAST: CPT

## 2024-10-25 PROCEDURE — 85025 COMPLETE CBC W/AUTO DIFF WBC: CPT

## 2024-10-25 PROCEDURE — 99232 SBSQ HOSP IP/OBS MODERATE 35: CPT | Performed by: STUDENT IN AN ORGANIZED HEALTH CARE EDUCATION/TRAINING PROGRAM

## 2024-10-25 PROCEDURE — 84100 ASSAY OF PHOSPHORUS: CPT

## 2024-10-25 PROCEDURE — 36430 TRANSFUSION BLD/BLD COMPNT: CPT

## 2024-10-25 PROCEDURE — 2580000003 HC RX 258: Performed by: CLINICAL NURSE SPECIALIST

## 2024-10-25 PROCEDURE — 97161 PT EVAL LOW COMPLEX 20 MIN: CPT

## 2024-10-25 PROCEDURE — 80053 COMPREHEN METABOLIC PANEL: CPT

## 2024-10-25 PROCEDURE — 84132 ASSAY OF SERUM POTASSIUM: CPT

## 2024-10-25 PROCEDURE — P9016 RBC LEUKOCYTES REDUCED: HCPCS

## 2024-10-25 RX ORDER — POTASSIUM CHLORIDE 7.45 MG/ML
10 INJECTION INTRAVENOUS
Status: DISCONTINUED | OUTPATIENT
Start: 2024-10-25 | End: 2024-10-25

## 2024-10-25 RX ORDER — POTASSIUM CHLORIDE 7.45 MG/ML
10 INJECTION INTRAVENOUS ONCE
Status: COMPLETED | OUTPATIENT
Start: 2024-10-25 | End: 2024-10-24

## 2024-10-25 RX ORDER — POTASSIUM CHLORIDE 7.45 MG/ML
10 INJECTION INTRAVENOUS
Status: COMPLETED | OUTPATIENT
Start: 2024-10-25 | End: 2024-10-25

## 2024-10-25 RX ORDER — SODIUM CHLORIDE 9 MG/ML
INJECTION, SOLUTION INTRAVENOUS PRN
Status: DISCONTINUED | OUTPATIENT
Start: 2024-10-25 | End: 2024-10-28 | Stop reason: HOSPADM

## 2024-10-25 RX ORDER — POTASSIUM CHLORIDE 1500 MG/1
20 TABLET, EXTENDED RELEASE ORAL 2 TIMES DAILY WITH MEALS
Status: DISCONTINUED | OUTPATIENT
Start: 2024-10-25 | End: 2024-10-28 | Stop reason: HOSPADM

## 2024-10-25 RX ORDER — INDOMETHACIN 100 MG
100 SUPPOSITORY, RECTAL RECTAL SEE ADMIN INSTRUCTIONS
Status: DISCONTINUED | OUTPATIENT
Start: 2024-10-26 | End: 2024-10-25

## 2024-10-25 RX ORDER — POTASSIUM CHLORIDE 750 MG/1
20 CAPSULE, EXTENDED RELEASE ORAL 2 TIMES DAILY
Status: DISCONTINUED | OUTPATIENT
Start: 2024-10-25 | End: 2024-10-25 | Stop reason: CLARIF

## 2024-10-25 RX ORDER — PANTOPRAZOLE SODIUM 40 MG/1
40 TABLET, DELAYED RELEASE ORAL
Status: DISCONTINUED | OUTPATIENT
Start: 2024-10-25 | End: 2024-10-28 | Stop reason: HOSPADM

## 2024-10-25 RX ORDER — SODIUM CHLORIDE, SODIUM LACTATE, POTASSIUM CHLORIDE, AND CALCIUM CHLORIDE .6; .31; .03; .02 G/100ML; G/100ML; G/100ML; G/100ML
1000 INJECTION, SOLUTION INTRAVENOUS SEE ADMIN INSTRUCTIONS
Status: DISCONTINUED | OUTPATIENT
Start: 2024-10-26 | End: 2024-10-25

## 2024-10-25 RX ORDER — IOPAMIDOL 755 MG/ML
75 INJECTION, SOLUTION INTRAVASCULAR
Status: COMPLETED | OUTPATIENT
Start: 2024-10-25 | End: 2024-10-25

## 2024-10-25 RX ADMIN — POTASSIUM CHLORIDE 20 MEQ: 1500 TABLET, EXTENDED RELEASE ORAL at 09:46

## 2024-10-25 RX ADMIN — POTASSIUM CHLORIDE 10 MEQ: 10 INJECTION, SOLUTION INTRAVENOUS at 17:01

## 2024-10-25 RX ADMIN — POTASSIUM CHLORIDE 10 MEQ: 10 INJECTION, SOLUTION INTRAVENOUS at 21:29

## 2024-10-25 RX ADMIN — SODIUM CHLORIDE, PRESERVATIVE FREE 10 ML: 5 INJECTION INTRAVENOUS at 09:49

## 2024-10-25 RX ADMIN — PETROLATUM: 420 OINTMENT TOPICAL at 14:16

## 2024-10-25 RX ADMIN — POTASSIUM PHOSPHATE, MONOBASIC AND POTASSIUM PHOSPHATE, DIBASIC 20 MMOL: 224; 236 INJECTION, SOLUTION, CONCENTRATE INTRAVENOUS at 09:48

## 2024-10-25 RX ADMIN — PANTOPRAZOLE SODIUM 40 MG: 40 TABLET, DELAYED RELEASE ORAL at 09:46

## 2024-10-25 RX ADMIN — POTASSIUM CHLORIDE 10 MEQ: 10 INJECTION, SOLUTION INTRAVENOUS at 14:16

## 2024-10-25 RX ADMIN — POTASSIUM CHLORIDE 10 MEQ: 10 INJECTION, SOLUTION INTRAVENOUS at 18:14

## 2024-10-25 RX ADMIN — PANTOPRAZOLE SODIUM 40 MG: 40 TABLET, DELAYED RELEASE ORAL at 16:22

## 2024-10-25 RX ADMIN — SODIUM CHLORIDE, PRESERVATIVE FREE 10 ML: 5 INJECTION INTRAVENOUS at 21:30

## 2024-10-25 RX ADMIN — IOPAMIDOL 75 ML: 755 INJECTION, SOLUTION INTRAVENOUS at 09:16

## 2024-10-25 RX ADMIN — POTASSIUM CHLORIDE 10 MEQ: 10 INJECTION, SOLUTION INTRAVENOUS at 15:22

## 2024-10-25 RX ADMIN — POTASSIUM CHLORIDE 20 MEQ: 1500 TABLET, EXTENDED RELEASE ORAL at 16:22

## 2024-10-25 RX ADMIN — POTASSIUM CHLORIDE 10 MEQ: 10 INJECTION, SOLUTION INTRAVENOUS at 19:45

## 2024-10-25 ASSESSMENT — PAIN SCALES - GENERAL
PAINLEVEL_OUTOF10: 0

## 2024-10-25 NOTE — CARE COORDINATION
10/25/2024  Social Work Discharge Planning:  CA . This worker discussed discharge planning with Pts son who he resides with. Pt discharged last admission here to a MAG and was sent home.  Son and Pt both would like Pt to return home. Son states he would like [facility]. Referral was made to [facility]. They are unable to take Pts ins. Referral was made to [facility].Waiting reply.[facility] order is in.They have a ww and wc. Son will transport at discharge.  SW informed son to call with any other DME needs he can forsee. Awaiting therapy evals.Electronically signed by RAMYA Ibanez on 10/25/2024 at 11:40 AM

## 2024-10-25 NOTE — ACP (ADVANCE CARE PLANNING)
10/25/2024  Advance Care Planning   Healthcare Decision Maker:    Primary Decision Maker: Otf Herndon - [place] - [phone]    Click here to complete Healthcare Decision Makers including selection of the Healthcare Decision Maker Relationship (ie \"Primary\").

## 2024-10-25 NOTE — CONSENT
Informed Consent for Blood Component Transfusion Note    I have discussed with the patient the rationale for blood component transfusion; its benefits in treating or preventing fatigue, organ damage, or death; and its risk which includes mild transfusion reactions, rare risk of blood borne infection, or more serious but rare reactions. I have discussed the alternatives to transfusion, including the risk and consequences of not receiving transfusion. The patient had an opportunity to ask questions and had agreed to proceed with transfusion of blood components.    Electronically signed by ELDA Gore CNP on 10/25/24 at 8:45 AM EDT

## 2024-10-25 NOTE — CARE COORDINATION
10/25/2024  Social Work Discharge Planning:CA . This worker discussed discharge planning with Pts son who he resides with. Pt discharged last admission here to a [facility] and was sent home.  Son and Pt both would like Pt to return home. Son states he would like [facility]. Referral was made to [facility]. Waiting reply.[facility] order is needed.They have a ww and wc. Son will transport at discharge.  SW informed son to call with any other DME needs he can forsee. Awaiting therapy evals.Electronically signed by RAMYA Ibanez on 10/25/2024 at 11:35 AM

## 2024-10-25 NOTE — FLOWSHEET NOTE
Volume (cm^3) 0.9 cm^3   Wound Healing % -1186   Wound Assessment [place]/red;[place]   Drainage Amount Scant (moist but unmeasurable)   Drainage Description Serosanguinous   Odor None   Krystal-wound Assessment Blanchable erythema   Wound 10/25/24 Arm Left;Upper   Date First Assessed/Time First Assessed: 10/25/24 1408   Present on Original Admission: No  Primary Wound Type: Skin Tear  Location: Arm  Wound Location Orientation: Left;Upper   Wound Etiology Skin Tear   Dressing/Treatment   (Versatel)   Wound Length (cm) 1.5 cm   Wound Width (cm) 1.5 cm   Wound Depth (cm) 0.1 cm   Wound Surface Area (cm^2) 2.25 cm^2   Wound Volume (cm^3) 0.225 cm^3   Wound Assessment Pink/red   Drainage Amount Scant (moist but unmeasurable)   Drainage Description Serosanguinous   Odor None   Krystal-wound Assessment Ecchymosis   Wound 10/25/24 Elbow Right   Date First Assessed/Time First Assessed: 10/25/24 1408   Present on Original Admission: No  Primary Wound Type: Skin Tear  Location: Elbow  Wound Location Orientation: Right   Wound Image    Wound Etiology Skin Tear   Dressing/Treatment   (versatel)   Wound Length (cm) 1 cm   Wound Width (cm) 1 cm   Wound Depth (cm) 0.1 cm   Wound Surface Area (cm^2) 1 cm^2   Wound Volume (cm^3) 0.1 cm^3   Wound Assessment Pink/red   Drainage Amount Scant (moist but unmeasurable)   Drainage Description Serosanguinous   Odor None   Krystal-wound Assessment Ecchymosis   scalp    **Informed Consent**    The patient has given verbal consent to have photos taken of wounds and inserted into their chart as part of their permanent medical record for purposes of documentation, treatment management and/or medical review.   All Images taken on 10/25/24 of patient name: Raudel Herndon were transmitted and stored on secured Epic  Site located within Media Folder Tab by a registered Epic-Haiku Mobile Application Device.     Plan: Aquaphor to bilateral buttocks  Comfort glide  Wedges  Heel protectors  Low air loss pump -

## 2024-10-26 ENCOUNTER — ANESTHESIA EVENT (OUTPATIENT)
Dept: OPERATING ROOM | Age: 74
DRG: 640 | End: 2024-10-26
Payer: MEDICARE

## 2024-10-26 PROBLEM — E43 SEVERE PROTEIN-CALORIE MALNUTRITION (HCC): Chronic | Status: ACTIVE | Noted: 2024-10-26

## 2024-10-26 LAB
ALBUMIN SERPL-MCNC: 2 G/DL (ref 3.5–5.2)
ALP SERPL-CCNC: 987 U/L (ref 40–129)
ALT SERPL-CCNC: 44 U/L (ref 0–40)
ANION GAP SERPL CALCULATED.3IONS-SCNC: 14 MMOL/L (ref 7–16)
AST SERPL-CCNC: 154 U/L (ref 0–39)
BASOPHILS # BLD: 0.11 K/UL (ref 0–0.2)
BASOPHILS NFR BLD: 1 % (ref 0–2)
BILIRUB SERPL-MCNC: 15 MG/DL (ref 0–1.2)
BUN SERPL-MCNC: 34 MG/DL (ref 6–23)
CALCIUM SERPL-MCNC: 7.4 MG/DL (ref 8.6–10.2)
CHLORIDE SERPL-SCNC: 106 MMOL/L (ref 98–107)
CO2 SERPL-SCNC: 19 MMOL/L (ref 22–29)
CREAT SERPL-MCNC: 1.5 MG/DL (ref 0.7–1.2)
EOSINOPHIL # BLD: 0 K/UL (ref 0.05–0.5)
EOSINOPHILS RELATIVE PERCENT: 0 % (ref 0–6)
ERYTHROCYTE [DISTWIDTH] IN BLOOD BY AUTOMATED COUNT: 20.4 % (ref 11.5–15)
GFR, ESTIMATED: 50 ML/MIN/1.73M2
GLUCOSE SERPL-MCNC: 95 MG/DL (ref 74–99)
HCT VFR BLD AUTO: 21.1 % (ref 37–54)
HGB BLD-MCNC: 7.1 G/DL (ref 12.5–16.5)
INR PPP: 1.8
LYMPHOCYTES NFR BLD: 1.39 K/UL (ref 1.5–4)
LYMPHOCYTES RELATIVE PERCENT: 11 % (ref 20–42)
MCH RBC QN AUTO: 30.9 PG (ref 26–35)
MCHC RBC AUTO-ENTMCNC: 33.6 G/DL (ref 32–34.5)
MCV RBC AUTO: 91.7 FL (ref 80–99.9)
MONOCYTES NFR BLD: 0.43 K/UL (ref 0.1–0.95)
MONOCYTES NFR BLD: 4 % (ref 2–12)
MYELOCYTES ABSOLUTE COUNT: 0.11 K/UL
MYELOCYTES: 1 %
NEUTROPHILS NFR BLD: 83 % (ref 43–80)
NEUTS SEG NFR BLD: 10.17 K/UL (ref 1.8–7.3)
PHOSPHATE SERPL-MCNC: 2.7 MG/DL (ref 2.5–4.5)
PLATELET # BLD AUTO: 196 K/UL (ref 130–450)
PMV BLD AUTO: 9.5 FL (ref 7–12)
POTASSIUM SERPL-SCNC: 3.6 MMOL/L (ref 3.5–5)
PROT SERPL-MCNC: 5.6 G/DL (ref 6.4–8.3)
PROTHROMBIN TIME: 18.9 SEC (ref 9.3–12.4)
RBC # BLD AUTO: 2.3 M/UL (ref 3.8–5.8)
RBC # BLD: ABNORMAL 10*6/UL
SODIUM SERPL-SCNC: 139 MMOL/L (ref 132–146)
WBC OTHER # BLD: 12.2 K/UL (ref 4.5–11.5)

## 2024-10-26 PROCEDURE — 85025 COMPLETE CBC W/AUTO DIFF WBC: CPT

## 2024-10-26 PROCEDURE — 85610 PROTHROMBIN TIME: CPT

## 2024-10-26 PROCEDURE — 2060000000 HC ICU INTERMEDIATE R&B

## 2024-10-26 PROCEDURE — 99232 SBSQ HOSP IP/OBS MODERATE 35: CPT | Performed by: STUDENT IN AN ORGANIZED HEALTH CARE EDUCATION/TRAINING PROGRAM

## 2024-10-26 PROCEDURE — 84100 ASSAY OF PHOSPHORUS: CPT

## 2024-10-26 PROCEDURE — 80053 COMPREHEN METABOLIC PANEL: CPT

## 2024-10-26 PROCEDURE — 6360000002 HC RX W HCPCS: Performed by: STUDENT IN AN ORGANIZED HEALTH CARE EDUCATION/TRAINING PROGRAM

## 2024-10-26 PROCEDURE — 2580000003 HC RX 258: Performed by: TRANSPLANT SURGERY

## 2024-10-26 PROCEDURE — 6370000000 HC RX 637 (ALT 250 FOR IP): Performed by: STUDENT IN AN ORGANIZED HEALTH CARE EDUCATION/TRAINING PROGRAM

## 2024-10-26 RX ORDER — INDOMETHACIN 100 MG
100 SUPPOSITORY, RECTAL RECTAL SEE ADMIN INSTRUCTIONS
Status: DISCONTINUED | OUTPATIENT
Start: 2024-10-27 | End: 2024-10-27

## 2024-10-26 RX ORDER — POTASSIUM CHLORIDE 7.45 MG/ML
10 INJECTION INTRAVENOUS
Status: COMPLETED | OUTPATIENT
Start: 2024-10-26 | End: 2024-10-26

## 2024-10-26 RX ORDER — MIDODRINE HYDROCHLORIDE 5 MG/1
5 TABLET ORAL
Status: DISCONTINUED | OUTPATIENT
Start: 2024-10-26 | End: 2024-10-28 | Stop reason: HOSPADM

## 2024-10-26 RX ORDER — SODIUM CHLORIDE, SODIUM LACTATE, POTASSIUM CHLORIDE, AND CALCIUM CHLORIDE .6; .31; .03; .02 G/100ML; G/100ML; G/100ML; G/100ML
1000 INJECTION, SOLUTION INTRAVENOUS SEE ADMIN INSTRUCTIONS
Status: DISCONTINUED | OUTPATIENT
Start: 2024-10-27 | End: 2024-10-28 | Stop reason: HOSPADM

## 2024-10-26 RX ADMIN — POTASSIUM CHLORIDE 10 MEQ: 7.46 INJECTION, SOLUTION INTRAVENOUS at 09:40

## 2024-10-26 RX ADMIN — POTASSIUM CHLORIDE 10 MEQ: 7.46 INJECTION, SOLUTION INTRAVENOUS at 08:10

## 2024-10-26 RX ADMIN — MIDODRINE HYDROCHLORIDE 5 MG: 5 TABLET ORAL at 12:32

## 2024-10-26 RX ADMIN — PETROLATUM: 420 OINTMENT TOPICAL at 08:12

## 2024-10-26 RX ADMIN — POTASSIUM CHLORIDE 10 MEQ: 7.46 INJECTION, SOLUTION INTRAVENOUS at 11:56

## 2024-10-26 RX ADMIN — POTASSIUM CHLORIDE 10 MEQ: 7.46 INJECTION, SOLUTION INTRAVENOUS at 10:40

## 2024-10-26 RX ADMIN — SODIUM CHLORIDE, PRESERVATIVE FREE 10 ML: 5 INJECTION INTRAVENOUS at 08:12

## 2024-10-26 ASSESSMENT — PAIN SCALES - GENERAL: PAINLEVEL_OUTOF10: 0

## 2024-10-26 NOTE — CONSULTS
[facility]ist [facility]   Consult for Medical Management      Reason for Consult:  Medical management    History of Present Illness:  74 y.o. male with a history of cholangiocarcinoma, DM, prostate cancer, chronic pancreatitis, osteomyelitis, obstructive jaundice presents with cholangiocarcinoma with local camron metastasis here for a diagnostic laparoscopy with Dr. Reynoso. Consulted for medical management.  Abnormal labs: WBCs 12.3, Hgb 7.8, K 2.2, Creat 1.3, calcium 7.4, albumin 2.4, alkaline phosphatase 1073, ALT 48, , total protein 6.3, magnesium 1.0.     Informant(s) for H&P: Patient and EMR    REVIEW OF SYSTEMS:  Complete ROS performed with patient, pertinent positives and negatives are listed in the HPI.    PMH:  Past Medical History:   Diagnosis Date    Cancer (HCC) 02/2024    cholangiocarcinoma    Diabetes mellitus (HCC)     Failure to thrive in adult 06/25/2024    Port-A-Cath in place     right chest    Prostate cancer (HCC)     treated with seed implants       Surgical History:  Past Surgical History:   Procedure Laterality Date    ANKLE FUSION Right 2022    COLONOSCOPY      ERCP N/A 02/29/2024    ENDOSCOPIC RETROGRADE CHOLANGIOPANCREATOGRAPHY performed by Raza Lane DO at [facility] ENDOSCOPY    ERCP N/A 02/29/2024    ENDOSCOPIC RETROGRADE CHOLANGIOPANCREATOGRAPHY DILATION BALLOON performed by Raza Lane DO at [facility] ENDOSCOPY    ERCP N/A 02/29/2024    ENDOSCOPIC RETROGRADE CHOLANGIOPANCREATOGRAPHY STENT REMOVAL performed by Raza Lane DO at [facility] ENDOSCOPY    ERCP N/A 02/29/2024    ENDOSCOPIC RETROGRADE CHOLANGIOPANCREATOGRAPHY STENT INSERTION performed by Raza Lane DO at [facility] ENDOSCOPY    ERCP N/A 6/28/2024    ENDOSCOPIC RETROGRADE CHOLANGIOPANCREATOGRAPHY/ POSSIBLE STENT performed by Raza Lane DO at [facility] ENDOSCOPY    ERCP N/A 10/7/2024    ENDOSCOPIC RETROGRADE CHOLANGIOPANCREATOGRAPHY STENT INSERTION x 2 performed by Rupesh 
  Palliative Care Department  [phone]  Palliative Care Initial Consult  Provider Vivien Ruiz, APRN - CNP    Raudel Herndon  [id number]  Hospital Day: 1  Date of Initial Consult: 10/24/2024  Referring Provider: Raza Reynoso MD  Palliative Medicine was consulted for assistance with: Goals of care, overwhelming symptoms, end stage disease    HPI:   Raudel Herndon is a 74 y.o. with a past medical history of cholangiocarcinoma, diabetes mellitus, prostate cancer s/p brachytherapy, chronic pancreatitis, obstructive jaundice, osteomyelitis, former smoker who was admitted on 10/24/2024 from home for planned procedure with HPB surgery.  Patient developed obstructive jaundice with a history of chronic pancreatitis in February 2024 and underwent ERCP with plastic stent placed.  Brushings were positive for adenocarcinoma.  He has since been following with the HPB clinic.  He was initiated on cancer directed treatment.  He reportedly was not tolerating his chemotherapy with severe back pain and inability to ambulate.  He had a repeat ERCP with stent exchange on 10/7/2024.  He was admitted to the hospital on 10/24/2024 to plan for a laparoscopic robotic diagnostic laparoscopically with possible resection with HPB surgery.  He was found to be significantly hypokalemic and surgery was canceled by anesthesia.  Palliative care was consulted for goals of care discussion, overwhelming symptoms, and end-stage disease.    ASSESSMENT/PLAN:     Pertinent Hospital Diagnoses     Cholangiocarcinoma with local camron metastasis   Obstructive juandice       Palliative Care Encounter / Counseling Regarding Goals of Care  Please see detailed goals of care discussion as below  At this time, Raudel Herndon, Does have capacity for medical decision-making.  Capacity is time limited and situation/question specific  During encounter a surrogate medical decision-maker was not needed.  Outcome of goals of care meeting:   Patient states he does 
Comprehensive Nutrition Assessment    Type and Reason for Visit:  Initial, Wound, Consult    Nutrition Recommendations/Plan:   Continue current diet and ONS with all meals, as tolerated  Continue inpatient monitoring     Malnutrition Assessment:  Malnutrition Status:  Severe malnutrition (10/26/24 1226)    Context:  Chronic Illness     Findings of the 6 clinical characteristics of malnutrition:  Energy Intake:  75% or less estimated energy requirements for 1 month or longer  Weight Loss:  Greater than 10% over 6 months     Body Fat Loss:  Severe body fat loss Triceps   Muscle Mass Loss:  Severe muscle mass loss Clavicles (pectoralis & deltoids), Temples (temporalis)  Fluid Accumulation:  No significant fluid accumulation     Strength:  Not Performed    Nutrition Assessment:    Pt admit 2/2 obstructive jaundice d/t cholangiocarcinoma. Pt to have ERCP 10/27 and does not wish any other treatment or bx at this time. Pt meets criteria for severe PCM. Will continue current ONS to optimize PO in the setting of stage III PI and multiple skin tears.    Nutrition Related Findings:    A&Ox4, jaundice, scleral icterus, soft round abd +BS, I/O WNL, edentulous. elevated LFT Wound Type: Pressure Injury, Stage III, Skin Tears (per wound Consult)       Current Nutrition Intake & Therapies:    Average Meal Intake: 1-25%  Average Supplements Intake: Unable to assess  ADULT DIET; Regular  ADULT ORAL NUTRITION SUPPLEMENT; Breakfast, Lunch, Dinner; Standard High Calorie/High Protein Oral Supplement  ADULT ORAL NUTRITION SUPPLEMENT; Breakfast, Dinner; Wound Healing Oral Supplement  Diet NPO    Anthropometric Measures:  Height: 185.4 cm (6' 1\")  Ideal Body Weight (IBW): 184 lbs (84 kg)    Admission Body Weight: 72 kg (158 lb 11.7 oz) (10/25 bedscale)  Current Body Weight: 74.4 kg (164 lb 0.4 oz), 89.1 % IBW. Weight Source: Bed Scale  Current BMI (kg/m2): 21.6  Usual Body Weight: 95 kg (209 lb 7 oz) (2/29/24 bedscale)  % Weight Change 
[facility]  [facility]    Consultation Note     Admit Date: 10/24/2024  6:51 AM    Reason for Consult:   cholangitis    Attending Physician:  Raza Reynoso*     Chief Complaint: jaundice    HISTORY OF PRESENT ILLNESS:   The patient is a 74 y.o.  male known to the Infectious Diseases service. The patient has hx of chlangiocarcinoma diagnosed 2/2024 underwent ERCP with biliary stenting at outside hospital, followed by repeat ERCp with stent replacement in 2/29/4,  Port placement 3/8/24. Started on chemo got 6 cycles and Patient was admitted in June and ID was consulted for Enterococcus faecalis bacteremia thought to be from UTI patient was treated with IV ampicillin/ceftriaxone/flagyl. JULIO was negative for endocarditis. Antibiotics were consolidated to IV ampicillin alone. Patient had severe back pain MRI spine showed  Progressive discitis and osteomyelitis at L2-3 and T12-L1. Increased soft tissue posterior to the L3, L4, and L5 vertebral bodies  worrisome for developing epidural infection, cannot exclude an epidural  abscess. Neurosurgery was consulted, TLSO brace was ordered. Patient was d/chris on IV vancomycin alone. Patient presented back to ER on 7/22 with worsening back pain and cefepime was added discitis was aspirated but cx were negative. No bone biopsy was done. Patient was d/chris on IV vancomycin for 6 weeks on 7/30/24. Unclear when he finished. Pt does not remember. Patient underwent repeat ERCp on 10.7 with stent exchange.   Patient presented to hospital for surgery but patient found to have low K and mag so he is getting admitted.     He does not have fever or abdominal pain or vomiting or diarrhea. Has port not been used for a while.     Past Medical History:        Diagnosis Date    Cancer (HCC) 02/2024    cholangiocarcinoma    Diabetes mellitus (HCC)     Failure to thrive in adult 06/25/2024    Port-A-Cath in place     right chest    Prostate cancer (HCC)     
measuring 1 cm.  Caliber of the abdominal aorta is normal.  No gross large or small-bowel abnormality is noted with the exception of minimal sigmoid diverticulosis.  Pelvic ascites has increased. Urinary bladder is grossly normal.  There are brachytherapy seeds within the prostate gland. Bones: No interval lytic or blastic osseous lesions.  There is multilevel advanced lumbar spine degenerative disc changes.     1. Progressive abdominal and pelvic ascites. 2. Interval decompression of previously noted intrahepatic biliary ductal dilation following placement of a percutaneous transhepatic stent which traverses the lumen of the common bile duct stent. 3. Stable periportal lymphadenopathy.  No evidence of hepatic metastatic disease. 4. Bilateral nonobstructive renal calculus disease.       IMPRESSION:  Cholangiocarcinoma    Obstructive jaundice secondary to cholangiocarcinoma   Anemia, normocytic  Hyperbilirubinemia   Hypokalemia-defer  Weight loss  Hx- constipation    RECOMMENDATIONS:      NPO after midnight, tentative ERCP tomorrow with Dr. Lane  Orders placed for ERCP  Antiemetics as needed as ordered  Medical management per primary care team  Continue pantoprazole 40 mg twice daily  Antiemetics as needed as ordered  Supportive care  Trend labs  Will follow    Note: This report was completed utilizing computer voice recognition software. Every effort has been made to ensure accuracy, however; inadvertent computerized transcription errors may be present.     Thank you very much for your consultation. We will follow closely with you.    Discussed with Dr. Lane  Treatment plan developed by Dr. Lane      GI Attending Addendum:  The patient was seen and examined independently from the midlevel team. The case was discuss with the team and the above assessment and plan was developed. RemoteFC-MAXINE and plastic stent through previously placed UC-MAXINE by Dr. Cruz on 10/7. Remote CTs reviewed no obvious increase in

## 2024-10-27 ENCOUNTER — APPOINTMENT (OUTPATIENT)
Dept: GENERAL RADIOLOGY | Age: 74
DRG: 640 | End: 2024-10-27
Attending: TRANSPLANT SURGERY
Payer: MEDICARE

## 2024-10-27 ENCOUNTER — ANESTHESIA (OUTPATIENT)
Dept: OPERATING ROOM | Age: 74
DRG: 640 | End: 2024-10-27
Payer: MEDICARE

## 2024-10-27 VITALS
OXYGEN SATURATION: 100 % | DIASTOLIC BLOOD PRESSURE: 55 MMHG | RESPIRATION RATE: 18 BRPM | HEART RATE: 86 BPM | TEMPERATURE: 98 F | SYSTOLIC BLOOD PRESSURE: 111 MMHG | HEIGHT: 73 IN | WEIGHT: 161 LBS | BODY MASS INDEX: 21.34 KG/M2

## 2024-10-27 LAB
ALBUMIN SERPL-MCNC: 1.9 G/DL (ref 3.5–5.2)
ALP SERPL-CCNC: 1112 U/L (ref 40–129)
ALT SERPL-CCNC: 45 U/L (ref 0–40)
ANION GAP SERPL CALCULATED.3IONS-SCNC: 13 MMOL/L (ref 7–16)
AST SERPL-CCNC: 157 U/L (ref 0–39)
BASOPHILS # BLD: 0.03 K/UL (ref 0–0.2)
BASOPHILS NFR BLD: 0 % (ref 0–2)
BILIRUB SERPL-MCNC: 15.3 MG/DL (ref 0–1.2)
BUN SERPL-MCNC: 34 MG/DL (ref 6–23)
CALCIUM SERPL-MCNC: 7.6 MG/DL (ref 8.6–10.2)
CHLORIDE SERPL-SCNC: 109 MMOL/L (ref 98–107)
CO2 SERPL-SCNC: 19 MMOL/L (ref 22–29)
CREAT SERPL-MCNC: 1.2 MG/DL (ref 0.7–1.2)
EOSINOPHIL # BLD: 0.06 K/UL (ref 0.05–0.5)
EOSINOPHILS RELATIVE PERCENT: 1 % (ref 0–6)
ERYTHROCYTE [DISTWIDTH] IN BLOOD BY AUTOMATED COUNT: 20.7 % (ref 11.5–15)
GFR, ESTIMATED: 64 ML/MIN/1.73M2
GLUCOSE SERPL-MCNC: 80 MG/DL (ref 74–99)
HCT VFR BLD AUTO: 20.1 % (ref 37–54)
HGB BLD-MCNC: 6.8 G/DL (ref 12.5–16.5)
IMM GRANULOCYTES # BLD AUTO: 0.11 K/UL (ref 0–0.58)
IMM GRANULOCYTES NFR BLD: 1 % (ref 0–5)
INR PPP: 1.8
LYMPHOCYTES NFR BLD: 1.56 K/UL (ref 1.5–4)
LYMPHOCYTES RELATIVE PERCENT: 12 % (ref 20–42)
MCH RBC QN AUTO: 31.6 PG (ref 26–35)
MCHC RBC AUTO-ENTMCNC: 33.8 G/DL (ref 32–34.5)
MCV RBC AUTO: 93.5 FL (ref 80–99.9)
MONOCYTES NFR BLD: 0.71 K/UL (ref 0.1–0.95)
MONOCYTES NFR BLD: 5 % (ref 2–12)
NEUTROPHILS NFR BLD: 81 % (ref 43–80)
NEUTS SEG NFR BLD: 10.7 K/UL (ref 1.8–7.3)
PHOSPHATE SERPL-MCNC: 2.4 MG/DL (ref 2.5–4.5)
PLATELET # BLD AUTO: 186 K/UL (ref 130–450)
PMV BLD AUTO: 9.3 FL (ref 7–12)
POTASSIUM SERPL-SCNC: 3.8 MMOL/L (ref 3.5–5)
PROT SERPL-MCNC: 5.3 G/DL (ref 6.4–8.3)
PROTHROMBIN TIME: 18.6 SEC (ref 9.3–12.4)
RBC # BLD AUTO: 2.15 M/UL (ref 3.8–5.8)
RBC # BLD: ABNORMAL 10*6/UL
SODIUM SERPL-SCNC: 141 MMOL/L (ref 132–146)
WBC OTHER # BLD: 13.2 K/UL (ref 4.5–11.5)

## 2024-10-27 PROCEDURE — 2580000003 HC RX 258: Performed by: TRANSPLANT SURGERY

## 2024-10-27 PROCEDURE — 2060000000 HC ICU INTERMEDIATE R&B

## 2024-10-27 PROCEDURE — 84100 ASSAY OF PHOSPHORUS: CPT

## 2024-10-27 PROCEDURE — 99232 SBSQ HOSP IP/OBS MODERATE 35: CPT | Performed by: STUDENT IN AN ORGANIZED HEALTH CARE EDUCATION/TRAINING PROGRAM

## 2024-10-27 PROCEDURE — 85025 COMPLETE CBC W/AUTO DIFF WBC: CPT

## 2024-10-27 PROCEDURE — 80053 COMPREHEN METABOLIC PANEL: CPT

## 2024-10-27 PROCEDURE — P9016 RBC LEUKOCYTES REDUCED: HCPCS

## 2024-10-27 PROCEDURE — 85610 PROTHROMBIN TIME: CPT

## 2024-10-27 PROCEDURE — 36430 TRANSFUSION BLD/BLD COMPNT: CPT

## 2024-10-27 RX ORDER — SODIUM CHLORIDE 9 MG/ML
INJECTION, SOLUTION INTRAVENOUS PRN
Status: DISCONTINUED | OUTPATIENT
Start: 2024-10-27 | End: 2024-10-27

## 2024-10-27 RX ORDER — SODIUM CHLORIDE 9 MG/ML
INJECTION, SOLUTION INTRAVENOUS PRN
Status: DISCONTINUED | OUTPATIENT
Start: 2024-10-27 | End: 2024-10-28 | Stop reason: HOSPADM

## 2024-10-27 RX ADMIN — SODIUM CHLORIDE: 9 INJECTION, SOLUTION INTRAVENOUS at 00:17

## 2024-10-27 NOTE — CARE COORDINATION
Social Work/Discharge Planning:  Hospice consult noted.  Met with patient in regards to hospice consult.  Patient states he lives in [place], [place] with his son Otf.  Reviewed hospice choices and patient does not have a preference.  Informed him that referral will be made to [facility] and he is agreeable.  Called Bianca with [facility] (ph: [phone]) and she states to call referral to (ph: [phone]).  Called the [facility] and [facility].  Caller states to make referral to [facility]. Referral made to [facility] via epic.  Will continue to follow. Electronically signed by RAMYA Stevens on 10/27/2024 at 9:27 AM    Addendum:  Mosher with [facility] states they do not service [facility], but she can still provide him with information.  Called patient son Otf (ph: [phone]) and provided him with an update.  Otf will reach out to family and notify this worker of his hospice preference.  Will continue to follow.  Electronically signed by RAMYA Stevens on 10/27/2024 at 9:52 AM    Addendum:  Patient son Otf returned call stating they prefer hospice through [facility].  Referral made to Cary with [facility] (ph: [phone]) and they will reach out to patient son.  Will continue to follow.  Electronically signed by RAMYA Stevens on 10/27/2024 at 10:52 AM      Addendum:  Cancelled referral to [facility].  Await return call from [facility].  Electronically signed by RAMYA Stevens on 10/27/2024 at 11:41 AM    Addendum:  Received call from Marylu with [facility] stating she is completely booked for today.  Informed her of patient request to discharge home today.  Marylu states they will meet with patient and his family tomorrow morning and arrange delivery of medical equipment in the morning for discharge tomorrow.  Updated patient and Sophia of above.  Patient acknowledged

## 2024-10-28 PROBLEM — D62 ACUTE BLOOD LOSS ANEMIA: Status: ACTIVE | Noted: 2024-10-28

## 2024-10-28 PROBLEM — C77.2 MALIGNANT NEOPLASM METASTATIC TO INTRA-ABDOMINAL LYMPH NODE (HCC): Status: ACTIVE | Noted: 2024-10-28

## 2024-10-28 LAB
ABO/RH: NORMAL
ANTIBODY SCREEN: NEGATIVE
ARM BAND NUMBER: NORMAL
BLOOD BANK BLOOD PRODUCT EXPIRATION DATE: NORMAL
BLOOD BANK DISPENSE STATUS: NORMAL
BLOOD BANK ISBT PRODUCT BLOOD TYPE: 1700
BLOOD BANK ISBT PRODUCT BLOOD TYPE: 600
BLOOD BANK ISBT PRODUCT BLOOD TYPE: 600
BLOOD BANK PRODUCT CODE: NORMAL
BLOOD BANK SAMPLE EXPIRATION: NORMAL
BLOOD BANK UNIT TYPE AND RH: NORMAL
BPU ID: NORMAL
COMPONENT: NORMAL
CROSSMATCH RESULT: NORMAL
TRANSFUSION STATUS: NORMAL
UNIT DIVISION: 0
UNIT ISSUE DATE/TIME: NORMAL

## 2024-10-28 PROCEDURE — APPSS45 APP SPLIT SHARED TIME 31-45 MINUTES: Performed by: CLINICAL NURSE SPECIALIST

## 2024-10-28 ASSESSMENT — PAIN SCALES - GENERAL: PAINLEVEL_OUTOF10: 0

## 2024-10-28 NOTE — DISCHARGE SUMMARY
water but noticed that he was more jaundiced.  It has worsened over the last 3 weeks associated with some cramping abdominal pain.  His back is also markedly improved secondary to his osteomyelitis which was treated with antibiotics.  He went to ED for jaundice due to malignant neoplasm. Dr. Lane, GI consulted for ERCP, pt refused stating he wanted no further care, no chemotherapy, his wishes were to go home with Hospice. He is refusing any further treatments.  Pt family in to discuss Hospice care at home. Hospice was arranged to meet patient and family at his home at 1 pm per their wishes. Pt discharged to Hospice care.      Discharge Exam: BP (!) 111/55   Pulse 86   Temp 98 °F (36.7 °C) (Oral)   Resp 18   Ht 1.854 m (6' 1\")   Wt 73 kg (161 lb)   SpO2 100%   BMI 21.24 kg/m²     PHYSICAL EXAM  General: Thin, ill appearing, markedly jaundiced   HEENT: trachea midline, PERRL  Chest: Breath sounds were clear and equal with no rales, wheezes, or rhonchi. Respiratory effort was normal  Cardiovascular: Heart sounds were normal with a regular rate and rhythm.  There were no murmurs or gallops.  Abdomen:  the abdomen was soft and non distended.         Disposition: home with Hospice    Meds at discharge:      Medication List        CONTINUE taking these medications      amoxicillin 500 MG capsule  Commonly known as: AMOXIL  Take 1 capsule by mouth 3 times daily     cholestyramine 4 g packet  Commonly known as: Questran  Take 1 packet by mouth 2 times daily     furosemide 20 MG tablet  Commonly known as: Lasix  Take 1 tablet by mouth daily     hydrOXYzine HCl 25 MG tablet  Commonly known as: ATARAX  Take 1 tablet by mouth every 8 hours as needed for Itching     magnesium oxide 400 MG tablet  Commonly known as: MAG-OX  Take 1 tablet by mouth daily     melatonin 3 MG Tabs tablet  Take 1 tablet by mouth nightly as needed (insomnia)     METFORMIN HCL PO     pantoprazole 40 MG tablet  Commonly known as: PROTONIX  Take 1

## 2024-10-28 NOTE — PROGRESS NOTES
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 Denies any new issues    ROS: denies fever, chills, cp, sob, n/v, HA unless stated above.     pantoprazole  40 mg Oral BID AC    potassium chloride  20 mEq Oral BID WC    potassium phosphate IVPB (PERIPHERAL LINE)  20 mmol IntraVENous Once    potassium chloride  10 mEq IntraVENous Q1H    white petrolatum   Topical BID    sodium chloride flush  5-40 mL IntraVENous 2 times per day    [Held by provider] enoxaparin  40 mg SubCUTAneous Daily     sodium chloride, , PRN  white petrolatum, , TID PRN  sodium chloride flush, 5-40 mL, PRN  sodium chloride, , PRN  ondansetron, 4 mg, Q8H PRN   Or  ondansetron, 4 mg, Q6H PRN  sodium phosphate 10 mmol in sodium chloride 0.9 % 250 mL IVPB, 10 mmol, PRN   Or  sodium phosphate 20 mmol in sodium chloride 0.9 % 250 mL IVPB, 20 mmol, PRN         Objective:    BP (!) 108/51   Pulse 70   Temp 97.7 °F (36.5 °C) (Oral)   Resp 18   Ht 1.854 m (6' 1\")   Wt 72 kg (158 lb 12.8 oz)   SpO2 99%   BMI 20.95 kg/m²     General Appearance: alert and in no acute distress  Skin: warm and dry. Jaundiced  Head: normocephalic and atraumatic  Pulmonary/Chest: clear to auscultation bilaterally- no wheezes, rales or rhonchi, normal air movement, no respiratory distress  Cardiovascular: normal rate, normal S1 and S2  Abdomen: soft, non-tender, non-distended, normal bowel sounds  Extremities: no cyanosis, no clubbing and no edema      Recent Labs     10/24/24  0845 10/25/24  0202    143   K 2.2* 2.6*    108*   CO2 23 20*   BUN 22 23   CREATININE 1.3* 1.3*   GLUCOSE 99 104*   CALCIUM 7.4* 7.0*       Recent Labs     10/24/24  0845 10/25/24  0202   WBC 12.3* 13.7*   RBC 2.42* 1.87*   HGB 7.8* 5.9*   HCT 
       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]  Hypokalemia [E87.6]    Subjective:  Patient is being followed for Cholangiocarcinoma (HCC) [C22.1]  Hypokalemia [E87.6]   Pt feels okay  Per RN: no additional concerns    ROS: denies fever, chills, cp, sob, n/v, HA unless otherwise noted above     [START ON 10/27/2024] ampicillin-sulbactam  3,000 mg IntraVENous See Admin Instructions    [START ON 10/27/2024] lactated ringers  1,000 mL IntraVENous See Admin Instructions    [START ON 10/27/2024] indomethacin  100 mg Rectal See Admin Instructions    potassium chloride  10 mEq IntraVENous Q1H    pantoprazole  40 mg Oral BID AC    potassium chloride  20 mEq Oral BID WC    white petrolatum   Topical BID    sodium chloride flush  5-40 mL IntraVENous 2 times per day    [Held by provider] enoxaparin  40 mg SubCUTAneous Daily     sodium chloride, , PRN  white petrolatum, , TID PRN  sodium chloride flush, 5-40 mL, PRN  sodium chloride, , PRN  ondansetron, 4 mg, Q8H PRN   Or  ondansetron, 4 mg, Q6H PRN  sodium phosphate 10 mmol in sodium chloride 0.9 % 250 mL IVPB, 10 mmol, PRN   Or  sodium phosphate 20 mmol in sodium chloride 0.9 % 250 mL IVPB, 20 mmol, PRN         Objective:  BP (!) 90/48   Pulse 82   Temp 98.2 °F (36.8 °C) (Oral)   Resp 16   Ht 1.854 m (6' 1\")   Wt 74.4 kg (164 lb 1.6 oz)   SpO2 98%   BMI 21.65 kg/m²     General Appearance: alert and oriented to person, place and time and in NAD, laying in bed  Skin: warm and dry  Head: normocephalic and atraumatic  Eyes: PERRL, EOMI, conjunctivae normal  Neck: neck supple, trachea midline   Pulmonary/Chest: CTAB, no w/r/r, normal air movement, no respiratory distress, RA  Cardiovascular: RRR, no murmurs  Abdomen: soft, non-tender, non-distended  Extremities: no cyanosis, no clubbing and trace edema  Neurologic: no cranial nerve deficit and speech normal      Recent Labs     
       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]  Hypokalemia [E87.6]    Subjective:  Patient is being followed for Cholangiocarcinoma (HCC) [C22.1]  Hypokalemia [E87.6]   Pt feels okay  Per RN: no additional concerns    ROS: denies fever, chills, cp, sob, n/v, HA unless otherwise noted above     white petrolatum   Topical BID    ampicillin-sulbactam  3,000 mg IntraVENous See Admin Instructions    lactated ringers  1,000 mL IntraVENous See Admin Instructions    indomethacin  100 mg Rectal See Admin Instructions    midodrine  5 mg Oral TID WC    pantoprazole  40 mg Oral BID AC    potassium chloride  20 mEq Oral BID WC    sodium chloride flush  5-40 mL IntraVENous 2 times per day    [Held by provider] enoxaparin  40 mg SubCUTAneous Daily     sodium chloride, , PRN  white petrolatum, , TID PRN  sodium chloride, , PRN  sodium chloride flush, 5-40 mL, PRN  sodium chloride, , PRN  ondansetron, 4 mg, Q8H PRN   Or  ondansetron, 4 mg, Q6H PRN  sodium phosphate 10 mmol in sodium chloride 0.9 % 250 mL IVPB, 10 mmol, PRN   Or  sodium phosphate 20 mmol in sodium chloride 0.9 % 250 mL IVPB, 20 mmol, PRN         Objective:  BP (!) 100/38   Pulse 75   Temp 98.2 °F (36.8 °C) (Oral)   Resp 18   Ht 1.854 m (6' 1\")   Wt 73 kg (161 lb)   SpO2 98%   BMI 21.24 kg/m²     General Appearance: alert and oriented to person, place and time and in NAD, laying in bed  Skin: warm and dry, jaundiced  Head: normocephalic and atraumatic  Eyes: PERRL, EOMI, conjunctivae normal  Neck: neck supple, trachea midline   Pulmonary/Chest: CTAB, no w/r/r, normal air movement, no respiratory distress, RA  Cardiovascular: RRR, no murmurs  Abdomen: soft, non-tender, non-distended  Extremities: no cyanosis, no clubbing and trace edema  Neurologic: no cranial nerve deficit and speech normal      Recent Labs     10/25/24  1528 10/26/24  0306 10/27/24  0110    139 
4 Eyes Skin Assessment     NAME:  Raudel Herndon  YOB: 1950  MEDICAL RECORD NUMBER:  [id number]    The patient is being assessed for  Admission    I agree that at least one RN has performed a thorough Head to Toe Skin Assessment on the patient. ALL assessment sites listed below have been assessed.      Areas assessed by both nurses:    Head, Face, Ears, Shoulders, Back, Chest, Arms, Elbows, Hands, Sacrum. Buttock, Coccyx, Ischium, and Legs. Feet and Heels        Does the Patient have a Wound? Yes wound(s) were present on assessment. LDA wound assessment was Initiated and completed by RN       Chris Prevention initiated by RN: Yes  Wound Care Orders initiated by RN: Yes    Pressure Injury (Stage 3,4, Unstageable, DTI, NWPT, and Complex wounds) if present, place Wound referral order by RN under : Yes    New Ostomies, if present place, Ostomy referral order under : No     Nurse 1 eSignature: Electronically signed by Leny Stock RN on 10/24/24 at 6:07 PM EDT    **SHARE this note so that the co-signing nurse can place an eSignature**    Nurse 2 eSignature: Electronically signed by Isabella Horner RN on 10/24/24 at 6:50 PM EDT    
4 Eyes Skin Assessment     NAME:  Raudel Herndon  YOB: 1950  MEDICAL RECORD NUMBER:  [id number]    The patient is being assessed for  Transfer to New Unit    I agree that at least one RN has performed a thorough Head to Toe Skin Assessment on the patient. ALL assessment sites listed below have been assessed.      Areas assessed by both nurses:    Head, Face, Ears, Shoulders, Back, Chest, Arms, Elbows, Hands, Sacrum. Buttock, Coccyx, Ischium, Legs. Feet and Heels, and Under Medical Devices         Does the Patient have a Wound? Yes wound(s) were present on assessment. LDA wound assessment was Initiated and completed by RN       Chris Prevention initiated by RN: Yes  Wound Care Orders initiated by RN: Yes    Pressure Injury (Stage 3,4, Unstageable, DTI, NWPT, and Complex wounds) if present, place Wound referral order by RN under : Yes    New Ostomies, if present place, Ostomy referral order under : No     Nurse 1 eSignature: Electronically signed by Sophie Lorenz RN on 10/25/24 at 5:48 PM EDT    **SHARE this note so that the co-signing nurse can place an eSignature**    Nurse 2 eSignature: Electronically signed by Loy Bella RN on 10/25/24 at 6:31 PM EDT  
[facility] PRE-ADMISSION TESTING INSTRUCTIONS    The Preadmission Testing patient is instructed accordingly using the following criteria (check applicable):    ARRIVAL INSTRUCTIONS:  [x] Parking the day of Surgery is located in the Main Entrance lot.  Upon entering the door, make an immediate right to the surgery reception desk    [x] Bring photo ID and insurance card    [] Bring in a copy of Living will or Durable Power of  papers.    [x] Please be sure to arrange for a responsible adult to provide transportation to and from the hospital    [x] Please arrange for a responsible adult to be with you for the 24 hour period post procedure due to having anesthesia    [x] If you awake am of surgery not feeling well or have temperature >100 please call [phone]    GENERAL INSTRUCTIONS:    [x] No solid foods after midnight, no gum, candy or mints.  May have clear liquids until 4 hours prior to surgery.         [x] You may brush your teeth, do not swallow any toothpaste    [x] Take medications as instructed     [x] Stop herbal supplements and vitamins 5 days prior to procedure    [] Follow preop dosing of blood thinners per physician instructions    [] Take 1/2 dose of evening insulin, but no insulin after midnight    [] No oral diabetic medications after midnight    [] If diabetic and have low blood sugar or feel symptomatic, take 1-2oz apple juice only    [] Bring inhalers day of surgery    [] Bring urine specimen day of surgery    [x] Shower or bath with soap, lather and rinse well, AM of Surgery, no lotion, powders or creams to surgical site    [] Follow bowel prep as instructed per surgeon    [x] No tobacco products within 24 hours of surgery     [x] No alcohol or illegal drug use, marijuana included, within 24 hours of surgery.    [x] Jewelry, body piercing's, eyeglasses, contact lenses and dentures are not permitted into surgery (bring cases)      [x] Please do not wear any nail 
Clinical manager notified to call to schedule ERCP.   
Database initiated. Patient is A&O comes in from home with son. States he uses a walker and is RA at baseline. He has a right chest port. He is a little hard of hearing.    
HGB 5.9 REPORTED TO DR. PERALES. TYPE  AND CROSS, CONSENT COMPLETED.  
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 K repleted. Denies abdominal pain. 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  - possible carcinomatosis on CT scan, patient does not wish to have laparoscopic biopsies  - GI consulted for EGD/ ERCP due to hyperbilirubinemia and blood loss anemia. Planning for Sunday.   - Hypokalemia, replace as ordered  - BP 70's systolic overnight, 90's-104 this am, tx to intermediate monitored bed  - Ok for diet today and NPO at midnight tomorrow.   - Plan for d/c after ERCP    Greater than or equal to 35 minutes was spent providing face-to-face patient care, including:  and coordinating care, reviewing the chart, labs, and diagnostics, as well as medical decision making. Greater than 50% of this time was spent instructing and counseling the patient face to face regarding findings and recommendations.          Susie Barrera MD  10/26/2024 8:34 AM     
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  Denies abdominal pain, nausea, or vomiting.  States he feels good overall.  Continues to lack appetite.  Last BM this a.m.    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  - possible carcinomatosis on CT scan, patient does not wish to have laparoscopic biopsies  - patient has expressed wishes for hospice and to go home. He is refusing any further treatments.  Hospice to come in today to come talk to him to set up home hospice prior to leaving.  Patient states his family is coming in to also speak with hospice along with him.    Greater than or equal to 35 minutes was spent providing face-to-face patient care, including:  and coordinating care, reviewing the chart, labs, and diagnostics, as well as medical decision making. Greater than 50% of this time was spent instructing and counseling the patient face to face regarding findings and recommendations.    Case discussed with and plan per Dr Bruce Gong, APRN - CNP  10/28/2024 8:44 AM     Attending Physician Statement:    Chief Complaint: 
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  I do not want any more chemotherapy.  I am just so weak and tired I do not think I can handle it.\"  Denies abdominal pain, nausea, or vomiting.  Had diarrhea all day yesterday, watery brown, denies any stools since midnight.    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  - possible carcinomatosis on CT scan  - I will refer him for a ERCP as he is noticeably jaundiced, he is known to Dr. Lane  - Hypokalemia, replace as ordered  - K+ 2.6, oral and IV ordered  - BP 70's systolic overnight, 90's-104 this am, tx to intermediate monitored bed  - NPO until seen by Dr. Lane, if no plans for endoscopy today, ok to resume diet and supplements  - Plan for laparoscopic robotic diagnostic laparoscopy with possible resection    Greater than or equal to 35 minutes was spent providing face-to-face patient care, including:  and coordinating care, reviewing the chart, labs, and diagnostics, as well as medical decision making. Greater than 50% of this time was spent instructing and 
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 Hgb is 6.8 but patient also refusing transfusion. Wishing to go home with hospice.     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  - possible carcinomatosis on CT scan, patient does not wish to have laparoscopic biopsies  - patient has expressed wishes for hospice and to go home. He is refusing any further treatments. I discussed with him allowing us to have hospice come talk to him to set up home hospice prior to leaving. Consult placed.     Greater than or equal to 35 minutes was spent providing face-to-face patient care, including:  and coordinating care, reviewing the chart, labs, and diagnostics, as well as medical decision making. Greater than 50% of this time was spent instructing and counseling the patient face to face regarding findings and recommendations.        Susie Barrera MD  10/27/2024 8:52 AM     
Hospitalist- Dr Cruz -notified of consult for medical management   
ID (Dr Gillis) and palliative (Thu Vega NP) notified of consults.  
Infectious Disease  Progress Note  [facility]    Chief Complaint: Jaundice    Subjective:  he is doing ok. No new complaints. No abdominal pain or nausea/vomiting. Had diarrhea before admission but not since admission.     Scheduled Meds:   pantoprazole  40 mg Oral BID AC    potassium chloride  20 mEq Oral BID WC    potassium phosphate IVPB (PERIPHERAL LINE)  20 mmol IntraVENous Once    potassium chloride  10 mEq IntraVENous Q1H    white petrolatum   Topical BID    sodium chloride flush  5-40 mL IntraVENous 2 times per day    [Held by provider] enoxaparin  40 mg SubCUTAneous Daily     Continuous Infusions:   sodium chloride      sodium chloride      sodium chloride 40 mL/hr at 10/24/24 1210     PRN Meds:sodium chloride, white petrolatum **AND** white petrolatum, sodium chloride flush, sodium chloride, ondansetron **OR** ondansetron, sodium phosphate 10 mmol in sodium chloride 0.9 % 250 mL IVPB **OR** sodium phosphate 20 mmol in sodium chloride 0.9 % 250 mL IVPB    Prior to Admission medications    Medication Sig Start Date End Date Taking? Authorizing Provider   furosemide (LASIX) 20 MG tablet Take 1 tablet by mouth daily 10/11/24  Yes Raza Reynoso III, MD   amoxicillin (AMOXIL) 500 MG capsule Take 1 capsule by mouth 3 times daily 10/10/24 12/9/24 Yes Annemarie Rico APRN - CNP   cholestyramine (QUESTRAN) 4 g packet Take 1 packet by mouth 2 times daily 10/8/24  Yes Sourav Rodrigez MD   METFORMIN HCL PO Take by mouth   Yes ProviderKwadwo MD   hydrOXYzine HCl (ATARAX) 25 MG tablet Take 1 tablet by mouth every 8 hours as needed for Itching 10/3/24 12/2/24 Yes Raza Reynoso III, MD   magnesium oxide (MAG-OX) 400 MG tablet Take 1 tablet by mouth daily 7/10/24  Yes Goldy Cardona MD   melatonin 3 MG TABS tablet Take 1 tablet by mouth nightly as needed (insomnia) 7/5/24  Yes Goldy Cardona MD   pantoprazole (PROTONIX) 40 MG tablet Take 1 tablet by mouth 2 times daily (before meals) 3/19/24  
Infectious Disease  Progress Note  [facility]    Chief Complaint: Jaundice    Subjective:  siting up in bed, in no distress. C/o diarrhea - large volume watery. No fevers.     Scheduled Meds:   [START ON 10/27/2024] ampicillin-sulbactam  3,000 mg IntraVENous See Admin Instructions    [START ON 10/27/2024] lactated ringers  1,000 mL IntraVENous See Admin Instructions    [START ON 10/27/2024] indomethacin  100 mg Rectal See Admin Instructions    midodrine  5 mg Oral TID WC    pantoprazole  40 mg Oral BID AC    potassium chloride  20 mEq Oral BID WC    white petrolatum   Topical BID    sodium chloride flush  5-40 mL IntraVENous 2 times per day    [Held by provider] enoxaparin  40 mg SubCUTAneous Daily     Continuous Infusions:   sodium chloride      sodium chloride      sodium chloride 40 mL/hr at 10/24/24 1210     PRN Meds:sodium chloride, white petrolatum **AND** white petrolatum, sodium chloride flush, sodium chloride, ondansetron **OR** ondansetron, sodium phosphate 10 mmol in sodium chloride 0.9 % 250 mL IVPB **OR** sodium phosphate 20 mmol in sodium chloride 0.9 % 250 mL IVPB    Prior to Admission medications    Medication Sig Start Date End Date Taking? Authorizing Provider   furosemide (LASIX) 20 MG tablet Take 1 tablet by mouth daily 10/11/24  Yes Raza Reynoso III, MD   amoxicillin (AMOXIL) 500 MG capsule Take 1 capsule by mouth 3 times daily 10/10/24 12/9/24 Yes Annemarie Rico, APRN - CNP   cholestyramine (QUESTRAN) 4 g packet Take 1 packet by mouth 2 times daily 10/8/24  Yes Sourav Rodrigez MD   METFORMIN HCL PO Take by mouth   Yes ProviderKwadwo MD   hydrOXYzine HCl (ATARAX) 25 MG tablet Take 1 tablet by mouth every 8 hours as needed for Itching 10/3/24 12/2/24 Yes Raza Reynoso III, MD   magnesium oxide (MAG-OX) 400 MG tablet Take 1 tablet by mouth daily 7/10/24  Yes Goldy Cardona MD   melatonin 3 MG TABS tablet Take 1 tablet by mouth nightly as needed (insomnia) 7/5/24  
Informed consent signed and in chart.  
Made visit to patient's room to follow up on our conversation from yesterday. He did not wish to talk to me. He denies any pain or any symptom management needs at this time. He asked for me to leave him alone. Palliative care will continue to follow along and we will revisit on Monday if he agrees. Please reach out with any needs.   
Message placed to Miah Gong regarding patient meeting with hospice at his house around 1:00pm. Awaiting discharge order.   
Message sent to Dr. Barrera in regards to patient refusing ERCP and wanting to discharge. Updated on patient refusing blood products as well at this time.   
Message sent to Dr. Manzo in regards to patient refusing ERCP, vitals, meds and GI signing off.   
Message sent to Dr. Manzo regarding low BP of 82/40. New orders received.   
Message sent to Miah Gong regarding patients discharge planning.   
Message sent to Rosemary SEGOVIA with HGB- 6.8.   
NOTIFIED DR SANDERS OF 2.8. NO NEW ORDERS GIVE RBC'S WHEN READY.  
Nurse to nurse called to [address] rn.  
Patient called this nurse to his room stating \"I am done, I don't want any more treatment, Take this blood off and give me something to drink\".   I asked the patient if he still wanted the ERCP procedure today he stated\" Call the doctor and cancel I do not want to have it done\".   Blood taken down, Dr. Lane notified of patient refusing procedure.   This nurse informed patient about the roles of palliative and hospice, He is asking to talk to his son first and he will make more decisions after that.     Rosemary NP notified of above.   Clinical Manager notified to cancel OR.   
Patient currently refusing all treatments due to going home with hospice.   
Patient is discharging with hospice services today. We will sign off. Please reconsult if needs arise.   
Patient refusing heart monitor and asking for IV to be taken out. Heart monitor removed, Agreeable to keep IV in for now.   
Patient refusing to have vital signs taken at this time. Educated on the importance of monitoring vital signs every 4 hours, patient continues to refuse.   
Patients son Otf called regarding plan of care. Son is concerned that he might not be able to take care of patient due to working long hours and patient needing more help around the house. Son states maybe he should go with hospice because he is giving up and doesn't want chemo anymore. All questions answered at this time.  
Physical Therapy  Facility/Department: [address] MED SURG/TELE  Physical Therapy Initial Assessment    Name: Raudel Herndon  : 1950  MRN: [id number]  Date of Service: 10/25/2024    Attending Provider:  Raza Reynoso*    Evaluating PT:  James Gaxiola Jr., P.T.    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   Precautions:  falls, bed/chair alarm     SUBJECTIVE:    Pt lives with his son in a 1 story home with a ramp to enter.  Pt ambulated with a ww PTA. TLSO was discharged 10/9/24 and he has no spinal restrictions at this time.  Pt has recliner lift chair and his bed height is higher as he has trouble standing up from low surfaces.     OBJECTIVE:   Initial Evaluation  Date: 10/25/24 Treatment Short Term/ Long Term   Goals   Was pt agreeable to Eval/treatment? yes     Does pt have pain? No c/o pain at this time.      Bed Mobility  Rolling: Independent  Supine to sit: Independent  Sit to supine: Independent  Scooting: Independent  Independent   Transfers Sit to stand: supervision  Stand to sit: supervision  Stand pivot: SBA with ww  Independent    Ambulation   150 feet with ww SBA  250 feet with ww supervision   Stair negotiation: ascended and descended NA  NA   AM-PAC 6 Clicks        BLE ROM is WFL.   BLE strength is grossly 4/5.   Balance: sitting is Independent and standing with ww is supervision  Endurance: fair+    ASSESSMENT:    Conditions Requiring Skilled Therapeutic Intervention:    [x]Decreased strength     []Decreased ROM  [x]Decreased functional mobility  [x]Decreased balance   [x]Decreased endurance   []Decreased posture  []Decreased sensation  []Decreased coordination   []Decreased vision  []Decreased safety awareness   []Increased pain       Comments:  Pt was in bed and was agreeable to PT.  He walked with ww and had no LOB or SOB. 
Procedure cancelled per anesthesia. K 2.2.   
Spoke to bedside nurse Dee notified patient following with Dr. Cruz consult to be changed.    Maricruz Linn, APRN - CNP 10/25/2024 10:47 AM   
When attempting to start patients blood transfusion, patient stated he does not want any more treatment done. I asked patient if he still wanted to have the ERCP today, He said yes he still wants the procedure so he will agree to the blood transfusion.       
  Component Value Date/Time    FERRITIN 2,098 03/15/2024 05:20 PM         Assessment:     Cholangiocarcinoma    Obstructive jaundice secondary to cholangiocarcinoma   Anemia, normocytic  Hyperbilirubinemia   Hypokalemia-defer  Weight loss  Hx- constipation    Plan:     NPO after midnight, tentative ERCP Sunday at 7:30 am with Dr. Lane  Orders placed for ERCP  Antiemetics as needed as ordered  Medical management per primary care team  Continue pantoprazole 40 mg twice daily  Antiemetics as needed as ordered  Supportive care  [facility] labs  Will follow      Note: This report was completed utilizing computer voice recognition software. Every effort has been made to ensure accuracy, however; inadvertent computerized transcription errors may be present.     Discussed with Dr. Domingo Linn, APRN - CNP 10/26/2024  12:19 PM For Dr. Lane    GI attending addendum:    The patient case was reviewed and discussed with the GI midlevel team.     Raza Lane, DO    
  Obstructive jaundice secondary to cholangiocarcinoma   Anemia, normocytic  Hyperbilirubinemia   Hypokalemia-defer  Weight loss  Hx- constipation    Plan:   Antiemetics as needed as ordered  Medical management per primary care team  Continue pantoprazole 40 mg twice daily  Antiemetics as needed as ordered  Supportive care  [facility] labs  Discharge planning, awaiting evaluation from hospice  GI will sign off. Please call if needed      Note: This report was completed utilizing computer voice recognition software. Every effort has been made to ensure accuracy, however; inadvertent computerized transcription errors may be present.     Discussed with Dr. Domingo Linn, APRN - CNP 10/27/2024  8:32 AM For Dr. Lane      GI Attending Addendum:  The patient was seen and examined independently from the midlevel team. The case was discuss with the team and the above assessment and plan was developed. Patient refused repeat ERCP today, would like to go hospice. Case discussed with Son previously. GI signing off  aRza Lane DO

## 2024-10-28 NOTE — PLAN OF CARE
Problem: Discharge Planning  Goal: Discharge to home or other facility with appropriate resources  10/28/2024 1138 by Sophie Lorenz RN  Outcome: Completed     Problem: Pain  Goal: Verbalizes/displays adequate comfort level or baseline comfort level  10/28/2024 1138 by Sophie Lorenz RN  Outcome: Completed     Problem: Safety - Adult  Goal: Free from fall injury  10/28/2024 1138 by Sophie Lorenz RN  Outcome: Completed     Problem: Chronic Conditions and Co-morbidities  Goal: Patient's chronic conditions and co-morbidity symptoms are monitored and maintained or improved  10/28/2024 1138 by Sophie Lorenz RN  Outcome: Completed     Problem: ABCDS Injury Assessment  Goal: Absence of physical injury  Outcome: Completed     Problem: Skin/Tissue Integrity  Goal: Absence of new skin breakdown  Description: 1.  Monitor for areas of redness and/or skin breakdown  2.  Assess vascular access sites hourly  3.  Every 4-6 hours minimum:  Change oxygen saturation probe site  4.  Every 4-6 hours:  If on nasal continuous positive airway pressure, respiratory therapy assess nares and determine need for appliance change or resting period.  Outcome: Completed     Problem: Nutrition Deficit:  Goal: Optimize nutritional status  Outcome: Completed

## 2024-10-28 NOTE — CARE COORDINATION
Social Work/Discharge Planning:  Received call from Graciela with [facility] (ph: [phone], fax: [phone]) and she requested for hospice order and chart notes.  Graciela states she will call patient son Otf to confirm if any medical equipment is needed.  Met with patient and his ex-wife Sophia and provided an update.  Sophia confirmed she will still transport patient home.  Informed them that RN will call Physician for discharge order.  Plan is for patient tentatively discharge home by 11:30am to meet with hospice at home him by 1:00pm.  Updated RN and Graciela with [facility].  Patient discharge instructions will need to be faxed to [facility].  Will continue to follow.  Electronically signed by RAMYA Stevens on 10/28/2024 at 10:25 AM

## 2024-11-06 PROBLEM — Z98.890 POST-OPERATIVE STATE: Status: RESOLVED | Noted: 2024-10-07 | Resolved: 2024-11-06

## 2025-02-12 ENCOUNTER — TELEPHONE (OUTPATIENT)
Dept: SURGERY | Age: 75
End: 2025-02-12

## 2025-02-12 NOTE — TELEPHONE ENCOUNTER
Patient due to have stent removal.  Chart reviewed, patient admitted into hospice care.  No stent removal at this time.  Electronically signed by Mana Barahona on 2/12/25 at 8:35 AM EST

## 2025-07-29 NOTE — H&P
HEPATOBILIARY AND PANCREATIC SURGERY  HISTORY AND PHYSICAL  2/28/2024    Chief complaint: Obstructive jaundice    HPI  Raudel Herndon is a 73 y.o. male with hx of diabetes mellitus and chronic pancreatitis and recently diagnosed extrahepatic cholangiocarcinoma. He was seen in [facility] clinic 2/27 and was recommended admission for obstructive jaundice. He initially had presented to [place] in January 2024 for evaluation of obstructive jaundice and underwent ERCP with plastic stent placed at that time. ERCP brushings were obtained with pathology positive for adenocarcinoma. Of note, ERCP noted to have a tight  common hepatic duct stricture. CA 19-9 at that time was 11,000 and bilirubin was 14. He states he has lost about 30 pounds in the last two months and states he has continued to lose weight since his admission in [place] with a 10-15lb weight loss in the past 5-6 weeks. He reports persistent nausea and some emesis but mainly dry heaves. He is still trying to eat but is limited secondary to emesis. He denies any abdominal pain and states he is still passing flatus and moving his bowels although he notes his stools are still feliz colored and has tea colored urine. He denies any SOB or chest pain. His son states he intermittently has chills but no fevers.     Hx of cholecystectomy in 2017 for gangrenous cholecystitis and found to have fatty liver. Hx of brachytherapy for prostate cancer.       Past Medical History:   Diagnosis Date    Cancer (HCC)        No past surgical history on file.    Prior to Admission medications    Medication Sig Start Date End Date Taking? Authorizing Provider   candesartan-hydroCHLOROthiazide (ATACAND HCT) 16-12.5 MG per tablet Take 1 tablet by mouth daily   Yes Kwadwo Valenzuela MD   candesartan (ATACAND) 16 MG tablet Take 1 tablet by mouth daily  Patient not taking: Reported on 2/27/2024    Kwadwo Valenzuela MD   metFORMIN (GLUCOPHAGE) 500 MG tablet Take 1 tablet by mouth  No indicators present

## (undated) DEVICE — AIR/WATER CLEANING ADAPTER FOR OLYMPUS® GI ENDOSCOPE: Brand: BULLDOG®

## (undated) DEVICE — KIT BEDSIDE REVITAL OX 500ML

## (undated) DEVICE — SPHINCTEROTOME: Brand: HYDRATOME RX 44

## (undated) DEVICE — RESCUE COMBO FORCEPS

## (undated) DEVICE — CANNULATING SPHINCTEROTOME: Brand: JAGTOME™ REVOLUTION RX

## (undated) DEVICE — SYRINGE 20ML LL S/C 50

## (undated) DEVICE — CONTAINER SPEC COLL 960ML POLYPR TRIANG GRAD INTAKE/OUTPUT

## (undated) DEVICE — RETRIEVAL BALLOON CATHETER: Brand: EXTRACTOR™ PRO RX

## (undated) DEVICE — SYSTEM INJ BILI RAP REFIL CONT

## (undated) DEVICE — BLADE,STAINLESS-STEEL,11,STRL,DISPOSABLE: Brand: MEDLINE

## (undated) DEVICE — GOWN,BREATHABLE SLV,AURORA,XLG,STRL: Brand: MEDLINE

## (undated) DEVICE — CANNULA NSL ORAL AD FOR CAPNOFLEX CO2 O2 AIRLFE

## (undated) DEVICE — LIQUIBAND RAPID ADHESIVE 36/CS 0.8ML: Brand: MEDLINE

## (undated) DEVICE — SNARE VASC L240CM LOOP W10MM SHTH DIA2.4MM RND STIFF CLD

## (undated) DEVICE — NEEDLE HYPO 25GA L1.5IN BLU POLYPR HUB S STL REG BVL STR

## (undated) DEVICE — DOUBLE BASIN SET: Brand: MEDLINE INDUSTRIES, INC.

## (undated) DEVICE — BITEBLOCK 54FR W/ DENT RIM BLOX

## (undated) DEVICE — SYRINGE MED 10ML TRNSLUC BRL PLUNG BLK MRK POLYPR CTRL

## (undated) DEVICE — MONOPOLAR CURVED SCISSORS: Brand: ENDOWRIST

## (undated) DEVICE — GRADUATE TRIANG MEASURE 1000ML BLK PRNT

## (undated) DEVICE — DRAPE,LAP,CHOLE,W/TROUGHS,STERILE: Brand: MEDLINE

## (undated) DEVICE — 3M™ IOBAN™ 2 ANTIMICROBIAL INCISE DRAPE 6640EZ: Brand: IOBAN™ 2

## (undated) DEVICE — Device

## (undated) DEVICE — SINGLE USE DISTAL COVER MAJ-2315: Brand: SINGLE USE DISTAL COVER

## (undated) DEVICE — SPONGE GZ W4XL4IN RAYON POLY CVR W/NONWOVEN FAB STRL 2/PK

## (undated) DEVICE — 4-PORT MANIFOLD: Brand: NEPTUNE 2

## (undated) DEVICE — SEAL

## (undated) DEVICE — GAUZE,SPONGE,4"X4",8PLY,STRL,LF,10/TRAY: Brand: MEDLINE

## (undated) DEVICE — SYSTEM BX CAP BILI RAP EXCHG CAP LOK DEV COMPATIBLE W/ OLY

## (undated) DEVICE — PLUMEPORT ACTIV LAPAROSCOPIC SMOKE FILTRATION DEVICE: Brand: PLUMEPORT ACTIVE

## (undated) DEVICE — TISSUE RETRIEVAL SYSTEM: Brand: INZII RETRIEVAL SYSTEM

## (undated) DEVICE — MAJOR VASCULAR: Brand: MEDLINE INDUSTRIES, INC.

## (undated) DEVICE — ELECTRODE PT RET AD L9FT HI MOIST COND ADH HYDRGEL CORDED

## (undated) DEVICE — SOLUTION IV 50ML 0.9% SOD CHL PLAS CONT USP VIAFLX

## (undated) DEVICE — WARMER SCP 2 ANTIFOG LAP DISP

## (undated) DEVICE — MEDIA CONTRAST ISOVUE 300 100ML

## (undated) DEVICE — VALVE SUCTION AIR H2O HYDR H2O JET CONN STRL ORCA POD + DISP

## (undated) DEVICE — GLOVE SURG SZ 65 THK91MIL LTX FREE SYN POLYISOPRENE

## (undated) DEVICE — ARM DRAPE

## (undated) DEVICE — FENESTRATED BIPOLAR FORCEPS: Brand: ENDOWRIST

## (undated) DEVICE — SPONGE GZ 4IN 4IN 4 PLY N WVN AVANT

## (undated) DEVICE — E-Z CLEAN, NON-STICK, PTFE COATED, ELECTROSURGICAL NEEDLE ELECTRODE, MODIFIED EXTENDED INSULATION, 2.75 INCH (7 CM): Brand: MEGADYNE

## (undated) DEVICE — PROGRASP FORCEPS: Brand: ENDOWRIST

## (undated) DEVICE — C-ARM: Brand: UNBRANDED

## (undated) DEVICE — BLOCK BITE 60FR RUBBER ADLT DENTAL

## (undated) DEVICE — APPLICATOR MEDICATED 26 CC SOLUTION HI LT ORNG CHLORAPREP

## (undated) DEVICE — WIPES SKIN CLOTH READYPREP 9 X 10.5 IN 2% CHLORHEX GLUCONATE CHG PREOP

## (undated) DEVICE — TIP COVER ACCESSORY

## (undated) DEVICE — GOWN,SIRUS,FABRNF,XL,20/CS: Brand: MEDLINE

## (undated) DEVICE — INSUFFLATION TUBING SET WITH FILTER, FUNNEL CONNECTOR AND LUER LOCK: Brand: JOSNOE MEDICAL INC

## (undated) DEVICE — KIT,ANTI FOG,W/SPONGE & FLUID,SOFT PACK: Brand: MEDLINE

## (undated) DEVICE — DEFENDO AIR WATER SUCTION AND BIOPSY VALVE KIT FOR  OLYMPUS: Brand: DEFENDO AIR/WATER/SUCTION AND BIOPSY VALVE

## (undated) DEVICE — MEDIUM-LARGE CLIP APPLIER: Brand: ENDOWRIST

## (undated) DEVICE — BLADELESS OBTURATOR: Brand: WECK VISTA

## (undated) DEVICE — DRAPE,UTILTY,TAPE,15X26, 4EA/PK: Brand: MEDLINE

## (undated) DEVICE — MAGNETIC INSTR DRAPE 20X16: Brand: MEDLINE INDUSTRIES, INC.

## (undated) DEVICE — TUBING, SUCTION, 1/4" X 10', STRAIGHT: Brand: MEDLINE

## (undated) DEVICE — TOWEL,OR,DSP,ST,BLUE,STD,6/PK,12PK/CS: Brand: MEDLINE

## (undated) DEVICE — INSUFFLATION NEEDLE TO ESTABLISH PNEUMOPERITONEUM.: Brand: INSUFFLATION NEEDLE

## (undated) DEVICE — COLUMN DRAPE

## (undated) DEVICE — BLOCK BITE 60FR CAREGUARD

## (undated) DEVICE — GLOVE SURG SZ 7 L12IN FNGR THK79MIL GRN LTX FREE